# Patient Record
Sex: FEMALE | Race: WHITE | NOT HISPANIC OR LATINO | Employment: FULL TIME | URBAN - NONMETROPOLITAN AREA
[De-identification: names, ages, dates, MRNs, and addresses within clinical notes are randomized per-mention and may not be internally consistent; named-entity substitution may affect disease eponyms.]

---

## 2017-01-02 ENCOUNTER — HOSPITAL ENCOUNTER (EMERGENCY)
Facility: HOSPITAL | Age: 42
Discharge: HOME | End: 2017-01-02
Attending: PHYSICIAN ASSISTANT | Admitting: PHYSICIAN ASSISTANT
Payer: COMMERCIAL

## 2017-01-02 VITALS
OXYGEN SATURATION: 99 % | HEART RATE: 79 BPM | TEMPERATURE: 96.6 F | DIASTOLIC BLOOD PRESSURE: 93 MMHG | RESPIRATION RATE: 20 BRPM | SYSTOLIC BLOOD PRESSURE: 132 MMHG

## 2017-01-02 DIAGNOSIS — J02.9 ACUTE PHARYNGITIS, UNSPECIFIED ETIOLOGY: ICD-10-CM

## 2017-01-02 LAB
DEPRECATED S PYO AG THROAT QL EIA: NORMAL
MICRO REPORT STATUS: NORMAL
SPECIMEN SOURCE: NORMAL

## 2017-01-02 PROCEDURE — 87077 CULTURE AEROBIC IDENTIFY: CPT | Performed by: FAMILY MEDICINE

## 2017-01-02 PROCEDURE — 87081 CULTURE SCREEN ONLY: CPT | Performed by: FAMILY MEDICINE

## 2017-01-02 PROCEDURE — 87880 STREP A ASSAY W/OPTIC: CPT | Performed by: FAMILY MEDICINE

## 2017-01-02 PROCEDURE — 99213 OFFICE O/P EST LOW 20 MIN: CPT | Performed by: PHYSICIAN ASSISTANT

## 2017-01-02 PROCEDURE — 99213 OFFICE O/P EST LOW 20 MIN: CPT

## 2017-01-02 ASSESSMENT — ENCOUNTER SYMPTOMS
EYE DISCHARGE: 0
NECK STIFFNESS: 0
SORE THROAT: 1
NECK PAIN: 0
COUGH: 0
EYE REDNESS: 0
NAUSEA: 0
VOMITING: 0
DIZZINESS: 0
FATIGUE: 0
PSYCHIATRIC NEGATIVE: 1
FEVER: 0
SINUS PRESSURE: 0
TROUBLE SWALLOWING: 0
ABDOMINAL PAIN: 0
HEADACHES: 0
CARDIOVASCULAR NEGATIVE: 1
VOICE CHANGE: 0
APPETITE CHANGE: 0
DIARRHEA: 0
LIGHT-HEADEDNESS: 0

## 2017-01-02 NOTE — ED AVS SNAPSHOT
[facility]    [address] [address]    Phone:  [phone]                                       Ritesh Soliman   MRN: [id number]    Department:  [facility]   Date of Visit:  1/2/2017           After Visit Summary Signature Page     I have received my discharge instructions, and my questions have been answered. I have discussed any challenges I see with this plan with the nurse or doctor.    ..........................................................................................................................................  Patient/Patient Representative Signature      ..........................................................................................................................................  Patient Representative Print Name and Relationship to Patient    ..................................................               ................................................  Date                                            Time    ..........................................................................................................................................  Reviewed by Signature/Title    ...................................................              ..............................................  Date                                                            Time

## 2017-01-02 NOTE — ED AVS SNAPSHOT
[facility]    [address]    [place] [address]    Phone:  [phone]                                       Ritesh Soliman   MRN: [id number]    Department:  [facility]   Date of Visit:  1/2/2017           Patient Information     Date Of Birth          1975        Your diagnoses for this visit were:     Acute pharyngitis, unspecified etiology Rapid strep negative  Culture pending       You were seen by Mere Mathis PA.      Follow-up Information     Follow up with Olivia Weiss MD.    Specialty:  Family Practice    Why:  If symptoms worsen    Contact information:    [facility] [place]  [address]  [place] [address] [phone]          Follow up with [facility].    Specialty:  EMERGENCY MEDICINE    Why:  If further concerns develop    Contact information:    [address]  [place] [address] [phone]    Additional information:    From [place] Area: Take [address]. Turn left at [address]. Turn left at the first stoplight on [address]. At the first stop sign, take a right onto [address]. Take a left into the parking lot and continue through until you reach the North enterance of the building.       From [place]: Take [address]. Take the [address] ramp towards [place]. Turn left onto [address]. Take a slight right onto [address]. Turn left at the first stoplight on [address]. At the first stop sign, take a right onto [address]. Take a left into the parking lot and continue through until you reach the North enterance of the building.       From [place]: Take [address]. Take a right at [address]. At the first stop sign, take a right onto [address]. Take a left into the parking lot and continue through until you reach the North enterance of the building.       Discharge References/Attachments     PHARYNGITIS, REPORT PENDING  (ENGLISH)    SORE THROAT, WHEN YOU HAVE A (ENGLISH)         Review of your medicines      Our records show that you are taking the medicines listed below. If these are incorrect, please call your family doctor or clinic.        Dose / Directions Last dose taken    AZO TABS PO        Taking 2-4 tabs a day as needed   Refills:  0        FLUoxetine 20 MG capsule   Commonly known as:  PROzac   Quantity:  30 capsule        Take 1 capsule (20 mg) by mouth daily. DUE FOR OFFICE VISIT.   Refills:  0        ketorolac 10 MG tablet   Commonly known as:  TORADOL   Dose:  10 mg   Quantity:  60 tablet        Take 1 tablet (10 mg) by mouth every 6 hours as needed for moderate pain   Refills:  1        traZODone 50 MG tablet   Commonly known as:  DESYREL   Quantity:  30 tablet        Take 1 tablet (50 mg) by mouth nightly as needed for sleep   Refills:  5                Procedures and tests performed during your visit     Beta strep group A culture    Rapid strep screen      Orders Needing Specimen Collection     None      Pending Results     Date and Time Order Name Status Description    1/2/2017 1800 Beta strep group A culture In process             Thank you for choosing [facility]       Thank you for choosing [facility] for your care. Our goal is always to provide you with excellent care. Hearing back from our patients is one way we can continue to improve our services. Please take a few minutes to complete the written survey that you may receive in the mail after you visit with us. Thank you!        After Visit Summary       This is your record. Keep this with you and show to your community pharmacist(s) and doctor(s) at your next visit.

## 2017-01-03 ENCOUNTER — HOSPITAL ENCOUNTER (EMERGENCY)
Facility: HOSPITAL | Age: 42
Discharge: HOME | End: 2017-01-03
Attending: PHYSICIAN ASSISTANT | Admitting: PHYSICIAN ASSISTANT
Payer: COMMERCIAL

## 2017-01-03 VITALS
HEART RATE: 66 BPM | RESPIRATION RATE: 18 BRPM | DIASTOLIC BLOOD PRESSURE: 84 MMHG | TEMPERATURE: 98.2 F | SYSTOLIC BLOOD PRESSURE: 117 MMHG | OXYGEN SATURATION: 100 %

## 2017-01-03 DIAGNOSIS — T78.40XA ALLERGIC REACTION, INITIAL ENCOUNTER: Primary | ICD-10-CM

## 2017-01-03 PROCEDURE — 25000125 ZZHC RX 250: Performed by: PHYSICIAN ASSISTANT

## 2017-01-03 PROCEDURE — 99284 EMERGENCY DEPT VISIT MOD MDM: CPT | Mod: 25

## 2017-01-03 PROCEDURE — 99284 EMERGENCY DEPT VISIT MOD MDM: CPT | Performed by: PHYSICIAN ASSISTANT

## 2017-01-03 PROCEDURE — 25000132 ZZH RX MED GY IP 250 OP 250 PS 637: Performed by: PHYSICIAN ASSISTANT

## 2017-01-03 PROCEDURE — 96372 THER/PROPH/DIAG INJ SC/IM: CPT

## 2017-01-03 RX ORDER — DEXAMETHASONE SODIUM PHOSPHATE 10 MG/ML
10 INJECTION, SOLUTION INTRAMUSCULAR; INTRAVENOUS ONCE
Status: COMPLETED | OUTPATIENT
Start: 2017-01-03 | End: 2017-01-03

## 2017-01-03 RX ORDER — PREDNISONE 20 MG/1
TABLET ORAL
Qty: 10 TABLET | Refills: 0 | Status: SHIPPED | OUTPATIENT
Start: 2017-01-03 | End: 2017-05-04

## 2017-01-03 RX ADMIN — DEXAMETHASONE SODIUM PHOSPHATE 10 MG: 10 INJECTION, SOLUTION INTRAMUSCULAR; INTRAVENOUS at 11:37

## 2017-01-03 RX ADMIN — RANITIDINE HYDROCHLORIDE 150 MG: 150 TABLET, FILM COATED ORAL at 11:37

## 2017-01-03 ASSESSMENT — ENCOUNTER SYMPTOMS
WHEEZING: 0
PALPITATIONS: 0
NECK PAIN: 0
ABDOMINAL PAIN: 0
SHORTNESS OF BREATH: 0
NAUSEA: 0
NECK STIFFNESS: 0
FEVER: 0
CHILLS: 0
CHEST TIGHTNESS: 0
VOMITING: 0
SORE THROAT: 1
NEUROLOGICAL NEGATIVE: 1
COUGH: 0
BACK PAIN: 0
STRIDOR: 0

## 2017-01-03 NOTE — ED NOTES
Patient presents via [facility] for concern of allergic reaction to old antibiotic she took.  She is unsure of the what the medication was but 1-2 minutes after taking it patient felt her throat was closing and she felt itchy.  Patient did take 2 tabs of benadryl prior to EMS arriving.  Patient reports she feels like she is able to swallow much better.

## 2017-01-03 NOTE — ED AVS SNAPSHOT
[facility]    [address]    [place] [address]    Phone:  [phone]                                       Ritesh Soliman   MRN: [id number]    Department:  [facility]   Date of Visit:  1/3/2017           Patient Information     Date Of Birth          1975        Your diagnoses for this visit were:     Allergic reaction, initial encounter        You were seen by Dafne Maciel PA-C.      Follow-up Information     Follow up with Olivia Weiss MD In 1 week.    Specialty:  Family Practice    Why:  As needed    Contact information:    [facility] [place]  [address]  [place] [address] [phone]          Follow up with [facility].    Specialty:  EMERGENCY MEDICINE    Why:  If symptoms worsen    Contact information:    [address]  [place] [address] [phone]    Additional information:    From [place] Area: Take [address]. Turn left at [address]. Turn left at the first stoplight on [address]. At the first stop sign, take a right onto [address]. Take a left into the parking lot and continue through until you reach the North enterance of the building.       From [place]: Take [address]. Take the [address] ramp towards [place]. Turn left onto [address]. Take a slight right onto [address]. Turn left at the first stoplight on [address]. At the first stop sign, take a right onto [address]. Take a left into the parking lot and continue through until you reach the North enterance of the building.       From [place]: Take [address]. Take a right at [address]. At the first stop sign, take a right onto [address]. Take a left into the parking lot and continue through until you reach the North enterance of the building.         Discharge Instructions       Take the Prednisone as prescribed for your sore throat and allergic reaction today. You  "have an allergy to augmentin (amoxicillin-clavulanate) and should not take this in the future. Continue taking Benadryl 25-50mg every 4-6 hours, max of 300mg daily. Return here with any difficulty breathing or new/worsening symptoms.    Drug Reaction: Allergic  You are having an allergic reaction to a drug you have taken. This causes an itchy rash and sometimes swelling of various parts of the body. It may also cause trouble swallowing or breathing. The rash may take a few hours or up to two weeks to go away. In the future, remember to tell your doctor about your allergy to this drug so that drugs of this type won't be used again.  Home Care:  1) Throw the drug away and do not take it again. The next reaction may be much worse.  2) Avoid tight clothing and anything that heats up your skin (hot showers/baths, direct sunlight) since heat will make itching worse.  3) An ice pack (ice cubes in a plastic bag, wrapped in a towel) will relieve local areas of intense itching and redness. Lanacaine cream or Solarcaine spray (or other product containing \"benzocaine\") will reduce the itching.  4) Avoid scratching which may worsen the reaction, damage your skin and lead to an infection.  5) Oral Benadryl (diphenhydramine) is an antihistamine available at drug and grocery stores. Unless a prescription antihistamine was given, Benadryl may be used to reduce itching if large areas of the skin are involved. Use lower doses during the daytime and higher doses at bedtime since the drug may make you sleepy. [NOTE: Do not use Benadryl if you have glaucoma or if you are a man with trouble urinating due to an enlarged prostate.] Claritin (loratadine) is an antihistamine that causes less drowsiness and is a good alternative for daytime use.  Follow Up  with your doctor or this facility in the next two days if your symptoms do not continue to improve.  Get Prompt Medical Attention  if any of the following occur:  -- Wheezing, shortness of " breath or difficulty swallowing  -- Increased swelling in the face, eyelids, mouth, lips, tongue or throat  -- Dizziness, weakness or fainting    1199-5387 [employer]. [address] [place] [address], [place] [address]. All rights reserved. This information is not intended as a substitute for professional medical care. Always follow your healthcare professional's instructions.             Review of your medicines      START taking        Dose / Directions Last dose taken    predniSONE 20 MG tablet   Commonly known as:  DELTASONE   Quantity:  10 tablet        Take two tablets (= 40mg) each day for 5 (five) days   Refills:  0          Our records show that you are taking the medicines listed below. If these are incorrect, please call your family doctor or clinic.        Dose / Directions Last dose taken    AZO TABS PO        Taking 2-4 tabs a day as needed   Refills:  0        FLUoxetine 20 MG capsule   Commonly known as:  PROzac   Quantity:  30 capsule        Take 1 capsule (20 mg) by mouth daily. DUE FOR OFFICE VISIT.   Refills:  0        ketorolac 10 MG tablet   Commonly known as:  TORADOL   Dose:  10 mg   Quantity:  60 tablet        Take 1 tablet (10 mg) by mouth every 6 hours as needed for moderate pain   Refills:  1        lidocaine visc 2% 2.5mL/5mL & maalox/mylanta w/ simeth 2.5mL/5mL & diphenhydrAMINE 5mg/5mL Susp suspension   Commonly known as:  MAGIC Mouthwash   Dose:  10 mL   Quantity:  1 Bottle        Swish and swallow 10 mLs in mouth every 6 hours as needed for mouth sores   Refills:  0        traZODone 50 MG tablet   Commonly known as:  DESYREL   Quantity:  30 tablet        Take 1 tablet (50 mg) by mouth nightly as needed for sleep   Refills:  5                Prescriptions were sent or printed at these locations (1 Prescription)                   [facility] - [place], [place] - [address] [place] [address] [place] [address]    Telephone:  [phone]   Fax:  [phone]   Hours:                   E-Prescribed (1 of 1)         predniSONE (DELTASONE) 20 MG tablet                Procedures and tests performed during your visit     Pulse oximetry      Orders Needing Specimen Collection     None      Pending Results     Date and Time Order Name Status Description    1/2/2017 1800 Beta strep group A culture Preliminary             Pending Culture Results     Date and Time Order Name Status Description    1/2/2017 1800 Beta strep group A culture Preliminary             Thank you for choosing [facility]       Thank you for choosing [facility] for your care. Our goal is always to provide you with excellent care. Hearing back from our patients is one way we can continue to improve our services. Please take a few minutes to complete the written survey that you may receive in the mail after you visit with us. Thank you!        Care EveryWhere ID     This is your Care EveryWhere ID. This could be used by other organizations to access your [facility] medical records  [id number]        After Visit Summary       This is your record. Keep this with you and show to your community pharmacist(s) and doctor(s) at your next visit.

## 2017-01-03 NOTE — ED PROVIDER NOTES
History   No chief complaint on file.    The history is provided by the patient. No  was used.     Ritesh Soliman is a 41 year old female who has 3 days of sore throat. Denies n/v/d/f/c. No ear pain. No sinus pain/pressure    Allergies as of 01/02/2017     (No Known Allergies)     Discharge Medication List as of 1/2/2017  6:29 PM      CONTINUE these medications which have NOT CHANGED    Details   ketorolac (TORADOL) 10 MG tablet Take 1 tablet (10 mg) by mouth every 6 hours as needed for moderate pain, Disp-60 tablet, R-1, E-Prescribe      FLUoxetine (PROZAC) 20 MG capsule Take 1 capsule (20 mg) by mouth daily. DUE FOR OFFICE VISIT., Disp-30 capsule, R-0, E-Prescribe      traZODone (DESYREL) 50 MG tablet Take 1 tablet (50 mg) by mouth nightly as needed for sleep, Disp-30 tablet, R-5, E-Prescribe      Phenazopyridine HCl (AZO TABS PO) Taking 2-4 tabs a day as needed, Historical           Past Medical History   Diagnosis Date     Tobacco abuse      Depression with anxiety      Past Surgical History   Procedure Laterality Date     Oophorectomy       tubal pregnancy     Tubal ligation       Family History   Problem Relation Age of Onset     C.A.D. No family hx of      Breast Cancer No family hx of      Cancer - colorectal No family hx of      DIABETES No family hx of      Thyroid Disease No family hx of      Asthma No family hx of      Colon Cancer No family hx of      Hyperlipidemia No family hx of      Coronary Artery Disease Mother      Hypertension Mother      Coronary Artery Disease Father      Social History     Social History     Marital Status: Single     Spouse Name: N/A     Number of Children: N/A     Years of Education: N/A     Social History Main Topics     Smoking status: Current Some Day Smoker -- 0.50 packs/day     Types: Cigarettes     Smokeless tobacco: Never Used      Comment: rarely smokes     Alcohol Use: No     Drug Use: No     Sexual Activity:     Partners: Male     Other Topics  Concern     Parent/Sibling W/ Cabg, Mi Or Angioplasty Before 65f 55m? Yes     dad had heart attack      Service No     Blood Transfusions Yes     Permits if needed     Caffeine Concern Yes     3 soda     Occupational Exposure No     Hobby Hazards No     Sleep Concern Yes     Stress Concern No     Weight Concern Yes     Special Diet No     Back Care Yes     Exercise No     Seat Belt Yes     Self-Exams No     Education given     Social History Narrative       I have reviewed the Medications, Allergies, Past Medical and Surgical History, and Social History in the Epic system.    Review of Systems   Constitutional: Negative for fever, appetite change and fatigue.   HENT: Positive for sore throat. Negative for congestion, ear pain, sinus pressure, trouble swallowing and voice change.    Eyes: Negative for discharge and redness.   Respiratory: Negative for cough.    Cardiovascular: Negative.    Gastrointestinal: Negative for nausea, vomiting, abdominal pain and diarrhea.   Genitourinary: Negative.    Musculoskeletal: Negative for neck pain and neck stiffness.   Skin: Negative for rash.   Neurological: Negative for dizziness, light-headedness and headaches.   Psychiatric/Behavioral: Negative.        Physical Exam   BP: 132/93 mmHg  Pulse: 79  Temp: 96.6  F (35.9  C)  Resp: 20  SpO2: 99 %  Physical Exam   Constitutional: She is oriented to person, place, and time. She appears well-developed and well-nourished. No distress.   HENT:   Head: Normocephalic and atraumatic.   Right Ear: External ear normal.   Left Ear: External ear normal.   Mouth/Throat: Oropharynx is clear and moist.   Bilateral TMs/canals clear/wnl  No sinus TTP     Eyes: Conjunctivae and EOM are normal. Right eye exhibits no discharge. Left eye exhibits no discharge.   Neck: Normal range of motion. Neck supple.   Cardiovascular: Normal rate, regular rhythm and normal heart sounds.    Pulmonary/Chest: Effort normal and breath sounds normal. No  respiratory distress.   Abdominal: Soft. Bowel sounds are normal. She exhibits no distension. There is no tenderness.   Neurological: She is alert and oriented to person, place, and time.   Skin: Skin is warm and dry. No rash noted. She is not diaphoretic.   Psychiatric: She has a normal mood and affect.   Nursing note and vitals reviewed.      ED Course   Procedures      Labs Ordered and Resulted from Time of ED Arrival Up to the Time of Departure from the ED   RAPID STREP SCREEN       Assessments & Plan (with Medical Decision Making)     I have reviewed the nursing notes.    I have reviewed the findings, diagnosis, plan and need for follow up with the patient.    Final diagnoses:   Acute pharyngitis, unspecified etiology - Rapid strep negative  Culture pending           Patient verbally educated and given appropriate education sheets for the diagnoses and has no questions.  Take medications as directed.   Follow up with your Primary Care provider if symptoms increase or if concerns develop, return to the ER  Mere Mathis Certified  Physician Assistant  1/4/2017  10:33 AM  URGENT CARE CLINIC      1/2/2017   [facility]      Mere Mathis PA  01/04/17 1033

## 2017-01-03 NOTE — ED PROVIDER NOTES
History     Chief Complaint   Patient presents with     Medication Reaction     took an old abx unsure what it was,  felt itchy immediately after taking it.     HPI  Ritesh Soliman is a 41 year old female who presents via [facility] for a medication reaction. She took an old antibiotic (augmentin) she had at home for a sore throat this morning and immediately after, she felt flushed and itchy from her head to toes and felt her throat was swelling. She took Benadryl 50mg PO following. By the time EMS arrived, she states her symptoms were already improving. No epi was given by EMS because there were no signs of anaphylaxis. Pt has no history of medication allergies. Denies abd pain, n/v, wheezing, CP, or dyspnea.     I have reviewed the Medications, Allergies, Past Medical and Surgical History, and Social History in the Epic system.    Review of Systems   Constitutional: Negative for fever and chills.   HENT: Positive for sore throat. Negative for congestion.    Respiratory: Negative for cough, chest tightness, shortness of breath, wheezing and stridor.    Cardiovascular: Negative for chest pain, palpitations and leg swelling.   Gastrointestinal: Negative for nausea, vomiting and abdominal pain.   Genitourinary: Negative.    Musculoskeletal: Negative for back pain, neck pain and neck stiffness.   Skin: Negative.    Neurological: Negative.    All other systems reviewed and are negative.    Past Medical History:   Past Medical History   Diagnosis Date     Tobacco abuse      Depression with anxiety        Past Surgical History   Procedure Laterality Date     Oophorectomy       tubal pregnancy     Tubal ligation         Social History     Social History     Marital Status: Single     Spouse Name: N/A     Number of Children: N/A     Years of Education: N/A     Occupational History     Not on file.     Social History Main Topics     Smoking status: Current Some Day Smoker -- 0.50 packs/day     Types: Cigarettes      Smokeless tobacco: Never Used      Comment: rarely smokes     Alcohol Use: 0.0 oz/week     0 Standard drinks or equivalent per week      Comment: occassionally     Drug Use: No     Sexual Activity:     Partners: Male     Other Topics Concern     Parent/Sibling W/ Cabg, Mi Or Angioplasty Before 65f 55m? Yes     dad had heart attack      Service No     Blood Transfusions Yes     Permits if needed     Caffeine Concern Yes     3 soda     Occupational Exposure No     Hobby Hazards No     Sleep Concern Yes     Stress Concern No     Weight Concern Yes     Special Diet No     Back Care Yes     Exercise No     Seat Belt Yes     Self-Exams No     Education given     Social History Narrative       Discharge Medication List as of 1/3/2017 12:35 PM      START taking these medications    Details   predniSONE (DELTASONE) 20 MG tablet Take two tablets (= 40mg) each day for 5 (five) days, Disp-10 tablet, R-0, E-Prescribe         CONTINUE these medications which have NOT CHANGED    Details   magic mouthwash suspension (diphenhydrAMINE 5 mg/5 mL, lidocaine 50 mg/5 mL, Maalox 2.5 g/5 mL , simethicone 6.65 mg/5 mL) Swish and swallow 10 mLs in mouth every 6 hours as needed for mouth sores, Disp-1 Bottle, R-0, Fax      FLUoxetine (PROZAC) 20 MG capsule Take 1 capsule (20 mg) by mouth daily. DUE FOR OFFICE VISIT., Disp-30 capsule, R-0, E-Prescribe      Phenazopyridine HCl (AZO TABS PO) Taking 2-4 tabs a day as needed, Historical      traZODone (DESYREL) 50 MG tablet Take 1 tablet (50 mg) by mouth nightly as needed for sleep, Disp-30 tablet, R-5, E-Prescribe      ketorolac (TORADOL) 10 MG tablet Take 1 tablet (10 mg) by mouth every 6 hours as needed for moderate pain, Disp-60 tablet, R-1, E-Prescribe             Allergies: Augmentin    Physical Exam   BP: 104/82 mmHg  Pulse: 73  Temp: 98.2  F (36.8  C)  Resp: 16  SpO2: 94 %  Physical Exam   Constitutional: She is oriented to person, place, and time. She appears well-developed and  well-nourished. No distress.   HENT:   Head: Normocephalic and atraumatic.   Right Ear: External ear normal.   Left Ear: External ear normal.   Nose: Nose normal.   Mouth/Throat: Uvula is midline and mucous membranes are normal. Posterior oropharyngeal erythema present. No oropharyngeal exudate, posterior oropharyngeal edema or tonsillar abscesses.   Eyes: Conjunctivae and EOM are normal. Pupils are equal, round, and reactive to light. Right eye exhibits no discharge. Left eye exhibits no discharge. No scleral icterus.   Neck: Normal range of motion. Neck supple.   Cardiovascular: Normal rate, regular rhythm, normal heart sounds and intact distal pulses.  Exam reveals no gallop and no friction rub.    No murmur heard.  Pulmonary/Chest: Effort normal and breath sounds normal. No respiratory distress. She has no wheezes. She has no rales. She exhibits no tenderness.   Abdominal: Soft. Bowel sounds are normal. There is no tenderness.   Musculoskeletal: She exhibits no edema.   Lymphadenopathy:     She has no cervical adenopathy.   Neurological: She is alert and oriented to person, place, and time. No cranial nerve deficit. Coordination normal.   Skin: Skin is warm and dry. No rash noted. She is not diaphoretic. No erythema. No pallor.   Face is flushed.   Psychiatric: She has a normal mood and affect. Her behavior is normal. Judgment and thought content normal.   Nursing note and vitals reviewed.      ED Course   Procedures             Labs Ordered and Resulted from Time of ED Arrival Up to the Time of Departure from the ED - No data to display       Pt is in NAD and VS are WNL. No signs of anaphylaxis on exam. She was given Decadron 10mg IM and Zantac 150mg PO. She was monitored for 1 hour in the ED and her symptoms have resolved. Allergy was listed for augmentin in her chart. She was discharged home in good condition.    Plan: Take the Prednisone as prescribed for your sore throat and allergic reaction today. You  have an allergy to augmentin (amoxicillin-clavulanate) and should not take this in the future. Continue taking Benadryl 25-50mg every 4-6 hours, max of 300mg daily. Return here with any difficulty breathing or new/worsening symptoms.      I have reviewed the nursing notes.    I have reviewed the findings, diagnosis, plan and need for follow up with the patient.    Discharge Medication List as of 1/3/2017 12:35 PM      START taking these medications    Details   predniSONE (DELTASONE) 20 MG tablet Take two tablets (= 40mg) each day for 5 (five) days, Disp-10 tablet, R-0, E-Prescribe             Final diagnoses:   Allergic reaction, initial encounter       1/3/2017   [facility]      Dafne Maciel PA-C  01/03/17 [id number]

## 2017-01-03 NOTE — DISCHARGE INSTRUCTIONS
"Take the Prednisone as prescribed for your sore throat and allergic reaction today. You have an allergy to augmentin (amoxicillin-clavulanate) and should not take this in the future. Continue taking Benadryl 25-50mg every 4-6 hours, max of 300mg daily. Return here with any difficulty breathing or new/worsening symptoms.    Drug Reaction: Allergic  You are having an allergic reaction to a drug you have taken. This causes an itchy rash and sometimes swelling of various parts of the body. It may also cause trouble swallowing or breathing. The rash may take a few hours or up to two weeks to go away. In the future, remember to tell your doctor about your allergy to this drug so that drugs of this type won't be used again.  Home Care:  1) Throw the drug away and do not take it again. The next reaction may be much worse.  2) Avoid tight clothing and anything that heats up your skin (hot showers/baths, direct sunlight) since heat will make itching worse.  3) An ice pack (ice cubes in a plastic bag, wrapped in a towel) will relieve local areas of intense itching and redness. Lanacaine cream or Solarcaine spray (or other product containing \"benzocaine\") will reduce the itching.  4) Avoid scratching which may worsen the reaction, damage your skin and lead to an infection.  5) Oral Benadryl (diphenhydramine) is an antihistamine available at drug and grocery stores. Unless a prescription antihistamine was given, Benadryl may be used to reduce itching if large areas of the skin are involved. Use lower doses during the daytime and higher doses at bedtime since the drug may make you sleepy. [NOTE: Do not use Benadryl if you have glaucoma or if you are a man with trouble urinating due to an enlarged prostate.] Claritin (loratadine) is an antihistamine that causes less drowsiness and is a good alternative for daytime use.  Follow Up  with your doctor or this facility in the next two days if your symptoms do not continue to " improve.  Get Prompt Medical Attention  if any of the following occur:  -- Wheezing, shortness of breath or difficulty swallowing  -- Increased swelling in the face, eyelids, mouth, lips, tongue or throat  -- Dizziness, weakness or fainting    9622-7665 [employer]. [address] [place] [address]31. All rights reserved. This information is not intended as a substitute for professional medical care. Always follow your healthcare professional's instructions.

## 2017-01-03 NOTE — ED AVS SNAPSHOT
[facility]    [address] [address]    Phone:  [phone]                                       Ritesh Soliman   MRN: [id number]    Department:  [facility]   Date of Visit:  1/3/2017           After Visit Summary Signature Page     I have received my discharge instructions, and my questions have been answered. I have discussed any challenges I see with this plan with the nurse or doctor.    ..........................................................................................................................................  Patient/Patient Representative Signature      ..........................................................................................................................................  Patient Representative Print Name and Relationship to Patient    ..................................................               ................................................  Date                                            Time    ..........................................................................................................................................  Reviewed by Signature/Title    ...................................................              ..............................................  Date                                                            Time

## 2017-01-03 NOTE — ED NOTES
"Pt resting on cot, states \"swelling and swallowing getting better\".  No noted difficulty noted.  Pt able to swallow small amount of water and pill with no difficulty.    "

## 2017-01-04 LAB
BACTERIA SPEC CULT: ABNORMAL
MICRO REPORT STATUS: ABNORMAL
SPECIMEN SOURCE: ABNORMAL

## 2017-01-04 NOTE — PROGRESS NOTES
Quick Note:    Beta Strep Group A Culture -FINAL-  Culture Micro: Beta hemolytic Streptococcus group A    Discussed results with LIZBETH Simpson, prescription for a Z salena called into [facility] in [place]. TC to patient with results. Pt verbalized understanding of need to  antibiotic and had no further questions at this time. Pt to f/u with PCP PRN, results routed to Olivia Weiss MD.  ______

## 2017-01-06 ENCOUNTER — APPOINTMENT (OUTPATIENT)
Dept: CARE COORDINATION | Facility: OTHER | Age: 42
End: 2017-01-06
Attending: PSYCHIATRY & NEUROLOGY
Payer: COMMERCIAL

## 2017-02-23 DIAGNOSIS — G47.00 INSOMNIA: ICD-10-CM

## 2017-02-24 RX ORDER — TRAZODONE HYDROCHLORIDE 50 MG/1
TABLET, FILM COATED ORAL
Qty: 30 TABLET | Refills: 3 | Status: SHIPPED | OUTPATIENT
Start: 2017-02-24 | End: 2017-09-01

## 2017-05-04 ENCOUNTER — HOSPITAL ENCOUNTER (EMERGENCY)
Facility: HOSPITAL | Age: 42
Discharge: HOME | End: 2017-05-04
Attending: NURSE PRACTITIONER | Admitting: NURSE PRACTITIONER
Payer: COMMERCIAL

## 2017-05-04 VITALS
DIASTOLIC BLOOD PRESSURE: 84 MMHG | SYSTOLIC BLOOD PRESSURE: 130 MMHG | HEART RATE: 68 BPM | TEMPERATURE: 96.8 F | RESPIRATION RATE: 20 BRPM | OXYGEN SATURATION: 97 %

## 2017-05-04 DIAGNOSIS — R07.0 THROAT PAIN: ICD-10-CM

## 2017-05-04 DIAGNOSIS — J06.9 VIRAL UPPER RESPIRATORY TRACT INFECTION: ICD-10-CM

## 2017-05-04 LAB
DEPRECATED S PYO AG THROAT QL EIA: NORMAL
MICRO REPORT STATUS: NORMAL
SPECIMEN SOURCE: NORMAL

## 2017-05-04 PROCEDURE — 99213 OFFICE O/P EST LOW 20 MIN: CPT | Performed by: NURSE PRACTITIONER

## 2017-05-04 PROCEDURE — 87081 CULTURE SCREEN ONLY: CPT | Performed by: FAMILY MEDICINE

## 2017-05-04 PROCEDURE — 87880 STREP A ASSAY W/OPTIC: CPT | Performed by: FAMILY MEDICINE

## 2017-05-04 PROCEDURE — 99213 OFFICE O/P EST LOW 20 MIN: CPT

## 2017-05-04 RX ORDER — BENZONATATE 100 MG/1
100 CAPSULE ORAL 3 TIMES DAILY PRN
Qty: 30 CAPSULE | Refills: 0 | Status: SHIPPED | OUTPATIENT
Start: 2017-05-04 | End: 2017-10-03

## 2017-05-04 ASSESSMENT — ENCOUNTER SYMPTOMS
SHORTNESS OF BREATH: 1
ABDOMINAL PAIN: 0
DYSURIA: 0
VOMITING: 0
FEVER: 0
APPETITE CHANGE: 0
COUGH: 1
DIARRHEA: 1
SINUS PRESSURE: 0
TROUBLE SWALLOWING: 0
ACTIVITY CHANGE: 0
WHEEZING: 1
STRIDOR: 0
PSYCHIATRIC NEGATIVE: 1
CHILLS: 0
NAUSEA: 0
RHINORRHEA: 0
SORE THROAT: 1

## 2017-05-04 NOTE — ED AVS SNAPSHOT
[facility]    [address] [address]    Phone:  [phone]                                       Ritesh Soliman   MRN: [id number]    Department:  [facility]   Date of Visit:  5/4/2017           After Visit Summary Signature Page     I have received my discharge instructions, and my questions have been answered. I have discussed any challenges I see with this plan with the nurse or doctor.    ..........................................................................................................................................  Patient/Patient Representative Signature      ..........................................................................................................................................  Patient Representative Print Name and Relationship to Patient    ..................................................               ................................................  Date                                            Time    ..........................................................................................................................................  Reviewed by Signature/Title    ...................................................              ..............................................  Date                                                            Time

## 2017-05-04 NOTE — ED AVS SNAPSHOT
[facility]    [address]    [place] [address]    Phone:  [phone]                                       Ritesh Soliman   MRN: [id number]    Department:  [facility]   Date of Visit:  5/4/2017           Patient Information     Date Of Birth          1975        Your diagnoses for this visit were:     Throat pain     Viral upper respiratory tract infection        You were seen by Harleen Garcia NP.      Follow-up Information     Follow up with Olivia Weiss MD.    Specialty:  Family Practice    Why:  As needed, If symptoms worsen    Contact information:    [facility] [place]  [address]  [place] [address] [phone]          Follow up with [facility].    Specialty:  EMERGENCY MEDICINE    Why:  As needed, If symptoms worsen    Contact information:    [address]  [place] [address] [phone]    Additional information:    From [place] Area: Take [address]. Turn left at [address]. Turn left at the first stoplight on [address]. At the first stop sign, take a right onto [address]. Take a left into the parking lot and continue through until you reach the North enterance of the building.       From [place]: Take [address]. Take the [address] ramp towards [place]. Turn left onto [address]. Take a slight right onto [address]. Turn left at the first stoplight on [address]. At the first stop sign, take a right onto [address]. Take a left into the parking lot and continue through until you reach the North enterance of the building.       From [place]: Take [address]. Take a right at [address]. At the first stop sign, take a right onto [address]. Take a left into the parking lot and continue through until you reach the North enterance of the building.         Discharge Instructions       Increase fluid intake.   Take tylenol and or  ibuprofen for pain or fever. Follow dosing on package.   Take tessalon as needed as directed for cough.   Rapid strep is negative. Culture will grow in lab for 3 days and if positive we will call you.   Follow up with PCP with any increase in symptoms or concerns.   Return to urgent care or emergency department with any increase in symptoms or concerns.     Discharge References/Attachments     SORE THROAT, WHEN YOU HAVE A (ENGLISH)    URI, VIRAL, NO ABX (ADULT) (ENGLISH)    SORE THROATS, SELF-CARE FOR (ENGLISH)         Review of your medicines      START taking        Dose / Directions Last dose taken    benzonatate 100 MG capsule   Commonly known as:  TESSALON   Dose:  100 mg   Quantity:  30 capsule        Take 1 capsule (100 mg) by mouth 3 times daily as needed for cough   Refills:  0          Our records show that you are taking the medicines listed below. If these are incorrect, please call your family doctor or clinic.        Dose / Directions Last dose taken    AZO TABS PO        Taking 2-4 tabs a day as needed   Refills:  0        ketorolac 10 MG tablet   Commonly known as:  TORADOL   Dose:  10 mg   Quantity:  60 tablet        Take 1 tablet (10 mg) by mouth every 6 hours as needed for moderate pain   Refills:  1        traZODone 50 MG tablet   Commonly known as:  DESYREL   Quantity:  30 tablet        Take 1 tablet (50 mg) by mouth nightly as needed for sleep   Refills:  3        UNABLE TO FIND        MEDICATION NAME: ***   Refills:  0                Prescriptions were sent or printed at these locations (1 Prescription)                   [facility] [address] [place] [address] [facility] [place], [place] - [address] AT [facility] [address]   [address], [place] [address]    Telephone:  [phone]   Fax:  [phone]   Hours:                  E-Prescribed (1 of 1)         benzonatate (TESSALON) 100 MG capsule                Procedures and tests performed during your visit     Beta strep group A culture    Rapid strep  "screen      Orders Needing Specimen Collection     None      Pending Results     Date and Time Order Name Status Description    2017 1508 Beta strep group A culture In process             Pending Culture Results     Date and Time Order Name Status Description    2017 1508 Beta strep group A culture In process             Thank you for choosing [facility]       Thank you for choosing [facility] for your care. Our goal is always to provide you with excellent care. Hearing back from our patients is one way we can continue to improve our services. Please take a few minutes to complete the written survey that you may receive in the mail after you visit with us. Thank you!        Help Scout Information     Help Scout lets you send messages to your doctor, view your test results, renew your prescriptions, schedule appointments and more. To sign up, go to [email or web address] . Click on \"Log in\" on the left side of the screen, which will take you to the Welcome page. Then click on \"Sign up Now\" on the right side of the page.     You will be asked to enter the access code listed below, as well as some personal information. Please follow the directions to create your username and password.     Your access code is: [email or web address]  Expires: 2017  3:55 PM     Your access code will  in 90 days. If you need help or a new code, please call your [facility] clinic or [phone].        Care EveryWhere ID     This is your Care EveryWhere ID. This could be used by other organizations to access your [facility] medical records  [id number]        After Visit Summary       This is your record. Keep this with you and show to your community pharmacist(s) and doctor(s) at your next visit.                  "

## 2017-05-04 NOTE — ED PROVIDER NOTES
"  History     Chief Complaint   Patient presents with     Pharyngitis     Cough     The history is provided by the patient. No  was used.     Ritesh Soliman is a 41 year old female who presents with an cough and sore throat that started 4 days ago. She's taken ibuprofen and sucking on cough drops with mild effectiveness. Denies fever, chills, or night sweats. Eating and drinking well. Bowel and bladder are working well. No antibiotic use in the past 30 days. She smokes cigarettes once a week and \"not a lot.\"    I have reviewed the Medications, Allergies, Past Medical and Surgical History, and Social History in the Epic system.    Review of Systems   Constitutional: Negative for activity change, appetite change, chills and fever.   HENT: Positive for postnasal drip and sore throat. Negative for congestion, ear discharge, ear pain, rhinorrhea, sinus pressure and trouble swallowing.    Respiratory: Positive for cough, shortness of breath and wheezing. Negative for stridor.         SOB with cough.    Gastrointestinal: Positive for diarrhea. Negative for abdominal pain, nausea and vomiting.        Diarrhea once on Tuesday and Wednesday.    Genitourinary: Negative for dysuria.   Skin: Negative for rash.   Psychiatric/Behavioral: Negative.        Physical Exam   BP: 130/84  Pulse: 68  Temp: 96.8  F (36  C)  Resp: 20  SpO2: 97 %  Physical Exam   Constitutional: She is oriented to person, place, and time. She appears well-developed and well-nourished. No distress.   HENT:   Head: Normocephalic.   Right Ear: External ear normal.   Left Ear: External ear normal.   Oropharynx with erythema without exudate. NO TTP to sinuses.    Neck: Normal range of motion. Neck supple.   Cardiovascular: Normal rate, regular rhythm and normal heart sounds.    No murmur heard.  Pulmonary/Chest: Effort normal. No respiratory distress. She has no wheezes. She has no rales.   Abdominal: Soft. She exhibits no distension. "   Musculoskeletal: Normal range of motion.   Lymphadenopathy:     She has no cervical adenopathy.   Neurological: She is alert and oriented to person, place, and time.   Skin: Skin is warm and dry. No rash noted. She is not diaphoretic.   Psychiatric: She has a normal mood and affect. Her behavior is normal.   Nursing note and vitals reviewed.      ED Course     ED Course     Procedures    Labs Ordered and Resulted from Time of ED Arrival Up to the Time of Departure from the ED   RAPID STREP SCREEN   BETA STREP GROUP A CULTURE     Results for orders placed or performed during the hospital encounter of 05/04/17   Rapid strep screen   Result Value Ref Range    Specimen Description Throat     Rapid Strep A Screen       NEGATIVE: No Group A streptococcal antigen detected by immunoassay, await   culture report.      Micro Report Status FINAL 05/04/2017          Assessments & Plan (with Medical Decision Making)     Discussed plan of care. She verbalized understanding. All questions answered.     I have reviewed the nursing notes.    I have reviewed the findings, diagnosis, plan and need for follow up with the patient.  Discharged in stable condition.     New Prescriptions    BENZONATATE (TESSALON) 100 MG CAPSULE    Take 1 capsule (100 mg) by mouth 3 times daily as needed for cough       Final diagnoses:   Throat pain   Viral upper respiratory tract infection     Increase fluid intake.   Take tylenol and or ibuprofen for pain or fever. Follow dosing on package.   Take tessalon as needed as directed for cough.   Rapid strep is negative. Culture will grow in lab for 3 days and if positive we will call you.   Follow up with PCP with any increase in symptoms or concerns.   Return to urgent care or emergency department with any increase in symptoms or concerns.     SARBJIT Amezquita  5/4/2017  3:33 PM  [facility]       Harleen Garcia NP  05/04/17 9871

## 2017-05-04 NOTE — DISCHARGE INSTRUCTIONS
Increase fluid intake.   Take tylenol and or ibuprofen for pain or fever. Follow dosing on package.   Take tessalon as needed as directed for cough.   Rapid strep is negative. Culture will grow in lab for 3 days and if positive we will call you.   Follow up with PCP with any increase in symptoms or concerns.   Return to urgent care or emergency department with any increase in symptoms or concerns.

## 2017-05-04 NOTE — ED NOTES
Patient came in with strep throat like symptoms, throat pain, coughing, burning in chest with coughing. Pain 6/10. Ibuprofen & cough drops.

## 2017-05-06 LAB
BACTERIA SPEC CULT: NORMAL
MICRO REPORT STATUS: NORMAL
SPECIMEN SOURCE: NORMAL

## 2017-08-24 DIAGNOSIS — G89.4 CHRONIC PAIN SYNDROME: Primary | ICD-10-CM

## 2017-08-24 RX ORDER — TRAMADOL HYDROCHLORIDE 50 MG/1
TABLET ORAL
Qty: 20 TABLET | Refills: 0 | Status: SHIPPED | OUTPATIENT
Start: 2017-08-24 | End: 2017-10-03

## 2017-08-24 NOTE — TELEPHONE ENCOUNTER
Controlled Substance Refill Request for Ultram - Never on med list.  Problem List Complete:  No     PROVIDER TO CONSIDER COMPLETION OF PROBLEM LIST AND OVERVIEW/CONTROLLED SUBSTANCE AGREEMENT      Last Office Visit with [facility] primary care provider: 12.13.16    Future Office visit:     Controlled substance agreement on file: No.       checked in past 6 months?  No, route to RN

## 2017-08-24 NOTE — TELEPHONE ENCOUNTER
[place], PCP is Dr. Chelsi Weiss.  This has never been filled in EPIC and according to the [place] , has not been filled in last 12 months.  Should pt be seen?    Alanna Avalos RN-BSN  Chronic Pain Care Coordinator  [phone]

## 2017-09-01 DIAGNOSIS — G47.00 INSOMNIA: ICD-10-CM

## 2017-09-01 RX ORDER — TRAZODONE HYDROCHLORIDE 50 MG/1
TABLET, FILM COATED ORAL
Qty: 30 TABLET | Refills: 2 | Status: SHIPPED | OUTPATIENT
Start: 2017-09-01 | End: 2018-02-12

## 2017-10-03 ENCOUNTER — OFFICE VISIT (OUTPATIENT)
Dept: FAMILY MEDICINE | Facility: OTHER | Age: 42
End: 2017-10-03
Attending: FAMILY MEDICINE
Payer: COMMERCIAL

## 2017-10-03 ENCOUNTER — OFFICE VISIT (OUTPATIENT)
Dept: BEHAVIORAL HEALTH | Facility: OTHER | Age: 42
End: 2017-10-03
Attending: SOCIAL WORKER
Payer: COMMERCIAL

## 2017-10-03 VITALS
DIASTOLIC BLOOD PRESSURE: 80 MMHG | TEMPERATURE: 97.7 F | HEART RATE: 88 BPM | BODY MASS INDEX: 30.97 KG/M2 | OXYGEN SATURATION: 96 % | HEIGHT: 63 IN | SYSTOLIC BLOOD PRESSURE: 118 MMHG | WEIGHT: 174.8 LBS | RESPIRATION RATE: 20 BRPM

## 2017-10-03 DIAGNOSIS — R69 DIAGNOSIS DEFERRED: Primary | ICD-10-CM

## 2017-10-03 DIAGNOSIS — R30.0 DYSURIA: Primary | ICD-10-CM

## 2017-10-03 DIAGNOSIS — M77.50 TENDONITIS OF ANKLE OR FOOT: ICD-10-CM

## 2017-10-03 DIAGNOSIS — R82.90 ABNORMAL URINE FINDINGS: ICD-10-CM

## 2017-10-03 DIAGNOSIS — N60.81 SEBACEOUS CYST OF BREAST, RIGHT: ICD-10-CM

## 2017-10-03 DIAGNOSIS — N30.00 ACUTE CYSTITIS WITHOUT HEMATURIA: ICD-10-CM

## 2017-10-03 DIAGNOSIS — B37.31 YEAST INFECTION OF THE VAGINA: ICD-10-CM

## 2017-10-03 DIAGNOSIS — M72.2 PLANTAR FASCIITIS: ICD-10-CM

## 2017-10-03 LAB
ALBUMIN UR-MCNC: 10 MG/DL
APPEARANCE UR: CLEAR
BACTERIA #/AREA URNS HPF: ABNORMAL /HPF
BILIRUB UR QL STRIP: NEGATIVE
COLOR UR AUTO: YELLOW
GLUCOSE UR STRIP-MCNC: NEGATIVE MG/DL
HGB UR QL STRIP: ABNORMAL
KETONES UR STRIP-MCNC: NEGATIVE MG/DL
LEUKOCYTE ESTERASE UR QL STRIP: ABNORMAL
MUCOUS THREADS #/AREA URNS LPF: PRESENT /LPF
NITRATE UR QL: NEGATIVE
PH UR STRIP: 6 PH (ref 4.7–8)
RBC #/AREA URNS AUTO: 68 /HPF (ref 0–2)
SOURCE: ABNORMAL
SP GR UR STRIP: 1.02 (ref 1–1.03)
SPECIMEN SOURCE: NORMAL
SQUAMOUS #/AREA URNS AUTO: 5 /HPF (ref 0–1)
UROBILINOGEN UR STRIP-MCNC: NORMAL MG/DL (ref 0–2)
WBC #/AREA URNS AUTO: 63 /HPF (ref 0–2)
WET PREP SPEC: NORMAL

## 2017-10-03 PROCEDURE — 87210 SMEAR WET MOUNT SALINE/INK: CPT | Mod: ZL | Performed by: FAMILY MEDICINE

## 2017-10-03 PROCEDURE — 87086 URINE CULTURE/COLONY COUNT: CPT | Mod: ZL | Performed by: FAMILY MEDICINE

## 2017-10-03 PROCEDURE — 99214 OFFICE O/P EST MOD 30 MIN: CPT | Performed by: FAMILY MEDICINE

## 2017-10-03 PROCEDURE — 81003 URINALYSIS AUTO W/O SCOPE: CPT | Mod: ZL | Performed by: FAMILY MEDICINE

## 2017-10-03 PROCEDURE — 99212 OFFICE O/P EST SF 10 MIN: CPT

## 2017-10-03 RX ORDER — SULFAMETHOXAZOLE/TRIMETHOPRIM 800-160 MG
1 TABLET ORAL 2 TIMES DAILY
Qty: 14 TABLET | Refills: 0 | Status: SHIPPED | OUTPATIENT
Start: 2017-10-03 | End: 2018-05-08

## 2017-10-03 RX ORDER — NABUMETONE 500 MG/1
500-1000 TABLET, FILM COATED ORAL 2 TIMES DAILY PRN
Qty: 60 TABLET | Refills: 1 | Status: SHIPPED | OUTPATIENT
Start: 2017-10-03 | End: 2018-09-18

## 2017-10-03 RX ORDER — ESCITALOPRAM OXALATE 20 MG/1
20 TABLET ORAL DAILY
COMMUNITY
End: 2018-02-17

## 2017-10-03 ASSESSMENT — PAIN SCALES - GENERAL: PAINLEVEL: SEVERE PAIN (6)

## 2017-10-03 ASSESSMENT — ANXIETY QUESTIONNAIRES
3. WORRYING TOO MUCH ABOUT DIFFERENT THINGS: NEARLY EVERY DAY
4. TROUBLE RELAXING: MORE THAN HALF THE DAYS
GAD7 TOTAL SCORE: 17
5. BEING SO RESTLESS THAT IT IS HARD TO SIT STILL: MORE THAN HALF THE DAYS
1. FEELING NERVOUS, ANXIOUS, OR ON EDGE: MORE THAN HALF THE DAYS
7. FEELING AFRAID AS IF SOMETHING AWFUL MIGHT HAPPEN: MORE THAN HALF THE DAYS
2. NOT BEING ABLE TO STOP OR CONTROL WORRYING: NEARLY EVERY DAY
6. BECOMING EASILY ANNOYED OR IRRITABLE: NEARLY EVERY DAY
IF YOU CHECKED OFF ANY PROBLEMS ON THIS QUESTIONNAIRE, HOW DIFFICULT HAVE THESE PROBLEMS MADE IT FOR YOU TO DO YOUR WORK, TAKE CARE OF THINGS AT HOME, OR GET ALONG WITH OTHER PEOPLE: EXTREMELY DIFFICULT

## 2017-10-03 ASSESSMENT — PATIENT HEALTH QUESTIONNAIRE - PHQ9: SUM OF ALL RESPONSES TO PHQ QUESTIONS 1-9: 22

## 2017-10-03 NOTE — MR AVS SNAPSHOT
"              After Visit Summary   10/3/2017    Ritesh Soliman    MRN: [id number]           Patient Information     Date Of Birth          1975        Visit Information        Provider Department      10/3/2017 11:30 AM Lucrecia Basurto LSW [facility]        Today's Diagnoses     Diagnosis deferred    -  1       Follow-ups after your visit        Who to contact     If you have questions or need follow up information about today's clinic visit or your schedule please contact [facility] directly at [phone].  Normal or non-critical lab and imaging results will be communicated to you by MyChart, letter or phone within 4 business days after the clinic has received the results. If you do not hear from us within 7 days, please contact the clinic through [employer] or phone. If you have a critical or abnormal lab result, we will notify you by phone as soon as possible.  Submit refill requests through [employer] or call your pharmacy and they will forward the refill request to us. Please allow 3 business days for your refill to be completed.          Additional Information About Your Visit        MyChart Information     Exper[employer] lets you send messages to your doctor, view your test results, renew your prescriptions, schedule appointments and more. To sign up, go to [email or web address] . Click on \"Log in\" on the left side of the screen, which will take you to the Welcome page. Then click on \"Sign up Now\" on the right side of the page.     You will be asked to enter the access code listed below, as well as some personal information. Please follow the directions to create your username and password.     Your access code is: [email or web address]  Expires: 2018 11:49 AM     Your access code will  in 90 days. If you need help or a new code, please call your [facility] or [phone].        Care EveryWhere ID     This is your Care EveryWhere ID. This could be used by other organizations " to access your [place] medical records  [id number]         Blood Pressure from Last 3 Encounters:   10/03/17 118/80   05/04/17 130/84   01/03/17 117/84    Weight from Last 3 Encounters:   10/03/17 174 lb 12.8 oz (79.3 kg)   12/13/16 166 lb (75.3 kg)   04/29/16 148 lb (67.1 kg)              Today, you had the following     No orders found for display         Today's Medication Changes          These changes are accurate as of: 10/3/17 11:49 AM.  If you have any questions, ask your nurse or doctor.               Stop taking these medicines if you haven't already. Please contact your care team if you have questions.     benzonatate 100 MG capsule   Commonly known as:  TESSALON   Stopped by:  Olivia Weiss MD           ketorolac 10 MG tablet   Commonly known as:  TORADOL   Stopped by:  Olivia Weiss MD           UNABLE TO FIND   Stopped by:  Olivia Weiss MD                    Primary Care Provider Office Phone # Fax #    Olivia Weiss [phone]       [facility] [place] [address]  [place] [address]        Equal Access to Services     [facility] AH: Hadii aad ku hadasho Soomaali, waaxda luqadaha, qaybta kaalmada adeegyada, waxay idiin hayaan adedeisy palomoaramaría la'hannahn ah. So [facility] [phone].    ATENCIÓN: Si habla español, tiene a meléndez disposición servicios gratuitos de asistencia lingüística. Llame al [phone].    We comply with applicable federal civil rights laws and [place] laws. We do not discriminate on the basis of race, color, national origin, age, disability, sex, sexual orientation, or gender identity.            Thank you!     Thank you for choosing [facility]  [facility]. Our goal is always to provide you with excellent care. Hearing back from our patients is one way we can continue to improve our services. Please take a few minutes to complete the written survey that you may receive in the mail after your visit with us. Thank you!             Your  Updated Medication List - Protect others around you: Learn how to safely use, store and throw away your medicines at www.disposemymeds.org.          This list is accurate as of: 10/3/17 11:49 AM.  Always use your most recent med list.                   Brand Name Dispense Instructions for use Diagnosis    AZO TABS PO      Taking 2-4 tabs a day as needed        escitalopram 20 MG tablet    LEXAPRO     Take 20 mg by mouth daily        traZODone 50 MG tablet    DESYREL    30 tablet    Take 1 tablet (50 mg) by mouth nightly as needed for sleep    Insomnia

## 2017-10-03 NOTE — NURSING NOTE
"Chief Complaint   Patient presents with     Musculoskeletal Problem     foot pain bilateral onset 6 months     Breast Mass     right beast lesion       Initial /80  Pulse 88  Temp 97.7  F (36.5  C) (Tympanic)  Resp 20  Ht 5' 2.75\" (1.594 m)  Wt 174 lb 12.8 oz (79.3 kg)  SpO2 96%  BMI 31.21 kg/m2 Estimated body mass index is 31.21 kg/(m^2) as calculated from the following:    Height as of this encounter: 5' 2.75\" (1.594 m).    Weight as of this encounter: 174 lb 12.8 oz (79.3 kg).  Medication Reconciliation: complete   Ngoc Duong LPN      "

## 2017-10-03 NOTE — NURSING NOTE
"Chief Complaint   Patient presents with     Musculoskeletal Problem     foot pain bilateral onset 6 months     Breast Mass     right beast lesion     UTI     possible UTI burning and frequency onset 1 week using AZO prn and possible yeast infection       Initial /80  Pulse 88  Temp 97.7  F (36.5  C) (Tympanic)  Resp 20  Ht 5' 2.75\" (1.594 m)  Wt 174 lb 12.8 oz (79.3 kg)  SpO2 96%  BMI 31.21 kg/m2 Estimated body mass index is 31.21 kg/(m^2) as calculated from the following:    Height as of this encounter: 5' 2.75\" (1.594 m).    Weight as of this encounter: 174 lb 12.8 oz (79.3 kg).  Medication Reconciliation: complete   Ngoc Duong LPN      "

## 2017-10-03 NOTE — MR AVS SNAPSHOT
After Visit Summary   10/3/2017    Ritesh Soliman    MRN: [id number]           Patient Information     Date Of Birth          1975        Visit Information        Provider Department      10/3/2017 11:15 AM Olivia Weiss MD [facility]        Today's Diagnoses     Dysuria    -  1    Vaginal discharge        Abnormal urine findings        Acute cystitis without hematuria        Tendonitis of ankle or foot        Plantar fasciitis        Sebaceous cyst of breast, right           Follow-ups after your visit        Additional Services     GENERAL SURG ADULT REFERRAL       Your provider has referred you to: Dr Gallego for evaluation of possible removal of sebaceous cyst    Please be aware that coverage of these services is subject to the terms and limitations of your health insurance plan.  Call member services at your health plan with any benefit or coverage questions.      Please bring the following with you to your appointment:    (1) Any X-Rays, CTs or MRIs which have been performed.  Contact the facility where they were done to arrange for  prior to your scheduled appointment.   (2) List of current medications   (3) This referral request   (4) Any documents/labs given to you for this referral                  Who to contact     If you have questions or need follow up information about today's clinic visit or your schedule please contact [facility] directly at [phone].  Normal or non-critical lab and imaging results will be communicated to you by MyChart, letter or phone within 4 business days after the clinic has received the results. If you do not hear from us within 7 days, please contact the clinic through MyChart or phone. If you have a critical or abnormal lab result, we will notify you by phone as soon as possible.  Submit refill requests through [facility] or call your pharmacy and they will forward the refill request to us. Please allow 3 business days  "for your refill to be completed.          Additional Information About Your Visit        BoosterharStoryz Information     [employer] lets you send messages to your doctor, view your test results, renew your prescriptions, schedule appointments and more. To sign up, go to [email or web address] . Click on \"Log in\" on the left side of the screen, which will take you to the Welcome page. Then click on \"Sign up Now\" on the right side of the page.     You will be asked to enter the access code listed below, as well as some personal information. Please follow the directions to create your username and password.     Your access code is: [email or web address]  Expires: 2018 11:49 AM     Your access code will  in 90 days. If you need help or a new code, please call your [place] clinic or [phone].        Care EveryWhere ID     This is your [facility] EveryWhere ID. This could be used by other organizations to access your [place] medical records  [id number]        Your Vitals Were     Pulse Temperature Respirations Height Pulse Oximetry BMI (Body Mass Index)    88 97.7  F (36.5  C) (Tympanic) 20 5' 2.75\" (1.594 m) 96% 31.21 kg/m2       Blood Pressure from Last 3 Encounters:   10/03/17 118/80   17 130/84   17 117/84    Weight from Last 3 Encounters:   10/03/17 174 lb 12.8 oz (79.3 kg)   16 166 lb (75.3 kg)   16 148 lb (67.1 kg)              We Performed the Following     GENERAL SURG ADULT REFERRAL     UA reflex to Microscopic and Culture     Urine Culture Aerobic Bacterial     Wet prep          Today's Medication Changes          These changes are accurate as of: 10/3/17 12:15 PM.  If you have any questions, ask your nurse or doctor.               Start taking these medicines.        Dose/Directions    nabumetone 500 MG tablet   Commonly known as:  RELAFEN   Used for:  Tendonitis of ankle or foot, Plantar fasciitis   Started by:  Olivia Weiss MD        Dose:  500-1000 mg   Take 1-2 tablets (500-1,000 mg) by " mouth 2 times daily as needed for moderate pain   Quantity:  60 tablet   Refills:  1       sulfamethoxazole-trimethoprim 800-160 MG per tablet   Commonly known as:  BACTRIM DS/SEPTRA DS   Used for:  Acute cystitis without hematuria   Started by:  Olivia Weiss MD        Dose:  1 tablet   Take 1 tablet by mouth 2 times daily   Quantity:  14 tablet   Refills:  0         Stop taking these medicines if you haven't already. Please contact your care team if you have questions.     benzonatate 100 MG capsule   Commonly known as:  TESSALON   Stopped by:  Olivia Weiss MD           ketorolac 10 MG tablet   Commonly known as:  TORADOL   Stopped by:  Olivia Weiss MD           UNABLE TO FIND   Stopped by:  Olivia Weiss MD                Where to get your medicines      These medications were sent to [address] [address]     Phone:  [phone]     nabumetone 500 MG tablet    sulfamethoxazole-trimethoprim 800-160 MG per tablet                Primary Care Provider Office Phone # Fax #    Olivia Weiss [phone]       [facility] [address] [address]        Equal Access to Services     [facility] AH: Hadii aad ku hadasho Soomaali, waaxda luqadaha, qaybta kaalmada adeegyada, waxay leoniein hayhannahn micheal núñez. So [facility] [phone].    ATENCIÓN: Si habla español, tiene a meléndez disposición servicios gratuitos de asistencia lingüística. [facility] [phone].    We comply with applicable federal civil rights laws and [place] laws. We do not discriminate on the basis of race, color, national origin, age, disability, sex, sexual orientation, or gender identity.            Thank you!     Thank you for choosing [facility]  for your care. Our goal is always to provide you with excellent care. Hearing back from our patients is one way we can continue to improve our services. Please take a few minutes  to complete the written survey that you may receive in the mail after your visit with us. Thank you!             Your Updated Medication List - Protect others around you: Learn how to safely use, store and throw away your medicines at www.disposemymeds.org.          This list is accurate as of: 10/3/17 12:15 PM.  Always use your most recent med list.                   Brand Name Dispense Instructions for use Diagnosis    AZO TABS PO      Taking 2-4 tabs a day as needed        escitalopram 20 MG tablet    LEXAPRO     Take 20 mg by mouth daily        nabumetone 500 MG tablet    RELAFEN    60 tablet    Take 1-2 tablets (500-1,000 mg) by mouth 2 times daily as needed for moderate pain    Tendonitis of ankle or foot, Plantar fasciitis       sulfamethoxazole-trimethoprim 800-160 MG per tablet    BACTRIM DS/SEPTRA DS    14 tablet    Take 1 tablet by mouth 2 times daily    Acute cystitis without hematuria       traZODone 50 MG tablet    DESYREL    30 tablet    Take 1 tablet (50 mg) by mouth nightly as needed for sleep    Insomnia

## 2017-10-03 NOTE — PROGRESS NOTES
GIANNI CC introduced and explained integrated health model, brief therapy interventions and referral/ support services for ongoing therapy interventions.  Discussed [facility] services, patient not interested at this time, but did ask to keep the [facility] information to give to a friend.    October 3, 2017 BERTHA CaiW

## 2017-10-04 LAB
BACTERIA SPEC CULT: ABNORMAL
SPECIMEN SOURCE: ABNORMAL

## 2017-10-04 ASSESSMENT — ANXIETY QUESTIONNAIRES: GAD7 TOTAL SCORE: 17

## 2017-10-04 NOTE — PROGRESS NOTES
[facility]    Star JUANCARLOS Soliman, 42 year old, female presents with   Chief Complaint   Patient presents with     Musculoskeletal Problem     foot pain bilateral onset 6 months over the dorsum of both feet after wearing flip flops this summer at work while on her feet. Pain of the volar aspect of both feet, painful with first steps in the morning     Breast Mass     right beast lesion of the skin present for 6 months, no drainage, no pain     UTI     possible UTI burning and frequency onset 1 week using AZO prn and possible yeast infection which she is treating with OTC medication and feels is getting better       PAST MEDICAL HISTORY:  Past Medical History:   Diagnosis Date     Depression with anxiety      Tobacco abuse        PAST SURGICAL HISTORY:  Past Surgical History:   Procedure Laterality Date     OOPHORECTOMY      tubal pregnancy     TUBAL LIGATION         SOCIAL HISTORY  Social History     Social History     Marital status: Single     Spouse name: N/A     Number of children: N/A     Years of education: N/A     Occupational History     Not on file.     Social History Main Topics     Smoking status: Current Some Day Smoker     Packs/day: 0.50     Types: Cigarettes     Smokeless tobacco: Never Used      Comment: rarely smokes     Alcohol use 0.0 oz/week     0 Standard drinks or equivalent per week      Comment: occassionally     Drug use: No     Sexual activity: Yes     Partners: Male     Other Topics Concern     Parent/Sibling W/ Cabg, Mi Or Angioplasty Before 65f 55m? Yes     dad had heart attack      Service No     Blood Transfusions Yes     Permits if needed     Caffeine Concern Yes     3 soda     Occupational Exposure No     Hobby Hazards No     Sleep Concern Yes     Stress Concern No     Weight Concern Yes     Special Diet No     Back Care Yes     Exercise No     Seat Belt Yes     Self-Exams No     Education given     Social History Narrative       MEDICATIONS:  Prior to Admission  "medications    Medication Sig Start Date End Date Taking? Authorizing Provider   escitalopram (LEXAPRO) 20 MG tablet Take 20 mg by mouth daily   Yes Reported, Patient   sulfamethoxazole-trimethoprim (BACTRIM DS/SEPTRA DS) 800-160 MG per tablet Take 1 tablet by mouth 2 times daily 10/3/17  Yes Olivia Weiss MD   nabumetone (RELAFEN) 500 MG tablet Take 1-2 tablets (500-1,000 mg) by mouth 2 times daily as needed for moderate pain 10/3/17  Yes Olivia Weiss MD   traZODone (DESYREL) 50 MG tablet Take 1 tablet (50 mg) by mouth nightly as needed for sleep 9/1/17  Yes Olivia Weiss MD   Phenazopyridine HCl (AZO TABS PO) Taking 2-4 tabs a day as needed    Reported, Patient       ALLERGIES:     Allergies   Allergen Reactions     Augmentin [Amoxicillin-Pot Clavulanate] Swelling       ROS:  C: NEGATIVE for fever, chills, change in weight  R: NEGATIVE for significant cough or SOB  CV: NEGATIVE for chest pain, palpitations or peripheral edema  GI: NEGATIVE for nausea, abdominal pain, heartburn, or change in bowel habits  N: NEGATIVE for weakness, dizziness or paresthesias  H: NEGATIVE for bleeding problems  P: NEGATIVE for changes in mood or affect    EXAM:  /80  Pulse 88  Temp 97.7  F (36.5  C) (Tympanic)  Resp 20  Ht 5' 2.75\" (1.594 m)  Wt 174 lb 12.8 oz (79.3 kg)  SpO2 96%  BMI 31.21 kg/m2 Body mass index is 31.21 kg/(m^2).   GENERAL APPEARANCE: healthy, alert and no distress  BREAST: skin of the right lateral breast with sebaceous cyst, not draining with slight surrounding erythema  MS: extremities normal- no gross deformities noted and tenderness of the bilateral lateral dorsal area of the feet and plantar surfaces of both feet and heels.   SKIN: multiple tattoos  NEURO: Normal strength and tone, mentation intact and speech normal  PSYCH: mentation appears normal and affect normal/bright    LABS AND IMAGING:     Results for orders placed or performed in visit on 10/03/17   UA reflex to Microscopic and " Culture   Result Value Ref Range    Color Urine Yellow     Appearance Urine Clear     Glucose Urine Negative NEG^Negative mg/dL    Bilirubin Urine Negative NEG^Negative    Ketones Urine Negative NEG^Negative mg/dL    Specific Gravity Urine 1.020 1.003 - 1.035    Blood Urine Large (A) NEG^Negative    pH Urine 6.0 4.7 - 8.0 pH    Protein Albumin Urine 10 (A) NEG^Negative mg/dL    Urobilinogen mg/dL Normal 0.0 - 2.0 mg/dL    Nitrite Urine Negative NEG^Negative    Leukocyte Esterase Urine Small (A) NEG^Negative    Source Midstream Urine     RBC Urine 68 (H) 0 - 2 /HPF    WBC Urine 63 (H) 0 - 2 /HPF    Bacteria Urine Few (A) NEG^Negative /HPF    Squamous Epithelial /HPF Urine 5 (H) 0 - 1 /HPF    Mucous Urine Present (A) NEG^Negative /LPF   Wet prep   Result Value Ref Range    Specimen Description Vagina     Wet Prep No Trichomonas seen     Wet Prep No clue cells seen     Wet Prep No yeast seen     Wet Prep Rare  WBC'S seen            ASSESSMENT/PLAN:  (R30.0) Dysuria  (primary encounter diagnosis)  Comment:   Plan: UA reflex to Microscopic and Culture            (R82.90) Abnormal urine findings  Comment:   Plan: Urine Culture Aerobic Bacterial            (N30.00) Acute cystitis without hematuria  Comment:   Plan: sulfamethoxazole-trimethoprim (BACTRIM         DS/SEPTRA DS) 800-160 MG per tablet        Treat with Septra DS bid for 7 days, follow up if not imroving    (M77.50) Tendonitis of ankle or foot  Comment:   Plan: nabumetone (RELAFEN) 500 MG tablet        Advised taking this bid with food. If not improving after 4 weeks, follow up to consider PT, She declines PT at this time    (M72.2) Plantar fasciitis  Comment:   Plan: nabumetone (RELAFEN) 500 MG tablet        As above. Avoid flip flops, wear good supportive shoes in and out of the house. Demonstrated stretches to do bid     (N60.81) Sebaceous cyst of breast, right  Comment:   Plan: GENERAL SURG ADULT REFERRAL        Refer for removal    (B37.3) Yeast infection  of the vagina  Comment:   Plan: she will continue with OTC medication and declines exam today      Olivia Weiss MD  October 3, 2017

## 2017-10-10 ENCOUNTER — OFFICE VISIT (OUTPATIENT)
Dept: SURGERY | Facility: OTHER | Age: 42
End: 2017-10-10
Attending: FAMILY MEDICINE
Payer: COMMERCIAL

## 2017-10-10 VITALS
BODY MASS INDEX: 30.12 KG/M2 | WEIGHT: 170 LBS | DIASTOLIC BLOOD PRESSURE: 68 MMHG | HEART RATE: 72 BPM | OXYGEN SATURATION: 94 % | SYSTOLIC BLOOD PRESSURE: 114 MMHG | HEIGHT: 63 IN | TEMPERATURE: 96.3 F

## 2017-10-10 DIAGNOSIS — N63.0 BREAST MASS: Primary | ICD-10-CM

## 2017-10-10 PROCEDURE — 99213 OFFICE O/P EST LOW 20 MIN: CPT

## 2017-10-10 PROCEDURE — 99243 OFF/OP CNSLTJ NEW/EST LOW 30: CPT | Performed by: SURGERY

## 2017-10-10 ASSESSMENT — PAIN SCALES - GENERAL: PAINLEVEL: NO PAIN (0)

## 2017-10-10 NOTE — MR AVS SNAPSHOT
After Visit Summary   10/10/2017    Ritesh Soliman    MRN: [id number]           Patient Information     Date Of Birth          1975        Visit Information        Provider Department      10/10/2017 11:00 AM Kiko Newberry MD [facility] [place]        Care Instructions    Thank you for allowing Dr. Gallego and our surgical team to participate in your care.  If you have a scheduling or an appointment question please contact our Health Unit Coordinator, Ariela,  at her direct line [phone].   ALL nursing questions or concerns can be directed to your surgical nurse at: [phone] -Selene    You are scheduled for a: Excisional biopsy right breast  Your procedure date is: Thursday, October 19, 2017        HOW TO PREPARE-        You need a friend or family member available to drive you home AND stay with you for 24 hours after you leave the hospital. You will not be allowed to drive yourself. IF you need to take a taxi or the bus you MUST have a responsible person to ride with you. YOUR PROCEDURE WILL BE CANCELLED IF YOU DO NOT HAVE A RESPONSIBLE ADULT TO DRIVE YOU HOME.       You CANNOT have anything to eat or drink after midnight the night before your surgery, ncluding water and coffee. Your stomach needs to be completely empty. Do NOT chew gum, suck on hard candy, or smoke. You can brush your teeth the morning of surgery.       You need to call our Surgery Education Nurses 1-2 weeks prior to your surgery date at  [phone] or toll free [phone]. Please have you medication and allergy lists ready.      Stop your aspirin or other NSAIDs(Ibuprofen, Motrin, Aleve, Celebrex, Naproxen, etc...) 7 days before your surgery.      Hospital admitting will call you the day before your surgery with your arrival time. If you are scheduled on a Monday admitting will call you the Friday before.      Please call your primary care physician if you should become ill within 24 hours of scheduled  "surgery. (ex.vomiting, diarrhea, fever)          You will need to wash the night before AND the morning of you procedure with the supplied Hibiclens. Wash your Surgical area with your bare hands, apply friction and rinse. KEEP IT AWAY FROM YOUR EYES, EARS, NOSE AND MOUTH.             Follow-ups after your visit        Who to contact     If you have questions or need follow up information about today's clinic visit or your schedule please [facility] at [phone].  Normal or non-critical lab and imaging results will be communicated to you by MyChart, letter or phone within 4 business days after the clinic has received the results. If you do not hear from us within 7 days, please contact the clinic through [employer] or phone. If you have a critical or abnormal lab result, we will notify you by phone as soon as possible.  Submit refill requests through [employer] or call your pharmacy and they will forward the refill request to us. Please allow 3 business days for your refill to be completed.          Additional Information About Your Visit        [employer] [facility] Information     [employer] lets you send messages to your doctor, view your test results, renew your prescriptions, schedule appointments and more. To sign up, go to [email or web address] . Click on \"Log in\" on the left side of the screen, which will take you to the Welcome page. Then click on \"Sign up Now\" on the right side of the page.     You will be asked to enter the access code listed below, as well as some personal information. Please follow the directions to create your username and password.     Your access code is: [email or web address]  Expires: 2018 11:49 AM     Your access code will  in 90 days. If you need help or a new code, please call your [facility] or [phone].        Care EveryWhere ID     This is your Care EveryWhere ID. This could be used by other organizations to access your [facility] medical " "records  [id number]        Your Vitals Were     Pulse Temperature Height Pulse Oximetry BMI (Body Mass Index)       72 96.3  F (35.7  C) (Tympanic) 5' 2.75\" (1.594 m) 94% 30.35 kg/m2        Blood Pressure from Last 3 Encounters:   10/10/17 114/68   10/03/17 118/80   05/04/17 130/84    Weight from Last 3 Encounters:   10/10/17 170 lb (77.1 kg)   10/03/17 174 lb 12.8 oz (79.3 kg)   12/13/16 166 lb (75.3 kg)              Today, you had the following     No orders found for display       Primary Care Provider Office Phone # Fax #    Olivia Weiss [phone]       [facility] [place] [address]  [facility] [address]        Equal Access to Services     [facility] : Hadii aad ku hadasho Soomaali, waaxda luqadaha, qaybta kaalmada adeegyada, waxay leoniein hayaatang garcia . So [facility] [phone].    ATENCIÓN: Si habla español, tiene a meléndez disposición servicios gratuitos de asistencia lingüística. Llame al [phone].    We comply with applicable federal civil rights laws and [place] laws. We do not discriminate on the basis of race, color, national origin, age, disability, sex, sexual orientation, or gender identity.            Thank you!     Thank you for choosing [facility]  for your care. Our goal is always to provide you with excellent care. Hearing back from our patients is one way we can continue to improve our services. Please take a few minutes to complete the written survey that you may receive in the mail after your visit with us. Thank you!             Your Updated Medication List - Protect others around you: Learn how to safely use, store and throw away your medicines at www.disposemymeds.org.          This list is accurate as of: 10/10/17 11:14 AM.  Always use your most recent med list.                   Brand Name Dispense Instructions for use Diagnosis    AZO TABS PO      Taking 2-4 tabs a day as needed        escitalopram 20 MG tablet    LEXAPRO     Take " 20 mg by mouth daily        nabumetone 500 MG tablet    RELAFEN    60 tablet    Take 1-2 tablets (500-1,000 mg) by mouth 2 times daily as needed for moderate pain    Tendonitis of ankle or foot, Plantar fasciitis       sulfamethoxazole-trimethoprim 800-160 MG per tablet    BACTRIM DS/SEPTRA DS    14 tablet    Take 1 tablet by mouth 2 times daily    Acute cystitis without hematuria       traZODone 50 MG tablet    DESYREL    30 tablet    Take 1 tablet (50 mg) by mouth nightly as needed for sleep    Insomnia

## 2017-10-10 NOTE — PATIENT INSTRUCTIONS
Thank you for allowing Dr. Gallego and our surgical team to participate in your care.  If you have a scheduling or an appointment question please contact our Health Unit Coordinator, Ariela,  at her direct line [phone].   ALL nursing questions or concerns can be directed to your surgical nurse at: [phone] -Selene    You are scheduled for a: Excisional biopsy right breast  Your procedure date is: Thursday, October 19, 2017        HOW TO PREPARE-        You need a friend or family member available to drive you home AND stay with you for 24 hours after you leave the hospital. You will not be allowed to drive yourself. IF you need to take a taxi or the bus you MUST have a responsible person to ride with you. YOUR PROCEDURE WILL BE CANCELLED IF YOU DO NOT HAVE A RESPONSIBLE ADULT TO DRIVE YOU HOME.       You CANNOT have anything to eat or drink after midnight the night before your surgery, ncluding water and coffee. Your stomach needs to be completely empty. Do NOT chew gum, suck on hard candy, or smoke. You can brush your teeth the morning of surgery.       You need to call our Surgery Education Nurses 1-2 weeks prior to your surgery date at  [phone] or toll free [phone]. Please have you medication and allergy lists ready.      Stop your aspirin or other NSAIDs(Ibuprofen, Motrin, Aleve, Celebrex, Naproxen, etc...) 7 days before your surgery.      Hospital admitting will call you the day before your surgery with your arrival time. If you are scheduled on a Monday admitting will call you the Friday before.      Please call your primary care physician if you should become ill within 24 hours of scheduled surgery. (ex.vomiting, diarrhea, fever)          You will need to wash the night before AND the morning of you procedure with the supplied Hibiclens. Wash your Surgical area with your bare hands, apply friction and rinse. KEEP IT AWAY FROM YOUR EYES, EARS, NOSE AND MOUTH.

## 2017-10-10 NOTE — NURSING NOTE
"Chief Complaint   Patient presents with     Consult     Sebaceous cyst of right breast-Dr Olivia Weiss is referring.  Has been there for 6 months-Sometimes it gets big and red-On antibiotics right now.         Initial /68 (BP Location: Right arm, Patient Position: Chair, Cuff Size: Adult Regular)  Pulse 72  Temp 96.3  F (35.7  C) (Tympanic)  Ht 5' 2.75\" (1.594 m)  Wt 170 lb (77.1 kg)  SpO2 94%  BMI 30.35 kg/m2 Estimated body mass index is 30.35 kg/(m^2) as calculated from the following:    Height as of this encounter: 5' 2.75\" (1.594 m).    Weight as of this encounter: 170 lb (77.1 kg).  Medication Reconciliation: complete   SUNDAR VU      "

## 2017-10-11 NOTE — H&P (VIEW-ONLY)
[facility] Surgery Consultation    CC: Right breast skin lesion     HPI:  This 42 year old year old female is seen at the request of Dr. Olivia Weiss for evaluation and scheduling of right breast lesion. She reports that over the last 6 months she has had a lateral breast lesion that changes in size. It appears to become inflamed at times and will respond to antibiotics. She notes that it will scale at times. She had a normal mammogram one year ago. She denies any family history of breast cancer. Her age at menarche was 8. She has had 2 children. She denies hormone therapy. She is pre-menopausal.      Past Medical History:   Diagnosis Date     Depression with anxiety      Tobacco abuse        Past Surgical History:   Procedure Laterality Date     OOPHORECTOMY      tubal pregnancy     TUBAL LIGATION         Allergies   Allergen Reactions     Augmentin [Amoxicillin-Pot Clavulanate] Swelling       Current Outpatient Prescriptions   Medication     escitalopram (LEXAPRO) 20 MG tablet     sulfamethoxazole-trimethoprim (BACTRIM DS/SEPTRA DS) 800-160 MG per tablet     nabumetone (RELAFEN) 500 MG tablet     traZODone (DESYREL) 50 MG tablet     Phenazopyridine HCl (AZO TABS PO)     No current facility-administered medications for this visit.        HABITS:    Social History   Substance Use Topics     Smoking status: Current Some Day Smoker     Packs/day: 0.50     Types: Cigarettes     Smokeless tobacco: Never Used      Comment: rarely smokes     Alcohol use 0.0 oz/week     0 Standard drinks or equivalent per week      Comment: occassionally       Family History   Problem Relation Age of Onset     Coronary Artery Disease Mother      Hypertension Mother      Coronary Artery Disease Father      C.A.D. No family hx of      Breast Cancer No family hx of      Cancer - colorectal No family hx of      DIABETES No family hx of      Thyroid Disease No family hx of      Asthma No family hx of      Colon Cancer No family hx of       "Hyperlipidemia No family hx of        REVIEW OF SYSTEMS:  Ten point review of systems negative except those mentioned in the HPI.     OBJECTIVE:    /68 (BP Location: Right arm, Patient Position: Chair, Cuff Size: Adult Regular)  Pulse 72  Temp 96.3  F (35.7  C) (Tympanic)  Ht 5' 2.75\" (1.594 m)  Wt 170 lb (77.1 kg)  SpO2 94%  BMI 30.35 kg/m2    GENERAL: Generally appears well, in no distress with appropriate affect.  HEENT:   Sclerae anicteric - No cervical, supra/infraclavciular lymphadenopathy  Respiratory:  No acute distress   Breast: no skin dimpling, no nipple discharge, there is a 1.5 cm irregular pigmented skin lesion on the lateral aspect of the right breast. There is a sunflower seed subcutaneous lesion, possible cyst. No other masses or axillary adenopathy.   Cardiovascular:  Regular Rate and Rhythm   Abdomen:  :  deferred  Extremities:  Extremities normal. No deformities, edema, or skin discoloration.  Skin:  Significant amount of tattoos   Neurological: grossly intact    Psych:  Alert, oriented, affect appropriate with normal decision making ability.    IMPRESSION:    42 y.o. Female with no significant medical history presents to clinic with a right breast lesion. It appears to involve the skin, and by history sounds cystic in nature. Due to the changing nature of lesion as well as 6 month time course feel that this would be better evaluated in the OR with an excisional biopsy. No other suspicious lesions identified on exam.     PLAN:    Excisional biopsy in OR.     Kiko Newberry MD    10/10/2017  11:20 AM    cc:  Olivia Weiss     "

## 2017-10-13 NOTE — OR NURSING
Email sent to [facility] as follows;    We  have Sabine Soliman : 1975 coming 10/19 for right breast biopsy with Dr. Newberry.  Her PCP is Dr. RAYMOND Weiss.    Thank you,  Shayla Payne R.N.  Pre-Anesthesia Testing  [facility]

## 2017-10-19 ENCOUNTER — HOSPITAL ENCOUNTER (OUTPATIENT)
Facility: HOSPITAL | Age: 42
Discharge: HOME | End: 2017-10-19
Attending: SURGERY | Admitting: SURGERY
Payer: COMMERCIAL

## 2017-10-19 ENCOUNTER — ANESTHESIA EVENT (OUTPATIENT)
Dept: SURGERY | Facility: HOSPITAL | Age: 42
End: 2017-10-19
Payer: COMMERCIAL

## 2017-10-19 ENCOUNTER — ANESTHESIA (OUTPATIENT)
Dept: SURGERY | Facility: HOSPITAL | Age: 42
End: 2017-10-19
Payer: COMMERCIAL

## 2017-10-19 ENCOUNTER — SURGERY (OUTPATIENT)
Age: 42
End: 2017-10-19

## 2017-10-19 VITALS
DIASTOLIC BLOOD PRESSURE: 83 MMHG | OXYGEN SATURATION: 98 % | RESPIRATION RATE: 18 BRPM | TEMPERATURE: 98.3 F | SYSTOLIC BLOOD PRESSURE: 115 MMHG | WEIGHT: 170 LBS | BODY MASS INDEX: 30.12 KG/M2 | HEIGHT: 63 IN

## 2017-10-19 DIAGNOSIS — Z98.890 S/P BREAST BIOPSY, RIGHT: Primary | ICD-10-CM

## 2017-10-19 LAB — HCG UR QL: NEGATIVE

## 2017-10-19 PROCEDURE — 81025 URINE PREGNANCY TEST: CPT | Performed by: ANESTHESIOLOGY

## 2017-10-19 PROCEDURE — 37000008 ZZH ANESTHESIA TECHNICAL FEE, 1ST 30 MIN: Performed by: SURGERY

## 2017-10-19 PROCEDURE — 88305 TISSUE EXAM BY PATHOLOGIST: CPT | Mod: TC | Performed by: SURGERY

## 2017-10-19 PROCEDURE — 25000125 ZZHC RX 250: Performed by: SURGERY

## 2017-10-19 PROCEDURE — 36000050 ZZH SURGERY LEVEL 2 1ST 30 MIN: Performed by: SURGERY

## 2017-10-19 PROCEDURE — 19101 BIOPSY OF BREAST OPEN: CPT | Performed by: ANESTHESIOLOGY

## 2017-10-19 PROCEDURE — 27210794 ZZH OR GENERAL SUPPLY STERILE: Performed by: SURGERY

## 2017-10-19 PROCEDURE — 25000125 ZZHC RX 250: Performed by: NURSE ANESTHETIST, CERTIFIED REGISTERED

## 2017-10-19 PROCEDURE — 25000128 H RX IP 250 OP 636: Performed by: ANESTHESIOLOGY

## 2017-10-19 PROCEDURE — 71000027 ZZH RECOVERY PHASE 2 EACH 15 MINS: Performed by: SURGERY

## 2017-10-19 PROCEDURE — 27110028 ZZH OR GENERAL SUPPLY NON-STERILE: Performed by: SURGERY

## 2017-10-19 PROCEDURE — 25000128 H RX IP 250 OP 636: Performed by: NURSE ANESTHETIST, CERTIFIED REGISTERED

## 2017-10-19 PROCEDURE — 01999 UNLISTED ANES PROCEDURE: CPT | Performed by: NURSE ANESTHETIST, CERTIFIED REGISTERED

## 2017-10-19 PROCEDURE — 40000306 ZZH STATISTIC PRE PROC ASSESS II: Performed by: SURGERY

## 2017-10-19 PROCEDURE — S0077 INJECTION, CLINDAMYCIN PHOSP: HCPCS | Performed by: SURGERY

## 2017-10-19 PROCEDURE — 36000052 ZZH SURGERY LEVEL 2 EA 15 ADDTL MIN: Performed by: SURGERY

## 2017-10-19 PROCEDURE — 19120 REMOVAL OF BREAST LESION: CPT | Performed by: SURGERY

## 2017-10-19 PROCEDURE — 25000125 ZZHC RX 250: Performed by: ANESTHESIOLOGY

## 2017-10-19 PROCEDURE — 37000009 ZZH ANESTHESIA TECHNICAL FEE, EACH ADDTL 15 MIN: Performed by: SURGERY

## 2017-10-19 RX ORDER — SODIUM CHLORIDE, SODIUM LACTATE, POTASSIUM CHLORIDE, CALCIUM CHLORIDE 600; 310; 30; 20 MG/100ML; MG/100ML; MG/100ML; MG/100ML
INJECTION, SOLUTION INTRAVENOUS CONTINUOUS
Status: DISCONTINUED | OUTPATIENT
Start: 2017-10-19 | End: 2017-10-19 | Stop reason: HOSPADM

## 2017-10-19 RX ORDER — CLINDAMYCIN PHOSPHATE 900 MG/50ML
900 INJECTION, SOLUTION INTRAVENOUS SEE ADMIN INSTRUCTIONS
Status: DISCONTINUED | OUTPATIENT
Start: 2017-10-19 | End: 2017-10-19 | Stop reason: HOSPADM

## 2017-10-19 RX ORDER — LIDOCAINE HYDROCHLORIDE 20 MG/ML
INJECTION, SOLUTION INFILTRATION; PERINEURAL PRN
Status: DISCONTINUED | OUTPATIENT
Start: 2017-10-19 | End: 2017-10-19

## 2017-10-19 RX ORDER — NALOXONE HYDROCHLORIDE 0.4 MG/ML
.1-.4 INJECTION, SOLUTION INTRAMUSCULAR; INTRAVENOUS; SUBCUTANEOUS
Status: DISCONTINUED | OUTPATIENT
Start: 2017-10-19 | End: 2017-10-19 | Stop reason: HOSPADM

## 2017-10-19 RX ORDER — PROPOFOL 10 MG/ML
INJECTION, EMULSION INTRAVENOUS PRN
Status: DISCONTINUED | OUTPATIENT
Start: 2017-10-19 | End: 2017-10-19

## 2017-10-19 RX ORDER — HYDRALAZINE HYDROCHLORIDE 20 MG/ML
2.5-5 INJECTION INTRAMUSCULAR; INTRAVENOUS EVERY 10 MIN PRN
Status: DISCONTINUED | OUTPATIENT
Start: 2017-10-19 | End: 2017-10-19 | Stop reason: HOSPADM

## 2017-10-19 RX ORDER — FENTANYL CITRATE 50 UG/ML
25-50 INJECTION, SOLUTION INTRAMUSCULAR; INTRAVENOUS
Status: DISCONTINUED | OUTPATIENT
Start: 2017-10-19 | End: 2017-10-19 | Stop reason: HOSPADM

## 2017-10-19 RX ORDER — MEPERIDINE HYDROCHLORIDE 25 MG/ML
12.5 INJECTION INTRAMUSCULAR; INTRAVENOUS; SUBCUTANEOUS
Status: DISCONTINUED | OUTPATIENT
Start: 2017-10-19 | End: 2017-10-19 | Stop reason: HOSPADM

## 2017-10-19 RX ORDER — CLINDAMYCIN PHOSPHATE 900 MG/50ML
900 INJECTION, SOLUTION INTRAVENOUS
Status: DISCONTINUED | OUTPATIENT
Start: 2017-10-19 | End: 2017-10-19 | Stop reason: HOSPADM

## 2017-10-19 RX ORDER — ONDANSETRON 2 MG/ML
4 INJECTION INTRAMUSCULAR; INTRAVENOUS EVERY 30 MIN PRN
Status: DISCONTINUED | OUTPATIENT
Start: 2017-10-19 | End: 2017-10-19 | Stop reason: HOSPADM

## 2017-10-19 RX ORDER — ALBUTEROL SULFATE 0.83 MG/ML
2.5 SOLUTION RESPIRATORY (INHALATION) EVERY 4 HOURS PRN
Status: DISCONTINUED | OUTPATIENT
Start: 2017-10-19 | End: 2017-10-19 | Stop reason: HOSPADM

## 2017-10-19 RX ORDER — HYDROCODONE BITARTRATE AND ACETAMINOPHEN 5; 325 MG/1; MG/1
1-2 TABLET ORAL
Status: DISCONTINUED | OUTPATIENT
Start: 2017-10-19 | End: 2017-10-19 | Stop reason: HOSPADM

## 2017-10-19 RX ORDER — HYDROCODONE BITARTRATE AND ACETAMINOPHEN 5; 325 MG/1; MG/1
1-2 TABLET ORAL EVERY 4 HOURS PRN
Qty: 5 TABLET | Refills: 0 | Status: SHIPPED | OUTPATIENT
Start: 2017-10-19 | End: 2018-04-26

## 2017-10-19 RX ORDER — LABETALOL HYDROCHLORIDE 5 MG/ML
10 INJECTION, SOLUTION INTRAVENOUS
Status: DISCONTINUED | OUTPATIENT
Start: 2017-10-19 | End: 2017-10-19 | Stop reason: HOSPADM

## 2017-10-19 RX ORDER — ONDANSETRON 4 MG/1
4 TABLET, ORALLY DISINTEGRATING ORAL EVERY 30 MIN PRN
Status: DISCONTINUED | OUTPATIENT
Start: 2017-10-19 | End: 2017-10-19 | Stop reason: HOSPADM

## 2017-10-19 RX ORDER — SCOLOPAMINE TRANSDERMAL SYSTEM 1 MG/1
1 PATCH, EXTENDED RELEASE TRANSDERMAL ONCE
Status: COMPLETED | OUTPATIENT
Start: 2017-10-19 | End: 2017-10-19

## 2017-10-19 RX ORDER — KETOROLAC TROMETHAMINE 30 MG/ML
30 INJECTION, SOLUTION INTRAMUSCULAR; INTRAVENOUS EVERY 6 HOURS PRN
Status: DISCONTINUED | OUTPATIENT
Start: 2017-10-19 | End: 2017-10-19 | Stop reason: HOSPADM

## 2017-10-19 RX ORDER — DEXAMETHASONE SODIUM PHOSPHATE 4 MG/ML
4 INJECTION, SOLUTION INTRA-ARTICULAR; INTRALESIONAL; INTRAMUSCULAR; INTRAVENOUS; SOFT TISSUE EVERY 10 MIN PRN
Status: DISCONTINUED | OUTPATIENT
Start: 2017-10-19 | End: 2017-10-19 | Stop reason: HOSPADM

## 2017-10-19 RX ORDER — PROMETHAZINE HYDROCHLORIDE 25 MG/ML
12.5 INJECTION, SOLUTION INTRAMUSCULAR; INTRAVENOUS
Status: DISCONTINUED | OUTPATIENT
Start: 2017-10-19 | End: 2017-10-19 | Stop reason: HOSPADM

## 2017-10-19 RX ORDER — FENTANYL CITRATE 50 UG/ML
INJECTION, SOLUTION INTRAMUSCULAR; INTRAVENOUS PRN
Status: DISCONTINUED | OUTPATIENT
Start: 2017-10-19 | End: 2017-10-19

## 2017-10-19 RX ADMIN — LIDOCAINE HYDROCHLORIDE 40 MG: 20 INJECTION, SOLUTION INFILTRATION; PERINEURAL at 07:35

## 2017-10-19 RX ADMIN — SODIUM CHLORIDE, POTASSIUM CHLORIDE, SODIUM LACTATE AND CALCIUM CHLORIDE: 600; 310; 30; 20 INJECTION, SOLUTION INTRAVENOUS at 07:21

## 2017-10-19 RX ADMIN — PROPOFOL 40 MG: 10 INJECTION, EMULSION INTRAVENOUS at 07:35

## 2017-10-19 RX ADMIN — SCOPALAMINE 1 PATCH: 1 PATCH, EXTENDED RELEASE TRANSDERMAL at 07:17

## 2017-10-19 RX ADMIN — PROPOFOL 40 MG: 10 INJECTION, EMULSION INTRAVENOUS at 07:45

## 2017-10-19 RX ADMIN — PROPOFOL 40 MG: 10 INJECTION, EMULSION INTRAVENOUS at 07:40

## 2017-10-19 RX ADMIN — CLINDAMYCIN PHOSPHATE 900 MG: 18 INJECTION, SOLUTION INTRAVENOUS at 07:30

## 2017-10-19 RX ADMIN — FENTANYL CITRATE 50 MCG: 50 INJECTION, SOLUTION INTRAMUSCULAR; INTRAVENOUS at 07:42

## 2017-10-19 RX ADMIN — LIDOCAINE HYDROCHLORIDE 10 ML: 10 INJECTION, SOLUTION EPIDURAL; INFILTRATION; INTRACAUDAL; PERINEURAL at 08:08

## 2017-10-19 RX ADMIN — FENTANYL CITRATE 50 MCG: 50 INJECTION, SOLUTION INTRAMUSCULAR; INTRAVENOUS at 07:43

## 2017-10-19 RX ADMIN — PROPOFOL 40 MG: 10 INJECTION, EMULSION INTRAVENOUS at 07:55

## 2017-10-19 RX ADMIN — PROPOFOL 40 MG: 10 INJECTION, EMULSION INTRAVENOUS at 08:02

## 2017-10-19 RX ADMIN — MIDAZOLAM HYDROCHLORIDE 2 MG: 1 INJECTION, SOLUTION INTRAMUSCULAR; INTRAVENOUS at 07:33

## 2017-10-19 ASSESSMENT — LIFESTYLE VARIABLES: TOBACCO_USE: 1

## 2017-10-19 NOTE — IP AVS SNAPSHOT
MRN:[id number]                      After Visit Summary   10/19/2017    Ritesh Soliman    MRN: [id number]           Thank you!     Thank you for choosing [facility] for your care. Our goal is always to provide you with excellent care. Hearing back from our patients is one way we can continue to improve our services. Please take a few minutes to complete the written survey that you may receive in the mail after you visit with us. Thank you!        Patient Information     Date Of Birth          1975        About your hospital stay     You were admitted on:  October 19, 2017 You last received care in the:  [facility] Preop/Phase II    You were discharged on:  October 19, 2017       Who to Call     For medical emergencies, please call 911.  For non-urgent questions about your medical care, please call your primary care provider or clinic, [phone]  For questions related to your surgery, please call your surgery clinic        Attending Provider     Provider Kiko Baires MD General Surgery       Primary Care Provider Office Phone # Fax #    Olivia Weiss [phone]      After Care Instructions     Diet Instructions       Resume pre-procedure diet            Discharge Instructions       Patient to follow up with appointment in 2 weeks            Shower       No shower for 24 hours post procedure. May shower Postoperative Day (POD)  1            Weight bearing status - As tolerated                 Your next 10 appointments already scheduled     Nov 01, 2017  2:00 PM CDT   (Arrive by 1:45 PM)   Post Op with Kiko Newberry MD   [facility] ([facility] )    [address] [place] [address]   [phone]              Further instructions from your care team           Post-Anesthesia Patient Instructions    IMMEDIATELY FOLLOWING SURGERY:  Do not drive or operate machinery for the first twenty four hours after surgery.  Do not make any  "important decisions for twenty four hours after surgery or while taking narcotic pain medications or sedatives.  If you develop intractable nausea and vomiting or a severe headache please notify your doctor immediately.    FOLLOW-UP:  Please make an appointment with your surgeon as instructed. You do not need to follow up with anesthesia unless specifically instructed to do so.    WOUND CARE INSTRUCTIONS (if applicable):  Keep a dry clean dressing on the anesthesia/puncture wound site if there is drainage.  Once the wound has quit draining you may leave it open to air.  Generally you should leave the bandage intact for twenty four hours unless there is drainage.  If the epidural site drains for more than 36-48 hours please call the anesthesia department.    QUESTIONS?:  Please feel free to call your physician or the hospital  if you have any questions, and they will be happy to assist you.       Pending Results     No orders found from 10/17/2017 to 10/20/2017.            Admission Information     Date & Time Provider Department Dept. Phone    10/19/2017 Kiko Newberry MD [facility] Preop/Phase [phone]      Your Vitals Were     Blood Pressure Temperature Height Weight Pulse Oximetry BMI (Body Mass Index)    121/82 98.3  F (36.8  C) (Temporal) 1.594 m (5' 2.76\") 77.1 kg (170 lb) 96% 30.35 kg/m2      [employer] Information     [employer] lets you send messages to your doctor, view your test results, renew your prescriptions, schedule appointments and more. To sign up, go to [email or web address] . Click on \"Log in\" on the left side of the screen, which will take you to the Welcome page. Then click on \"Sign up Now\" on the right side of the page.     You will be asked to enter the access code listed below, as well as some personal information. Please follow the directions to create your username and password.     Your access code is: [email or web address]  Expires: 2018 11:49 AM     Your access code will  in 90 " days. If you need help or a new code, please call your [facility] clinic or [phone].        Care EveryWhere ID     This is your Care EveryWhere ID. This could be used by other organizations to access your [facility] medical records  [id number]        Equal Access to Services     CARLITOOBDULIA CHANCE : Hadii aad ku hadwilmerstefan Sorebelali, waaxda luqadaha, qaybta kaalmada adedeisyyada, sol burton karlimaría núñez. So [facility] [phone].    ATENCIÓN: Si habla español, tiene a meléndez disposición servicios gratuitos de asistencia lingüística. Llame al [phone].    We comply with applicable federal civil rights laws and [place] laws. We do not discriminate on the basis of race, color, national origin, age, disability, sex, sexual orientation, or gender identity.               Review of your medicines      START taking        Dose / Directions    HYDROcodone-acetaminophen 5-325 MG per tablet   Commonly known as:  NORCO   Used for:  S/P breast biopsy, right        Dose:  1-2 tablet   Take 1-2 tablets by mouth every 4 hours as needed for other (Moderate to Severe Pain)   Quantity:  5 tablet   Refills:  0         CONTINUE these medicines which have NOT CHANGED        Dose / Directions    AZO TABS PO        Taking 2-4 tabs a day as needed   Refills:  0       escitalopram 20 MG tablet   Commonly known as:  LEXAPRO        Dose:  20 mg   Take 20 mg by mouth daily   Refills:  0       nabumetone 500 MG tablet   Commonly known as:  RELAFEN   Used for:  Tendonitis of ankle or foot, Plantar fasciitis        Dose:  500-1000 mg   Take 1-2 tablets (500-1,000 mg) by mouth 2 times daily as needed for moderate pain   Quantity:  60 tablet   Refills:  1       sulfamethoxazole-trimethoprim 800-160 MG per tablet   Commonly known as:  BACTRIM DS/SEPTRA DS   Used for:  Acute cystitis without hematuria        Dose:  1 tablet   Take 1 tablet by mouth 2 times daily   Quantity:  14 tablet   Refills:  0       traZODone 50 MG tablet   Commonly known  as:  DESYREL   Used for:  Insomnia        Take 1 tablet (50 mg) by mouth nightly as needed for sleep   Quantity:  30 tablet   Refills:  2            Where to get your medicines      Some of these will need a paper prescription and others can be bought over the counter. Ask your nurse if you have questions.     Bring a paper prescription for each of these medications     HYDROcodone-acetaminophen 5-325 MG per tablet                Protect others around you: Learn how to safely use, store and throw away your medicines at www.disposemymeds.org.             Medication List: This is a list of all your medications and when to take them. Check marks below indicate your daily home schedule. Keep this list as a reference.      Medications           Morning Afternoon Evening Bedtime As Needed    AZO TABS PO   Taking 2-4 tabs a day as needed                                escitalopram 20 MG tablet   Commonly known as:  LEXAPRO   Take 20 mg by mouth daily                                HYDROcodone-acetaminophen 5-325 MG per tablet   Commonly known as:  NORCO   Take 1-2 tablets by mouth every 4 hours as needed for other (Moderate to Severe Pain)                                nabumetone 500 MG tablet   Commonly known as:  RELAFEN   Take 1-2 tablets (500-1,000 mg) by mouth 2 times daily as needed for moderate pain                                sulfamethoxazole-trimethoprim 800-160 MG per tablet   Commonly known as:  BACTRIM DS/SEPTRA DS   Take 1 tablet by mouth 2 times daily                                traZODone 50 MG tablet   Commonly known as:  DESYREL   Take 1 tablet (50 mg) by mouth nightly as needed for sleep

## 2017-10-19 NOTE — ANESTHESIA PREPROCEDURE EVALUATION
Anesthesia Evaluation     . Pt has had prior anesthetic.     No history of anesthetic complications          ROS/MED HX    ENT/Pulmonary:     (+)tobacco use, Current use 0.5 PPD packs/day  , . .    Neurologic:  - neg neurologic ROS     Cardiovascular:  - neg cardiovascular ROS       METS/Exercise Tolerance:     Hematologic:  - neg hematologic  ROS       Musculoskeletal:   (+) , , other musculoskeletal- BREAST MASS/RIGHT      GI/Hepatic:  - neg GI/hepatic ROS       Renal/Genitourinary:  - ROS Renal section negative       Endo:     (+) Obesity, .      Psychiatric:     (+) psychiatric history anxiety and depression      Infectious Disease:  - neg infectious disease ROS       Malignancy:      - no malignancy   Other:    (+) No chance of pregnancy   - neg other ROS                 Physical Exam  Normal systems: dental    Airway   Mallampati: III  TM distance: >3 FB  Neck ROM: full    Dental     Cardiovascular       Pulmonary                     Anesthesia Plan      History & Physical Review  History and physical reviewed and following examination; no interval change.    ASA Status:  2 .    NPO Status:  > 8 hours    Plan for MAC with Intravenous and Propofol induction. Maintenance will be TIVA.  Reason for MAC:  Deep or markedly invasive procedure (G8), Procedure to face, neck, head or breast, Extreme anxiety (QS) and Difficulty with conscious sedation (QS)  PONV prophylaxis:  Ondansetron (or other 5HT-3), Scopolamine patch and Dexamethasone or Solumedrol  HCG Negative      Postoperative Care  Postoperative pain management:  IV analgesics and Oral pain medications.      Consents  Anesthetic plan, risks, benefits and alternatives discussed with:  Patient..                          .

## 2017-10-19 NOTE — OR NURSING
Patient and responsible adult given discharge instructions with no questions regarding instructions. Vandana score 20. Pain level 0/10.  Discharged from unit via ambulatory. Patient discharged to home.

## 2017-10-19 NOTE — ANESTHESIA CARE TRANSFER NOTE
Patient: Ritesh Soliman    Procedure(s):  EXCISIONAL BIOPSY RIGHT BREAST - Wound Class: I-Clean    Diagnosis: BREAST MASS/RIGHT  Diagnosis Additional Information: No value filed.    Anesthesia Type:   MAC     Note:  Airway :Room Air  Patient transferred to:Phase II  Handoff Report: Identifed the Patient, Identified the Reponsible Provider, Reviewed the pertinent medical history, Discussed the surgical course, Reviewed Intra-OP anesthesia mangement and issues during anesthesia, Set expectations for post-procedure period and Allowed opportunity for questions and acknowledgement of understanding      Vitals: (Last set prior to Anesthesia Care Transfer)    CRNA VITALS  10/19/2017 0745 - 10/19/2017 0821      10/19/2017             Pulse: 74    SpO2: 97 %    Resp Rate (set): 8    EKG: Sinus rhythm                Electronically Signed By: MARINAN Raymond CRNA  October 19, 2017  8:21 AM

## 2017-10-19 NOTE — IP AVS SNAPSHOT
[facility]op[facility] II    [address] [address]    Phone:  [phone]                                       After Visit Summary   10/19/2017    Ritesh Soliman    MRN: [id number]           After Visit Summary Signature Page     I have received my discharge instructions, and my questions have been answered. I have discussed any challenges I see with this plan with the nurse or doctor.    ..........................................................................................................................................  Patient/Patient Representative Signature      ..........................................................................................................................................  Patient Representative Print Name and Relationship to Patient    ..................................................               ................................................  Date                                            Time    ..........................................................................................................................................  Reviewed by Signature/Title    ...................................................              ..............................................  Date                                                            Time

## 2017-10-19 NOTE — OP NOTE
[facility] Surgery   Procedure Note    Preoperative diagnosis:  Right breast lesion     Postoperative diagnosis:  Same     Procedure:  Excisional biopsy right breast     Anesthesia:  MAC     History: 42 year old year old female with no significant past medical history presents for outpatient excision of a right breast lesion. She was noted to have skin changes and pain at the site in question . Please see clinic note for more details.     Findings:  Right breast skin lesion     Tissue to pathology:    Right breast tissue, marked for orientation     Details:  Pre procedure anita was placed and the requisite time out pause observed during which the patient confirmed their identity, date of birth, side and site of the requested excision.  The region was prepped and draped sterilely; local anesthesia was obtained with infiltration of 10 cc of 0.25% marcaine w/ 1% lidocaine mix 1:1.  An elliptical incision was made to incorporate the entire lesion carried through full thickness skin.  Using  sharp dissection the skin lesion as well as some breast fat was removed. It was marked for orientation. Hemostasis was ensured with electrocautery.   The wound was closed in layers with interrupted 3-0 Vicryl in the subcutaneous tissue.  The skin was reapproximated with 4-0 monocryl in a subcuticular fashion.  Dermabond was placed and a steri-strip over the top.          Kiko Newberry

## 2017-10-20 LAB — COPATH REPORT: NORMAL

## 2017-11-01 ENCOUNTER — OFFICE VISIT (OUTPATIENT)
Dept: SURGERY | Facility: OTHER | Age: 42
End: 2017-11-01
Attending: SURGERY
Payer: COMMERCIAL

## 2017-11-01 VITALS
WEIGHT: 170 LBS | HEIGHT: 63 IN | HEART RATE: 76 BPM | OXYGEN SATURATION: 98 % | TEMPERATURE: 96.7 F | DIASTOLIC BLOOD PRESSURE: 78 MMHG | SYSTOLIC BLOOD PRESSURE: 112 MMHG | BODY MASS INDEX: 30.12 KG/M2

## 2017-11-01 DIAGNOSIS — Z98.890 POSTOPERATIVE STATE: Primary | ICD-10-CM

## 2017-11-01 PROCEDURE — 99024 POSTOP FOLLOW-UP VISIT: CPT | Performed by: SURGERY

## 2017-11-01 PROCEDURE — 99212 OFFICE O/P EST SF 10 MIN: CPT

## 2017-11-01 ASSESSMENT — PAIN SCALES - GENERAL: PAINLEVEL: NO PAIN (0)

## 2017-11-01 NOTE — MR AVS SNAPSHOT
"              After Visit Summary   11/1/2017    Ritesh Soliman    MRN: [id number]           Patient Information     Date Of Birth          1975        Visit Information        Provider Department      11/1/2017 2:00 PM Kiko Newberry MD [facility]        Care Instructions    Thank you for allowing Dr. Newberry and our surgical team to participate in your care.  If you have a scheduling or an appointment question please contact Ariela, our Health Unit Coordinator at her direct line [phone].   ALL nursing questions or concerns can be directed to your surgical nurse at: [phone]          Follow-ups after your visit        Who to contact     If you have questions or need follow up information about today's clinic visit or your schedule please contact [facility] directly at [phone].  Normal or non-critical lab and imaging results will be communicated to you by MyChart, letter or phone within 4 business days after the clinic has received the results. If you do not hear from us within 7 days, please contact the clinic through MyChart or phone. If you have a critical or abnormal lab result, we will notify you by phone as soon as possible.  Submit refill requests through Navmii or call your pharmacy and they will forward the refill request to us. Please allow 3 business days for your refill to be completed.          Additional Information About Your Visit        MyChart Information     Navmii lets you send messages to your doctor, view your test results, renew your prescriptions, schedule appointments and more. To sign up, go to [email or web address] . Click on \"Log in\" on the left side of the screen, which will take you to the Welcome page. Then click on \"Sign up Now\" on the right side of the page.     You will be asked to enter the access code listed below, as well as some personal information. Please follow the directions to create your username and password.   " "  Your access code is: [email or web address]  Expires: 2018 11:49 AM     Your access code will  in 90 days. If you need help or a new code, please call your [facility] clinic or [phone].        Care EveryWhere ID     This is your Care EveryWhere ID. This could be used by other organizations to access your [facility] medical records  [id number]        Your Vitals Were     Pulse Temperature Height Pulse Oximetry BMI (Body Mass Index)       76 96.7  F (35.9  C) (Tympanic) 5' 2.76\" (1.594 m) 98% 30.34 kg/m2        Blood Pressure from Last 3 Encounters:   17 112/78   10/19/17 115/83   10/10/17 114/68    Weight from Last 3 Encounters:   17 170 lb (77.1 kg)   10/19/17 170 lb (77.1 kg)   10/10/17 170 lb (77.1 kg)              Today, you had the following     No orders found for display       Primary Care Provider Office Phone # Fax #    Olivia Weiss [phone]       [facility] [place] [address]  [place] [address]        Equal Access to Services     ROBBI FLETCHER AH: Hadii carl ku hadasho Soomaali, waaxda luqadaha, qaybta kaalmada adeegyada, sol jeann micheal garcia . So [facility] [phone].    ATENCIÓN: Si habla español, tiene a melnédez disposición servicios gratuitos de asistencia lingüística. Llame al [phone].    We comply with applicable federal civil rights laws and [place] laws. We do not discriminate on the basis of race, color, national origin, age, disability, sex, sexual orientation, or gender identity.            Thank you!     Thank you for choosing [facility]  for your care. Our goal is always to provide you with excellent care. Hearing back from our patients is one way we can continue to improve our services. Please take a few minutes to complete the written survey that you may receive in the mail after your visit with us. Thank you!             Your Updated Medication List - Protect others around you: Learn how to safely use, store and " throw away your medicines at www.disposemymeds.org.          This list is accurate as of: 11/1/17  2:19 PM.  Always use your most recent med list.                   Brand Name Dispense Instructions for use Diagnosis    AZO TABS PO      Taking 2-4 tabs a day as needed        escitalopram 20 MG tablet    LEXAPRO     Take 20 mg by mouth daily        HYDROcodone-acetaminophen 5-325 MG per tablet    NORCO    5 tablet    Take 1-2 tablets by mouth every 4 hours as needed for other (Moderate to Severe Pain)    S/P breast biopsy, right       nabumetone 500 MG tablet    RELAFEN    60 tablet    Take 1-2 tablets (500-1,000 mg) by mouth 2 times daily as needed for moderate pain    Tendonitis of ankle or foot, Plantar fasciitis       sulfamethoxazole-trimethoprim 800-160 MG per tablet    BACTRIM DS/SEPTRA DS    14 tablet    Take 1 tablet by mouth 2 times daily    Acute cystitis without hematuria       traZODone 50 MG tablet    DESYREL    30 tablet    Take 1 tablet (50 mg) by mouth nightly as needed for sleep    Insomnia

## 2017-11-01 NOTE — NURSING NOTE
"Chief Complaint   Patient presents with     Surgical Followup     Right breast lesion bx-S/P excisional biopsy right breast 10/19/17       Initial /78 (BP Location: Left arm, Patient Position: Chair, Cuff Size: Adult Regular)  Pulse 76  Temp 96.7  F (35.9  C) (Tympanic)  Ht 5' 2.76\" (1.594 m)  Wt 170 lb (77.1 kg)  SpO2 98%  BMI 30.34 kg/m2 Estimated body mass index is 30.34 kg/(m^2) as calculated from the following:    Height as of this encounter: 5' 2.76\" (1.594 m).    Weight as of this encounter: 170 lb (77.1 kg).  Medication Reconciliation: bunny Martell          "

## 2017-11-01 NOTE — PATIENT INSTRUCTIONS
Thank you for allowing Dr. Newberry and our surgical team to participate in your care.  If you have a scheduling or an appointment question please contact Ariela, our Health Unit Coordinator at her direct line [phone].   ALL nursing questions or concerns can be directed to your surgical nurse at: [phone]

## 2017-11-01 NOTE — PROGRESS NOTES
[facility] Progress Note    S: Doing well, no complaints.     O:   Vitals:  @FLOWSTAT(5,6,8:24)@  [unfilled]    Physical Exam:  G: alert oriented, no acute distress   ENT: sclera non-icteric   Breast: Right breast incision with some scabbing, no erythema or drainage.   Pulm: no respiratory distress   CVS: RRR  ABD: soft non-tender non-distended   Ext: WWP     Assessment/Plan:  Discussed path, no concerns for infection.     Kiko Newberry

## 2017-12-14 ENCOUNTER — HOSPITAL ENCOUNTER (EMERGENCY)
Facility: HOSPITAL | Age: 42
Discharge: HOME | End: 2017-12-14
Attending: PHYSICIAN ASSISTANT | Admitting: PHYSICIAN ASSISTANT
Payer: COMMERCIAL

## 2017-12-14 VITALS
DIASTOLIC BLOOD PRESSURE: 74 MMHG | RESPIRATION RATE: 16 BRPM | OXYGEN SATURATION: 97 % | HEART RATE: 60 BPM | TEMPERATURE: 97.6 F | SYSTOLIC BLOOD PRESSURE: 123 MMHG

## 2017-12-14 DIAGNOSIS — J02.9 ACUTE PHARYNGITIS, UNSPECIFIED ETIOLOGY: ICD-10-CM

## 2017-12-14 LAB
DEPRECATED S PYO AG THROAT QL EIA: NORMAL
SPECIMEN SOURCE: NORMAL

## 2017-12-14 PROCEDURE — 87081 CULTURE SCREEN ONLY: CPT | Performed by: FAMILY MEDICINE

## 2017-12-14 PROCEDURE — 87880 STREP A ASSAY W/OPTIC: CPT | Performed by: FAMILY MEDICINE

## 2017-12-14 PROCEDURE — 99213 OFFICE O/P EST LOW 20 MIN: CPT

## 2017-12-14 PROCEDURE — 99213 OFFICE O/P EST LOW 20 MIN: CPT | Performed by: PHYSICIAN ASSISTANT

## 2017-12-14 ASSESSMENT — ENCOUNTER SYMPTOMS
FEVER: 0
SORE THROAT: 1
VOMITING: 0
DIARRHEA: 0
EYE DISCHARGE: 0
LIGHT-HEADEDNESS: 0
APPETITE CHANGE: 0
NAUSEA: 0
PSYCHIATRIC NEGATIVE: 1
NECK STIFFNESS: 0
ABDOMINAL PAIN: 0
VOICE CHANGE: 0
COUGH: 0
EYE REDNESS: 0
TROUBLE SWALLOWING: 0
HEADACHES: 0
FATIGUE: 1
CARDIOVASCULAR NEGATIVE: 1
SINUS PRESSURE: 0
NECK PAIN: 0
DIZZINESS: 0

## 2017-12-14 NOTE — ED PROVIDER NOTES
History     Chief Complaint   Patient presents with     Pharyngitis     The history is provided by the patient. No  was used.     Ritesh Soliman is a 42 year old female who has had a sore throat x 2 days. No n/v/d/f/c. No ear pain. No sinus pain/pressure. No change in b/b habits. States she has been exposed to strep pharyngitis    Problem List:    Patient Active Problem List    Diagnosis Date Noted     ACP (advance care planning) 04/29/2016     Priority: Medium     Advance Care Planning 4/29/2016: ACP Review of Chart / Resources Provided:  Reviewed chart for advance care plan.  Ritesh Soliman has been provided information and resources to begin or update their advance care plan.  Added by Ngoc Duong             Tobacco abuse      Priority: Medium     Depression with anxiety      Priority: Medium     Recurrent UTI 02/27/2014     Priority: Medium        Past Medical History:    Past Medical History:   Diagnosis Date     Depression with anxiety      Tobacco abuse        Past Surgical History:    Past Surgical History:   Procedure Laterality Date     BIOPSY BREAST Right 10/19/2017    Procedure: BIOPSY BREAST;  EXCISIONAL BIOPSY RIGHT BREAST;  Surgeon: Kiko Newberry MD;  Location: [facility]     OOPHORECTOMY      tubal pregnancy     TUBAL LIGATION         Family History:    Family History   Problem Relation Age of Onset     Coronary Artery Disease Mother      Hypertension Mother      Coronary Artery Disease Father      C.A.D. No family hx of      Breast Cancer No family hx of      Cancer - colorectal No family hx of      DIABETES No family hx of      Thyroid Disease No family hx of      Asthma No family hx of      Colon Cancer No family hx of      Hyperlipidemia No family hx of        Social History:  Marital Status:  Single [1]  Social History   Substance Use Topics     Smoking status: Current Some Day Smoker     Packs/day: 0.50     Types: Cigarettes     Smokeless tobacco: Never Used       Comment: rarely smokes     Alcohol use 0.0 oz/week     0 Standard drinks or equivalent per week      Comment: occassionally        Medications:      HYDROcodone-acetaminophen (NORCO) 5-325 MG per tablet   escitalopram (LEXAPRO) 20 MG tablet   sulfamethoxazole-trimethoprim (BACTRIM DS/SEPTRA DS) 800-160 MG per tablet   nabumetone (RELAFEN) 500 MG tablet   traZODone (DESYREL) 50 MG tablet   Phenazopyridine HCl (AZO TABS PO)         Review of Systems   Constitutional: Positive for fatigue. Negative for appetite change and fever.   HENT: Positive for sore throat. Negative for congestion, ear pain, sinus pressure, trouble swallowing and voice change.    Eyes: Negative for discharge and redness.   Respiratory: Negative for cough.    Cardiovascular: Negative.    Gastrointestinal: Negative for abdominal pain, diarrhea, nausea and vomiting.   Genitourinary: Negative.    Musculoskeletal: Negative for neck pain and neck stiffness.   Skin: Negative for rash.   Neurological: Negative for dizziness, light-headedness and headaches.   Psychiatric/Behavioral: Negative.        Physical Exam   BP: 123/74  Pulse: 60  Temp: 97.6  F (36.4  C)  Resp: 16  SpO2: 97 %      Physical Exam   Constitutional: She is oriented to person, place, and time. She appears well-developed and well-nourished. No distress.   HENT:   Head: Normocephalic and atraumatic.   Right Ear: External ear normal.   Left Ear: External ear normal.   Bilateral TMs/canals clear/wnl  No sinus TTP    Oropharynx has mild diffuse erythema. No edema/exudate     Eyes: Conjunctivae and EOM are normal. Right eye exhibits no discharge. Left eye exhibits no discharge.   Neck: Normal range of motion. Neck supple.   Cardiovascular: Normal rate, regular rhythm and normal heart sounds.    Pulmonary/Chest: Effort normal and breath sounds normal. No respiratory distress.   Abdominal: Soft. Bowel sounds are normal. She exhibits no distension. There is no tenderness.   Neurological: She is  alert and oriented to person, place, and time.   Skin: Skin is warm and dry. No rash noted. She is not diaphoretic.   Psychiatric: She has a normal mood and affect.   Nursing note and vitals reviewed.      ED Course     ED Course     Procedures            Labs Ordered and Resulted from Time of ED Arrival Up to the Time of Departure from the ED   RAPID STREP SCREEN   BETA STREP GROUP A CULTURE       Assessments & Plan (with Medical Decision Making)     I have reviewed the nursing notes.    I have reviewed the findings, diagnosis, plan and need for follow up with the patient.    Final diagnoses:   Acute pharyngitis, unspecified etiology - Rapid strep negative  Culture pending           Patient verbally educated and given appropriate education sheets for each of the diagnoses and has no questions.  Take OTC motrin or tylenol as directed on the bottle as needed.  Take prescription medications as directed.  Increase fluids, wash hands often.  Sleep in a recliner or with multiple pillows until this has resolved.  Follow up with your provider if symptoms increase or if concerns develop, return to the ER.  Mere Mathis Certified   Physician Assistant  12/14/2017  2:40 PM  URGENT CARE CLINIC    12/14/2017   [facility]     Mere Mathis PA  12/14/17 [id number]

## 2017-12-14 NOTE — ED AVS SNAPSHOT
[facility] Department    [address] [place] [address]    Phone:  [phone]                                       Ritesh Soliman   MRN: [id number]    Department:  [facility]   Date of Visit:  12/14/2017           After Visit Summary Signature Page     I have received my discharge instructions, and my questions have been answered. I have discussed any challenges I see with this plan with the nurse or doctor.    ..........................................................................................................................................  Patient/Patient Representative Signature      ..........................................................................................................................................  Patient Representative Print Name and Relationship to Patient    ..................................................               ................................................  Date                                            Time    ..........................................................................................................................................  Reviewed by Signature/Title    ...................................................              ..............................................  Date                                                            Time

## 2017-12-14 NOTE — ED AVS SNAPSHOT
[facility]    [address]    [place] [address]    Phone:  [phone]                                       Ritesh Soliman   MRN: [id number]    Department:  [facility]   Date of Visit:  12/14/2017           Patient Information     Date Of Birth          1975        Your diagnoses for this visit were:     Acute pharyngitis, unspecified etiology Rapid strep negative  Culture pending       You were seen by Mere Mathis PA.      Follow-up Information     Follow up with Olivia Weiss MD.    Specialty:  Family Practice    Why:  If symptoms worsen    Contact information:    [facility] [place]  [address]  [place] [address] [phone]          Follow up with [facility].    Specialty:  EMERGENCY MEDICINE    Why:  If further concerns develop    Contact information:    [address]  [place] [address] [phone]    Additional information:    From [place]: Take [address]. Turn left at [address]. Turn left at the first stoplight on [address]. At the first stop sign, take a right onto [address]. Take a left into the parking lot and continue through until you reach the North enterance of the building.       From [place]: Take [address]. Take the [address] ramp towards [place]. Turn left onto [address]. Take a slight right onto [address]. Turn left at the first stoplight on [address]. At the first stop sign, take a right onto [address]. Take a left into the parking lot and continue through until you reach the North enterance of the building.       From [place]: Take [address]. Take a right at [address]. At the first stop sign, take a right onto [address]. Take a left into the parking lot and continue through until you reach the North enterance of the building.       Discharge References/Attachments     PHARYNGITIS, REPORT PENDING  (ENGLISH)         Review of your medicines      Our records show that you are taking the medicines listed below. If these are incorrect, please call your family doctor or clinic.        Dose / Directions Last dose taken    AZO TABS PO        Taking 2-4 tabs a day as needed   Refills:  0        escitalopram 20 MG tablet   Commonly known as:  LEXAPRO   Dose:  20 mg        Take 20 mg by mouth daily   Refills:  0        HYDROcodone-acetaminophen 5-325 MG per tablet   Commonly known as:  NORCO   Dose:  1-2 tablet   Quantity:  5 tablet        Take 1-2 tablets by mouth every 4 hours as needed for other (Moderate to Severe Pain)   Refills:  0        nabumetone 500 MG tablet   Commonly known as:  RELAFEN   Dose:  500-1000 mg   Quantity:  60 tablet        Take 1-2 tablets (500-1,000 mg) by mouth 2 times daily as needed for moderate pain   Refills:  1        sulfamethoxazole-trimethoprim 800-160 MG per tablet   Commonly known as:  BACTRIM DS/SEPTRA DS   Dose:  1 tablet   Quantity:  14 tablet        Take 1 tablet by mouth 2 times daily   Refills:  0        traZODone 50 MG tablet   Commonly known as:  DESYREL   Quantity:  30 tablet        Take 1 tablet (50 mg) by mouth nightly as needed for sleep   Refills:  2                Procedures and tests performed during your visit     Beta strep group A culture    Rapid strep screen      Orders Needing Specimen Collection     None      Pending Results     Date and Time Order Name Status Description    12/14/2017 1412 Beta strep group A culture In process             Pending Culture Results     Date and Time Order Name Status Description    12/14/2017 1412 Beta strep group A culture In process             Thank you for choosing [facility]       Thank you for choosing [facility] for your care. Our goal is always to provide you with excellent care. Hearing back from our patients is one way we can continue to improve our services. Please take a few minutes to complete the written survey that  "you may receive in the mail after you visit with us. Thank you!        [employer] Information     [employer] lets you send messages to your doctor, view your test results, renew your prescriptions, schedule appointments and more. To sign up, go to [email or web address] . Click on \"Log in\" on the left side of the screen, which will take you to the Welcome page. Then click on \"Sign up Now\" on the right side of the page.     You will be asked to enter the access code listed below, as well as some personal information. Please follow the directions to create your username and password.     Your access code is: [email or web address]  Expires: 2018 10:49 AM     Your access code will  in 90 days. If you need help or a new code, please call your [facility] clinic or [phone].        Care EveryWhere ID     This is your Care EveryWhere ID. This could be used by other organizations to access your [facility] medical records  [id number]        Equal Access to Services     ROBBI FLETCHER : Hadii carl camarao Sojustin, waaxda luqadaha, qaybta kaalmada adehuber, sol garcia . So [facility] [phone].    ATENCIÓN: Si habla español, tiene a meléndez disposición servicios gratuitos de asistencia lingüística. Llame al [phone].    We comply with applicable federal civil rights laws and [place] laws. We do not discriminate on the basis of race, color, national origin, age, disability, sex, sexual orientation, or gender identity.            After Visit Summary       This is your record. Keep this with you and show to your community pharmacist(s) and doctor(s) at your next visit.                  "

## 2017-12-16 LAB
BACTERIA SPEC CULT: NORMAL
SPECIMEN SOURCE: NORMAL

## 2018-02-17 DIAGNOSIS — F41.8 DEPRESSION WITH ANXIETY: Primary | ICD-10-CM

## 2018-02-19 NOTE — TELEPHONE ENCOUNTER
Lexapro  Last Written Prescription Date: Patient Reported  Last Fill Quantity: NA # of Refills: NA  Last Office Visit: 10/3/17

## 2018-02-20 RX ORDER — ESCITALOPRAM OXALATE 20 MG/1
TABLET ORAL
Qty: 60 TABLET | Refills: 0 | Status: SHIPPED | OUTPATIENT
Start: 2018-02-20 | End: 2018-04-26 | Stop reason: ALTCHOICE

## 2018-02-20 NOTE — TELEPHONE ENCOUNTER
Lexapro is patient reported medication.  PCP Chelsi Weiss.  Please see below for last PHQ    PHQ-9 score:    PHQ-9 SCORE 10/3/2017   Total Score -   Total Score 22

## 2018-04-17 ENCOUNTER — TELEPHONE (OUTPATIENT)
Dept: FAMILY MEDICINE | Facility: OTHER | Age: 43
End: 2018-04-17

## 2018-04-17 DIAGNOSIS — R30.0 DYSURIA: Primary | ICD-10-CM

## 2018-04-17 NOTE — TELEPHONE ENCOUNTER
We need to get a sample so I can put an order in for her. Or she can be seen in Urgent Care. She could be an evisit if she were in [facility]

## 2018-04-17 NOTE — TELEPHONE ENCOUNTER
1:25 PM    Reason for Call: Phone Call    Description: Ritesh is wondering if Dr. Baker could send in a prescription for a UTI  To [facility]. She has an appointment on 04/26/2018, but has a UTI now. Please call [facility]    Preferred method for responding to this message: [phone]    If we cannot reach you directly, may we leave a detailed response at the number you provided?  Yes      Marichuy Jones

## 2018-04-20 DIAGNOSIS — N30.00 ACUTE CYSTITIS WITHOUT HEMATURIA: Primary | ICD-10-CM

## 2018-04-20 DIAGNOSIS — R30.0 DYSURIA: ICD-10-CM

## 2018-04-20 LAB
ALBUMIN UR-MCNC: 10 MG/DL
APPEARANCE UR: ABNORMAL
BACTERIA #/AREA URNS HPF: ABNORMAL /HPF
BILIRUB UR QL STRIP: NEGATIVE
COLOR UR AUTO: YELLOW
GLUCOSE UR STRIP-MCNC: NEGATIVE MG/DL
HGB UR QL STRIP: NEGATIVE
KETONES UR STRIP-MCNC: NEGATIVE MG/DL
LEUKOCYTE ESTERASE UR QL STRIP: ABNORMAL
NITRATE UR QL: NEGATIVE
PH UR STRIP: 5.5 PH (ref 4.7–8)
RBC #/AREA URNS AUTO: 3 /HPF (ref 0–2)
SOURCE: ABNORMAL
SP GR UR STRIP: 1.02 (ref 1–1.03)
SQUAMOUS #/AREA URNS AUTO: 9 /HPF (ref 0–1)
UROBILINOGEN UR STRIP-MCNC: NORMAL MG/DL (ref 0–2)
WBC #/AREA URNS AUTO: 53 /HPF (ref 0–5)

## 2018-04-20 PROCEDURE — 87186 SC STD MICRODIL/AGAR DIL: CPT | Mod: ZL | Performed by: FAMILY MEDICINE

## 2018-04-20 PROCEDURE — 81001 URINALYSIS AUTO W/SCOPE: CPT | Mod: ZL | Performed by: FAMILY MEDICINE

## 2018-04-20 PROCEDURE — 87086 URINE CULTURE/COLONY COUNT: CPT | Mod: ZL | Performed by: FAMILY MEDICINE

## 2018-04-20 PROCEDURE — 87088 URINE BACTERIA CULTURE: CPT | Mod: ZL | Performed by: FAMILY MEDICINE

## 2018-04-23 ENCOUNTER — TELEPHONE (OUTPATIENT)
Dept: FAMILY MEDICINE | Facility: OTHER | Age: 43
End: 2018-04-23

## 2018-04-23 LAB
BACTERIA SPEC CULT: ABNORMAL
SPECIMEN SOURCE: ABNORMAL

## 2018-04-23 RX ORDER — NITROFURANTOIN 25; 75 MG/1; MG/1
100 CAPSULE ORAL 2 TIMES DAILY
Qty: 14 CAPSULE | Refills: 0 | Status: SHIPPED | OUTPATIENT
Start: 2018-04-23 | End: 2018-05-08

## 2018-04-23 NOTE — TELEPHONE ENCOUNTER
Please schedule patient for date/time: unable to see today or tomorrow, may wait until Thursday to be seen or may see another provider    Have patient go to ER/Urgent Care Center. Urgent Care hours are 9:30 am to 8 pm, open 7 days a week. No.    Provider will call patient.No.    Other:

## 2018-04-23 NOTE — TELEPHONE ENCOUNTER
8:41 AM    Reason for Call: OVERBOOK    Patient is having the following symptoms: Med review (pt was very upset and stated her meds are not working)    The patient is requesting an appointment for ASAP with Dr JAIMIE Weiss .    Was an appointment offered for this call? No, pt has appt on Thurs and nothing before  If yes : Appointment type              Date    Preferred method for responding to this message: Telephone Call  What is your phone number ? [phone]    If we cannot reach you directly, may we leave a detailed response at the number you provided? Yes    Can this message wait until your PCP/provider returns, if unavailable today? Not applicable    Ana Shanks

## 2018-04-23 NOTE — TELEPHONE ENCOUNTER
Pt returned nurse call. Advised of note and offered another provider. Pt did not want to schedule with someone else. Put pt on the waitlist for her Thurs appt and advised her to call back if she changes her mind.

## 2018-04-26 ENCOUNTER — OFFICE VISIT (OUTPATIENT)
Dept: FAMILY MEDICINE | Facility: OTHER | Age: 43
End: 2018-04-26
Attending: FAMILY MEDICINE
Payer: COMMERCIAL

## 2018-04-26 VITALS
HEART RATE: 95 BPM | DIASTOLIC BLOOD PRESSURE: 72 MMHG | BODY MASS INDEX: 29.1 KG/M2 | TEMPERATURE: 99.3 F | WEIGHT: 163 LBS | OXYGEN SATURATION: 99 % | SYSTOLIC BLOOD PRESSURE: 128 MMHG

## 2018-04-26 DIAGNOSIS — F41.8 DEPRESSION WITH ANXIETY: ICD-10-CM

## 2018-04-26 PROCEDURE — 99213 OFFICE O/P EST LOW 20 MIN: CPT | Performed by: FAMILY MEDICINE

## 2018-04-26 PROCEDURE — G0463 HOSPITAL OUTPT CLINIC VISIT: HCPCS

## 2018-04-26 RX ORDER — PAROXETINE 40 MG/1
40 TABLET, FILM COATED ORAL AT BEDTIME
Qty: 30 TABLET | Refills: 1 | Status: SHIPPED | OUTPATIENT
Start: 2018-04-26 | End: 2018-05-08

## 2018-04-26 ASSESSMENT — ANXIETY QUESTIONNAIRES
4. TROUBLE RELAXING: NEARLY EVERY DAY
GAD7 TOTAL SCORE: 16
6. BECOMING EASILY ANNOYED OR IRRITABLE: SEVERAL DAYS
2. NOT BEING ABLE TO STOP OR CONTROL WORRYING: NEARLY EVERY DAY
IF YOU CHECKED OFF ANY PROBLEMS ON THIS QUESTIONNAIRE, HOW DIFFICULT HAVE THESE PROBLEMS MADE IT FOR YOU TO DO YOUR WORK, TAKE CARE OF THINGS AT HOME, OR GET ALONG WITH OTHER PEOPLE: NOT DIFFICULT AT ALL
3. WORRYING TOO MUCH ABOUT DIFFERENT THINGS: NEARLY EVERY DAY
1. FEELING NERVOUS, ANXIOUS, OR ON EDGE: NEARLY EVERY DAY
7. FEELING AFRAID AS IF SOMETHING AWFUL MIGHT HAPPEN: SEVERAL DAYS
5. BEING SO RESTLESS THAT IT IS HARD TO SIT STILL: MORE THAN HALF THE DAYS

## 2018-04-26 ASSESSMENT — PAIN SCALES - GENERAL: PAINLEVEL: NO PAIN (0)

## 2018-04-26 NOTE — LETTER
My Depression Action Plan  Name: Ritesh Soliman   Date of Birth 1975  Date: 4/26/2018    My doctor: Olivia Weiss   My clinic: [facility] [place]  [place] [address]  [phone]          GREEN    ZONE   Good Control    What it looks like:     Things are going generally well. You have normal up s and down s. You may even feel depressed from time to time, but bad moods usually last less than a day.   What you need to do:  1. Continue to care for yourself (see self care plan)  2. Check your depression survival kit and update it as needed  3. Follow your physician s recommendations including any medication.  4. Do not stop taking medication unless you consult with your physician first.           YELLOW         ZONE Getting Worse    What it looks like:     Depression is starting to interfere with your life.     It may be hard to get out of bed; you may be starting to isolate yourself from others.    Symptoms of depression are starting to last most all day and this has happened for several days.     You may have suicidal thoughts but they are not constant.   What you need to do:     1. Call your care team, your response to treatment will improve if you keep your care team informed of your progress. Yellow periods are signs an adjustment may need to be made.     2. Continue your self-care, even if you have to fake it!    3. Talk to someone in your support network    4. Open up your depression survival kit           RED    ZONE Medical Alert - Get Help    What it looks like:     Depression is seriously interfering with your life.     You may experience these or other symptoms: You can t get out of bed most days, can t work or engage in other necessary activities, you have trouble taking care of basic hygiene, or basic responsibilities, thoughts of suicide or death that will not go away, self-injurious behavior.     What you need to do:  1. Call your care team and request a same-day  appointment. If they are not available (weekends or after hours) call your local crisis line, emergency room or 911.            Depression Self Care Plan / Survival Kit    Self-Care for Depression  Here s the deal. Your body and mind are really not as separate as most people think.  What you do and think affects how you feel and how you feel influences what you do and think. This means if you do things that people who feel good do, it will help you feel better.  Sometimes this is all it takes.  There is also a place for medication and therapy depending on how severe your depression is, so be sure to consult with your medical provider and/ or Behavioral Health Consultant if your symptoms are worsening or not improving.     In order to better manage my stress, I will:    Exercise  Get some form of exercise, every day. This will help reduce pain and release endorphins, the  feel good  chemicals in your brain. This is almost as good as taking antidepressants!  This is not the same as joining a gym and then never going! (they count on that by the way ) It can be as simple as just going for a walk or doing some gardening, anything that will get you moving.      Hygiene   Maintain good hygiene (Get out of bed in the morning, Make your bed, Brush your teeth, Take a shower, and Get dressed like you were going to work, even if you are unemployed).  If your clothes don't fit try to get ones that do.    Diet  I will strive to eat foods that are good for me, drink plenty of water, and avoid excessive sugar, caffeine, alcohol, and other mood-altering substances.  Some foods that are helpful in depression are: complex carbohydrates, B vitamins, flaxseed, fish or fish oil, fresh fruits and vegetables.    Psychotherapy  I agree to participate in Individual Therapy (if recommended).    Medication  If prescribed medications, I agree to take them.  Missing doses can result in serious side effects.  I understand that drinking alcohol,  or other illicit drug use, may cause potential side effects.  I will not stop my medication abruptly without first discussing it with my provider.    Staying Connected With Others  I will stay in touch with my friends, family members, and my primary care provider/team.    Use your imagination  Be creative.  We all have a creative side; it doesn t matter if it s oil painting, sand castles, or mud pies! This will also kick up the endorphins.    Witness Beauty  (AKA stop and smell the roses) Take a look outside, even in mid-winter. Notice colors, textures. Watch the squirrels and birds.     Service to others  Be of service to others.  There is always someone else in need.  By helping others we can  get out of ourselves  and remember the really important things.  This also provides opportunities for practicing all the other parts of the program.    Humor  Laugh and be silly!  Adjust your TV habits for less news and crime-drama and more comedy.    Control your stress  Try breathing deep, massage therapy, biofeedback, and meditation. Find time to relax each day.     My support system    Clinic Contact:  Phone number:    Contact 1:  Phone number:    Contact 2:  Phone number:    Sabianism/:  Phone number:    Therapist:  Phone number:    Local crisis center:    Phone number:    Other community support:  Phone number:

## 2018-04-26 NOTE — LETTER
My Depression Action Plan  Name: Ritesh Soliman   Date of Birth 1975  Date: 4/26/2018    My doctor: Olivia Weiss   My clinic: [facility] [place]  [place] [address]  [phone]          GREEN    ZONE   Good Control    What it looks like:     Things are going generally well. You have normal up s and down s. You may even feel depressed from time to time, but bad moods usually last less than a day.   What you need to do:  1. Continue to care for yourself (see self care plan)  2. Check your depression survival kit and update it as needed  3. Follow your physician s recommendations including any medication.  4. Do not stop taking medication unless you consult with your physician first.           YELLOW         ZONE Getting Worse    What it looks like:     Depression is starting to interfere with your life.     It may be hard to get out of bed; you may be starting to isolate yourself from others.    Symptoms of depression are starting to last most all day and this has happened for several days.     You may have suicidal thoughts but they are not constant.   What you need to do:     1. Call your care team, your response to treatment will improve if you keep your care team informed of your progress. Yellow periods are signs an adjustment may need to be made.     2. Continue your self-care, even if you have to fake it!    3. Talk to someone in your support network    4. Open up your depression survival kit           RED    ZONE Medical Alert - Get Help    What it looks like:     Depression is seriously interfering with your life.     You may experience these or other symptoms: You can t get out of bed most days, can t work or engage in other necessary activities, you have trouble taking care of basic hygiene, or basic responsibilities, thoughts of suicide or death that will not go away, self-injurious behavior.     What you need to do:  1. Call your care team and request a same-day  appointment. If they are not available (weekends or after hours) call your local crisis line, emergency room or 911.            Depression Self Care Plan / Survival Kit    Self-Care for Depression  Here s the deal. Your body and mind are really not as separate as most people think.  What you do and think affects how you feel and how you feel influences what you do and think. This means if you do things that people who feel good do, it will help you feel better.  Sometimes this is all it takes.  There is also a place for medication and therapy depending on how severe your depression is, so be sure to consult with your medical provider and/ or Behavioral Health Consultant if your symptoms are worsening or not improving.     In order to better manage my stress, I will:    Exercise  Get some form of exercise, every day. This will help reduce pain and release endorphins, the  feel good  chemicals in your brain. This is almost as good as taking antidepressants!  This is not the same as joining a gym and then never going! (they count on that by the way ) It can be as simple as just going for a walk or doing some gardening, anything that will get you moving.      Hygiene   Maintain good hygiene (Get out of bed in the morning, Make your bed, Brush your teeth, Take a shower, and Get dressed like you were going to work, even if you are unemployed).  If your clothes don't fit try to get ones that do.    Diet  I will strive to eat foods that are good for me, drink plenty of water, and avoid excessive sugar, caffeine, alcohol, and other mood-altering substances.  Some foods that are helpful in depression are: complex carbohydrates, B vitamins, flaxseed, fish or fish oil, fresh fruits and vegetables.    Psychotherapy  I agree to participate in Individual Therapy (if recommended).    Medication  If prescribed medications, I agree to take them.  Missing doses can result in serious side effects.  I understand that drinking alcohol,  or other illicit drug use, may cause potential side effects.  I will not stop my medication abruptly without first discussing it with my provider.    Staying Connected With Others  I will stay in touch with my friends, family members, and my primary care provider/team.    Use your imagination  Be creative.  We all have a creative side; it doesn t matter if it s oil painting, sand castles, or mud pies! This will also kick up the endorphins.    Witness Beauty  (AKA stop and smell the roses) Take a look outside, even in mid-winter. Notice colors, textures. Watch the squirrels and birds.     Service to others  Be of service to others.  There is always someone else in need.  By helping others we can  get out of ourselves  and remember the really important things.  This also provides opportunities for practicing all the other parts of the program.    Humor  Laugh and be silly!  Adjust your TV habits for less news and crime-drama and more comedy.    Control your stress  Try breathing deep, massage therapy, biofeedback, and meditation. Find time to relax each day.     My support system    Clinic Contact:  Phone number:    Contact 1:  Phone number:    Contact 2:  Phone number:    Taoist/:  Phone number:    Therapist:  Phone number:    Local crisis center:    Phone number:    Other community support:  Phone number:

## 2018-04-26 NOTE — MR AVS SNAPSHOT
"              After Visit Summary   4/26/2018    Ritesh Soliman    MRN: [id number]           Patient Information     Date Of Birth          1975        Visit Information        Provider Department      4/26/2018 3:45 PM Olivia Weiss MD [facility]        Today's Diagnoses     Depression with anxiety           Follow-ups after your visit        Your next 10 appointments already scheduled     May 08, 2018  2:00 PM CDT   (Arrive by 1:45 PM)   SHORT with Olivia Weiss MD   [facility] ([facility] )    [address] [place] [address]   [phone]              Who to contact     If you have questions or need follow up information about today's clinic visit or your schedule please contact [facility] directly at [phone].  Normal or non-critical lab and imaging results will be communicated to you by MyChart, letter or phone within 4 business days after the clinic has received the results. If you do not hear from us within 7 days, please contact the clinic through MyChart or phone. If you have a critical or abnormal lab result, we will notify you by phone as soon as possible.  Submit refill requests through [facility] or call your pharmacy and they will forward the refill request to us. Please allow 3 business days for your refill to be completed.          Additional Information About Your Visit        MyChart Information     [facility] lets you send messages to your doctor, view your test results, renew your prescriptions, schedule appointments and more. To sign up, go to [email or web address] . Click on \"Log in\" on the left side of the screen, which will take you to the Welcome page. Then click on \"Sign up Now\" on the right side of the page.     You will be asked to enter the access code listed below, as well as some personal information. Please follow the directions to create your username and password.     Your access code is: " [email or web address]  Expires: 2018  4:27 PM     Your access code will  in 90 days. If you need help or a new code, please call your [facility] clinic or [phone].        Care EveryWhere ID     This is your Care EveryWhere ID. This could be used by other organizations to access your [facility] medical records  [id number]        Your Vitals Were     Pulse Temperature Pulse Oximetry BMI (Body Mass Index)          95 99.3  F (37.4  C) (Tympanic) 99% 29.1 kg/m2         Blood Pressure from Last 3 Encounters:   18 128/72   17 123/74   17 112/78    Weight from Last 3 Encounters:   18 163 lb (73.9 kg)   17 170 lb (77.1 kg)   10/19/17 170 lb (77.1 kg)              Today, you had the following     No orders found for display         Today's Medication Changes          These changes are accurate as of 18  4:27 PM.  If you have any questions, ask your nurse or doctor.               Start taking these medicines.        Dose/Directions    PARoxetine 40 MG tablet   Commonly known as:  PAXIL   Used for:  Depression with anxiety   Started by:  Olivia Weiss MD        Dose:  40 mg   Take 1 tablet (40 mg) by mouth At Bedtime   Quantity:  30 tablet   Refills:  1         Stop taking these medicines if you haven't already. Please contact your care team if you have questions.     escitalopram 20 MG tablet   Commonly known as:  LEXAPRO   Stopped by:  Olivia Weiss MD                Where to get your medicines      These medications were sent to [id number]  [place], [place] - [address] AT [facility] [address], [place] [address]     Phone:  [phone]     PARoxetine 40 MG tablet                Primary Care Provider Office Phone # Fax #    Olivia Weiss [phone] [address] [facility]  [place] [address]        Equal Access to Services     ROBBI FLETCHER AH: Hadii carl Allison, randida susy, qaybta sol kirkpatrick  parker palomojane la'aan ah. So [facility] [phone].    ATENCIÓN: Si jenniferla pat, tiene a meléndez disposición servicios gratuitos de asistencia lingüística. [place] [phone].    We comply with applicable federal civil rights laws and [place] laws. We do not discriminate on the basis of race, color, national origin, age, disability, sex, sexual orientation, or gender identity.            Thank you!     Thank you for choosing [facility]  for your care. Our goal is always to provide you with excellent care. Hearing back from our patients is one way we can continue to improve our services. Please take a few minutes to complete the written survey that you may receive in the mail after your visit with us. Thank you!             Your Updated Medication List - Protect others around you: Learn how to safely use, store and throw away your medicines at www.disposemymeds.org.          This list is accurate as of 4/26/18  4:27 PM.  Always use your most recent med list.                   Brand Name Dispense Instructions for use Diagnosis    AZO TABS PO      Taking 2-4 tabs a day as needed        nabumetone 500 MG tablet    RELAFEN    60 tablet    Take 1-2 tablets (500-1,000 mg) by mouth 2 times daily as needed for moderate pain    Tendonitis of ankle or foot, Plantar fasciitis       nitroFURantoin (macrocrystal-monohydrate) 100 MG capsule    MACROBID    14 capsule    Take 1 capsule (100 mg) by mouth 2 times daily    Acute cystitis without hematuria       PARoxetine 40 MG tablet    PAXIL    30 tablet    Take 1 tablet (40 mg) by mouth At Bedtime    Depression with anxiety       sulfamethoxazole-trimethoprim 800-160 MG per tablet    BACTRIM DS/SEPTRA DS    14 tablet    Take 1 tablet by mouth 2 times daily    Acute cystitis without hematuria       traZODone 50 MG tablet    DESYREL    30 tablet    Take 1 tablet (50 mg) by mouth nightly as needed for sleep    Insomnia

## 2018-04-26 NOTE — PROGRESS NOTES
SUBJECTIVE:   Ritesh Soliman is a 42 year old female who presents to clinic today for the following health issues:      Depression and Anxiety Follow-Up    Status since last visit: Worsened breakup with long term boyfriend    Other associated symptoms:does not feel medication is helping at this time    Complicating factors:     Significant life event: Yes-  See above     Current substance abuse: Alcohol- a little more than ususal    PHQ-9 4/29/2016 12/13/2016 10/3/2017   Total Score 14 15 22   Q9: Suicide Ideation Not at all Not at all Several days     OSCAR-7 SCORE 4/29/2016 12/13/2016 10/3/2017   Total Score 15 7 17     In the past two weeks have you had thoughts of suicide or self-harm?  Yes  In the past two weeks have you thought of a plan or intent to harm yourself? No.  Do you have concerns about your personal safety or the safety of others?   No  PHQ-9  English  PHQ-9   Any Language  OSCAR-7  Suicide Assessment Five-step Evaluation and Treatment (SAFE-T)    Amount of exercise or physical activity: not asked    Problems taking medications regularly: No    Medication side effects: none    Diet: regular (no restrictions)        She is not sleeping well which is not new for her. Edi isn't always working well. She has a good support group of friends and coworkers and her 9 year old son. She contracts to not harm herself as she would not want to leave her son. She has had depression in the past but his is worse now with her breakup    Problem list and histories reviewed & adjusted, as indicated.  Additional history: as documented    Patient Active Problem List   Diagnosis     Recurrent UTI     Tobacco abuse     Depression with anxiety     ACP (advance care planning)     Past Surgical History:   Procedure Laterality Date     BIOPSY BREAST Right 10/19/2017    Procedure: BIOPSY BREAST;  EXCISIONAL BIOPSY RIGHT BREAST;  Surgeon: Kiko Newberry MD;  Location: [facility]     OOPHORECTOMY      tubal pregnancy     TUBAL  LIGATION         Social History   Substance Use Topics     Smoking status: Current Some Day Smoker     Packs/day: 0.50     Types: Cigarettes     Smokeless tobacco: Never Used      Comment: rarely smokes     Alcohol use 0.0 oz/week     0 Standard drinks or equivalent per week      Comment: occassionally     Family History   Problem Relation Age of Onset     Coronary Artery Disease Mother      Hypertension Mother      Coronary Artery Disease Father      C.A.D. No family hx of      Breast Cancer No family hx of      Cancer - colorectal No family hx of      DIABETES No family hx of      Thyroid Disease No family hx of      Asthma No family hx of      Colon Cancer No family hx of      Hyperlipidemia No family hx of          Current Outpatient Prescriptions   Medication Sig Dispense Refill     nabumetone (RELAFEN) 500 MG tablet Take 1-2 tablets (500-1,000 mg) by mouth 2 times daily as needed for moderate pain 60 tablet 1     nitroFURantoin, macrocrystal-monohydrate, (MACROBID) 100 MG capsule Take 1 capsule (100 mg) by mouth 2 times daily 14 capsule 0     PARoxetine (PAXIL) 40 MG tablet Take 1 tablet (40 mg) by mouth At Bedtime 30 tablet 1     Phenazopyridine HCl (AZO TABS PO) Taking 2-4 tabs a day as needed       sulfamethoxazole-trimethoprim (BACTRIM DS/SEPTRA DS) 800-160 MG per tablet Take 1 tablet by mouth 2 times daily 14 tablet 0     traZODone (DESYREL) 50 MG tablet Take 1 tablet (50 mg) by mouth nightly as needed for sleep 30 tablet 5     Allergies   Allergen Reactions     Augmentin [Amoxicillin-Pot Clavulanate] Swelling     BP Readings from Last 3 Encounters:   04/26/18 128/72   12/14/17 123/74   11/01/17 112/78    Wt Readings from Last 3 Encounters:   04/26/18 163 lb (73.9 kg)   11/01/17 170 lb (77.1 kg)   10/19/17 170 lb (77.1 kg)                    Reviewed and updated as needed this visit by clinical staff  Tobacco  Allergies  Meds  Med Hx  Surg Hx  Fam Hx  Soc Hx      Reviewed and updated as needed this  visit by Provider         ROS:  Constitutional, HEENT, cardiovascular, pulmonary, gi and gu systems are negative, except as otherwise noted.    OBJECTIVE:                                                    /72  Pulse 95  Temp 99.3  F (37.4  C) (Tympanic)  Wt 163 lb (73.9 kg)  SpO2 99%  BMI 29.1 kg/m2  Body mass index is 29.1 kg/(m^2).  GENERAL APPEARANCE: alert, mild distress and crying  NEURO: Normal strength and tone, mentation intact and speech normal  PSYCH: mentation appears normal, anxious, concentration poor, crying and worried         ASSESSMENT/PLAN:                                                    1. Depression with anxiety  Will swtich to Paxil to take at night to help with sleep. She can switch over from lexaprol. Do not take trazedone with the paxil unless she awakens after falling asleep. Do not take together the first few days. She was seen by [facility] to enroll in Behavioral Health Program for counseling and follow up.  Will recheck in 2 weeks, sooner for concerns. Discussed that she had considered hospitalization but didn't want to do this at this time. She feels comfortable as an out patient.   - PARoxetine (PAXIL) 40 MG tablet; Take 1 tablet (40 mg) by mouth At Bedtime  Dispense: 30 tablet; Refill: 1          Olivia Weiss MD  [facility]

## 2018-04-26 NOTE — NURSING NOTE
"Chief Complaint   Patient presents with     Depression     Anxiety       Initial /72  Pulse 95  Temp 99.3  F (37.4  C) (Tympanic)  Wt 163 lb (73.9 kg)  SpO2 99%  BMI 29.1 kg/m2 Estimated body mass index is 29.1 kg/(m^2) as calculated from the following:    Height as of 11/1/17: 5' 2.76\" (1.594 m).    Weight as of this encounter: 163 lb (73.9 kg).  Medication Reconciliation: complete   Carie Palomares      "

## 2018-04-27 ASSESSMENT — PATIENT HEALTH QUESTIONNAIRE - PHQ9: SUM OF ALL RESPONSES TO PHQ QUESTIONS 1-9: 23

## 2018-04-27 ASSESSMENT — ANXIETY QUESTIONNAIRES: GAD7 TOTAL SCORE: 16

## 2018-05-08 ENCOUNTER — OFFICE VISIT (OUTPATIENT)
Dept: FAMILY MEDICINE | Facility: OTHER | Age: 43
End: 2018-05-08
Attending: FAMILY MEDICINE
Payer: COMMERCIAL

## 2018-05-08 VITALS
BODY MASS INDEX: 29.1 KG/M2 | HEART RATE: 81 BPM | DIASTOLIC BLOOD PRESSURE: 70 MMHG | WEIGHT: 163 LBS | SYSTOLIC BLOOD PRESSURE: 124 MMHG | OXYGEN SATURATION: 97 % | RESPIRATION RATE: 19 BRPM

## 2018-05-08 DIAGNOSIS — Z71.6 TOBACCO ABUSE COUNSELING: Primary | ICD-10-CM

## 2018-05-08 DIAGNOSIS — F41.8 DEPRESSION WITH ANXIETY: ICD-10-CM

## 2018-05-08 DIAGNOSIS — Z72.0 TOBACCO USE: ICD-10-CM

## 2018-05-08 PROCEDURE — G0463 HOSPITAL OUTPT CLINIC VISIT: HCPCS

## 2018-05-08 PROCEDURE — 99213 OFFICE O/P EST LOW 20 MIN: CPT | Performed by: FAMILY MEDICINE

## 2018-05-08 RX ORDER — PAROXETINE 40 MG/1
60 TABLET, FILM COATED ORAL AT BEDTIME
Qty: 45 TABLET | Refills: 1 | Status: SHIPPED | OUTPATIENT
Start: 2018-05-08 | End: 2018-09-04

## 2018-05-08 ASSESSMENT — ANXIETY QUESTIONNAIRES
GAD7 TOTAL SCORE: 20
3. WORRYING TOO MUCH ABOUT DIFFERENT THINGS: NEARLY EVERY DAY
IF YOU CHECKED OFF ANY PROBLEMS ON THIS QUESTIONNAIRE, HOW DIFFICULT HAVE THESE PROBLEMS MADE IT FOR YOU TO DO YOUR WORK, TAKE CARE OF THINGS AT HOME, OR GET ALONG WITH OTHER PEOPLE: VERY DIFFICULT
1. FEELING NERVOUS, ANXIOUS, OR ON EDGE: NEARLY EVERY DAY
6. BECOMING EASILY ANNOYED OR IRRITABLE: NEARLY EVERY DAY
4. TROUBLE RELAXING: NEARLY EVERY DAY
2. NOT BEING ABLE TO STOP OR CONTROL WORRYING: NEARLY EVERY DAY
5. BEING SO RESTLESS THAT IT IS HARD TO SIT STILL: NEARLY EVERY DAY
7. FEELING AFRAID AS IF SOMETHING AWFUL MIGHT HAPPEN: MORE THAN HALF THE DAYS

## 2018-05-08 ASSESSMENT — PAIN SCALES - GENERAL: PAINLEVEL: NO PAIN (0)

## 2018-05-08 NOTE — PROGRESS NOTES
SUBJECTIVE:   Ritesh Soliman is a 42 year old female who presents to clinic today for the following health issues:      Depression Followup    Status since last visit: Stable     See PHQ-9 for current symptoms.  Other associated symptoms: None    Complicating factors:   Significant life event:  No   Current substance abuse:  None  Anxiety or Panic symptoms:  No    PHQ-9 12/13/2016 10/3/2017 4/26/2018   Total Score 15 22 23   Q9: Suicide Ideation Not at all Several days Nearly every day   F/U: Thoughts of suicide or self-harm - - Yes   F/U: Self harm-plan - - No   F/U: Self-harm action - - No   F/U: Safety concerns - - No     In the past two weeks have you had thoughts of suicide or self-harm?  No.    Do you have concerns about your personal safety or the safety of others?   No  PHQ-9  English  PHQ-9   Any Language            Problem list and histories reviewed & adjusted, as indicated.  Additional history: as documented    Patient Active Problem List   Diagnosis     Recurrent UTI     Tobacco abuse     Depression with anxiety     ACP (advance care planning)     Past Surgical History:   Procedure Laterality Date     BIOPSY BREAST Right 10/19/2017    Procedure: BIOPSY BREAST;  EXCISIONAL BIOPSY RIGHT BREAST;  Surgeon: Kiko Newberry MD;  Location: [facility]     OOPHORECTOMY      tubal pregnancy     TUBAL LIGATION         Social History   Substance Use Topics     Smoking status: Current Some Day Smoker     Packs/day: 0.50     Types: Cigarettes     Smokeless tobacco: Never Used      Comment: rarely smokes     Alcohol use 0.0 oz/week     0 Standard drinks or equivalent per week      Comment: occassionally     Family History   Problem Relation Age of Onset     Coronary Artery Disease Mother      Hypertension Mother      Coronary Artery Disease Father      C.A.D. No family hx of      Breast Cancer No family hx of      Cancer - colorectal No family hx of      DIABETES No family hx of      Thyroid Disease No family hx of       Asthma No family hx of      Colon Cancer No family hx of      Hyperlipidemia No family hx of          Current Outpatient Prescriptions   Medication Sig Dispense Refill     nabumetone (RELAFEN) 500 MG tablet Take 1-2 tablets (500-1,000 mg) by mouth 2 times daily as needed for moderate pain 60 tablet 1     PARoxetine (PAXIL) 40 MG tablet Take 1.5 tablets (60 mg) by mouth At Bedtime 45 tablet 1     Phenazopyridine HCl (AZO TABS PO) Taking 2-4 tabs a day as needed       traZODone (DESYREL) 50 MG tablet Take 1 tablet (50 mg) by mouth nightly as needed for sleep 30 tablet 5     [DISCONTINUED] PARoxetine (PAXIL) 40 MG tablet Take 1 tablet (40 mg) by mouth At Bedtime 30 tablet 1     Allergies   Allergen Reactions     Augmentin [Amoxicillin-Pot Clavulanate] Swelling     BP Readings from Last 3 Encounters:   05/08/18 124/70   04/26/18 128/72   12/14/17 123/74    Wt Readings from Last 3 Encounters:   05/08/18 163 lb (73.9 kg)   04/26/18 163 lb (73.9 kg)   11/01/17 170 lb (77.1 kg)                    Reviewed and updated as needed this visit by clinical staff  Tobacco  Allergies  Meds  Med Hx  Surg Hx  Fam Hx  Soc Hx      Reviewed and updated as needed this visit by Provider         ROS:  CONSTITUTIONAL:NEGATIVE for fever, chills, change in weight  NEURO: NEGATIVE for weakness, dizziness or paresthesias  PSYCHIATRIC: as above    OBJECTIVE:                                                    /70  Pulse 81  Resp 19  Wt 163 lb (73.9 kg)  SpO2 97%  Breastfeeding? No  BMI 29.1 kg/m2  Body mass index is 29.1 kg/(m^2).  GENERAL APPEARANCE: alert and no distress  NEURO: Normal strength and tone, mentation intact and speech normal  PSYCH: mentation appears normal, affect flat and worried         ASSESSMENT/PLAN:                                                    1. Depression with anxiety  Improved but not where she needs to be. She has an appointment for counseling tomorrow. Will increase Paxil to 60 mg daily and  recheck in 3 weeks, sooner for concerns  - PARoxetine (PAXIL) 40 MG tablet; Take 1.5 tablets (60 mg) by mouth At Bedtime  Dispense: 45 tablet; Refill: 1    2. Tobacco abuse counseling  Not ready to quit    3. Tobacco use  Rare, previously on Tuesdays when she goes out          Olivia Weiss MD  [facility]

## 2018-05-08 NOTE — NURSING NOTE
"Chief Complaint   Patient presents with     Depression       Initial /70  Pulse 81  Resp 19  Wt 163 lb (73.9 kg)  SpO2 97%  Breastfeeding? No  BMI 29.1 kg/m2 Estimated body mass index is 29.1 kg/(m^2) as calculated from the following:    Height as of 11/1/17: 5' 2.76\" (1.594 m).    Weight as of this encounter: 163 lb (73.9 kg).  Medication Reconciliation: complete    Emily Álvarez LPN    "

## 2018-05-08 NOTE — MR AVS SNAPSHOT
After Visit Summary   5/8/2018    Ritesh Soliman    MRN: [id number]           Patient Information     Date Of Birth          1975        Visit Information        Provider Department      5/8/2018 2:00 PM Olivia Weiss MD [facility]3]bing        Today's Diagnoses     Tobacco abuse counseling    -  1    Depression with anxiety        Tobacco use           Follow-ups after your visit        Follow-up notes from your care team     Return in about 3 weeks (around 5/29/2018) for medication check.      Your next 10 appointments already scheduled     May 08, 2018  2:00 PM CDT   (Arrive by 1:45 PM)   SHORT with Olivia Weiss MD   [facility] [place] ([facility] [place] )    [address] [place] [address]  [place] [address]   [phone]            May 09, 2018  9:00 AM CDT   (Arrive by 8:45 AM)   New Visit with Dulce Maria Wise [facility] [place] ([facility] [place] )    [address] [place] [address]  [place] [id number] [phone]            May 31, 2018  3:45 PM CDT   (Arrive by 3:30 PM)   SHORT with Olivia Weiss MD   [facility] [place] ([facility] [place] )    [address] [place] [address]  [place] [address]   [phone]              Who to contact     If you have questions or need follow up information about today's clinic visit or your schedule please contact [facility] directly at [phone].  Normal or non-critical lab and imaging results will be communicated to you by MyChart, letter or phone within 4 business days after the clinic has received the results. If you do not hear from us within 7 days, please contact the clinic through MyChart or phone. If you have a critical or abnormal lab result, we will notify you by phone as soon as possible.  Submit refill requests through [employer] or call your pharmacy and they will forward the refill request to us. Please allow 3 business days for your refill to be completed.          Additional  "Information About Your Visit        MyChart Information     [employer] lets you send messages to your doctor, view your test results, renew your prescriptions, schedule appointments and more. To sign up, go to [email or web address] . Click on \"Log in\" on the left side of the screen, which will take you to the Welcome page. Then click on \"Sign up Now\" on the right side of the page.     You will be asked to enter the access code listed below, as well as some personal information. Please follow the directions to create your username and password.     Your access code is: [email or web address]  Expires: 2018  4:27 PM     Your access code will  in 90 days. If you need help or a new code, please call your Campus clinic or [phone].        Care EveryWhere ID     This is your Care EveryWhere ID. This could be used by other organizations to access your [facility] medical records  [id number]        Your Vitals Were     Pulse Respirations Pulse Oximetry Breastfeeding? BMI (Body Mass Index)       81 19 97% No 29.1 kg/m2        Blood Pressure from Last 3 Encounters:   18 124/70   18 128/72   17 123/74    Weight from Last 3 Encounters:   18 163 lb (73.9 kg)   18 163 lb (73.9 kg)   17 170 lb (77.1 kg)              Today, you had the following     No orders found for display         Today's Medication Changes          These changes are accurate as of 18  1:35 PM.  If you have any questions, ask your nurse or doctor.               These medicines have changed or have updated prescriptions.        Dose/Directions    PARoxetine 40 MG tablet   Commonly known as:  PAXIL   This may have changed:  how much to take   Used for:  Depression with anxiety   Changed by:  Olivia Weiss MD        Dose:  60 mg   Take 1.5 tablets (60 mg) by mouth At Bedtime   Quantity:  45 tablet   Refills:  1            Where to get your medicines      These medications were sent to [facility]- [place] - " [place] - [address]  [address] [place] [address]     Phone:  [phone]     PARoxetine 40 MG tablet                Primary Care Provider Office Phone # Fax #    Olivia Weiss [phone] [address] [facility] [address]        Equal Access to Services     [facility]: Hadii aad ku hadasho Soomaali, waaxda luqadaha, qaybta kaalmada adeegyada, waxay idiin hayaan adeeg khdarielsh lairman ah. So [facility] [phone].    ATENCIÓN: Si habla espsantiago, tiene a meléndez disposición servicios gratuitos de asistencia lingüística. Tieraame al [phone].    We comply with applicable federal civil rights laws and [place] laws. We do not discriminate on the basis of race, color, national origin, age, disability, sex, sexual orientation, or gender identity.            Thank you!     Thank you for choosing [facility]  for your care. Our goal is always to provide you with excellent care. Hearing back from our patients is one way we can continue to improve our services. Please take a few minutes to complete the written survey that you may receive in the mail after your visit with us. Thank you!             Your Updated Medication List - Protect others around you: Learn how to safely use, store and throw away your medicines at www.disposemymeds.org.          This list is accurate as of 5/8/18  1:35 PM.  Always use your most recent med list.                   Brand Name Dispense Instructions for use Diagnosis    AZO TABS PO      Taking 2-4 tabs a day as needed        nabumetone 500 MG tablet    RELAFEN    60 tablet    Take 1-2 tablets (500-1,000 mg) by mouth 2 times daily as needed for moderate pain    Tendonitis of ankle or foot, Plantar fasciitis       PARoxetine 40 MG tablet    PAXIL    45 tablet    Take 1.5 tablets (60 mg) by mouth At Bedtime    Depression with anxiety       traZODone 50 MG tablet    DESYREL    30 tablet    Take 1 tablet (50 mg) by mouth nightly as  needed for sleep    Insomnia

## 2018-05-09 ENCOUNTER — TELEPHONE (OUTPATIENT)
Dept: FAMILY MEDICINE | Facility: OTHER | Age: 43
End: 2018-05-09

## 2018-05-09 ENCOUNTER — OFFICE VISIT (OUTPATIENT)
Dept: BEHAVIORAL HEALTH | Facility: OTHER | Age: 43
End: 2018-05-09
Attending: SOCIAL WORKER
Payer: COMMERCIAL

## 2018-05-09 DIAGNOSIS — F41.0 PANIC ATTACK: ICD-10-CM

## 2018-05-09 DIAGNOSIS — F33.1 MAJOR DEPRESSIVE DISORDER, RECURRENT EPISODE, MODERATE (H): ICD-10-CM

## 2018-05-09 DIAGNOSIS — F41.1 GAD (GENERALIZED ANXIETY DISORDER): Primary | ICD-10-CM

## 2018-05-09 PROCEDURE — 90791 PSYCH DIAGNOSTIC EVALUATION: CPT | Performed by: SOCIAL WORKER

## 2018-05-09 ASSESSMENT — ANXIETY QUESTIONNAIRES
2. NOT BEING ABLE TO STOP OR CONTROL WORRYING: NEARLY EVERY DAY
7. FEELING AFRAID AS IF SOMETHING AWFUL MIGHT HAPPEN: MORE THAN HALF THE DAYS
3. WORRYING TOO MUCH ABOUT DIFFERENT THINGS: NEARLY EVERY DAY
GAD7 TOTAL SCORE: 20
6. BECOMING EASILY ANNOYED OR IRRITABLE: MORE THAN HALF THE DAYS
1. FEELING NERVOUS, ANXIOUS, OR ON EDGE: NEARLY EVERY DAY
5. BEING SO RESTLESS THAT IT IS HARD TO SIT STILL: MORE THAN HALF THE DAYS
IF YOU CHECKED OFF ANY PROBLEMS ON THIS QUESTIONNAIRE, HOW DIFFICULT HAVE THESE PROBLEMS MADE IT FOR YOU TO DO YOUR WORK, TAKE CARE OF THINGS AT HOME, OR GET ALONG WITH OTHER PEOPLE: VERY DIFFICULT
GAD7 TOTAL SCORE: 18

## 2018-05-09 ASSESSMENT — PATIENT HEALTH QUESTIONNAIRE - PHQ9
SUM OF ALL RESPONSES TO PHQ QUESTIONS 1-9: 26
5. POOR APPETITE OR OVEREATING: NEARLY EVERY DAY

## 2018-05-09 NOTE — TELEPHONE ENCOUNTER
APPROVAL - 05/09/2018 - received APPROVAL from [facility] for paroxetine.  Approval dates:  05/08/2018 thru 05/08/2019.  Pharmacy advised.  Forms scanned to Epic.  Belen Sesay, [facility] Specialist.

## 2018-05-09 NOTE — TELEPHONE ENCOUNTER
05/09/2018 - Received PA request from [facility] thru [employer].  Submitted thru [employer].  Waiting for response.  Belen Sesay, [facility] Specialist.
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
## 2018-05-09 NOTE — MR AVS SNAPSHOT
After Visit Summary   5/9/2018    Ritesh Soliman    MRN: [id number]           Patient Information     Date Of Birth          1975        Visit Information        Provider Department      5/9/2018 9:00 AM Dulce Maria Wise [facility]        Today's Diagnoses     OSCAR (generalized anxiety disorder)    -  1    Major depressive disorder, recurrent episode, moderate (H)        Panic attack           Follow-ups after your visit        Your next 10 appointments already scheduled     May 18, 2018  1:30 PM CDT   (Arrive by 1:15 PM)   Return Visit with EMELY Ramos   [facility] [place] ([facility] [place] )    [address] [place] [address] [phone]            May 25, 2018 11:00 AM CDT   (Arrive by 10:45 AM)   Return Visit with EMELY Ramos   [facility] ([facility] [place] )    [address] [place] [address]  [place] [address] [phone]            May 31, 2018  2:30 PM CDT   (Arrive by 2:15 PM)   Return Visit with EMELY Ramos   [facility] ([facility] [place] [facility]    [address] [place], [place] [address] [place] [id number] [phone]            May 31, 2018  3:45 PM CDT   (Arrive by 3:30 PM)   SHORT with Olivia Weiss MD   [facility] ([facility] [place] )    [address] [place] [address]  [place] [id number] [phone]              Who to contact     If you have questions or need follow up information about today's clinic visit or your schedule please contact [facility] directly at [phone].  Normal or non-critical lab and imaging results will be communicated to you by MyChart, letter or phone within 4 business days after the clinic has received the results. If you do not hear from us within 7 days, please contact the clinic through MyChart or phone. If you have a critical or abnormal lab result, we will notify you by phone as soon as  "possible.  Submit refill requests through [employer] or call your pharmacy and they will forward the refill request to us. Please allow 3 business days for your refill to be completed.          Additional Information About Your Visit        [facility] Information     [employer] lets you send messages to your doctor, view your test results, renew your prescriptions, schedule appointments and more. To sign up, go to [email or web address] . Click on \"Log in\" on the left side of the screen, which will take you to the Welcome page. Then click on \"Sign up Now\" on the right side of the page.     You will be asked to enter the access code listed below, as well as some personal information. Please follow the directions to create your username and password.     Your access code is: [email or web address]  Expires: 2018  4:27 PM     Your access code will  in 90 days. If you need help or a new code, please call your [place] clinic or [phone].        Care EveryWhere ID     This is your Care EveryWhere ID. This could be used by other organizations to access your [place] medical records  [id number]         Blood Pressure from Last 3 Encounters:   18 124/70   18 128/72   17 123/74    Weight from Last 3 Encounters:   18 163 lb (73.9 kg)   18 163 lb (73.9 kg)   17 170 lb (77.1 kg)              Today, you had the following     No orders found for display       Primary Care Provider Office Phone # Fax #    Olivia Weiss [phone]       [facility] [place] [id number]        Equal Access to Services     [facility] CHANCE : Hadroman Allison, joselito jain, sol olivia. So [facility] [phone].    ATENCIÓN: Si habla español, tiene a meléndez disposición servicios gratuitos de asistencia lingüística. Llame al [phone].    We comply with applicable federal civil rights laws and [place] laws. We do not " discriminate on the basis of race, color, national origin, age, disability, sex, sexual orientation, or gender identity.            Thank you!     Thank you for choosing [facility]  for your care. Our goal is always to provide you with excellent care. Hearing back from our patients is one way we can continue to improve our services. Please take a few minutes to complete the written survey that you may receive in the mail after your visit with us. Thank you!             Your Updated Medication List - Protect others around you: Learn how to safely use, store and throw away your medicines at www.disposemymeds.org.          This list is accurate as of 5/9/18 10:58 AM.  Always use your most recent med list.                   Brand Name Dispense Instructions for use Diagnosis    AZO TABS PO      Taking 2-4 tabs a day as needed        nabumetone 500 MG tablet    RELAFEN    60 tablet    Take 1-2 tablets (500-1,000 mg) by mouth 2 times daily as needed for moderate pain    Tendonitis of ankle or foot, Plantar fasciitis       PARoxetine 40 MG tablet    PAXIL    45 tablet    Take 1.5 tablets (60 mg) by mouth At Bedtime    Depression with anxiety       traZODone 50 MG tablet    DESYREL    30 tablet    Take 1 tablet (50 mg) by mouth nightly as needed for sleep    Insomnia

## 2018-05-10 ASSESSMENT — PATIENT HEALTH QUESTIONNAIRE - PHQ9: SUM OF ALL RESPONSES TO PHQ QUESTIONS 1-9: 23

## 2018-05-10 ASSESSMENT — ANXIETY QUESTIONNAIRES: GAD7 TOTAL SCORE: 18

## 2018-05-18 ENCOUNTER — OFFICE VISIT (OUTPATIENT)
Dept: BEHAVIORAL HEALTH | Facility: OTHER | Age: 43
End: 2018-05-18
Attending: SOCIAL WORKER
Payer: COMMERCIAL

## 2018-05-18 DIAGNOSIS — F41.0 PANIC ATTACK: ICD-10-CM

## 2018-05-18 DIAGNOSIS — F33.1 MAJOR DEPRESSIVE DISORDER, RECURRENT EPISODE, MODERATE (H): ICD-10-CM

## 2018-05-18 DIAGNOSIS — F41.1 GAD (GENERALIZED ANXIETY DISORDER): Primary | ICD-10-CM

## 2018-05-18 PROCEDURE — 90832 PSYTX W PT 30 MINUTES: CPT | Performed by: SOCIAL WORKER

## 2018-05-18 ASSESSMENT — PATIENT HEALTH QUESTIONNAIRE - PHQ9: 5. POOR APPETITE OR OVEREATING: MORE THAN HALF THE DAYS

## 2018-05-18 ASSESSMENT — ANXIETY QUESTIONNAIRES
2. NOT BEING ABLE TO STOP OR CONTROL WORRYING: NEARLY EVERY DAY
6. BECOMING EASILY ANNOYED OR IRRITABLE: NEARLY EVERY DAY
GAD7 TOTAL SCORE: 17
IF YOU CHECKED OFF ANY PROBLEMS ON THIS QUESTIONNAIRE, HOW DIFFICULT HAVE THESE PROBLEMS MADE IT FOR YOU TO DO YOUR WORK, TAKE CARE OF THINGS AT HOME, OR GET ALONG WITH OTHER PEOPLE: SOMEWHAT DIFFICULT
3. WORRYING TOO MUCH ABOUT DIFFERENT THINGS: NEARLY EVERY DAY
7. FEELING AFRAID AS IF SOMETHING AWFUL MIGHT HAPPEN: MORE THAN HALF THE DAYS
1. FEELING NERVOUS, ANXIOUS, OR ON EDGE: MORE THAN HALF THE DAYS
5. BEING SO RESTLESS THAT IT IS HARD TO SIT STILL: MORE THAN HALF THE DAYS

## 2018-05-18 NOTE — MR AVS SNAPSHOT
After Visit Summary   5/18/2018    Ritesh Soliman    MRN: [id number]           Patient Information     Date Of Birth          1975        Visit Information        Provider Department      5/18/2018 1:30 PM Dulce Maria Wise [facility]        Today's Diagnoses     OSCAR (generalized anxiety disorder)    -  1    Major depressive disorder, recurrent episode, moderate (H)        Panic attack           Follow-ups after your visit        Your next 10 appointments already scheduled     May 25, 2018 11:00 AM CDT   (Arrive by 10:45 AM)   [facility] EMELY Ramos   [facility] [place] )    [address] [place] [address] [phone]            May 31, 2018  2:30 PM CDT   (Arrive by 2:15 PM)   Return Visit with EMELY Ramos   [facility] ([facility] [place] )    [address] [facility]  [place] [address] [phone]            May 31, 2018  3:45 PM CDT   (Arrive by 3:30 PM)   SHORT with Olivia Weiss MD   [facility] ([facility] [place] )    [address] [facility]  [place] [address] [phone]              Who to contact     If you have questions or need follow up information about today's clinic visit or your schedule please contact [facility] directly at [phone].  Normal or non-critical lab and imaging results will be communicated to you by MyChart, letter or phone within 4 business days after the clinic has received the results. If you do not hear from us within 7 days, please contact the clinic through MyChart or phone. If you have a critical or abnormal lab result, we will notify you by phone as soon as possible.  Submit refill requests through [employer] or call your pharmacy and they will forward the refill request to us. Please allow 3 business days for your refill to be completed.          Additional Information About Your Visit    "     MyChart Information     [employer] lets you send messages to your doctor, view your test results, renew your prescriptions, schedule appointments and more. To sign up, go to [email or web address] . Click on \"Log in\" on the left side of the screen, which will take you to the Welcome page. Then click on \"Sign up Now\" on the right side of the page.     You will be asked to enter the access code listed below, as well as some personal information. Please follow the directions to create your username and password.     Your access code is: [email or web address]  Expires: 2018  4:27 PM     Your access code will  in 90 days. If you need help or a new code, please call your [place] clinic or [phone].        Care EveryWhere ID     This is your Care EveryWhere ID. This could be used by other organizations to access your [place] medical records  [id number]         Blood Pressure from Last 3 Encounters:   18 124/70   18 128/72   17 123/74    Weight from Last 3 Encounters:   18 163 lb (73.9 kg)   18 163 lb (73.9 kg)   17 170 lb (77.1 kg)              Today, you had the following     No orders found for display       Primary Care Provider Office Phone # Fax #    Olivia Weiss [phone]       [address] [place] [address]        Equal Access to Services     [facility] AH: Hadii carl bass hadasho Sojustin, waaxda luqadaha, qaybta kaalmada obdulia, sol garcia . So [facility] [phone].    ATENCIÓN: Si habla pat, tiene a meléndez disposición servicios gratuitos de asistencia lingüística. Llame al [phone].    We comply with applicable federal civil rights laws and [place] laws. We do not discriminate on the basis of race, color, national origin, age, disability, sex, sexual orientation, or gender identity.            Thank you!     Thank you for choosing [facility]  for your care. Our goal is always to provide " you with excellent care. Hearing back from our patients is one way we can continue to improve our services. Please take a few minutes to complete the written survey that you may receive in the mail after your visit with us. Thank you!             Your Updated Medication List - Protect others around you: Learn how to safely use, store and throw away your medicines at www.disposemymeds.org.          This list is accurate as of 5/18/18  2:47 PM.  Always use your most recent med list.                   Brand Name Dispense Instructions for use Diagnosis    AZO TABS PO      Taking 2-4 tabs a day as needed        nabumetone 500 MG tablet    RELAFEN    60 tablet    Take 1-2 tablets (500-1,000 mg) by mouth 2 times daily as needed for moderate pain    Tendonitis of ankle or foot, Plantar fasciitis       PARoxetine 40 MG tablet    PAXIL    45 tablet    Take 1.5 tablets (60 mg) by mouth At Bedtime    Depression with anxiety       traZODone 50 MG tablet    DESYREL    30 tablet    Take 1 tablet (50 mg) by mouth nightly as needed for sleep    Insomnia

## 2018-05-18 NOTE — PROGRESS NOTES
[facility]  May 18, 2018    Behavioral Health Clinician Progress Note    Patient Name: Ritesh Soliman         Service Type: Individual           Service Location:  Face to Face in Clinic      Session Start Time:  1:30  Session End Time: 2:00      Session Length: 16 - 37      Attendees: Client    Visit Activities (Refresh list every visit): [facility] and MSW Student Intern Moises Espinosa      Diagnostic Assessment Date: 05/09/2018  Treatment Plan Review Date: In the next two visits    Previous PHQ-9:   PHQ-9 5/8/2018 5/9/2018 5/18/2018   Total Score 26 23 22   Q9: Suicide Ideation Nearly every day More than half the days More than half the days   F/U: Thoughts of suicide or self-harm Yes Yes -   F/U: Self harm-plan No Yes -   F/U: Self-harm action No No -   F/U: Safety concerns No No -     Previous OSCAR-7:   OSCAR-7 SCORE 5/8/2018 5/9/2018 5/18/2018   Total Score 20 18 17     In the past two weeks have you had thoughts of suicide or self-harm?  No.    Do you have concerns about your personal safety or the safety of others?   No  Suicide Assessment Five-step Evaluation and Treatment (SAFE-T)  TOYA LEVEL:  TOYA Score (Last Two) 5/9/2018   TOYA Raw Score 23   Activation Score 39   OTYA Level 1       DATA  Extended Session (60+ minutes): No  Interactive Complexity: No  Crisis: No  [facility] Patient No    Treatment Objective(s) Addressed in This Session:  Target Behavior(s):  Depressed Mood: Increase interest, engagement, and pleasure in doing things  Decrease frequency and intensity of feeling down, depressed, hopeless  Improve quantity and quality of night time sleep / decrease daytime naps  Feel less tired and more energy during the day   Identify negative self-talk and behaviors: challenge core beliefs, myths, and actions  Improve concentration, focus, and mindfulness in daily activities   Decrease thoughts that you'd be better off dead or of suicide / self-harm  Anxiety: will experience a reduction in  anxiety, will develop more effective coping skills to manage anxiety symptoms and will develop healthy cognitive patterns and beliefs  Adjustment Difficulties: will develop coping/problem-solving skills to facilitate more adaptive adjustment    Current Stressors / Issues:    Pt reports that she is feeling ok. She states that she has not been crying as much in the past week. She feels that is because of the medications working. Pt describes her current stressors as feeling depressed, betrayed and conflicted over a recent break up with her boyfriend. She also feels guilt over her angry outbursts in the past and feels heartbroken over her son and her boyfriend's daughter being  from the breakup. She reports that she has felt a dip in her concentration and she also has been having body aches. She reports having no interest in doing activities that she used to enjoy.     Pt was amenable to trying AlphaStim as a treatment modality for improvement in anxiety, depression, and sleep. She completed a twenty minute treatment session at [facility] [employer].     Progress on Treatment Objective(s) / Homework:  Minimal progress - CONTEMPLATION (Considering change and yet undecided); Intervened by assessing the negative and positive thinking (ambivalence) about behavior change    Motivational Interviewing    MI Intervention: Expressed Empathy/Understanding, Supported Autonomy, Collaboration, Evocation, Reflections: simple and complex and Change talk (evoked)     Change Talk Expressed by the Patient: Desire to change    Provider Response to Change Talk: E - Evoked more info from patient about behavior change, A - Affirmed patient's thoughts, decisions, or attempts at behavior change and R - Reflected patient's change talk      Situation        Automatic Thoughts  Cognitive Distortions      Feelings        Behavior        Questioning Thoughts            Also provided psychoeducation about behavioral health condition, symptoms,  and treatment options    SKILLS TRAINING: Explored skills useful to client in current situation Skills include assertiveness, communication, conflict management, problem-solving, relaxation, etc.  SOLUTION FOCUSED: Explored patterns in patient's relationships and discussed options for new behaviors, Explored patterns in patient's actions and choices and discussed options for new behaviors  BEHAVIORAL ACTIVATION:  Discussed steps patient can take to become more involved in meaningful activity, Identified barriers to these activities and explored possible solutions  MINDFULLNESS-BASED-STRATEGIES:  Discussed skills based on development and application of mindfulness, Skills drawn from dialectical behavior therapy, mindfulness-based stress reduction, mindfulness-based cognitive therapy, etc.  ACCEPTANCE AND COMMITMENT THERAPY: Explored and identified important values in patient's life, Discussed ways to commit to behavioral activation around these values  RELAXATION INTERVENTIONS: Provided education and modeled, deep breathing, Progressive Muscle Relaxation, Guided Imagery, provided werbsite handout to practice at home  NARRATIVE THERAPY: Allowed patient to express themselves and their feelings in conversational mannor    Care Plan review completed: No    Medication Review:  Changes to psychiatric medications, see updated Medication List in EPIC.     Medication Compliance:  Yes    Changes in Health Issues:   None reported    Chemical Use Review:   Substance Use: Chemical use reviewed, no active concerns identified      Tobacco Use: No change in amount of tobacco use since last session.  Patient declined discussion at this time    Assessment: Current Emotional / Mental Status (status of significant symptoms):    Risk status (Self / Other harm or suicidal ideation)  Patient denies current fears or concerns for personal safety.  Patient reports the following current or recent suicidal ideation or behaviors: Wishes that she  was no longer here at times.  Patient denies current or recent homicidal ideation or behaviors.  Patient denies current or recent self injurious behavior or ideation.  Patient denies other safety concerns.  A safety and risk management plan has not been developed at this time, however patient was encouraged to call [facility] should there be a change in any of these risk factors.    Appearance:   Appropriate   Eye Contact:   Good   Psychomotor Behavior: Normal   Attitude:   Cooperative   Orientation:   All  Speech   Rate / Production: Normal    Volume:  Normal   Mood:    Depressed  Sad   Affect:    Appropriate   Thought Content:  Clear   Thought Form:  Coherent  Logical   Insight:    Good     Diagnoses:    1. OSCAR (generalized anxiety disorder)    2. Major depressive disorder, recurrent episode, moderate (H)    3. Panic attack        Collateral Reports Completed:  Not Applicable    Plan: (Homework, other):  Patient was given information about behavioral services and encouraged to schedule a follow up appointment with the clinic [facility] in 1 week.  She was also given information about mental health symptoms and treatment options  and sleep Hygeine recommendations, including a handout with tips and strategies.  CD Recommendations: No indications of CD issues.      GENEVA Foley Student Intern

## 2018-05-18 NOTE — PROGRESS NOTES
I saw and assessed the patient with SW student. I discussed with the student and agree with the assessment and plan as documented in the students note.    Dulce Maria iWse, MSW, LICSW, [facility]

## 2018-05-19 ASSESSMENT — ANXIETY QUESTIONNAIRES: GAD7 TOTAL SCORE: 17

## 2018-05-19 ASSESSMENT — PATIENT HEALTH QUESTIONNAIRE - PHQ9: SUM OF ALL RESPONSES TO PHQ QUESTIONS 1-9: 22

## 2018-05-25 ENCOUNTER — OFFICE VISIT (OUTPATIENT)
Dept: BEHAVIORAL HEALTH | Facility: OTHER | Age: 43
End: 2018-05-25
Attending: FAMILY MEDICINE
Payer: COMMERCIAL

## 2018-05-25 DIAGNOSIS — F33.1 MAJOR DEPRESSIVE DISORDER, RECURRENT EPISODE, MODERATE (H): Primary | ICD-10-CM

## 2018-05-25 PROCEDURE — 90832 PSYTX W PT 30 MINUTES: CPT | Performed by: SOCIAL WORKER

## 2018-05-25 ASSESSMENT — PATIENT HEALTH QUESTIONNAIRE - PHQ9: 5. POOR APPETITE OR OVEREATING: NOT AT ALL

## 2018-05-25 ASSESSMENT — ANXIETY QUESTIONNAIRES
5. BEING SO RESTLESS THAT IT IS HARD TO SIT STILL: MORE THAN HALF THE DAYS
2. NOT BEING ABLE TO STOP OR CONTROL WORRYING: MORE THAN HALF THE DAYS
7. FEELING AFRAID AS IF SOMETHING AWFUL MIGHT HAPPEN: SEVERAL DAYS
3. WORRYING TOO MUCH ABOUT DIFFERENT THINGS: NEARLY EVERY DAY
6. BECOMING EASILY ANNOYED OR IRRITABLE: SEVERAL DAYS
1. FEELING NERVOUS, ANXIOUS, OR ON EDGE: MORE THAN HALF THE DAYS
GAD7 TOTAL SCORE: 11
IF YOU CHECKED OFF ANY PROBLEMS ON THIS QUESTIONNAIRE, HOW DIFFICULT HAVE THESE PROBLEMS MADE IT FOR YOU TO DO YOUR WORK, TAKE CARE OF THINGS AT HOME, OR GET ALONG WITH OTHER PEOPLE: SOMEWHAT DIFFICULT

## 2018-05-25 NOTE — PROGRESS NOTES
[facility]  May 25, 2018    Behavioral Health Clinician Progress Note    Patient Name: Ritesh Soliman         Service Type: Individual           Service Location:  Face to Face in Clinic      Session Start Time:  10:40  Session End Time: 11:15      Session Length: 16 - 37      Attendees: Patient    Visit Activities (Refresh list every visit): [facility] Only      Diagnostic Assessment Date: 05/09/2018  Treatment Plan Review Date: In the next visit    Previous PHQ-9:   PHQ-9 5/9/2018 5/18/2018 5/25/2018   Total Score 23 22 22   Q9: Suicide Ideation More than half the days More than half the days More than half the days   F/U: Thoughts of suicide or self-harm Yes - -   F/U: Self harm-plan Yes - -   F/U: Self-harm action No - -   F/U: Safety concerns No - -     Previous OSCAR-7:   OSCAR-7 SCORE 5/9/2018 5/18/2018 5/25/2018   Total Score 18 17 11     In the past two weeks have you had thoughts of suicide or self-harm?  No.    Do you have concerns about your personal safety or the safety of others?   No  Suicide Assessment Five-step Evaluation and Treatment (SAFE-T)  TOYA LEVEL:  TOYA Score (Last Two) 5/9/2018   TOYA Raw Score 23   Activation Score 39   TOYA Level 1       DATA  Extended Session (60+ minutes): No  Interactive Complexity: No  Crisis: No  [facility] Patient No    Treatment Objective(s) Addressed in This Session:  Target Behavior(s):  Depressed Mood: Increase interest, engagement, and pleasure in doing things  Decrease frequency and intensity of feeling down, depressed, hopeless  Improve quantity and quality of night time sleep / decrease daytime naps  Feel less tired and more energy during the day   Identify negative self-talk and behaviors: challenge core beliefs, myths, and actions  Improve concentration, focus, and mindfulness in daily activities   Decrease thoughts that you'd be better off dead or of suicide / self-harm  Anxiety: will experience a reduction in anxiety, will develop more effective  coping skills to manage anxiety symptoms and will develop healthy cognitive patterns and beliefs  Adjustment Difficulties: will develop coping/problem-solving skills to facilitate more adaptive adjustment    Current Stressors / Issues:  Pt reports that she continues with depression.  States she went out to dinner with her ex-boyfriends daughter whom she's still close with.  States she had a good time, but then got sad that he wasn't with them.  Continues to presrevate on wanting  him back.  Patient continues to be tearful throughout appointment.  Allowed expression of her thoughts and emotions using supportive listening and narrative therapy techniques.    Coping skills: BE-THIS, 3C's, Mindfullness toolkit resources- Squeeze out stress, getting out of the monkey trap, thought re framing techniques.    Progress on Treatment Objective(s) / Homework:  Satisfactory progress - ACTION (Actively working towards change); Intervened by reinforcing change plan / affirming steps taken    Motivational Interviewing    MI Intervention: Expressed Empathy/Understanding, Supported Autonomy, Collaboration, Evocation, Reflections: simple and complex and Change talk (evoked)     Change Talk Expressed by the Patient: Desire to change Ability to change Reasons to change Need to change Committment to change Activation    Provider Response to Change Talk: E - Evoked more info from patient about behavior change, A - Affirmed patient's thoughts, decisions, or attempts at behavior change and R - Reflected patient's change talk    Also provided psychoeducation about behavioral health condition, symptoms, and treatment options    SKILLS TRAINING: Explored skills useful to client in current situation Skills include assertiveness, communication, conflict management, problem-solving, relaxation, etc.  SOLUTION FOCUSED: Explored patterns in patient's relationships and discussed options for new behaviors, Explored patterns in patient's actions and  choices and discussed options for new behaviors  BEHAVIORAL ACTIVATION:  Discussed steps patient can take to become more involved in meaningful activity, Identified barriers to these activities and explored possible solutions  MINDFULLNESS-BASED-STRATEGIES:  Discussed skills based on development and application of mindfulness, Skills drawn from dialectical behavior therapy, mindfulness-based stress reduction, mindfulness-based cognitive therapy, etc.  ACCEPTANCE AND COMMITMENT THERAPY: Explored and identified important values in patient's life, Discussed ways to commit to behavioral activation around these values  RELAXATION INTERVENTIONS: Provided education and modeled, deep breathing, Progressive Muscle Relaxation, Guided Imagery, provided werbsite handout to practice at home  NARRATIVE THERAPY: Allowed patient to express themselves and their feelings in conversational mannor    Care Plan review completed: No    Medication Review:  Changes to psychiatric medications, see updated Medication List in EPIC.     Medication Compliance:  Yes    Changes in Health Issues:   None reported    Chemical Use Review:   Substance Use: Chemical use reviewed, no active concerns identified      Tobacco Use: No change in amount of tobacco use since last session.  Patient declined discussion at this time    Assessment: Current Emotional / Mental Status (status of significant symptoms):    Risk status (Self / Other harm or suicidal ideation)  Patient denies current fears or concerns for personal safety.  Patient reports the following current or recent suicidal ideation or behaviors: Wishes that she was no longer here at times.  Denies plan of how she would hurt herself.  Denies intent to harm self..  Patient denies current or recent homicidal ideation or behaviors.  Patient denies current or recent self injurious behavior or ideation.  Patient denies other safety concerns.  A safety and risk management plan has not been developed at  this time, however patient was encouraged to call [facility] should there be a change in any of these risk factors.    Appearance:   Appropriate   Eye Contact:   Good   Psychomotor Behavior: Normal   Attitude:   Cooperative   Orientation:   All  Speech   Rate / Production: Normal    Volume:  Normal   Mood:    Depressed  Sad   Affect:    Appropriate   Thought Content:  Clear   Thought Form:  Coherent  Logical   Insight:    Good     Diagnoses:  1. Major depressive disorder, recurrent episode, moderate (H)        Collateral Reports Completed:  Not Applicable    Plan: (Homework, other):  Patient was given information about behavioral services and encouraged to schedule a follow up appointment with the clinic [facility] in 1 week.  She was also given information about mental health symptoms and treatment options  and sleep Hygeine recommendations, including a handout with tips and strategies.  CD Recommendations: No indications of CD issues.      GENEVA Queen, LICSW, [facility]

## 2018-05-25 NOTE — MR AVS SNAPSHOT
"              After Visit Summary   5/25/2018    Ritesh Soliman    MRN: [id number]           Patient Information     Date Of Birth          1975        Visit Information        Provider Department      5/25/2018 11:00 AM Dulce Maria Wise, [facility]        Today's Diagnoses     Major depressive disorder, recurrent episode, moderate (H)    -  1       Follow-ups after your visit        Your next 10 appointments already scheduled     May 31, 2018  3:45 PM CDT   (Arrive by 3:30 PM)   SHORT with Olivia Weiss MD   [facility] ([facility] [place] )    [address] [place] [address] [id number] [phone]            Jun 01, 2018  2:00 PM CDT   (Arrive by 1:45 PM)   Return Visit with EMELY Ramos   [facility] ([facility] [place] )    [address] [place] [address]  [place] [address] [phone]              Who to contact     If you have questions or need follow up information about today's clinic visit or your schedule please contact [facility] directly at [phone].  Normal or non-critical lab and imaging results will be communicated to you by MyChart, letter or phone within 4 business days after the clinic has received the results. If you do not hear from us within 7 days, please contact the clinic through Invaciohart or phone. If you have a critical or abnormal lab result, we will notify you by phone as soon as possible.  Submit refill requests through Broota or call your pharmacy and they will forward the refill request to us. Please allow 3 business days for your refill to be completed.          Additional Information About Your Visit        MyChart Information     Broota lets you send messages to your doctor, view your test results, renew your prescriptions, schedule appointments and more. To sign up, go to [email or web address] . Click on \"Log in\" on the left side of the screen, which will take you to the Welcome " "page. Then click on \"Sign up Now\" on the right side of the page.     You will be asked to enter the access code listed below, as well as some personal information. Please follow the directions to create your username and password.     Your access code is: [email or web address]  Expires: 2018  4:27 PM     Your access code will  in 90 days. If you need help or a new code, please call your [facility] clinic or [phone].        Care EveryWhere ID     This is your Care EveryWhere ID. This could be used by other organizations to access your [facility] medical records  [id number]         Blood Pressure from Last 3 Encounters:   18 124/70   18 128/72   17 123/74    Weight from Last 3 Encounters:   18 163 lb (73.9 kg)   18 163 lb (73.9 kg)   17 170 lb (77.1 kg)              Today, you had the following     No orders found for display       Primary Care Provider Office Phone # Fax #    Olivia Weiss [phone]       [facility] [address]        Equal Access to Services     ROBBI FLETCHER AH: Hadii carl camarao Sojustin, waaxda luqadaha, qaybta kaalmada adeegyada, sol núñez. So [facility] [phone].    ATENCIÓN: Si habla español, tiene a meléndez disposición servicios gratuitos de asistencia lingüística. [place] al [phone].    We comply with applicable federal civil rights laws and [place] laws. We do not discriminate on the basis of race, color, national origin, age, disability, sex, sexual orientation, or gender identity.            Thank you!     Thank you for choosing [facility]  for your care. Our goal is always to provide you with excellent care. Hearing back from our patients is one way we can continue to improve our services. Please take a few minutes to complete the written survey that you may receive in the mail after your visit with us. Thank you!             Your Updated Medication List - Protect " others around you: Learn how to safely use, store and throw away your medicines at www.disposemymeds.org.          This list is accurate as of 5/25/18 11:35 AM.  Always use your most recent med list.                   Brand Name Dispense Instructions for use Diagnosis    AZO TABS PO      Taking 2-4 tabs a day as needed        nabumetone 500 MG tablet    RELAFEN    60 tablet    Take 1-2 tablets (500-1,000 mg) by mouth 2 times daily as needed for moderate pain    Tendonitis of ankle or foot, Plantar fasciitis       PARoxetine 40 MG tablet    PAXIL    45 tablet    Take 1.5 tablets (60 mg) by mouth At Bedtime    Depression with anxiety       traZODone 50 MG tablet    DESYREL    30 tablet    Take 1 tablet (50 mg) by mouth nightly as needed for sleep    Insomnia

## 2018-05-26 ASSESSMENT — PATIENT HEALTH QUESTIONNAIRE - PHQ9: SUM OF ALL RESPONSES TO PHQ QUESTIONS 1-9: 22

## 2018-05-26 ASSESSMENT — ANXIETY QUESTIONNAIRES: GAD7 TOTAL SCORE: 11

## 2018-07-13 ENCOUNTER — HOSPITAL ENCOUNTER (EMERGENCY)
Facility: HOSPITAL | Age: 43
Discharge: HOME | End: 2018-07-13
Attending: PHYSICIAN ASSISTANT | Admitting: PHYSICIAN ASSISTANT
Payer: COMMERCIAL

## 2018-07-13 VITALS
SYSTOLIC BLOOD PRESSURE: 138 MMHG | OXYGEN SATURATION: 98 % | HEART RATE: 87 BPM | RESPIRATION RATE: 16 BRPM | TEMPERATURE: 98.6 F | DIASTOLIC BLOOD PRESSURE: 92 MMHG

## 2018-07-13 DIAGNOSIS — R55 PRE-SYNCOPE: ICD-10-CM

## 2018-07-13 PROCEDURE — 25000132 ZZH RX MED GY IP 250 OP 250 PS 637: Performed by: PHYSICIAN ASSISTANT

## 2018-07-13 PROCEDURE — 99213 OFFICE O/P EST LOW 20 MIN: CPT | Performed by: PHYSICIAN ASSISTANT

## 2018-07-13 PROCEDURE — G0463 HOSPITAL OUTPT CLINIC VISIT: HCPCS

## 2018-07-13 PROCEDURE — 25000125 ZZHC RX 250: Performed by: PHYSICIAN ASSISTANT

## 2018-07-13 RX ORDER — CETIRIZINE HYDROCHLORIDE 10 MG/1
10 TABLET ORAL DAILY
Qty: 10 TABLET | Refills: 0 | Status: SHIPPED | OUTPATIENT
Start: 2018-07-13 | End: 2018-07-23

## 2018-07-13 RX ORDER — ONDANSETRON 4 MG/1
4 TABLET, ORALLY DISINTEGRATING ORAL ONCE
Status: COMPLETED | OUTPATIENT
Start: 2018-07-13 | End: 2018-07-13

## 2018-07-13 RX ORDER — PREDNISONE 20 MG/1
TABLET ORAL
Qty: 10 TABLET | Refills: 0 | Status: SHIPPED | OUTPATIENT
Start: 2018-07-13 | End: 2018-08-08

## 2018-07-13 RX ORDER — CETIRIZINE HYDROCHLORIDE 10 MG/1
10 TABLET ORAL ONCE
Status: COMPLETED | OUTPATIENT
Start: 2018-07-13 | End: 2018-07-13

## 2018-07-13 RX ORDER — DEXAMETHASONE SODIUM PHOSPHATE 10 MG/ML
10 INJECTION, SOLUTION INTRAMUSCULAR; INTRAVENOUS ONCE
Status: COMPLETED | OUTPATIENT
Start: 2018-07-13 | End: 2018-07-13

## 2018-07-13 RX ADMIN — DEXAMETHASONE SODIUM PHOSPHATE 10 MG: 10 INJECTION, SOLUTION INTRAMUSCULAR; INTRAVENOUS at 13:37

## 2018-07-13 RX ADMIN — RANITIDINE 150 MG: 150 TABLET ORAL at 13:38

## 2018-07-13 RX ADMIN — ONDANSETRON 4 MG: 4 TABLET, ORALLY DISINTEGRATING ORAL at 13:37

## 2018-07-13 RX ADMIN — CETIRIZINE HYDROCHLORIDE 10 MG: 10 TABLET, FILM COATED ORAL at 13:38

## 2018-07-13 NOTE — ED TRIAGE NOTES
Pt presents with son with c/o rash across the chest and arms. Stated it has gotten worse, just itchy and no pain. Started about a month ago. Tried anti-ich and benadryl.

## 2018-07-13 NOTE — ED AVS SNAPSHOT
[facility]    [address] [place] [address]    Phone:  [phone]                                       Ritesh Soliman   MRN: [id number]    Department:  [facility]   Date of Visit:  7/13/2018           After Visit Summary Signature Page     I have received my discharge instructions, and my questions have been answered. I have discussed any challenges I see with this plan with the nurse or doctor.    ..........................................................................................................................................  Patient/Patient Representative Signature      ..........................................................................................................................................  Patient Representative Print Name and Relationship to Patient    ..................................................               ................................................  Date                                            Time    ..........................................................................................................................................  Reviewed by Signature/Title    ...................................................              ..............................................  Date                                                            Time

## 2018-07-13 NOTE — ED AVS SNAPSHOT
[facility]    [address] [address]    Phone:  [phone]                                       Ritesh Soliman   MRN: [id number]    Department:  [facility]   Date of Visit:  7/13/2018           Patient Information     Date Of Birth          1975        Your diagnoses for this visit were:     Pre-syncope     Allergy, urticaria        You were seen by Mere Mathis PA.      Follow-up Information     Follow up with Olivia Weiss MD.    Specialty:  Family Practice    Why:  If symptoms worsen    Contact information:    [address]  [place] [address] [phone]          Follow up with [facility].    Specialty:  EMERGENCY MEDICINE    Why:  If further concerns develop    Contact information:    [address] [place] [address] [phone]    Additional information:    From [facility]: Take [address]. Turn left at [address]. Turn left at the first stoplight on [address] [place] [address]. At the first stop sign, take a right onto [address]. Take a left into the parking lot and continue through until you reach the North enterance of the building.       From [place]: Take [address]. Take the [address] ramp towards [place]. Turn left onto [address]. Take a slight right onto [address]. Turn left at the first stoplight on [address]. At the first stop sign, take a right onto [address]. Take a left into the parking lot and continue through until you reach the North enterance of the building.       From [place]: Take [address]. Take a right at [address]. At the first stop sign, take a right onto [address]. Take a left into the parking lot and continue through until you reach the North enterance of the building.       Discharge References/Attachments     ALLERGIC REACTIONS, GENERAL (ENGLISH)    HIVES (ADULT) (ENGLISH)    HIVES (URTICARIA)  UNDERSTANDING (ENGLISH)    SYNCOPE, VASOVAGAL (ENGLISH)         Review of your medicines      START taking        Dose / Directions Last dose taken    cetirizine 10 MG tablet   Commonly known as:  zyrTEC   Dose:  10 mg   Quantity:  10 tablet        Take 1 tablet (10 mg) by mouth daily for 10 days   Refills:  0        predniSONE 20 MG tablet   Commonly known as:  DELTASONE   Quantity:  10 tablet        Take two tablets (= 40mg) each day for 5 (five) days   Refills:  0        ranitidine 150 MG tablet   Commonly known as:  ZANTAC   Dose:  150 mg   Quantity:  20 tablet        Take 1 tablet (150 mg) by mouth 2 times daily for 10 days   Refills:  0          Our records show that you are taking the medicines listed below. If these are incorrect, please call your family doctor or clinic.        Dose / Directions Last dose taken    AZO TABS PO        Taking 2-4 tabs a day as needed   Refills:  0        nabumetone 500 MG tablet   Commonly known as:  RELAFEN   Dose:  500-1000 mg   Quantity:  60 tablet        Take 1-2 tablets (500-1,000 mg) by mouth 2 times daily as needed for moderate pain   Refills:  1        PARoxetine 40 MG tablet   Commonly known as:  PAXIL   Dose:  60 mg   Quantity:  45 tablet        Take 1.5 tablets (60 mg) by mouth At Bedtime   Refills:  1        traZODone 50 MG tablet   Commonly known as:  DESYREL   Quantity:  30 tablet        Take 1 tablet (50 mg) by mouth nightly as needed for sleep   Refills:  5                Prescriptions were sent or printed at these locations (3 Prescriptions)                   [facility] [place] - [address] [place] [address]    Telephone:  [phone]   Fax:  [phone]   Hours:                  E-Prescribed (3 of 3)         cetirizine (ZYRTEC) 10 MG tablet               predniSONE (DELTASONE) 20 MG tablet               ranitidine (ZANTAC) 150 MG tablet                Orders Needing Specimen Collection     None     "  Pending Results     No orders found from 2018 to 2018.            Pending Culture Results     No orders found from 2018 to 2018.            Thank you for choosing [facility]       Thank you for choosing [facility] for your care. Our goal is always to provide you with excellent care. Hearing back from our patients is one way we can continue to improve our services. Please take a few minutes to complete the written survey that you may receive in the mail after you visit with us. Thank you!        HTPharCyberX Information     flux - neutrinity lets you send messages to your doctor, view your test results, renew your prescriptions, schedule appointments and more. To sign up, go to [email or web address] . Click on \"Log in\" on the left side of the screen, which will take you to the Welcome page. Then click on \"Sign up Now\" on the right side of the page.     You will be asked to enter the access code listed below, as well as some personal information. Please follow the directions to create your username and password.     Your access code is: [email or web address]  Expires: 2018  4:27 PM     Your access code will  in 90 days. If you need help or a new code, please call your [facility] clinic or [phone].        Care EveryWhere ID     This is your Care EveryWhere ID. This could be used by other organizations to access your [facility] medical records  [id number]        Equal Access to Services     ROBBI FLETCHER : Hadii carl restrepo Sojustin, waaxda lumartaadaha, qaybta kaalmada obdulia, sol garcia . So [facility] [phone].    ATENCIÓN: Si habla español, tiene a meléndez disposición servicios gratuitos de asistencia lingüística. Llame al [phone].    We comply with applicable federal civil rights laws and [place] laws. We do not discriminate on the basis of race, color, national origin, age, disability, sex, sexual orientation, or gender identity.            After Visit Summary       This is " your record. Keep this with you and show to your community pharmacist(s) and doctor(s) at your next visit.

## 2018-07-13 NOTE — ED TRIAGE NOTES
Pt presents with c/o a rash all over her body that started a month ago and got progressively worse.

## 2018-07-14 ASSESSMENT — ENCOUNTER SYMPTOMS
NEUROLOGICAL NEGATIVE: 1
FACIAL SWELLING: 0
CARDIOVASCULAR NEGATIVE: 1
PSYCHIATRIC NEGATIVE: 1
FEVER: 0

## 2018-07-14 NOTE — ED PROVIDER NOTES
History     Chief Complaint   Patient presents with     Rash     The history is provided by the patient. No  was used.     Ritesh Soliman is a 43 year old female who has an itchy rash over much of her body, and all of her tattoos area raised, yet these are not new tattoos and there are spreading hives. No throat/tongue swelling. No difficulty breathing. She is dorie puffy under her eyes. She does not recall any new food/toiletries/clothes/detergents.    Problem List:    Patient Active Problem List    Diagnosis Date Noted     OSCAR (generalized anxiety disorder) 05/09/2018     Priority: Medium     Major depressive disorder, recurrent episode, moderate (H) 05/09/2018     Priority: Medium     Panic attack 05/09/2018     Priority: Medium     ACP (advance care planning) 04/29/2016     Priority: Medium     Advance Care Planning 4/29/2016: ACP Review of Chart / Resources Provided:  Reviewed chart for advance care plan.  Ritesh Soliman has been provided information and resources to begin or update their advance care plan.  Added by Ngoc Duong             Tobacco abuse      Priority: Medium     Depression with anxiety      Priority: Medium     Recurrent UTI 02/27/2014     Priority: Medium        Past Medical History:    Past Medical History:   Diagnosis Date     Depression with anxiety      Tobacco abuse        Past Surgical History:    Past Surgical History:   Procedure Laterality Date     BIOPSY BREAST Right 10/19/2017    Procedure: BIOPSY BREAST;  EXCISIONAL BIOPSY RIGHT BREAST;  Surgeon: Kiko Newberry MD;  Location: [facility]     OOPHORECTOMY      tubal pregnancy     TUBAL LIGATION         Family History:    Family History   Problem Relation Age of Onset     Coronary Artery Disease Mother      Hypertension Mother      Coronary Artery Disease Father      C.A.D. No family hx of      Breast Cancer No family hx of      Cancer - colorectal No family hx of      Diabetes No family hx of      Thyroid  "Disease No family hx of      Asthma No family hx of      Colon Cancer No family hx of      Hyperlipidemia No family hx of        Social History:  Marital Status:  Single [1]  Social History   Substance Use Topics     Smoking status: Current Some Day Smoker     Packs/day: 0.50     Types: Cigarettes     Smokeless tobacco: Never Used      Comment: rarely smokes     Alcohol use 0.0 oz/week     0 Standard drinks or equivalent per week      Comment: occassionally        Medications:      cetirizine (ZYRTEC) 10 MG tablet   Phenazopyridine HCl (AZO TABS PO)   predniSONE (DELTASONE) 20 MG tablet   ranitidine (ZANTAC) 150 MG tablet   traZODone (DESYREL) 50 MG tablet   nabumetone (RELAFEN) 500 MG tablet   PARoxetine (PAXIL) 40 MG tablet         Review of Systems   Constitutional: Negative for fever.   HENT: Negative for facial swelling.    Cardiovascular: Negative.    Skin: Positive for rash.   Neurological: Negative.    Psychiatric/Behavioral: Negative.        Physical Exam   BP: 138/92  Pulse: 87  Temp: 98.6  F (37  C)  Resp: 16  SpO2: 98 %      Physical Exam   Constitutional: She is oriented to person, place, and time. She appears well-developed and well-nourished. No distress.   HENT:   No lip/tongue edema   Cardiovascular: Normal rate.    Pulmonary/Chest: Effort normal.   Neurological: She is alert and oriented to person, place, and time.   Skin: She is not diaphoretic.   Each of the pt's tattoos are raised, on her arms/legs, she has hives spreading out from the tattoos, she has mild edema under her eyes.   Psychiatric: She has a normal mood and affect.   Nursing note and vitals reviewed.      ED Course     ED Course     Procedures        Pt has had a vaso vagal response.  Cold wet wash cloth applied to neck and forehead, pt drank a cup of juice. After 20 minutes, she feels \"back to normal.\"      Medications   dexamethasone (DECADRON) oral solution (inj used orally) 10 mg (10 mg Oral Given 7/13/18 8467)   ranitidine " (ZANTAC) tablet 150 mg (150 mg Oral Given 7/13/18 1338)   cetirizine (zyrTEC) tablet 10 mg (10 mg Oral Given 7/13/18 1338)   ondansetron (ZOFRAN-ODT) ODT tab 4 mg (4 mg Oral Given 7/13/18 1337)     Pt observed x 20 minutes. Pt tolerated well      Assessments & Plan (with Medical Decision Making)     I have reviewed the nursing notes.    I have reviewed the findings, diagnosis, plan and need for follow up with the patient.      Discharge Medication List as of 7/13/2018  1:57 PM      START taking these medications    Details   cetirizine (ZYRTEC) 10 MG tablet Take 1 tablet (10 mg) by mouth daily for 10 days, Disp-10 tablet, R-0, E-Prescribe      predniSONE (DELTASONE) 20 MG tablet Take two tablets (= 40mg) each day for 5 (five) days, Disp-10 tablet, R-0, E-Prescribe      ranitidine (ZANTAC) 150 MG tablet Take 1 tablet (150 mg) by mouth 2 times daily for 10 days, Disp-20 tablet, R-0, E-Prescribe             Final diagnoses:   Pre-syncope   Allergy, urticaria           Patient verbally educated and given appropriate education sheets for the diagnoses and has no questions.  Take medications as directed.    if symptoms increase or if further concerns develop, return to the ER  Mere Mathis Certified  Physician Assistant  7/14/2018  12:14 PM  URGENT CARE CLINIC      7/13/2018   [facility]     Mere Mathis PA  07/14/18 2035

## 2018-07-15 ENCOUNTER — HOSPITAL ENCOUNTER (EMERGENCY)
Facility: HOSPITAL | Age: 43
Discharge: HOME | End: 2018-07-15
Attending: PHYSICIAN ASSISTANT | Admitting: PHYSICIAN ASSISTANT
Payer: COMMERCIAL

## 2018-07-15 VITALS
DIASTOLIC BLOOD PRESSURE: 98 MMHG | RESPIRATION RATE: 16 BRPM | SYSTOLIC BLOOD PRESSURE: 142 MMHG | TEMPERATURE: 98.9 F | HEART RATE: 68 BPM | OXYGEN SATURATION: 99 %

## 2018-07-15 DIAGNOSIS — R13.10 DYSPHAGIA, UNSPECIFIED TYPE: ICD-10-CM

## 2018-07-15 DIAGNOSIS — Z87.2 H/O URTICARIA: ICD-10-CM

## 2018-07-15 PROCEDURE — 99284 EMERGENCY DEPT VISIT MOD MDM: CPT | Mod: 25

## 2018-07-15 PROCEDURE — 99283 EMERGENCY DEPT VISIT LOW MDM: CPT | Performed by: PHYSICIAN ASSISTANT

## 2018-07-15 PROCEDURE — 25000128 H RX IP 250 OP 636: Performed by: PHYSICIAN ASSISTANT

## 2018-07-15 PROCEDURE — 96372 THER/PROPH/DIAG INJ SC/IM: CPT

## 2018-07-15 RX ORDER — HYDROXYZINE HYDROCHLORIDE 25 MG/1
25-50 TABLET, FILM COATED ORAL EVERY 6 HOURS PRN
Qty: 18 TABLET | Refills: 0 | Status: SHIPPED | OUTPATIENT
Start: 2018-07-15 | End: 2018-09-18

## 2018-07-15 RX ORDER — METHYLPREDNISOLONE SODIUM SUCCINATE 125 MG/2ML
125 INJECTION, POWDER, LYOPHILIZED, FOR SOLUTION INTRAMUSCULAR; INTRAVENOUS ONCE
Status: COMPLETED | OUTPATIENT
Start: 2018-07-15 | End: 2018-07-15

## 2018-07-15 RX ADMIN — METHYLPREDNISOLONE SODIUM SUCCINATE 125 MG: 125 INJECTION, POWDER, FOR SOLUTION INTRAMUSCULAR; INTRAVENOUS at 15:35

## 2018-07-15 ASSESSMENT — ENCOUNTER SYMPTOMS
TROUBLE SWALLOWING: 1
CHILLS: 0
COUGH: 0
FACIAL SWELLING: 0
SHORTNESS OF BREATH: 0
NAUSEA: 0
HEADACHES: 0
EYES NEGATIVE: 1
MUSCULOSKELETAL NEGATIVE: 1
FEVER: 0
STRIDOR: 0
VOMITING: 0
RESPIRATORY NEGATIVE: 1
CONSTITUTIONAL NEGATIVE: 1
CARDIOVASCULAR NEGATIVE: 1
WHEEZING: 0
SORE THROAT: 0
CHOKING: 0
LIGHT-HEADEDNESS: 0

## 2018-07-15 NOTE — DISCHARGE INSTRUCTIONS
"- resume the prednisone in the morning  - may add the hydroxyzine for itching.   - referral placed to ENT for discomfort swallowing. NO findings to indicate severe allergic reaction or complete blockage after the drinking test.   * Back here if unable to pass food/drink (especially the \"bolus\" like we did in the office) OR with facial or tongue swelling.   "

## 2018-07-15 NOTE — ED AVS SNAPSHOT
[facility]    [address] [place] [address]    Phone:  [phone]                                       Ritesh Soliman   MRN: [id number]    Department:  [facility]   Date of Visit:  7/15/2018           After Visit Summary Signature Page     I have received my discharge instructions, and my questions have been answered. I have discussed any challenges I see with this plan with the nurse or doctor.    ..........................................................................................................................................  Patient/Patient Representative Signature      ..........................................................................................................................................  Patient Representative Print Name and Relationship to Patient    ..................................................               ................................................  Date                                            Time    ..........................................................................................................................................  Reviewed by Signature/Title    ...................................................              ..............................................  Date                                                            Time

## 2018-07-15 NOTE — ED NOTES
Ambulated to room 1 independently, gown placed, call light in reach.  Reports ate ham this morning and feels like it is stuck in her throat.  Woke up with a rash on Friday, was seen in UC.  Nausea, no vomiting.  Pain in suprasternal notch is 4, pressure.  Pain goal zero.

## 2018-07-15 NOTE — ED NOTES
DC instructions reviewed with patient.  Patient verbalized understanding and has no further questions.  Patient is aware that she has an ENT consult and they should be contacting her this week.  Patient to return to the emergency room if symptoms worsen or any new concerns.  Home to rest.

## 2018-07-15 NOTE — ED PROVIDER NOTES
History     Chief Complaint   Patient presents with     Airway Obstruction     HPI  Ritesh Soliman is a 43 year old female who presents with dysphagia. She is concerned about allergic reaction vs. Ham stuck in her throat. She is on day 3 zantac, zyrtec, and prednisone. She did not take the prednisone today due to agitation and is concerned her throat could be swelling. She is able to sip on water. NO wheezing. Pt can swallow water, but has not tried to eat solids since breakfast.     Problem List:    Patient Active Problem List    Diagnosis Date Noted     OSCAR (generalized anxiety disorder) 05/09/2018     Priority: Medium     Major depressive disorder, recurrent episode, moderate (H) 05/09/2018     Priority: Medium     Panic attack 05/09/2018     Priority: Medium     ACP (advance care planning) 04/29/2016     Priority: Medium     Advance Care Planning 4/29/2016: ACP Review of Chart / Resources Provided:  Reviewed chart for advance care plan.  Ritesh Soliman has been provided information and resources to begin or update their advance care plan.  Added by Ngoc Duong             Tobacco abuse      Priority: Medium     Depression with anxiety      Priority: Medium     Recurrent UTI 02/27/2014     Priority: Medium        Past Medical History:    Past Medical History:   Diagnosis Date     Depression with anxiety      Tobacco abuse        Past Surgical History:    Past Surgical History:   Procedure Laterality Date     BIOPSY BREAST Right 10/19/2017    Procedure: BIOPSY BREAST;  EXCISIONAL BIOPSY RIGHT BREAST;  Surgeon: Kiko Newberry MD;  Location: [facility]     OOPHORECTOMY      tubal pregnancy     TUBAL LIGATION         Family History:    Family History   Problem Relation Age of Onset     Coronary Artery Disease Mother      Hypertension Mother      Coronary Artery Disease Father      C.A.D. No family hx of      Breast Cancer No family hx of      Cancer - colorectal No family hx of      Diabetes No family hx of       Thyroid Disease No family hx of      Asthma No family hx of      Colon Cancer No family hx of      Hyperlipidemia No family hx of        Social History:  Marital Status:  Single [1]  Social History   Substance Use Topics     Smoking status: Current Some Day Smoker     Packs/day: 0.50     Types: Cigarettes     Smokeless tobacco: Never Used      Comment: rarely smokes     Alcohol use 0.0 oz/week     0 Standard drinks or equivalent per week      Comment: occassionally        Medications:      cetirizine (ZYRTEC) 10 MG tablet   hydrOXYzine (ATARAX) 25 MG tablet   nabumetone (RELAFEN) 500 MG tablet   PARoxetine (PAXIL) 40 MG tablet   Phenazopyridine HCl (AZO TABS PO)   predniSONE (DELTASONE) 20 MG tablet   ranitidine (ZANTAC) 150 MG tablet   traZODone (DESYREL) 50 MG tablet         Review of Systems   Constitutional: Negative.  Negative for chills and fever.   HENT: Positive for trouble swallowing. Negative for facial swelling and sore throat.    Eyes: Negative.    Respiratory: Negative.  Negative for cough, choking, shortness of breath, wheezing and stridor.    Cardiovascular: Negative.    Gastrointestinal: Negative for nausea and vomiting.   Musculoskeletal: Negative.    Skin: Positive for rash.   Neurological: Negative for syncope, light-headedness and headaches.       Physical Exam   BP: 142/95  Pulse: 68  Heart Rate: 69  Temp: 98  F (36.7  C)  Resp: 16  SpO2: 99 %      Physical Exam   Constitutional: She is oriented to person, place, and time. She appears well-developed and well-nourished. She appears distressed (anxious).   HENT:   Head: Normocephalic and atraumatic.   Right Ear: External ear normal.   Left Ear: External ear normal.   Mouth/Throat: Oropharynx is clear and moist.   NO angioedema   Eyes: Conjunctivae are normal.   Cardiovascular: Normal rate and normal heart sounds.    Pulmonary/Chest: Effort normal and breath sounds normal.   Abdominal: Soft. Bowel sounds are normal.   Neurological: She is alert and  oriented to person, place, and time.   Skin: Skin is warm.   Hives resolved. Excoriations arms and neck.    Psychiatric: She has a normal mood and affect.   Nursing note and vitals reviewed.      ED Course     ED Course     Procedures      No results found for this or any previous visit (from the past 24 hour(s)).    Medications   methylPREDNISolone sodium succinate (solu-MEDROL) injection 125 mg (125 mg Intramuscular Given 7/15/18 1535)       Assessments & Plan (with Medical Decision Making)     I have reviewed the nursing notes.  I have reviewed the findings, diagnosis, plan and need for follow up with the patient.    Discharge Medication List as of 7/15/2018  3:55 PM      START taking these medications    Details   hydrOXYzine (ATARAX) 25 MG tablet Take 1-2 tablets (25-50 mg) by mouth every 6 hours as needed for itching, Disp-18 tablet, R-0, E-Prescribe             Final diagnoses:   Dysphagia, unspecified type   H/O urticaria   I witnessed Star drink approx 12 oz water withOUT complications. She in fact reports symptoms have improved since arrival. I recommended resuming the prednisone for hives, IM solumedrol given here. May use atarax for itching. I did place referral to ENT for f/u for sensation of FB in her throat/dysphagia. She will return here with any worsening, facial swelling or hives despite resuming  Steroid.     7/15/2018   [facility]     Lamont Gibbs PA  07/15/18 6899

## 2018-07-15 NOTE — ED AVS SNAPSHOT
[facility]    [address] [address]    Phone:  [phone]                                       Ritesh Soliman   MRN: [id number]    Department:  [facility]   Date of Visit:  7/15/2018           Patient Information     Date Of Birth          1975        Your diagnoses for this visit were:     Dysphagia, unspecified type     H/O urticaria        You were seen by Lamont Gibbs PA.      Follow-up Information     Please follow up.    Why:  ENT vs primary care after the weekend        Follow up with [facility].    Specialty:  EMERGENCY MEDICINE    Why:  If symptoms worsen    Contact information:    [address] [address] [phone]    Additional information:    From [facility]: Take [address]. Turn left at [address]. Turn left at the first stoplight on [address]. At the first stop sign, take a right onto [address]. Take a left into the parking lot and continue through until you reach the North enterance of the building.       From [place]: Take [address]. Take the [address] ramp towards [place]. Turn left onto [address]. Take a slight right onto [address]. Turn left at the first stoplight on [address]. At the first stop sign, take a right onto [address]. Take a left into the parking lot and continue through until you reach the North enterance of the building.       From [place]: Take [address]. Take a right at [address]. At the first stop sign, take a right onto [address]. Take a left into the parking lot and continue through until you reach the North enterance of the building.         Discharge Instructions       - resume the prednisone in the morning  - may add the hydroxyzine for itching.   - referral placed to ENT for discomfort swallowing. NO findings to indicate severe allergic reaction or complete blockage after the  "drinking test.   * Back here if unable to pass food/drink (especially the \"bolus\" like we did in the office) OR with facial or tongue swelling.     Discharge References/Attachments     DYSPHAGIA, TREATING (ENGLISH)    HIVES (URTICARIA) UNDERSTANDING (ENGLISH)      ED Discharge Orders     OTOLARYNGOLOGY REFERRAL       Your provider has referred you to: [facility] [phone]   [email or web address]    Please be aware that coverage of these services is subject to the terms and limitations of your health insurance plan.  Call member services at your health plan with any benefit or coverage questions.      Please bring the following with you to your appointment:    (1) Any X-Rays, CTs or MRIs which have been performed.  Contact the facility where they were done to arrange for  prior to your scheduled appointment.   (2) List of current medications  (3) This referral request   (4) Any documents/labs given to you for this referral                     Review of your medicines      START taking        Dose / Directions Last dose taken    hydrOXYzine 25 MG tablet   Commonly known as:  ATARAX   Dose:  25-50 mg   Quantity:  18 tablet        Take 1-2 tablets (25-50 mg) by mouth every 6 hours as needed for itching   Refills:  0          Our records show that you are taking the medicines listed below. If these are incorrect, please call your family doctor or clinic.        Dose / Directions Last dose taken    AZO TABS PO        Taking 2-4 tabs a day as needed   Refills:  0        cetirizine 10 MG tablet   Commonly known as:  zyrTEC   Dose:  10 mg   Quantity:  10 tablet        Take 1 tablet (10 mg) by mouth daily for 10 days   Refills:  0        nabumetone 500 MG tablet   Commonly known as:  RELAFEN   Dose:  500-1000 mg   Quantity:  60 tablet        Take 1-2 tablets (500-1,000 mg) by mouth 2 times daily as needed for moderate pain   Refills:  1        " "PARoxetine 40 MG tablet   Commonly known as:  PAXIL   Dose:  60 mg   Quantity:  45 tablet        Take 1.5 tablets (60 mg) by mouth At Bedtime   Refills:  1        predniSONE 20 MG tablet   Commonly known as:  DELTASONE   Quantity:  10 tablet        Take two tablets (= 40mg) each day for 5 (five) days   Refills:  0        ranitidine 150 MG tablet   Commonly known as:  ZANTAC   Dose:  150 mg   Quantity:  20 tablet        Take 1 tablet (150 mg) by mouth 2 times daily for 10 days   Refills:  0        traZODone 50 MG tablet   Commonly known as:  DESYREL   Quantity:  30 tablet        Take 1 tablet (50 mg) by mouth nightly as needed for sleep   Refills:  5                Prescriptions were sent or printed at these locations (1 Prescription)                   [facility] [address] [place] [address], [place] - [address] AT [facility] [address]   [address], [place] [address]    Telephone:  [phone]   Fax:  [phone]   Hours:                  E-Prescribed (1 of 1)         hydrOXYzine (ATARAX) 25 MG tablet                Orders Needing Specimen Collection     None      Pending Results     No orders found from 7/13/2018 to 7/16/2018.            Pending Culture Results     No orders found from 7/13/2018 to 7/16/2018.            Thank you for choosing [facility]       Thank you for choosing [facility] for your care. Our goal is always to provide you with excellent care. Hearing back from our patients is one way we can continue to improve our services. Please take a few minutes to complete the written survey that you may receive in the mail after you visit with us. Thank you!        [email or web address] Information     [email or web address] lets you send messages to your doctor, view your test results, renew your prescriptions, schedule appointments and more. To sign up, go to [email or web address] . Click on \"Log in\" on the left side of the screen, which will take you to the Welcome page. Then click on \"Sign up Now\" on the right side of the " page.     You will be asked to enter the access code listed below, as well as some personal information. Please follow the directions to create your username and password.     Your access code is: [email or web address]  Expires: 2018  4:27 PM     Your access code will  in 90 days. If you need help or a new code, please call your [facility] clinic or [phone].        Care EveryWhere ID     This is your Care EveryWhere ID. This could be used by other organizations to access your [facility] medical records  [id number]        Equal Access to Services     [facility]: Jamil Allison, joselito jain, stefania steiner, sol garcia . So [facility] [phone].    ATENCIÓN: Si habla español, tiene a meléndez disposición servicios gratuitos de asistencia lingüística. Llame al [phone].    We comply with applicable federal civil rights laws and [place] laws. We do not discriminate on the basis of race, color, national origin, age, disability, sex, sexual orientation, or gender identity.            After Visit Summary       This is your record. Keep this with you and show to your community pharmacist(s) and doctor(s) at your next visit.

## 2018-07-19 ENCOUNTER — HOSPITAL ENCOUNTER (EMERGENCY)
Facility: HOSPITAL | Age: 43
Discharge: HOME | End: 2018-07-19
Attending: EMERGENCY MEDICINE | Admitting: EMERGENCY MEDICINE
Payer: COMMERCIAL

## 2018-07-19 VITALS
HEART RATE: 58 BPM | SYSTOLIC BLOOD PRESSURE: 142 MMHG | TEMPERATURE: 98 F | DIASTOLIC BLOOD PRESSURE: 99 MMHG | RESPIRATION RATE: 16 BRPM | OXYGEN SATURATION: 96 %

## 2018-07-19 DIAGNOSIS — K21.9 GASTROESOPHAGEAL REFLUX DISEASE, ESOPHAGITIS PRESENCE NOT SPECIFIED: Primary | ICD-10-CM

## 2018-07-19 PROCEDURE — 99284 EMERGENCY DEPT VISIT MOD MDM: CPT | Mod: Z6 | Performed by: EMERGENCY MEDICINE

## 2018-07-19 PROCEDURE — 93005 ELECTROCARDIOGRAM TRACING: CPT

## 2018-07-19 PROCEDURE — 25000125 ZZHC RX 250: Performed by: EMERGENCY MEDICINE

## 2018-07-19 PROCEDURE — 25000132 ZZH RX MED GY IP 250 OP 250 PS 637: Performed by: EMERGENCY MEDICINE

## 2018-07-19 PROCEDURE — 99283 EMERGENCY DEPT VISIT LOW MDM: CPT

## 2018-07-19 RX ORDER — SUCRALFATE 1 G/1
1 TABLET ORAL 4 TIMES DAILY
Qty: 40 TABLET | Refills: 1 | Status: SHIPPED | OUTPATIENT
Start: 2018-07-19 | End: 2018-08-08

## 2018-07-19 RX ADMIN — LIDOCAINE HYDROCHLORIDE: 20 SOLUTION ORAL; TOPICAL at 03:24

## 2018-07-19 NOTE — ED AVS SNAPSHOT
[facility]    [address] [place] [address]    Phone:  [phone]                                       Ritesh Soliman   MRN: [id number]    Department[facility]  [facility]   Date of Visit:  7/19/2018           After Visit Summary Signature Page     I have received my discharge instructions, and my questions have been answered. I have discussed any challenges I see with this plan with the nurse or doctor.    ..........................................................................................................................................  Patient/Patient Representative Signature      ..........................................................................................................................................  Patient Representative Print Name and Relationship to Patient    ..................................................               ................................................  Date                                            Time    ..........................................................................................................................................  Reviewed by Signature/Title    ...................................................              ..............................................  Date                                                            Time

## 2018-07-19 NOTE — ED NOTES
Patient discharged to home with spouse. Patient verbalize/demonstrate understanding of all written and verbal instructions. Prescriptions e-scribed to local pharmacy. All questions answered.

## 2018-07-19 NOTE — ED NOTES
Patient being evaluated tonight for mid-sternal chest pain that radiates into her throat. Patient states she has been experiencing chest pain and a rash X 1 week. Tonight, the chest pain started at approx 1900 and has been progressively become more severe. She also reports associated SOB, dizziness, nausea, abdominal pain, and headache. Patient alert and oriented. She is resting on ED cart. Call light in reach.

## 2018-07-19 NOTE — ED PROVIDER NOTES
History     Chief Complaint   Patient presents with     Gastrophageal Reflux     reports she has some midline anterior chest pain from neck to upper abdomen. intermittent sx worse with eating or drinking     HPI  Ritesh Soliman is a 43 year old female who presented to the emergency department with a one-week history of epigastric pain, pain with swallowing of food.  Patient was seen in the emergency department 4 days ago with similar symptoms.  She was evaluated and discharged home and advised to continue using prednisone.  She now reports that the pain is worsened especially when she is swallowing food.  Pain is also located in the epigastric region.  No unknown relieving factors.  EKG done upon arrival showed normal sinus rhythm.  Patient denies any fever, cough, shortness of breath, sputum production, painful breathing.  She is currently on paroxetine for depression, ranitidine and prednisone.     Problem List:    Patient Active Problem List    Diagnosis Date Noted     OSCAR (generalized anxiety disorder) 05/09/2018     Priority: Medium     Major depressive disorder, recurrent episode, moderate (H) 05/09/2018     Priority: Medium     Panic attack 05/09/2018     Priority: Medium     ACP (advance care planning) 04/29/2016     Priority: Medium     Advance Care Planning 4/29/2016: ACP Review of Chart / Resources Provided:  Reviewed chart for advance care plan.  Ritesh Soliman has been provided information and resources to begin or update their advance care plan.  Added by Ngoc Duong             Tobacco abuse      Priority: Medium     Depression with anxiety      Priority: Medium     Recurrent UTI 02/27/2014     Priority: Medium        Past Medical History:    Past Medical History:   Diagnosis Date     Depression with anxiety      Tobacco abuse        Past Surgical History:    Past Surgical History:   Procedure Laterality Date     BIOPSY BREAST Right 10/19/2017    Procedure: BIOPSY BREAST;  EXCISIONAL BIOPSY  RIGHT BREAST;  Surgeon: Kiko Newberry MD;  Location: [facility]     OOPHORECTOMY      tubal pregnancy     TUBAL LIGATION         Family History:    Family History   Problem Relation Age of Onset     Coronary Artery Disease Mother      Hypertension Mother      Coronary Artery Disease Father      C.A.D. No family hx of      Breast Cancer No family hx of      Cancer - colorectal No family hx of      Diabetes No family hx of      Thyroid Disease No family hx of      Asthma No family hx of      Colon Cancer No family hx of      Hyperlipidemia No family hx of        Social History:  Marital Status:  Single [1]  Social History   Substance Use Topics     Smoking status: Current Some Day Smoker     Packs/day: 0.50     Types: Cigarettes     Smokeless tobacco: Never Used      Comment: rarely smokes     Alcohol use 0.0 oz/week     0 Standard drinks or equivalent per week      Comment: occassionally        Medications:      cetirizine (ZYRTEC) 10 MG tablet   hydrOXYzine (ATARAX) 25 MG tablet   lidocaine, viscous, (XYLOCAINE) 2 % solution   nabumetone (RELAFEN) 500 MG tablet   PARoxetine (PAXIL) 40 MG tablet   predniSONE (DELTASONE) 20 MG tablet   ranitidine (ZANTAC) 150 MG tablet   sucralfate (CARAFATE) 1 GM tablet   traZODone (DESYREL) 50 MG tablet         Review of Systems   All other systems reviewed and are negative.      Physical Exam   BP: 148/100  Pulse: 62  Heart Rate: 64  Temp: 98  F (36.7  C)  Resp: 16  SpO2: 94 %      Physical Exam   Constitutional: She is oriented to person, place, and time. She appears well-developed and well-nourished. No distress.   HENT:   Head: Normocephalic and atraumatic.   Eyes: EOM are normal. Pupils are equal, round, and reactive to light.   Cardiovascular: Normal rate, regular rhythm and normal heart sounds.    Pulmonary/Chest: Effort normal and breath sounds normal. No respiratory distress. She has no wheezes.   Abdominal: Soft. Bowel sounds are normal. She exhibits no distension. There is  no tenderness.   Neurological: She is alert and oriented to person, place, and time.   Skin: She is not diaphoretic.   Nursing note and vitals reviewed.      ED Course     ED Course     Procedures         EKG Interpretation:      Interpreted by Kevin Reveles  Time reviewed: 3:10 PM  Symptoms at time of EKG: Painful swallowing  Rhythm: normal sinus   Rate: normal  Axis: normal  Ectopy: none  Conduction: normal  ST Segments/ T Waves: No ST-T wave changes  Q Waves: none  Comparison to prior: No old EKG available    Clinical Impression: normal EKG            No results found for this or any previous visit (from the past 24 hour(s)).    Medications   lidocaine (viscous) (XYLOCAINE) 2 % 15 mL, alum & mag hydroxide-simethicone (MYLANTA ES/MAALOX  ES) 15 mL GI Cocktail ( Oral Given 7/19/18 0324)       Assessments & Plan (with Medical Decision Making)   As esophageal reflux: Patient presented to the emergency department with complaints of painful swallowing for more than a week.  Her pain was relieved after GI cocktail was given.  EKG done showed normal sinus rhythm without any acute changes.  Normal examination of the chest and abdomen.  Patient is currently on prednisone for some allergic reaction.  She is also taking ranitidine and antidepressants.  Patient is going to call later today  to schedule for EGD.  Now she is being discharged home on lidocaine viscous gel and Carafate as needed for pain for swallowing.  Advised follow-up with primary care later today or tomorrow.  May return to ED if condition deteriorates.    I have reviewed the nursing notes.    I have reviewed the findings, diagnosis, plan and need for follow up with the patient.    Discharge Medication List as of 7/19/2018  4:18 AM      START taking these medications    Details   lidocaine, viscous, (XYLOCAINE) 2 % solution Take 15 mLs by mouth every 2 hours as needed for moderate pain swish and spit every 3-8 hours as needed; max 8 doses/24 hour period,  Disp-100 mL, R-1, E-Prescribe      sucralfate (CARAFATE) 1 GM tablet Take 1 tablet (1 g) by mouth 4 times daily, Disp-40 tablet, R-1, E-Prescribe             Final diagnoses:   Gastroesophageal reflux disease, esophagitis presence not specified       7/19/2018   [facility]     Kevin Reveles MD  07/19/18 6610

## 2018-07-19 NOTE — ED AVS SNAPSHOT
[facility]    [address]    [place] [address]    Phone:  [phone]                                       Ritesh Soliman   MRN: [id number]    Department:  [facility]   Date of Visit:  7/19/2018           Patient Information     Date Of Birth          1975        Your diagnoses for this visit were:     Gastroesophageal reflux disease, esophagitis presence not specified        You were seen by Kevin Reveles MD.         Review of your medicines      START taking        Dose / Directions Last dose taken    lidocaine (viscous) 2 % solution   Commonly known as:  XYLOCAINE   Dose:  15 mL   Quantity:  100 mL        Take 15 mLs by mouth every 2 hours as needed for moderate pain swish and spit every 3-8 hours as needed; max 8 doses/24 hour period   Refills:  1        sucralfate 1 GM tablet   Commonly known as:  CARAFATE   Dose:  1 g   Quantity:  40 tablet        Take 1 tablet (1 g) by mouth 4 times daily   Refills:  1          Our records show that you are taking the medicines listed below. If these are incorrect, please call your family doctor or clinic.        Dose / Directions Last dose taken    cetirizine 10 MG tablet   Commonly known as:  zyrTEC   Dose:  10 mg   Quantity:  10 tablet        Take 1 tablet (10 mg) by mouth daily for 10 days   Refills:  0        hydrOXYzine 25 MG tablet   Commonly known as:  ATARAX   Dose:  25-50 mg   Quantity:  18 tablet        Take 1-2 tablets (25-50 mg) by mouth every 6 hours as needed for itching   Refills:  0        nabumetone 500 MG tablet   Commonly known as:  RELAFEN   Dose:  500-1000 mg   Quantity:  60 tablet        Take 1-2 tablets (500-1,000 mg) by mouth 2 times daily as needed for moderate pain   Refills:  1        PARoxetine 40 MG tablet   Commonly known as:  PAXIL   Dose:  60 mg   Quantity:  45 tablet        Take 1.5 tablets (60 mg) by mouth At Bedtime   Refills:  1        predniSONE 20 MG tablet   Commonly known as:  DELTASONE  "  Quantity:  10 tablet        Take two tablets (= 40mg) each day for 5 (five) days   Refills:  0        ranitidine 150 MG tablet   Commonly known as:  ZANTAC   Dose:  150 mg   Quantity:  20 tablet        Take 1 tablet (150 mg) by mouth 2 times daily for 10 days   Refills:  0        traZODone 50 MG tablet   Commonly known as:  DESYREL   Quantity:  30 tablet        Take 1 tablet (50 mg) by mouth nightly as needed for sleep   Refills:  5                Prescriptions were sent or printed at these locations (2 Prescriptions)                   [id number] [place] [facility] [address] AT [facility] [address]   [address], [place] [address]    Telephone:  [phone]   Fax:  [phone]   Hours:                  E-Prescribed (2 of 2)         lidocaine, viscous, (XYLOCAINE) 2 % solution               sucralfate (CARAFATE) 1 GM tablet                Orders Needing Specimen Collection     None      Pending Results     No orders found from 7/17/2018 to 7/20/2018.            Pending Culture Results     No orders found from 7/17/2018 to 7/20/2018.            Thank you for choosing [facility]       Thank you for choosing [facility] for your care. Our goal is always to provide you with excellent care. Hearing back from our patients is one way we can continue to improve our services. Please take a few minutes to complete the written survey that you may receive in the mail after you visit with us. Thank you!        [employer] Information     [employer] lets you send messages to your doctor, view your test results, renew your prescriptions, schedule appointments and more. To sign up, go to [email or web address] . Click on \"Log in\" on the left side of the screen, which will take you to the Welcome page. Then click on \"Sign up Now\" on the right side of the page.     You will be asked to enter the access code listed below, as well as some personal information. Please follow the directions to create your username and " password.     Your access code is: [email or web address]  Expires: 2018  4:27 PM     Your access code will  in 90 days. If you need help or a new code, please call your [facility] clinic or [phone].        Care EveryWhere ID     This is your Care EveryWhere ID. This could be used by other organizations to access your [facility] medical records  [id number]        Equal Access to Services     ROBBI FLETCHER : Hadii carl camarao Sojustin, waaxda luqadaha, qaybta kaalmada adeegyada, sol garcia . So [facility] [phone].    ATENCIÓN: Si habla español, tiene a meléndez disposición servicios gratuitos de asistencia lingüística. Llame al [phone].    We comply with applicable federal civil rights laws and [place] laws. We do not discriminate on the basis of race, color, national origin, age, disability, sex, sexual orientation, or gender identity.            After Visit Summary       This is your record. Keep this with you and show to your community pharmacist(s) and doctor(s) at your next visit.

## 2018-08-08 ENCOUNTER — OFFICE VISIT (OUTPATIENT)
Dept: OTOLARYNGOLOGY | Facility: OTHER | Age: 43
End: 2018-08-08
Attending: PHYSICIAN ASSISTANT
Payer: COMMERCIAL

## 2018-08-08 VITALS
SYSTOLIC BLOOD PRESSURE: 124 MMHG | OXYGEN SATURATION: 98 % | WEIGHT: 160 LBS | TEMPERATURE: 97.8 F | BODY MASS INDEX: 28.35 KG/M2 | HEIGHT: 63 IN | DIASTOLIC BLOOD PRESSURE: 72 MMHG | HEART RATE: 67 BPM

## 2018-08-08 DIAGNOSIS — Z72.0 TOBACCO ABUSE: ICD-10-CM

## 2018-08-08 DIAGNOSIS — L29.9 PRURITUS: ICD-10-CM

## 2018-08-08 DIAGNOSIS — R30.0 DYSURIA: ICD-10-CM

## 2018-08-08 DIAGNOSIS — R13.10 DYSPHAGIA, UNSPECIFIED TYPE: Primary | ICD-10-CM

## 2018-08-08 DIAGNOSIS — K21.9 LPRD (LARYNGOPHARYNGEAL REFLUX DISEASE): ICD-10-CM

## 2018-08-08 DIAGNOSIS — R82.90 ABNORMAL URINE FINDINGS: ICD-10-CM

## 2018-08-08 DIAGNOSIS — N39.0 URINARY TRACT INFECTION WITHOUT HEMATURIA, SITE UNSPECIFIED: ICD-10-CM

## 2018-08-08 DIAGNOSIS — L50.9 URTICARIA: ICD-10-CM

## 2018-08-08 LAB
ALBUMIN UR-MCNC: NEGATIVE MG/DL
ANION GAP SERPL CALCULATED.3IONS-SCNC: 5 MMOL/L (ref 3–14)
APPEARANCE UR: ABNORMAL
BACTERIA #/AREA URNS HPF: ABNORMAL /HPF
BILIRUB UR QL STRIP: NEGATIVE
BUN SERPL-MCNC: 17 MG/DL (ref 7–30)
CALCIUM SERPL-MCNC: 8 MG/DL (ref 8.5–10.1)
CHLORIDE SERPL-SCNC: 110 MMOL/L (ref 94–109)
CO2 SERPL-SCNC: 25 MMOL/L (ref 20–32)
COLOR UR AUTO: YELLOW
CREAT SERPL-MCNC: 0.89 MG/DL (ref 0.52–1.04)
GFR SERPL CREATININE-BSD FRML MDRD: 69 ML/MIN/1.7M2
GLUCOSE SERPL-MCNC: 89 MG/DL (ref 70–99)
GLUCOSE UR STRIP-MCNC: NEGATIVE MG/DL
HGB BLD-MCNC: 14 G/DL (ref 11.7–15.7)
HGB UR QL STRIP: NEGATIVE
KETONES UR STRIP-MCNC: NEGATIVE MG/DL
LEUKOCYTE ESTERASE UR QL STRIP: ABNORMAL
MUCOUS THREADS #/AREA URNS LPF: PRESENT /LPF
NITRATE UR QL: POSITIVE
PH UR STRIP: 5.5 PH (ref 4.7–8)
POTASSIUM SERPL-SCNC: 4.3 MMOL/L (ref 3.4–5.3)
RBC #/AREA URNS AUTO: 0 /HPF (ref 0–2)
SODIUM SERPL-SCNC: 140 MMOL/L (ref 133–144)
SOURCE: ABNORMAL
SP GR UR STRIP: 1.02 (ref 1–1.03)
SQUAMOUS #/AREA URNS AUTO: 7 /HPF (ref 0–1)
TSH SERPL DL<=0.005 MIU/L-ACNC: 0.76 MU/L (ref 0.4–4)
UROBILINOGEN UR STRIP-MCNC: NORMAL MG/DL (ref 0–2)
WBC #/AREA URNS AUTO: >182 /HPF (ref 0–5)

## 2018-08-08 PROCEDURE — 31575 DIAGNOSTIC LARYNGOSCOPY: CPT | Performed by: PHYSICIAN ASSISTANT

## 2018-08-08 PROCEDURE — 87088 URINE BACTERIA CULTURE: CPT | Mod: ZL | Performed by: PHYSICIAN ASSISTANT

## 2018-08-08 PROCEDURE — 80048 BASIC METABOLIC PNL TOTAL CA: CPT | Mod: ZL | Performed by: PHYSICIAN ASSISTANT

## 2018-08-08 PROCEDURE — 36415 COLL VENOUS BLD VENIPUNCTURE: CPT | Mod: ZL | Performed by: PHYSICIAN ASSISTANT

## 2018-08-08 PROCEDURE — G0463 HOSPITAL OUTPT CLINIC VISIT: HCPCS

## 2018-08-08 PROCEDURE — 84443 ASSAY THYROID STIM HORMONE: CPT | Mod: ZL | Performed by: PHYSICIAN ASSISTANT

## 2018-08-08 PROCEDURE — 87186 SC STD MICRODIL/AGAR DIL: CPT | Mod: ZL | Performed by: PHYSICIAN ASSISTANT

## 2018-08-08 PROCEDURE — 99214 OFFICE O/P EST MOD 30 MIN: CPT | Mod: 25 | Performed by: PHYSICIAN ASSISTANT

## 2018-08-08 PROCEDURE — 87086 URINE CULTURE/COLONY COUNT: CPT | Mod: ZL | Performed by: PHYSICIAN ASSISTANT

## 2018-08-08 PROCEDURE — 81001 URINALYSIS AUTO W/SCOPE: CPT | Mod: ZL | Performed by: PHYSICIAN ASSISTANT

## 2018-08-08 PROCEDURE — 85018 HEMOGLOBIN: CPT | Mod: ZL | Performed by: PHYSICIAN ASSISTANT

## 2018-08-08 RX ORDER — OMEPRAZOLE 40 MG/1
40 CAPSULE, DELAYED RELEASE ORAL DAILY
Qty: 30 CAPSULE | Refills: 1 | Status: SHIPPED | OUTPATIENT
Start: 2018-08-08 | End: 2018-08-25

## 2018-08-08 RX ORDER — SULFAMETHOXAZOLE/TRIMETHOPRIM 800-160 MG
1 TABLET ORAL 2 TIMES DAILY
Qty: 14 TABLET | Refills: 0 | Status: SHIPPED | OUTPATIENT
Start: 2018-08-08 | End: 2018-08-28

## 2018-08-08 ASSESSMENT — PAIN SCALES - GENERAL: PAINLEVEL: NO PAIN (0)

## 2018-08-08 NOTE — MR AVS SNAPSHOT
After Visit Summary   8/8/2018    Ritesh Soliman    MRN: [id number]           Patient Information     Date Of Birth          1975        Visit Information        Provider Department      8/8/2018 9:30 AM Ioana Mendenhall PA-C [facility]        Today's Diagnoses     Pruritus    -  1    Dysphagia, unspecified type        Urticaria        Dysuria          Care Instructions    Complete labs- will call with results.   If rash continues- consider allergy testing  Start Zyrtec one tablet at night for 1-2 weeks     Start Prilosec 40 mg daily for 1-2 months  Increase water intake. Limit caffeine  Follow LPR precautions.   Return in 2 months  Quit tobacco.   It takes 20 years of quitting tobacco to be at same risk of developing head and neck cancer as a never smoker.  Every year of cessation offers health benefits. This was discussed with the patient today and they voiced understanding.  Tobacco cessation was strongly encouraged.    Adult lifestyle changes to prevent LPR reviewed      Avoid eating and drinking within two to three hours prior to bedtime    Do not drink alcohol    Eat small meals and slowly    Limit problem foods:    o Caffeine  o Carbonated drinks  o Chocolate  o Peppermint  o Tomato  o Citrus fruits  o Fatty and fried foods      Lose weight    Quit smoking    Wear loose clothing      Thank you for allowing [facility] and our ENT team to participate in your care.  If your medications are too expensive, please give the nurse a call.  We can possibly change this medication.  If you have a scheduling or an appointment question please contact [facility] Coordinator at their direct line [phone].   ALL nursing questions or concerns can be directed to your ENT nurse at: [phone] [facility]               Follow-ups after your visit        Follow-up notes from your care team     Return in about 2 months (around 10/8/2018).      Who to contact     If you have questions or  "need follow up information about today's clinic visit or your schedule please contact [facility] [place] directly at [phone].  Normal or non-critical lab and imaging results will be communicated to you by MyChart, letter or phone within 4 business days after the clinic has received the results. If you do not hear from us within 7 days, please contact the clinic through MyChart or phone. If you have a critical or abnormal lab result, we will notify you by phone as soon as possible.  Submit refill requests through [employer] or call your pharmacy and they will forward the refill request to us. Please allow 3 business days for your refill to be completed.          Additional Information About Your Visit        Care EveryWhere ID     This is your Care EveryWhere ID. This could be used by other organizations to access your [facility] medical records  [id number]        Your Vitals Were     Pulse Temperature Height Pulse Oximetry BMI (Body Mass Index)       67 97.8  F (36.6  C) (Tympanic) 5' 2.75\" (1.594 m) 98% 28.57 kg/m2        Blood Pressure from Last 3 Encounters:   08/08/18 124/72   07/19/18 142/99   07/15/18 142/98    Weight from Last 3 Encounters:   08/08/18 160 lb (72.6 kg)   05/08/18 163 lb (73.9 kg)   04/26/18 163 lb (73.9 kg)              We Performed the Following     Basic metabolic panel     Hemoglobin     TSH     UA reflex to Microscopic and Culture - [place]          Today's Medication Changes          These changes are accurate as of 8/8/18  9:44 AM.  If you have any questions, ask your nurse or doctor.               Start taking these medicines.        Dose/Directions    omeprazole 40 MG capsule   Commonly known as:  priLOSEC   Used for:  Dysphagia, unspecified type   Started by:  Ioana Mendenhall PA-C        Dose:  40 mg   Take 1 capsule (40 mg) by mouth daily Take 30-60 minutes before a meal.   Quantity:  30 capsule   Refills:  1            Where to get your medicines      These medications were sent " to [facility] [place] [place] [facility] [address]  [address], [place] [address]     Phone:  [phone]     omeprazole 40 MG capsule                Primary Care Provider Office Phone # Fax #    Olivia Weiss [phone] [address] [facility] [address] [facility] [address]        Equal Access to Services     ROBBI FLETCHER : Hadii aad ku hadasho Soomaali, waaxda luqadaha, qaybta kaalmada adeegyada, waxay idiin hayaan adeeg khjane lamarvin . So [facility] [phone].    ATENCIÓN: Si habla espsantiago, tiene a meléndez disposición servicios gratuitos de asistencia lingüística. Llame al [phone].    We comply with applicable federal civil rights laws and [place] laws. We do not discriminate on the basis of race, color, national origin, age, disability, sex, sexual orientation, or gender identity.            Thank you!     Thank you for choosing [facility]  for your care. Our goal is always to provide you with excellent care. Hearing back from our patients is one way we can continue to improve our services. Please take a few minutes to complete the written survey that you may receive in the mail after your visit with us. Thank you!             Your Updated Medication List - Protect others around you: Learn how to safely use, store and throw away your medicines at www.disposemymeds.org.          This list is accurate as of 8/8/18  9:44 AM.  Always use your most recent med list.                   Brand Name Dispense Instructions for use Diagnosis    hydrOXYzine 25 MG tablet    ATARAX    18 tablet    Take 1-2 tablets (25-50 mg) by mouth every 6 hours as needed for itching        nabumetone 500 MG tablet    RELAFEN    60 tablet    Take 1-2 tablets (500-1,000 mg) by mouth 2 times daily as needed for moderate pain    Tendonitis of ankle or foot, Plantar fasciitis       omeprazole 40 MG capsule    priLOSEC    30 capsule    Take 1 capsule (40 mg) by mouth daily Take 30-60 minutes  before a meal.    Dysphagia, unspecified type       PARoxetine 40 MG tablet    PAXIL    45 tablet    Take 1.5 tablets (60 mg) by mouth At Bedtime    Depression with anxiety       traZODone 50 MG tablet    DESYREL    30 tablet    Take 1 tablet (50 mg) by mouth nightly as needed for sleep    Insomnia

## 2018-08-08 NOTE — PATIENT INSTRUCTIONS
Complete labs- will call with results.   If rash continues- consider allergy testing  Start Zyrtec one tablet at night for 1-2 weeks     Start Prilosec 40 mg daily for 1-2 months  Increase water intake. Limit caffeine  Follow LPR precautions.   Return in 2 months  Quit tobacco.   It takes 20 years of quitting tobacco to be at same risk of developing head and neck cancer as a never smoker.  Every year of cessation offers health benefits. This was discussed with the patient today and they voiced understanding.  Tobacco cessation was strongly encouraged.    Adult lifestyle changes to prevent LPR reviewed      Avoid eating and drinking within two to three hours prior to bedtime    Do not drink alcohol    Eat small meals and slowly    Limit problem foods:    o Caffeine  o Carbonated drinks  o Chocolate  o Peppermint  o Tomato  o Citrus fruits  o Fatty and fried foods      Lose weight    Quit smoking    Wear loose clothing      Thank you for allowing LIZBETH Roberts and our ENT team to participate in your care.  If your medications are too expensive, please give the nurse a call.  We can possibly change this medication.  If you have a scheduling or an appointment question please contact Nicole our Health Unit Coordinator at their direct line [phone].   ALL nursing questions or concerns can be directed to your ENT nurse at: [phone] [place]

## 2018-08-08 NOTE — LETTER
2018         RE: Ritesh Soliman  [address] [facility] [address]        Dear Colleague,    Thank you for referring your patient, Ritesh Soliman, to the [facility]. Please see a copy of my visit note below.    Otolaryngology Consultation    Patient: Ritesh Soliman  : 1975    Patient presents with:  Consult: Dysphagia; Referred by Lamont Gibbs      HPI:  Ritesh Soliman is a 43 year old female seen today for rash developing into throat irritation. She develops rash a few weeks     Patient presents for concerns of of swallowing. She had difficulty swallowing water at he r visit.   She has noted resolution of her dysphagia. No concerns with liquids or solids. Symptoms lasted about 4-6 days and after ED visits, symptoms resolved.   Mild raspy voice at this time. Mild globus sensation, but significantly improved.   Ritesh has no fevers. She had night sweats recently, and remaining present.   Weight loss about 10 pounds, patient related to nerve/ anxiety/ depression.     Denies allergies, seasonal complaints. No concerns with post nasal drip or congestions.   She does have a rash present, which is intermittent. Marcus correlation among food/ products.   No current use of Carafate. No concerns with reflux.     Water- 2 glasses. Caffeine- 4 pops daily.   Tobacco use- she does smoke- Social use of tobacco.   ETOH- Social. 1-2 weeks.       Current Outpatient Rx   Medication Sig Dispense Refill     hydrOXYzine (ATARAX) 25 MG tablet Take 1-2 tablets (25-50 mg) by mouth every 6 hours as needed for itching 18 tablet 0     nabumetone (RELAFEN) 500 MG tablet Take 1-2 tablets (500-1,000 mg) by mouth 2 times daily as needed for moderate pain 60 tablet 1     PARoxetine (PAXIL) 40 MG tablet Take 1.5 tablets (60 mg) by mouth At Bedtime 45 tablet 1     traZODone (DESYREL) 50 MG tablet Take 1 tablet (50 mg) by mouth nightly as needed for sleep 30 tablet 5       Allergies: Augmentin [amoxicillin-pot clavulanate]  "    Past Medical History:   Diagnosis Date     Depression with anxiety      Tobacco abuse        Past Surgical History:   Procedure Laterality Date     BIOPSY BREAST Right 10/19/2017    Procedure: BIOPSY BREAST;  EXCISIONAL BIOPSY RIGHT BREAST;  Surgeon: Kiko Newberry MD;  Location: [facility]     OOPHORECTOMY      tubal pregnancy     TUBAL LIGATION         ENT family history reviewed    Social History   Substance Use Topics     Smoking status: Current Some Day Smoker     Packs/day: 0.50     Types: Cigarettes     Smokeless tobacco: Never Used      Comment: rarely smokes     Alcohol use 0.0 oz/week     0 Standard drinks or equivalent per week      Comment: occassionally       Review of Systems  ROS: 10 point ROS neg other than the symptoms noted above in the HPI     Physical Exam  /72 (Cuff Size: Adult Regular)  Pulse 67  Temp 97.8  F (36.6  C) (Tympanic)  Ht 5' 2.75\" (1.594 m)  Wt 160 lb (72.6 kg)  SpO2 98%  BMI 28.57 kg/m2  General - The patient is well nourished and well developed, and appears to have good nutritional status.  Alert and oriented to person and place, answers questions and cooperates with examination appropriately.   Head and Face - Normocephalic and atraumatic, with no gross asymmetry noted.  The facial nerve is intact, with strong symmetric movements.  Voice and Breathing - The patient was breathing comfortably without the use of accessory muscles. There was no wheezing, stridor, or stertor.  The patients voice was clear and strong, and had appropriate pitch and quality.  Ears -The external auditory canals are patent, the tympanic membranes are intact without effusion, retraction or mass.  Bony landmarks are intact.  Eyes - Extraocular movements intact, and the pupils were reactive to light.  Sclera were not icteric or injected, conjunctiva were pink and moist.  Mouth - Examination of the oral cavity showed pink, healthy oral mucosa. No lesions or ulcerations noted.  The tongue was " mobile and midline, and the dentition were in good condition.    Throat - The walls of the oropharynx were smooth, pink, moist, symmetric, and had no lesions or ulcerations.  The tonsillar pillars and soft palate were symmetric.  The uvula was midline on elevation.    Neck - Normal midline excursion of the laryngotracheal complex during swallowing.  Full range of motion on passive movement.  Palpation of the occipital, submental, submandibular, internal jugular chain, and supraclavicular nodes did not demonstrate any abnormal lymph nodes or masses.  Palpation of the thyroid was soft and smooth, with no nodules or goiter appreciated.  The trachea was mobile and midline.  Nose - External contour is symmetric, no gross deflection or scars.  Nasal mucosa is pink and moist with no abnormal mucus.  The septum and turbinates were evaluated:  No polyps, masses, or purulence noted on examination.    Flexible Endoscopy -    Attempts at mirror laryngoscopy were not possible due to gag reflex.  Therefore I proceeded with a fiberoptic examination.  First I sprayed both sides of the nose with a mixture of lidocaine and neosynephrine.  I then passed the scope through the nasal cavity.   The nasal cavity was unremarkable.  The nasopharynx was mucosally covered and symmetric.  The Eustachian tube openings were unobstructed.  Going further down I had a clear view of the base of tongue, which had normal appearing lingual tonsillar tissue.  The base of tongue was free of lesions, and the vallecula was open.  The epiglottis was smooth and mucosally covered.  The supraglottic larynx was then clearly visualized.  The vocal cords moved smoothly and symmetrically, they were slightly edematous but pearly white and no lesions were seen.  I did note a significant amount of edema and redundant mucosa in the interarytenoid soft tissues and posterior surface of the larynx.  The pyriform sinuses were open, and the limited view of the postcricoid  region did not show any erosive or mass lesions.      ASSESSMENT:    ICD-10-CM    1. Dysphagia, unspecified type R13.10 omeprazole (PRILOSEC) 40 MG capsule   2. LPRD (laryngopharyngeal reflux disease) K21.9    3. Pruritus L29.9 TSH     Basic metabolic panel     Hemoglobin   4. Urticaria L50.9 TSH     Basic metabolic panel     Hemoglobin   5. Dysuria R30.0 UA reflex to Microscopic and Culture - [place]     sulfamethoxazole-trimethoprim (BACTRIM DS/SEPTRA DS) 800-160 MG per tablet   6. Abnormal urine findings R82.90 Urine Culture Aerobic Bacterial   7. Urinary tract infection without hematuria, site unspecified N39.0 sulfamethoxazole-trimethoprim (BACTRIM DS/SEPTRA DS) 800-160 MG per tablet   8. Tobacco abuse Z72.0        Complete labs- will call with results.   If rash continues- consider allergy testing  Start Zyrtec one tablet at night for 1-2 weeks     Start Prilosec 40 mg daily for 1-2 months  Increase water intake. Limit caffeine  Follow LPR precautions.   Return in 2 months for recheck.     Quit tobacco.   It takes 20 years of quitting tobacco to be at same risk of developing head and neck cancer as a never smoker.  Every year of cessation offers health benefits. This was discussed with the patient today and they voiced understanding.  Tobacco cessation was strongly encouraged.      Patient has c/o dysuria and hx of UTI.   UA was ordered. Bactrim DS BID for 7 days sent to pharmacy. Follow up with PCP for continued concerns regarding UTI.     Adult lifestyle changes to prevent LPR reviewed      Avoid eating and drinking within two to three hours prior to bedtime    Do not drink alcohol    Eat small meals and slowly    Limit problem foods:    o Caffeine  o Carbonated drinks  o Chocolate  o Peppermint  o Tomato  o Citrus fruits  o Fatty and fried foods      Lose weight    Quit smoking    Wear loose clothing    Ioana Mendenhall PA-C  [facility], [place]  [phone]        Again, thank you for allowing me to  participate in the care of your patient.        Sincerely,        Ioana Mendenhall PA-C

## 2018-08-08 NOTE — NURSING NOTE
"Chief Complaint   Patient presents with     Consult     Dysphagia; Referred by Lamont Gibbs       Initial /72 (Cuff Size: Adult Regular)  Pulse 67  Temp 97.8  F (36.6  C) (Tympanic)  Ht 5' 2.75\" (1.594 m)  Wt 160 lb (72.6 kg)  SpO2 98%  BMI 28.57 kg/m2 Estimated body mass index is 28.57 kg/(m^2) as calculated from the following:    Height as of this encounter: 5' 2.75\" (1.594 m).    Weight as of this encounter: 160 lb (72.6 kg).  Medication Reconciliation: complete    Dulce Maria Street LPN    "

## 2018-08-08 NOTE — PROGRESS NOTES
Otolaryngology Consultation    Patient: Ritesh Soliman  : 1975    Patient presents with:  Consult: Dysphagia; Referred by Lamont Gibbs      HPI:  Ritesh Soliman is a 43 year old female seen today for rash developing into throat irritation. She develops rash a few weeks     Patient presents for concerns of of swallowing. She had difficulty swallowing water at he r visit.   She has noted resolution of her dysphagia. No concerns with liquids or solids. Symptoms lasted about 4-6 days and after ED visits, symptoms resolved.   Mild raspy voice at this time. Mild globus sensation, but significantly improved.   Ritesh has no fevers. She had night sweats recently, and remaining present.   Weight loss about 10 pounds, patient related to nerve/ anxiety/ depression.     Denies allergies, seasonal complaints. No concerns with post nasal drip or congestions.   She does have a rash present, which is intermittent. Marcus correlation among food/ products.   No current use of Carafate. No concerns with reflux.     Water- 2 glasses. Caffeine- 4 pops daily.   Tobacco use- she does smoke- Social use of tobacco.   ETOH- Social. 1-2 weeks.       Current Outpatient Rx   Medication Sig Dispense Refill     hydrOXYzine (ATARAX) 25 MG tablet Take 1-2 tablets (25-50 mg) by mouth every 6 hours as needed for itching 18 tablet 0     nabumetone (RELAFEN) 500 MG tablet Take 1-2 tablets (500-1,000 mg) by mouth 2 times daily as needed for moderate pain 60 tablet 1     PARoxetine (PAXIL) 40 MG tablet Take 1.5 tablets (60 mg) by mouth At Bedtime 45 tablet 1     traZODone (DESYREL) 50 MG tablet Take 1 tablet (50 mg) by mouth nightly as needed for sleep 30 tablet 5       Allergies: Augmentin [amoxicillin-pot clavulanate]     Past Medical History:   Diagnosis Date     Depression with anxiety      Tobacco abuse        Past Surgical History:   Procedure Laterality Date     BIOPSY BREAST Right 10/19/2017    Procedure: BIOPSY BREAST;  EXCISIONAL BIOPSY  "RIGHT BREAST;  Surgeon: Kiko Newberry MD;  Location: [facility]     OOPHORECTOMY      tubal pregnancy     TUBAL LIGATION         ENT family history reviewed    Social History   Substance Use Topics     Smoking status: Current Some Day Smoker     Packs/day: 0.50     Types: Cigarettes     Smokeless tobacco: Never Used      Comment: rarely smokes     Alcohol use 0.0 oz/week     0 Standard drinks or equivalent per week      Comment: occassionally       Review of Systems  ROS: 10 point ROS neg other than the symptoms noted above in the HPI     Physical Exam  /72 (Cuff Size: Adult Regular)  Pulse 67  Temp 97.8  F (36.6  C) (Tympanic)  Ht 5' 2.75\" (1.594 m)  Wt 160 lb (72.6 kg)  SpO2 98%  BMI 28.57 kg/m2  General - The patient is well nourished and well developed, and appears to have good nutritional status.  Alert and oriented to person and place, answers questions and cooperates with examination appropriately.   Head and Face - Normocephalic and atraumatic, with no gross asymmetry noted.  The facial nerve is intact, with strong symmetric movements.  Voice and Breathing - The patient was breathing comfortably without the use of accessory muscles. There was no wheezing, stridor, or stertor.  The patients voice was clear and strong, and had appropriate pitch and quality.  Ears -The external auditory canals are patent, the tympanic membranes are intact without effusion, retraction or mass.  Bony landmarks are intact.  Eyes - Extraocular movements intact, and the pupils were reactive to light.  Sclera were not icteric or injected, conjunctiva were pink and moist.  Mouth - Examination of the oral cavity showed pink, healthy oral mucosa. No lesions or ulcerations noted.  The tongue was mobile and midline, and the dentition were in good condition.    Throat - The walls of the oropharynx were smooth, pink, moist, symmetric, and had no lesions or ulcerations.  The tonsillar pillars and soft palate were symmetric.  The " uvula was midline on elevation.    Neck - Normal midline excursion of the laryngotracheal complex during swallowing.  Full range of motion on passive movement.  Palpation of the occipital, submental, submandibular, internal jugular chain, and supraclavicular nodes did not demonstrate any abnormal lymph nodes or masses.  Palpation of the thyroid was soft and smooth, with no nodules or goiter appreciated.  The trachea was mobile and midline.  Nose - External contour is symmetric, no gross deflection or scars.  Nasal mucosa is pink and moist with no abnormal mucus.  The septum and turbinates were evaluated:  No polyps, masses, or purulence noted on examination.    Flexible Endoscopy -    Attempts at mirror laryngoscopy were not possible due to gag reflex.  Therefore I proceeded with a fiberoptic examination.  First I sprayed both sides of the nose with a mixture of lidocaine and neosynephrine.  I then passed the scope through the nasal cavity.   The nasal cavity was unremarkable.  The nasopharynx was mucosally covered and symmetric.  The Eustachian tube openings were unobstructed.  Going further down I had a clear view of the base of tongue, which had normal appearing lingual tonsillar tissue.  The base of tongue was free of lesions, and the vallecula was open.  The epiglottis was smooth and mucosally covered.  The supraglottic larynx was then clearly visualized.  The vocal cords moved smoothly and symmetrically, they were slightly edematous but pearly white and no lesions were seen.  I did note a significant amount of edema and redundant mucosa in the interarytenoid soft tissues and posterior surface of the larynx.  The pyriform sinuses were open, and the limited view of the postcricoid region did not show any erosive or mass lesions.      ASSESSMENT:    ICD-10-CM    1. Dysphagia, unspecified type R13.10 omeprazole (PRILOSEC) 40 MG capsule   2. LPRD (laryngopharyngeal reflux disease) K21.9    3. Pruritus L29.9 TSH      Basic metabolic panel     Hemoglobin   4. Urticaria L50.9 TSH     Basic metabolic panel     Hemoglobin   5. Dysuria R30.0 UA reflex to Microscopic and Culture - [place]     sulfamethoxazole-trimethoprim (BACTRIM DS/SEPTRA DS) 800-160 MG per tablet   6. Abnormal urine findings R82.90 Urine Culture Aerobic Bacterial   7. Urinary tract infection without hematuria, site unspecified N39.0 sulfamethoxazole-trimethoprim (BACTRIM DS/SEPTRA DS) 800-160 MG per tablet   8. Tobacco abuse Z72.0        Complete labs- will call with results.   If rash continues- consider allergy testing  Start Zyrtec one tablet at night for 1-2 weeks     Start Prilosec 40 mg daily for 1-2 months  Increase water intake. Limit caffeine  Follow LPR precautions.   Return in 2 months for recheck.     Quit tobacco.   It takes 20 years of quitting tobacco to be at same risk of developing head and neck cancer as a never smoker.  Every year of cessation offers health benefits. This was discussed with the patient today and they voiced understanding.  Tobacco cessation was strongly encouraged.      Patient has c/o dysuria and hx of UTI.   UA was ordered. Bactrim DS BID for 7 days sent to pharmacy. Follow up with PCP for continued concerns regarding UTI.     Adult lifestyle changes to prevent LPR reviewed      Avoid eating and drinking within two to three hours prior to bedtime    Do not drink alcohol    Eat small meals and slowly    Limit problem foods:    o Caffeine  o Carbonated drinks  o Chocolate  o Peppermint  o Tomato  o Citrus fruits  o Fatty and fried foods      Lose weight    Quit smoking    Wear loose clothing    Ioana Mendenhall PA-C  [facility], [place]  [phone]

## 2018-08-10 LAB
BACTERIA SPEC CULT: ABNORMAL
SPECIMEN SOURCE: ABNORMAL

## 2018-08-25 DIAGNOSIS — G47.00 INSOMNIA: ICD-10-CM

## 2018-08-25 DIAGNOSIS — R13.10 DYSPHAGIA, UNSPECIFIED TYPE: ICD-10-CM

## 2018-08-27 ENCOUNTER — TELEPHONE (OUTPATIENT)
Dept: FAMILY MEDICINE | Facility: OTHER | Age: 43
End: 2018-08-27

## 2018-08-27 RX ORDER — TRAZODONE HYDROCHLORIDE 50 MG/1
TABLET, FILM COATED ORAL
Qty: 30 TABLET | Refills: 1 | Status: SHIPPED | OUTPATIENT
Start: 2018-08-27 | End: 2018-11-08

## 2018-08-27 RX ORDER — OMEPRAZOLE 40 MG/1
CAPSULE, DELAYED RELEASE ORAL
Qty: 30 CAPSULE | Refills: 3 | Status: SHIPPED | OUTPATIENT
Start: 2018-08-27 | End: 2018-09-18

## 2018-08-27 NOTE — TELEPHONE ENCOUNTER
GENERIC ADULT  Ritesh Soliman is a 43 year old female who calls with multiple complains.  She states she has nausea, rash/hives occasionally, and a mole that came up on her pelvic area.    Patient states she had a mole removed years ago and that it has now came back over the last month.  She is wondering if it has to be removed again.   The nausea has been over the last month but worse the last couple days.    She states she has had hives off and on and wondering if it from her meds.  She states he was put on a bunch of meds but taken off of them so she is unsure what she is taking.    RECOMMENDED DISPOSITION:  Advised patient I can offer an appointment sooner with another provider but that they will not be able to address all her problems in the short appointment and she may need to make another appointment.  She verbalized understanding.      Next 5 appointments (look out 90 days)     Aug 28, 2018  2:00 PM CDT   (Arrive by 1:45 PM)   SHORT with LIZBETH Huerta   [facility] ([facility] )    [address] [place] [address]   [phone]            Oct 09, 2018  1:15 PM CDT   (Arrive by 1:00 PM)   Return Visit with Ioana Mendenhall PA-C   [facility] ([facility] )    [address] [place] [address]   [phone]                  Will comply with recommendation: Yes    If further questions/concerns or if symptoms do not improve, worsen or new symptoms develop, call your PCP or [facility] as soon as possible.      Guideline used: Adult Telephone Protocols Office Version 3rd Edition - Elliot Coronado MD, FACEP      Janet Catherine, RN

## 2018-08-28 ENCOUNTER — OFFICE VISIT (OUTPATIENT)
Dept: FAMILY MEDICINE | Facility: OTHER | Age: 43
End: 2018-08-28
Attending: PHYSICIAN ASSISTANT
Payer: COMMERCIAL

## 2018-08-28 VITALS
HEIGHT: 62 IN | SYSTOLIC BLOOD PRESSURE: 108 MMHG | WEIGHT: 158 LBS | BODY MASS INDEX: 29.08 KG/M2 | OXYGEN SATURATION: 98 % | HEART RATE: 88 BPM | DIASTOLIC BLOOD PRESSURE: 70 MMHG | TEMPERATURE: 98 F

## 2018-08-28 DIAGNOSIS — R11.2 NAUSEA AND VOMITING, INTRACTABILITY OF VOMITING NOT SPECIFIED, UNSPECIFIED VOMITING TYPE: Primary | ICD-10-CM

## 2018-08-28 DIAGNOSIS — L98.9 SKIN LESION: ICD-10-CM

## 2018-08-28 LAB — HCG UR QL: NEGATIVE

## 2018-08-28 PROCEDURE — 81025 URINE PREGNANCY TEST: CPT | Mod: ZL | Performed by: PHYSICIAN ASSISTANT

## 2018-08-28 PROCEDURE — 36415 COLL VENOUS BLD VENIPUNCTURE: CPT | Mod: ZL | Performed by: PHYSICIAN ASSISTANT

## 2018-08-28 PROCEDURE — 99214 OFFICE O/P EST MOD 30 MIN: CPT | Performed by: PHYSICIAN ASSISTANT

## 2018-08-28 PROCEDURE — 80053 COMPREHEN METABOLIC PANEL: CPT | Mod: ZL | Performed by: PHYSICIAN ASSISTANT

## 2018-08-28 PROCEDURE — G0463 HOSPITAL OUTPT CLINIC VISIT: HCPCS

## 2018-08-28 ASSESSMENT — ANXIETY QUESTIONNAIRES
6. BECOMING EASILY ANNOYED OR IRRITABLE: NEARLY EVERY DAY
3. WORRYING TOO MUCH ABOUT DIFFERENT THINGS: NEARLY EVERY DAY
7. FEELING AFRAID AS IF SOMETHING AWFUL MIGHT HAPPEN: SEVERAL DAYS
1. FEELING NERVOUS, ANXIOUS, OR ON EDGE: MORE THAN HALF THE DAYS
GAD7 TOTAL SCORE: 14
5. BEING SO RESTLESS THAT IT IS HARD TO SIT STILL: SEVERAL DAYS
2. NOT BEING ABLE TO STOP OR CONTROL WORRYING: NEARLY EVERY DAY

## 2018-08-28 ASSESSMENT — PATIENT HEALTH QUESTIONNAIRE - PHQ9: 5. POOR APPETITE OR OVEREATING: SEVERAL DAYS

## 2018-08-28 ASSESSMENT — PAIN SCALES - GENERAL: PAINLEVEL: NO PAIN (0)

## 2018-08-28 NOTE — NURSING NOTE
"Chief Complaint   Patient presents with     Nausea     Derm Problem       Initial /70  Pulse 88  Temp 98  F (36.7  C) (Tympanic)  Ht 5' 2\" (1.575 m)  Wt 158 lb (71.7 kg)  SpO2 98%  BMI 28.9 kg/m2 Estimated body mass index is 28.9 kg/(m^2) as calculated from the following:    Height as of this encounter: 5' 2\" (1.575 m).    Weight as of this encounter: 158 lb (71.7 kg).  Medication Reconciliation: complete    Akanksha Mitchell LPN  "

## 2018-08-28 NOTE — PROGRESS NOTES
SUBJECTIVE:   Ritesh Soliman is a 43 year old female who presents to clinic today for the following health issues: 1 month history of nausea and vomiting. Symptoms worsening. She has eliminated some medications which has not made a difference. Next she has a mole in her pubic are. She had this removed years ago but has now regrown. Would like removed        Nausea      Duration: 1 month    Description (location/character/radiation): Nauseous everyday, vomiting almost everyday     Intensity:  moderate    Accompanying signs and symptoms: Vomiting, nauseous, vomits after eating, dizzy    History (similar episodes/previous evaluation): None    Precipitating or alleviating factors: Turning head to quick    Therapies tried and outcome: None       Rash      Duration: 3 months    Description  Location: All over  Itching: severe    Intensity:  severe    Accompanying signs and symptoms: Break out in hives all over, severe itching    History (similar episodes/previous evaluation): None    Precipitating or alleviating factors:  New exposures:  None  Recent travel: no      Therapies tried and outcome: hydrocortisone cream -  , calamine lotion, prednisone, prednisone was effective Benadryl/diphenhydramine -  not effective       Problem list and histories reviewed & adjusted, as indicated.  Additional history: as documented    Patient Active Problem List   Diagnosis     Recurrent UTI     Tobacco abuse     Depression with anxiety     ACP (advance care planning)     OSCAR (generalized anxiety disorder)     Major depressive disorder, recurrent episode, moderate (H)     Panic attack     Past Surgical History:   Procedure Laterality Date     BIOPSY BREAST Right 10/19/2017    Procedure: BIOPSY BREAST;  EXCISIONAL BIOPSY RIGHT BREAST;  Surgeon: Kiko Newberry MD;  Location: [facility]     OOPHORECTOMY      tubal pregnancy     TUBAL LIGATION         Social History   Substance Use Topics     Smoking status: Current Some Day Smoker      "Packs/day: 0.50     Types: Cigarettes     Smokeless tobacco: Never Used      Comment: rarely smokes     Alcohol use 0.0 oz/week     0 Standard drinks or equivalent per week      Comment: occassionally     Family History   Problem Relation Age of Onset     Coronary Artery Disease Mother      Hypertension Mother      Coronary Artery Disease Father      C.A.D. No family hx of      Breast Cancer No family hx of      Cancer - colorectal No family hx of      Diabetes No family hx of      Thyroid Disease No family hx of      Asthma No family hx of      Colon Cancer No family hx of      Hyperlipidemia No family hx of          Current Outpatient Prescriptions   Medication Sig Dispense Refill     hydrOXYzine (ATARAX) 25 MG tablet Take 1-2 tablets (25-50 mg) by mouth every 6 hours as needed for itching 18 tablet 0     nabumetone (RELAFEN) 500 MG tablet Take 1-2 tablets (500-1,000 mg) by mouth 2 times daily as needed for moderate pain 60 tablet 1     omeprazole (PRILOSEC) 40 MG capsule Take 1 capsule (40 mg) by mouth daily Take 30-60 minutes before a meal. 30 capsule 3     PARoxetine (PAXIL) 40 MG tablet Take 1.5 tablets (60 mg) by mouth At Bedtime 45 tablet 1     traZODone (DESYREL) 50 MG tablet TAKE 1 TABLET (50 MG) BY MOUTH NIGHTLY AS NEEDED FOR SLEEP 30 tablet 1     Allergies   Allergen Reactions     Augmentin [Amoxicillin-Pot Clavulanate] Swelling       Reviewed and updated as needed this visit by clinical staff       Reviewed and updated as needed this visit by Provider         ROS:  Constitutional, HEENT, cardiovascular, pulmonary, gi and gu systems are negative, except as otherwise noted.    OBJECTIVE:                                                    /70  Pulse 88  Temp 98  F (36.7  C) (Tympanic)  Ht 5' 2\" (1.575 m)  Wt 158 lb (71.7 kg)  SpO2 98%  BMI 28.9 kg/m2  Body mass index is 28.9 kg/(m^2).          Physical Exam:  Constitutional: healthy, alert and no acute distress  Skin: Raised skin lesion left " mons pubis  ENT: Posterior pharynx moist and pink without edema or exudate.  EAC's clear. TM's intact bilateral.  CV: RRR. No murmur  Pulm: Lungs clear to auscultation without wheeze, rales or rhonchi  GI: Abdomen soft, non-tender. BS normal. No masses, organomegaly  Psych: mentation and affect appear normal                   ASSESSMENT/PLAN:                                                    1. Nausea and vomiting, intractability of vomiting not specified, unspecified vomiting type  Labs. Refer for EGD consult  - Comprehensive metabolic panel  - HCG qualitative urine  - H Pylori antigen, stool; Future  - GENERAL SURG ADULT REFERRAL    2. Skin lesion  Refer for excision consult  - GENERAL SURG ADULT REFERRAL      Follow up with all results.      LIZBETH Casanova  [facility]

## 2018-08-28 NOTE — MR AVS SNAPSHOT
After Visit Summary   8/28/2018    Ritesh Soliman    MRN: [id number]           Patient Information     Date Of Birth          1975        Visit Information        Provider Department      8/28/2018 2:00 PM Dafne Mccall PA [facility]        Today's Diagnoses     Nausea and vomiting, intractability of vomiting not specified, unspecified vomiting type    -  1    Skin lesion           Follow-ups after your visit        Additional Services     GENERAL SURG ADULT REFERRAL       Your provider has referred you to: surgery for EGD consult and skin lesion consult    Please be aware that coverage of these services is subject to the terms and limitations of your health insurance plan.  Call member services at your health plan with any benefit or coverage questions.      Please bring the following with you to your appointment:    (1) Any X-Rays, CTs or MRIs which have been performed.  Contact the facility where they were done to arrange for  prior to your scheduled appointment.   (2) List of current medications   (3) This referral request   (4) Any documents/labs given to you for this referral                  Your next 10 appointments already scheduled     Sep 18, 2018  1:30 PM CDT   (Arrive by 1:15 PM)   New Visit with Kiko Newberry MD   [facility] ([facility] [place] )    [address]  [place] [address]   [phone]            Oct 09, 2018  1:15 PM CDT   (Arrive by 1:00 PM)   Return Visit with Ioana Mendenhall PA-C   [facility] ([facility] [place] )    [address]  [place] [address]   [phone]              Future tests that were ordered for you today     Open Future Orders        Priority Expected Expires Ordered    H Pylori antigen, stool Routine  11/27/2018 8/28/2018            Who to contact     If you have questions or need follow up information about today's clinic visit or your schedule please contact  "[facility] directly at [phone].  Normal or non-critical lab and imaging results will be communicated to you by MyChart, letter or phone within 4 business days after the clinic has received the results. If you do not hear from us within 7 days, please contact the clinic through MyChart or phone. If you have a critical or abnormal lab result, we will notify you by phone as soon as possible.  Submit refill requests through [employer] or call your pharmacy and they will forward the refill request to us. Please allow 3 business days for your refill to be completed.          Additional Information About Your Visit        Care EveryWhere ID     This is your Care EveryWhere ID. This could be used by other organizations to access your [facility] medical records  [id number]        Your Vitals Were     Pulse Temperature Height Pulse Oximetry BMI (Body Mass Index)       88 98  F (36.7  C) (Tympanic) 5' 2\" (1.575 m) 98% 28.9 kg/m2        Blood Pressure from Last 3 Encounters:   08/28/18 108/70   08/08/18 124/72   07/19/18 142/99    Weight from Last 3 Encounters:   08/28/18 158 lb (71.7 kg)   08/08/18 160 lb (72.6 kg)   05/08/18 163 lb (73.9 kg)              We Performed the Following     Comprehensive metabolic panel     GENERAL SURG ADULT REFERRAL     HCG qualitative urine          Today's Medication Changes          These changes are accurate as of 8/28/18  4:08 PM.  If you have any questions, ask your nurse or doctor.               Stop taking these medicines if you haven't already. Please contact your care team if you have questions.     sulfamethoxazole-trimethoprim 800-160 MG per tablet   Commonly known as:  BACTRIM DS/SEPTRA DS   Stopped by:  Dafne Mccall PA                    Primary Care Provider Office Phone # Fax #    Olivia Weiss [phone] [address] [facility] [address]        Equal Access to Services     ROBBI FLETCHER AH: joselito Murray " susy sol olivia. So [facility] [phone].    ATENCIÓN: Si kinjal stewart, tiene a meléndez disposición servicios gratuitos de asistencia lingüística. [place] [phone].    We comply with applicable federal civil rights laws and [place] laws. We do not discriminate on the basis of race, color, national origin, age, disability, sex, sexual orientation, or gender identity.            Thank you!     Thank you for choosing [facility]  for your care. Our goal is always to provide you with excellent care. Hearing back from our patients is one way we can continue to improve our services. Please take a few minutes to complete the written survey that you may receive in the mail after your visit with us. Thank you!             Your Updated Medication List - Protect others around you: Learn how to safely use, store and throw away your medicines at www.disposemymeds.org.          This list is accurate as of 8/28/18  4:08 PM.  Always use your most recent med list.                   Brand Name Dispense Instructions for use Diagnosis    hydrOXYzine 25 MG tablet    ATARAX    18 tablet    Take 1-2 tablets (25-50 mg) by mouth every 6 hours as needed for itching        nabumetone 500 MG tablet    RELAFEN    60 tablet    Take 1-2 tablets (500-1,000 mg) by mouth 2 times daily as needed for moderate pain    Tendonitis of ankle or foot, Plantar fasciitis       omeprazole 40 MG capsule    priLOSEC    30 capsule    Take 1 capsule (40 mg) by mouth daily Take 30-60 minutes before a meal.    Dysphagia, unspecified type       PARoxetine 40 MG tablet    PAXIL    45 tablet    Take 1.5 tablets (60 mg) by mouth At Bedtime    Depression with anxiety       traZODone 50 MG tablet    DESYREL    30 tablet    TAKE 1 TABLET (50 MG) BY MOUTH NIGHTLY AS NEEDED FOR SLEEP    Insomnia

## 2018-08-29 LAB
ALBUMIN SERPL-MCNC: 4.2 G/DL (ref 3.4–5)
ALP SERPL-CCNC: 68 U/L (ref 40–150)
ALT SERPL W P-5'-P-CCNC: 23 U/L (ref 0–50)
ANION GAP SERPL CALCULATED.3IONS-SCNC: 10 MMOL/L (ref 3–14)
AST SERPL W P-5'-P-CCNC: 18 U/L (ref 0–45)
BILIRUB SERPL-MCNC: 0.4 MG/DL (ref 0.2–1.3)
BUN SERPL-MCNC: 12 MG/DL (ref 7–30)
CALCIUM SERPL-MCNC: 8.8 MG/DL (ref 8.5–10.1)
CHLORIDE SERPL-SCNC: 105 MMOL/L (ref 94–109)
CO2 SERPL-SCNC: 24 MMOL/L (ref 20–32)
CREAT SERPL-MCNC: 0.87 MG/DL (ref 0.52–1.04)
GFR SERPL CREATININE-BSD FRML MDRD: 70 ML/MIN/1.7M2
GLUCOSE SERPL-MCNC: 88 MG/DL (ref 70–99)
POTASSIUM SERPL-SCNC: 4 MMOL/L (ref 3.4–5.3)
PROT SERPL-MCNC: 7.8 G/DL (ref 6.8–8.8)
SODIUM SERPL-SCNC: 139 MMOL/L (ref 133–144)

## 2018-08-29 ASSESSMENT — PATIENT HEALTH QUESTIONNAIRE - PHQ9: SUM OF ALL RESPONSES TO PHQ QUESTIONS 1-9: 22

## 2018-08-29 ASSESSMENT — ANXIETY QUESTIONNAIRES: GAD7 TOTAL SCORE: 14

## 2018-09-12 DIAGNOSIS — R11.2 NAUSEA AND VOMITING, INTRACTABILITY OF VOMITING NOT SPECIFIED, UNSPECIFIED VOMITING TYPE: ICD-10-CM

## 2018-09-13 ENCOUNTER — APPOINTMENT (OUTPATIENT)
Dept: LAB | Facility: OTHER | Age: 43
End: 2018-09-13
Attending: FAMILY MEDICINE
Payer: COMMERCIAL

## 2018-09-13 PROCEDURE — 87338 HPYLORI STOOL AG IA: CPT | Mod: ZL | Performed by: PHYSICIAN ASSISTANT

## 2018-09-13 PROCEDURE — 99000 SPECIMEN HANDLING OFFICE-LAB: CPT | Performed by: PHYSICIAN ASSISTANT

## 2018-09-14 LAB
H PYLORI AG STL QL IA: NORMAL
SPECIMEN SOURCE: NORMAL

## 2018-09-18 ENCOUNTER — OFFICE VISIT (OUTPATIENT)
Dept: SURGERY | Facility: OTHER | Age: 43
End: 2018-09-18
Attending: SURGERY
Payer: COMMERCIAL

## 2018-09-18 VITALS
TEMPERATURE: 96.8 F | BODY MASS INDEX: 28.3 KG/M2 | OXYGEN SATURATION: 99 % | DIASTOLIC BLOOD PRESSURE: 70 MMHG | HEART RATE: 83 BPM | SYSTOLIC BLOOD PRESSURE: 110 MMHG | HEIGHT: 62 IN | WEIGHT: 153.8 LBS

## 2018-09-18 DIAGNOSIS — R11.2 NAUSEA AND VOMITING, INTRACTABILITY OF VOMITING NOT SPECIFIED, UNSPECIFIED VOMITING TYPE: ICD-10-CM

## 2018-09-18 DIAGNOSIS — L98.9 SKIN LESION: ICD-10-CM

## 2018-09-18 PROCEDURE — 12031 INTMD RPR S/A/T/EXT 2.5 CM/<: CPT

## 2018-09-18 PROCEDURE — 11401 EXC TR-EXT B9+MARG 0.6-1 CM: CPT

## 2018-09-18 PROCEDURE — 11401 EXC TR-EXT B9+MARG 0.6-1 CM: CPT | Performed by: SURGERY

## 2018-09-18 PROCEDURE — G0463 HOSPITAL OUTPT CLINIC VISIT: HCPCS

## 2018-09-18 PROCEDURE — 88305 TISSUE EXAM BY PATHOLOGIST: CPT | Mod: TC | Performed by: SURGERY

## 2018-09-18 PROCEDURE — 99213 OFFICE O/P EST LOW 20 MIN: CPT | Mod: 25 | Performed by: SURGERY

## 2018-09-18 PROCEDURE — G0463 HOSPITAL OUTPT CLINIC VISIT: HCPCS | Mod: 25

## 2018-09-18 PROCEDURE — 12031 INTMD RPR S/A/T/EXT 2.5 CM/<: CPT | Performed by: SURGERY

## 2018-09-18 ASSESSMENT — PAIN SCALES - GENERAL: PAINLEVEL: NO PAIN (0)

## 2018-09-18 NOTE — MR AVS SNAPSHOT
After Visit Summary   9/18/2018    Ritesh Soliman    MRN: [id number]           Patient Information     Date Of Birth          1975        Visit Information        Provider Department      9/18/2018 1:30 PM Kiko Newberry MD [facility]        Today's Diagnoses     Nausea and vomiting, intractability of vomiting not specified, unspecified vomiting type        Skin lesion          Care Instructions    Thank you for allowing Dr Newberry and our surgical team to participate in your care.  If you have a scheduling or an appointment question please contact Ariela, our Health Unit Coordinator, at her direct line [phone].   ALL nursing questions or concerns can be directed to your surgical nurse at: [phone]-Selene    You are scheduled for a:Esophagogastroduodenoscopy with possible biopsy.  Your procedure date is: Thursday October 18, 2018    Nothing to eat after midnight the night prior to your procedure.  Nothing to drink 4 hours before your arrival time.        HOW TO PREPARE-        You need a friend or family member available to drive you home AND stay with you for 4 hours after you leave the hospital. You will not be allowed to drive yourself. IF you need to take a taxi or the bus you MUST have a responsible person to ride with you. YOUR PROCEDURE WILL BE CANCELLED IF YOU DO NOT HAVE A RESPONSIBLE ADULT TO DRIVE YOU HOME.       You need to call our Surgery Education Nurses 1-2 weeks prior to your surgery date at [phone] or toll free [phone]. Please have you medication and allergy lists ready.       Stop your aspirin or other NSAIDs(Ibuprofen, Motrin, Aleve, Celebrex, Naproxen, etc...) 7 days before your surgery.      Hospital admitting will call you the day before your surgery with your arrival time. If you are scheduled on a Monday admitting will call you the Friday before.      Please call your primary care physician if you should become ill within 24 hours  "of scheduled surgery. (ex.vomiting, diarrhea, fever)              Follow-ups after your visit        Your next 10 appointments already scheduled     Oct 09, 2018  1:15 PM CDT   (Arrive by 1:00 PM)   Return Visit with Ioana Mendenhall PA-C   [facility] ([facility] )    [address]  [place] [address] [phone]              Who to contact     If you have questions or need follow up information about today's clinic visit or your schedule please contact [facility] directly at [phone].  Normal or non-critical lab and imaging results will be communicated to you by MyChart, letter or phone within 4 business days after the clinic has received the results. If you do not hear from us within 7 days, please contact the clinic through MyChart or phone. If you have a critical or abnormal lab result, we will notify you by phone as soon as possible.  Submit refill requests through [employer] or call your pharmacy and they will forward the refill request to us. Please allow 3 business days for your refill to be completed.          Additional Information About Your Visit        Care EveryWhere ID     This is your Care EveryWhere ID. This could be used by other organizations to access your [facility] medical records  [id number]        Your Vitals Were     Pulse Temperature Height Pulse Oximetry BMI (Body Mass Index)       83 96.8  F (36  C) 5' 2\" (1.575 m) 99% 28.13 kg/m2        Blood Pressure from Last 3 Encounters:   09/18/18 110/70   08/28/18 108/70   08/08/18 124/72    Weight from Last 3 Encounters:   09/18/18 153 lb 12.8 oz (69.8 kg)   08/28/18 158 lb (71.7 kg)   08/08/18 160 lb (72.6 kg)              Today, you had the following     No orders found for display       Primary Care Provider Office Phone # Fax #    Olivia Weiss [phone] [address]  [facility] [address]        Equal Access to Services     ROBBI FLETCHER AH: Hadii " carl Allison, joselito jain, qaandraeta kadawson senahuber, sol palomodarielmaría núñez. So [facility] [phone].    ATENCIÓN: Si habla español, tiene a meléndez disposición servicios gratuitos de asistencia lingüística. [place] al [phone].    We comply with applicable federal civil rights laws and [place] laws. We do not discriminate on the basis of race, color, national origin, age, disability, sex, sexual orientation, or gender identity.            Thank you!     Thank you for choosing [facility]  for your care. Our goal is always to provide you with excellent care. Hearing back from our patients is one way we can continue to improve our services. Please take a few minutes to complete the written survey that you may receive in the mail after your visit with us. Thank you!             Your Updated Medication List - Protect others around you: Learn how to safely use, store and throw away your medicines at www.disposemymeds.org.          This list is accurate as of 9/18/18  1:54 PM.  Always use your most recent med list.                   Brand Name Dispense Instructions for use Diagnosis    PARoxetine 40 MG tablet    PAXIL    45 tablet    Take 1.5 tablets (60 mg) by mouth At Bedtime    Depression with anxiety       traZODone 50 MG tablet    DESYREL    30 tablet    TAKE 1 TABLET (50 MG) BY MOUTH NIGHTLY AS NEEDED FOR SLEEP    Insomnia

## 2018-09-18 NOTE — PATIENT INSTRUCTIONS
Thank you for allowing Dr Newberry and our surgical team to participate in your care.  If you have a scheduling or an appointment question please contact Ariela, our Health Unit Coordinator, at her direct line [phone].   ALL nursing questions or concerns can be directed to your surgical nurse at: [phone]-Selene    You are scheduled for a:Esophagogastroduodenoscopy with possible biopsy.  Your procedure date is: Thursday October 18, 2018    Nothing to eat after midnight the night prior to your procedure.  Nothing to drink 4 hours before your arrival time.        HOW TO PREPARE-        You need a friend or family member available to drive you home AND stay with you for 4 hours after you leave the hospital. You will not be allowed to drive yourself. IF you need to take a taxi or the bus you MUST have a responsible person to ride with you. YOUR PROCEDURE WILL BE CANCELLED IF YOU DO NOT HAVE A RESPONSIBLE ADULT TO DRIVE YOU HOME.       You need to call our Surgery Education Nurses 1-2 weeks prior to your surgery date at [phone] or toll free [phone]. Please have you medication and allergy lists ready.       Stop your aspirin or other NSAIDs(Ibuprofen, Motrin, Aleve, Celebrex, Naproxen, etc...) 7 days before your surgery.      Hospital admitting will call you the day before your surgery with your arrival time. If you are scheduled on a Monday admitting will call you the Friday before.      Please call your primary care physician if you should become ill within 24 hours of scheduled surgery. (ex.vomiting, diarrhea, fever)

## 2018-09-18 NOTE — NURSING NOTE
"Chief Complaint   Patient presents with     Consult For     nausea and vomiting; mole in pelvic area. Referred by Dafne Mccall.       Initial /70  Pulse 83  Temp 96.8  F (36  C)  Ht 5' 2\" (1.575 m)  Wt 153 lb 12.8 oz (69.8 kg)  SpO2 99%  BMI 28.13 kg/m2 Estimated body mass index is 28.13 kg/(m^2) as calculated from the following:    Height as of this encounter: 5' 2\" (1.575 m).    Weight as of this encounter: 153 lb 12.8 oz (69.8 kg).  Medication Reconciliation: complete    ARGENIS LEUNG LPN    "

## 2018-09-19 NOTE — PROGRESS NOTES
[facility] Surgery Consultation    CC:  Skin lesion, dysphagia.     HPI:  This 43 year old year old female is seen at the request of Dafne Villa for evaluation of left groin lesion as well as dysphagia, globus sensation. She reports that she has had a constant feeling of a lump in the back of her throat. She does admit to avoidance of certain foods due to concerns for it getting stuck. She denies pain. She does admit to nausea and vomiting. She was seen by ENT who did nasopharyngoscopy which was normal and placed on PPI. She has gone off it now as she was having issues with hives and facial swelling and was concerned that medication was exacerbating. She admits that the feeling has improved. She smoke occassionally. Works at a bar.     Other issue is a left groin lesion. She had it removed 4 years ago sounds like shave biopsy it has come back and continues to get bigger. She denies any STIs. It does not bleed. She is concerned about it.     Past Medical History:   Diagnosis Date     Depression with anxiety      Tobacco abuse        Past Surgical History:   Procedure Laterality Date     BIOPSY BREAST Right 10/19/2017    Procedure: BIOPSY BREAST;  EXCISIONAL BIOPSY RIGHT BREAST;  Surgeon: Kiko Newberry MD;  Location: [facility]     OOPHORECTOMY      tubal pregnancy     TUBAL LIGATION         Allergies   Allergen Reactions     Augmentin [Amoxicillin-Pot Clavulanate] Swelling       Current Outpatient Prescriptions   Medication     PARoxetine (PAXIL) 40 MG tablet     traZODone (DESYREL) 50 MG tablet     No current facility-administered medications for this visit.        HABITS:    Social History   Substance Use Topics     Smoking status: Current Some Day Smoker     Packs/day: 0.50     Types: Cigarettes     Smokeless tobacco: Never Used      Comment: rarely smokes. declines quitplan referral 9/18/18     Alcohol use 0.0 oz/week     0 Standard drinks or equivalent per week      Comment: occassionally       Family  "History   Problem Relation Age of Onset     Coronary Artery Disease Mother      Hypertension Mother      Coronary Artery Disease Father      C.A.D. No family hx of      Breast Cancer No family hx of      Cancer - colorectal No family hx of      Diabetes No family hx of      Thyroid Disease No family hx of      Asthma No family hx of      Colon Cancer No family hx of      Hyperlipidemia No family hx of        REVIEW OF SYSTEMS:  Ten point review of systems negative except those mentioned in the HPI.     OBJECTIVE:    /70  Pulse 83  Temp 96.8  F (36  C)  Ht 5' 2\" (1.575 m)  Wt 153 lb 12.8 oz (69.8 kg)  SpO2 99%  BMI 28.13 kg/m2    GENERAL: Generally appears well, in no distress with appropriate affect.  HEENT:   Sclerae anicteric - normocephalic atraumatic   Respiratory:  No acute distress, no splinting, CTAB   Cardiovascular:  Regular Rate and Rhythm, no murmur   Abdomen: soft non-tender   :  deferred  Extremities:  Extremities normal. No deformities, edema, or skin discoloration.  Skin:  verroucus lesion left mons pubis, no bleeding or erythema. aprox 1 cm in size.   Neurological: grossly intact  Psych:  Alert, oriented, affect appropriate with normal decision making ability.    IMPRESSION:    Two issues today     First is globus sensation with dysphagia, she does not that this has been improving over the last month and does admit to significant stress in her life during the time it was the worse. The interesting thing is that she has been dealing with unexplained outbreaks of hives and facial swelling to the point of going to the ED without clear cause. I would like to preform an EGD to get biopsies of the esophagus to rule out eosinophilic esophagitis. She signed consent today in clinic. Though discussed that likely reflux associated.     Second she admits to having a lesion removed from her left mons pubis aprox 4 years ago that has since grown back and has been getting bigger. I do not have the " path report from this but it sounds like it was a shave biopsy. The lesion looks like a wart to me, but will removed full thickness to prove this as I do not have pathology. She agreed to proceed with this.     PLAN:    Excisional biopsy left groin     EGD with biopsies    Kiko Newberry MD,     9/19/2018  9:08 AM    [facility] Surgery  Minor Procedure Note    Preoperative diagnosis:  Left groin lesion     Postoperative diagnosis:  Same     Procedure:  Excisional biopsy left groin     Anesthesia:  Local    History: see above     Findings:  1 cm verrucous lesion to the left groin     Tissue to pathology:    Skin lesion     Details:  Pre procedure anita was placed and the requisite time out pause observed during which the patient confirmed their identity, date of birth, side and site of the requested excision.  The region was prepped and draped sterilely; local anesthesia was obtained with infiltration of 7 cc of 1% lidocaine.  A elliptical incision was made over the presenting portion of the mass and carried through full thickness skin.   The lesion was then elevated off normal appearing fat. The wound was closed in layers with interrupted 3-0 Vicryl in the subcutaneous tissue.  The skin was reapproximated with 3-0 vicryl interrupted x 2.  dressings were applied.   The patient was observed in the treatment room for 15 minutes following the procedure without sequelae.  The procedure was well tolerated and the patient was discharged with wound care instructions and followup appointment.       Kiko Newberry

## 2018-09-20 LAB — COPATH REPORT: NORMAL

## 2018-09-25 NOTE — H&P (VIEW-ONLY)
[facility] Surgery Consultation    CC:  Skin lesion, dysphagia.     HPI:  This 43 year old year old female is seen at the request of [facility] for evaluation of left groin lesion as well as dysphagia, globus sensation. She reports that she has had a constant feeling of a lump in the back of her throat. She does admit to avoidance of certain foods due to concerns for it getting stuck. She denies pain. She does admit to nausea and vomiting. She was seen by ENT who did nasopharyngoscopy which was normal and placed on PPI. She has gone off it now as she was having issues with hives and facial swelling and was concerned that medication was exacerbating. She admits that the feeling has improved. She smoke occassionally. Works at a bar.     Other issue is a left groin lesion. She had it removed 4 years ago sounds like shave biopsy it has come back and continues to get bigger. She denies any STIs. It does not bleed. She is concerned about it.     Past Medical History:   Diagnosis Date     Depression with anxiety      Tobacco abuse        Past Surgical History:   Procedure Laterality Date     BIOPSY BREAST Right 10/19/2017    Procedure: BIOPSY BREAST;  EXCISIONAL BIOPSY RIGHT BREAST;  Surgeon: Kiko Newberry MD;  Location: [facility] OR     OOPHORECTOMY      tubal pregnancy     TUBAL LIGATION         Allergies   Allergen Reactions     Augmentin [Amoxicillin-Pot Clavulanate] Swelling       Current Outpatient Prescriptions   Medication     PARoxetine (PAXIL) 40 MG tablet     traZODone (DESYREL) 50 MG tablet     No current facility-administered medications for this visit.        HABITS:    Social History   Substance Use Topics     Smoking status: Current Some Day Smoker     Packs/day: 0.50     Types: Cigarettes     Smokeless tobacco: Never Used      Comment: rarely smokes. declines quitplan referral 9/18/18     Alcohol use 0.0 oz/week     0 Standard drinks or equivalent per week      Comment: occassionally       Family  "History   Problem Relation Age of Onset     Coronary Artery Disease Mother      Hypertension Mother      Coronary Artery Disease Father      C.A.D. No family hx of      Breast Cancer No family hx of      Cancer - colorectal No family hx of      Diabetes No family hx of      Thyroid Disease No family hx of      Asthma No family hx of      Colon Cancer No family hx of      Hyperlipidemia No family hx of        REVIEW OF SYSTEMS:  Ten point review of systems negative except those mentioned in the HPI.     OBJECTIVE:    /70  Pulse 83  Temp 96.8  F (36  C)  Ht 5' 2\" (1.575 m)  Wt 153 lb 12.8 oz (69.8 kg)  SpO2 99%  BMI 28.13 kg/m2    GENERAL: Generally appears well, in no distress with appropriate affect.  HEENT:   Sclerae anicteric - normocephalic atraumatic   Respiratory:  No acute distress, no splinting, CTAB   Cardiovascular:  Regular Rate and Rhythm, no murmur   Abdomen: soft non-tender   :  deferred  Extremities:  Extremities normal. No deformities, edema, or skin discoloration.  Skin:  verroucus lesion left mons pubis, no bleeding or erythema. aprox 1 cm in size.   Neurological: grossly intact  Psych:  Alert, oriented, affect appropriate with normal decision making ability.    IMPRESSION:    Two issues today     First is globus sensation with dysphagia, she does not that this has been improving over the last month and does admit to significant stress in her life during the time it was the worse. The interesting thing is that she has been dealing with unexplained outbreaks of hives and facial swelling to the point of going to the ED without clear cause. I would like to preform an EGD to get biopsies of the esophagus to rule out eosinophilic esophagitis. She signed consent today in clinic. Though discussed that likely reflux associated.     Second she admits to having a lesion removed from her left mons pubis aprox 4 years ago that has since grown back and has been getting bigger. I do not have the " path report from this but it sounds like it was a shave biopsy. The lesion looks like a wart to me, but will removed full thickness to prove this as I do not have pathology. She agreed to proceed with this.     PLAN:    Excisional biopsy left groin     EGD with biopsies    Kiko Newberry MD,     9/19/2018  9:08 AM    [facility] Surgery  Minor Procedure Note    Preoperative diagnosis:  Left groin lesion     Postoperative diagnosis:  Same     Procedure:  Excisional biopsy left groin     Anesthesia:  Local    History: see above     Findings:  1 cm verrucous lesion to the left groin     Tissue to pathology:    Skin lesion     Details:  Pre procedure anita was placed and the requisite time out pause observed during which the patient confirmed their identity, date of birth, side and site of the requested excision.  The region was prepped and draped sterilely; local anesthesia was obtained with infiltration of 7 cc of 1% lidocaine.  A elliptical incision was made over the presenting portion of the mass and carried through full thickness skin.   The lesion was then elevated off normal appearing fat. The wound was closed in layers with interrupted 3-0 Vicryl in the subcutaneous tissue.  The skin was reapproximated with 3-0 vicryl interrupted x 2.  dressings were applied.   The patient was observed in the treatment room for 15 minutes following the procedure without sequelae.  The procedure was well tolerated and the patient was discharged with wound care instructions and followup appointment.       Kiko Newberry

## 2018-09-28 ENCOUNTER — ANESTHESIA EVENT (OUTPATIENT)
Dept: SURGERY | Facility: HOSPITAL | Age: 43
End: 2018-09-28
Payer: COMMERCIAL

## 2018-09-28 ASSESSMENT — LIFESTYLE VARIABLES: TOBACCO_USE: 1

## 2018-09-28 NOTE — ANESTHESIA PREPROCEDURE EVALUATION
Anesthesia Evaluation     . Pt has had prior anesthetic. Type: General    No history of anesthetic complications          ROS/MED HX    ENT/Pulmonary:     (+)tobacco use, Current use 0.5 PPD packs/day  , . .    Neurologic:  - neg neurologic ROS     Cardiovascular:  - neg cardiovascular ROS   (+) ----. : . . . :. . No previous cardiac testing       METS/Exercise Tolerance:  >4 METS   Hematologic:  - neg hematologic  ROS       Musculoskeletal:   (+) arthritis, , , other musculoskeletal- LBP      GI/Hepatic:     (+) GERD (questionable heart burn, none now, not on anything)       Renal/Genitourinary:  - ROS Renal section negative       Endo:  - neg endo ROS       Psychiatric:     (+) psychiatric history anxiety, depression and other (comment) (Panic attack)      Infectious Disease:  - neg infectious disease ROS       Malignancy:      - no malignancy   Other:    - neg other ROS                 Physical Exam  Normal systems: pulmonary and dental    Airway   Mallampati: III  TM distance: >3 FB  Neck ROM: full    Dental     Cardiovascular   Rhythm and rate: regular and normal      Pulmonary    breath sounds clear to auscultation                    Anesthesia Plan      History & Physical Review  History and physical reviewed and following examination; no interval change.    ASA Status:  2 .    NPO Status:  > 8 hours    Plan for MAC with Intravenous and Propofol induction. Maintenance will be TIVA.  Reason for MAC:  Extreme anxiety (QS), Procedure to face, neck, head or breast and Other - see comments  PONV prophylaxis:  Ondansetron (or other 5HT-3)  Surgeon requests deep sedation. Patient has an anxiety d/o treated with anxiolytics. Will provide MAC.  HCG Pending- negative  H&P 9/18/18 Coni  Risks and benefits of MAC anesthetic discussed including dental damage, aspiration, loss of airway, conversion to general anesthetic, CV complications, MI, stroke, death. Pt wishes to proceed.       Postoperative Care  Postoperative  pain management:  IV analgesics.      Consents  Anesthetic plan, risks, benefits and alternatives discussed with:  Patient.  Use of blood products discussed: Yes.   Use of blood products discussed with Patient.  Consented to blood products.  .                          .

## 2018-10-01 ENCOUNTER — ANESTHESIA (OUTPATIENT)
Dept: SURGERY | Facility: HOSPITAL | Age: 43
End: 2018-10-01
Payer: COMMERCIAL

## 2018-10-01 ENCOUNTER — HOSPITAL ENCOUNTER (OUTPATIENT)
Facility: HOSPITAL | Age: 43
Discharge: HOME | End: 2018-10-01
Attending: SURGERY | Admitting: SURGERY
Payer: COMMERCIAL

## 2018-10-01 ENCOUNTER — APPOINTMENT (OUTPATIENT)
Dept: LAB | Facility: HOSPITAL | Age: 43
End: 2018-10-01
Attending: SURGERY
Payer: COMMERCIAL

## 2018-10-01 ENCOUNTER — SURGERY (OUTPATIENT)
Age: 43
End: 2018-10-01

## 2018-10-01 VITALS
HEIGHT: 62 IN | HEART RATE: 69 BPM | TEMPERATURE: 97.3 F | BODY MASS INDEX: 28.71 KG/M2 | OXYGEN SATURATION: 96 % | WEIGHT: 156 LBS | DIASTOLIC BLOOD PRESSURE: 85 MMHG | SYSTOLIC BLOOD PRESSURE: 119 MMHG | RESPIRATION RATE: 18 BRPM

## 2018-10-01 LAB — HCG UR QL: NEGATIVE

## 2018-10-01 PROCEDURE — 88305 TISSUE EXAM BY PATHOLOGIST: CPT | Mod: TC | Performed by: SURGERY

## 2018-10-01 PROCEDURE — 25000125 ZZHC RX 250: Performed by: SURGERY

## 2018-10-01 PROCEDURE — 25000128 H RX IP 250 OP 636: Performed by: NURSE ANESTHETIST, CERTIFIED REGISTERED

## 2018-10-01 PROCEDURE — 40000306 ZZH STATISTIC PRE PROC ASSESS II: Performed by: SURGERY

## 2018-10-01 PROCEDURE — 37000008 ZZH ANESTHESIA TECHNICAL FEE, 1ST 30 MIN: Performed by: SURGERY

## 2018-10-01 PROCEDURE — 71000027 ZZH RECOVERY PHASE 2 EACH 15 MINS: Performed by: SURGERY

## 2018-10-01 PROCEDURE — 25000125 ZZHC RX 250: Performed by: NURSE ANESTHETIST, CERTIFIED REGISTERED

## 2018-10-01 PROCEDURE — 36000050 ZZH SURGERY LEVEL 2 1ST 30 MIN: Performed by: SURGERY

## 2018-10-01 PROCEDURE — 01999 UNLISTED ANES PROCEDURE: CPT | Performed by: NURSE ANESTHETIST, CERTIFIED REGISTERED

## 2018-10-01 PROCEDURE — 25000128 H RX IP 250 OP 636: Performed by: ANESTHESIOLOGY

## 2018-10-01 PROCEDURE — 43239 EGD BIOPSY SINGLE/MULTIPLE: CPT | Performed by: SURGERY

## 2018-10-01 PROCEDURE — 81025 URINE PREGNANCY TEST: CPT | Performed by: ANESTHESIOLOGY

## 2018-10-01 RX ORDER — FENTANYL CITRATE 50 UG/ML
INJECTION, SOLUTION INTRAMUSCULAR; INTRAVENOUS PRN
Status: DISCONTINUED | OUTPATIENT
Start: 2018-10-01 | End: 2018-10-01

## 2018-10-01 RX ORDER — DEXAMETHASONE SODIUM PHOSPHATE 4 MG/ML
4 INJECTION, SOLUTION INTRA-ARTICULAR; INTRALESIONAL; INTRAMUSCULAR; INTRAVENOUS; SOFT TISSUE EVERY 10 MIN PRN
Status: DISCONTINUED | OUTPATIENT
Start: 2018-10-01 | End: 2018-10-01 | Stop reason: HOSPADM

## 2018-10-01 RX ORDER — SODIUM CHLORIDE, SODIUM LACTATE, POTASSIUM CHLORIDE, CALCIUM CHLORIDE 600; 310; 30; 20 MG/100ML; MG/100ML; MG/100ML; MG/100ML
INJECTION, SOLUTION INTRAVENOUS CONTINUOUS
Status: DISCONTINUED | OUTPATIENT
Start: 2018-10-01 | End: 2018-10-01 | Stop reason: HOSPADM

## 2018-10-01 RX ORDER — ALBUTEROL SULFATE 0.83 MG/ML
2.5 SOLUTION RESPIRATORY (INHALATION) EVERY 4 HOURS PRN
Status: DISCONTINUED | OUTPATIENT
Start: 2018-10-01 | End: 2018-10-01 | Stop reason: HOSPADM

## 2018-10-01 RX ORDER — LIDOCAINE HYDROCHLORIDE 20 MG/ML
INJECTION, SOLUTION INFILTRATION; PERINEURAL PRN
Status: DISCONTINUED | OUTPATIENT
Start: 2018-10-01 | End: 2018-10-01

## 2018-10-01 RX ORDER — NALOXONE HYDROCHLORIDE 0.4 MG/ML
.1-.4 INJECTION, SOLUTION INTRAMUSCULAR; INTRAVENOUS; SUBCUTANEOUS
Status: CANCELLED | OUTPATIENT
Start: 2018-10-01 | End: 2018-10-02

## 2018-10-01 RX ORDER — GLYCOPYRROLATE 0.2 MG/ML
INJECTION, SOLUTION INTRAMUSCULAR; INTRAVENOUS PRN
Status: DISCONTINUED | OUTPATIENT
Start: 2018-10-01 | End: 2018-10-01

## 2018-10-01 RX ORDER — HYDRALAZINE HYDROCHLORIDE 20 MG/ML
2.5-5 INJECTION INTRAMUSCULAR; INTRAVENOUS EVERY 10 MIN PRN
Status: DISCONTINUED | OUTPATIENT
Start: 2018-10-01 | End: 2018-10-01 | Stop reason: HOSPADM

## 2018-10-01 RX ORDER — FENTANYL CITRATE 50 UG/ML
25-50 INJECTION, SOLUTION INTRAMUSCULAR; INTRAVENOUS
Status: DISCONTINUED | OUTPATIENT
Start: 2018-10-01 | End: 2018-10-01 | Stop reason: HOSPADM

## 2018-10-01 RX ORDER — OXYCODONE HYDROCHLORIDE 5 MG/1
5 TABLET ORAL EVERY 4 HOURS PRN
Status: DISCONTINUED | OUTPATIENT
Start: 2018-10-01 | End: 2018-10-01 | Stop reason: HOSPADM

## 2018-10-01 RX ORDER — LABETALOL HYDROCHLORIDE 5 MG/ML
10 INJECTION, SOLUTION INTRAVENOUS
Status: DISCONTINUED | OUTPATIENT
Start: 2018-10-01 | End: 2018-10-01 | Stop reason: HOSPADM

## 2018-10-01 RX ORDER — ONDANSETRON 2 MG/ML
4 INJECTION INTRAMUSCULAR; INTRAVENOUS EVERY 30 MIN PRN
Status: DISCONTINUED | OUTPATIENT
Start: 2018-10-01 | End: 2018-10-01 | Stop reason: HOSPADM

## 2018-10-01 RX ORDER — LIDOCAINE 40 MG/G
CREAM TOPICAL
Status: DISCONTINUED | OUTPATIENT
Start: 2018-10-01 | End: 2018-10-01 | Stop reason: HOSPADM

## 2018-10-01 RX ORDER — KETOROLAC TROMETHAMINE 30 MG/ML
30 INJECTION, SOLUTION INTRAMUSCULAR; INTRAVENOUS EVERY 6 HOURS PRN
Status: DISCONTINUED | OUTPATIENT
Start: 2018-10-01 | End: 2018-10-01 | Stop reason: HOSPADM

## 2018-10-01 RX ORDER — ONDANSETRON 4 MG/1
4 TABLET, ORALLY DISINTEGRATING ORAL EVERY 30 MIN PRN
Status: DISCONTINUED | OUTPATIENT
Start: 2018-10-01 | End: 2018-10-01 | Stop reason: HOSPADM

## 2018-10-01 RX ORDER — NALOXONE HYDROCHLORIDE 0.4 MG/ML
.1-.4 INJECTION, SOLUTION INTRAMUSCULAR; INTRAVENOUS; SUBCUTANEOUS
Status: DISCONTINUED | OUTPATIENT
Start: 2018-10-01 | End: 2018-10-01 | Stop reason: HOSPADM

## 2018-10-01 RX ORDER — MEPERIDINE HYDROCHLORIDE 50 MG/ML
12.5 INJECTION INTRAMUSCULAR; INTRAVENOUS; SUBCUTANEOUS
Status: DISCONTINUED | OUTPATIENT
Start: 2018-10-01 | End: 2018-10-01 | Stop reason: HOSPADM

## 2018-10-01 RX ORDER — PROPOFOL 10 MG/ML
INJECTION, EMULSION INTRAVENOUS PRN
Status: DISCONTINUED | OUTPATIENT
Start: 2018-10-01 | End: 2018-10-01

## 2018-10-01 RX ORDER — FLUMAZENIL 0.1 MG/ML
0.2 INJECTION, SOLUTION INTRAVENOUS
Status: CANCELLED | OUTPATIENT
Start: 2018-10-01 | End: 2018-10-02

## 2018-10-01 RX ADMIN — BENZOCAINE 3 SPRAY: 200 SPRAY DENTAL; ORAL; PERIODONTAL at 12:30

## 2018-10-01 RX ADMIN — FENTANYL CITRATE 100 MCG: 50 INJECTION, SOLUTION INTRAMUSCULAR; INTRAVENOUS at 12:35

## 2018-10-01 RX ADMIN — LIDOCAINE HYDROCHLORIDE 40 MG: 20 INJECTION, SOLUTION INFILTRATION; PERINEURAL at 12:35

## 2018-10-01 RX ADMIN — SODIUM CHLORIDE, POTASSIUM CHLORIDE, SODIUM LACTATE AND CALCIUM CHLORIDE: 600; 310; 30; 20 INJECTION, SOLUTION INTRAVENOUS at 11:45

## 2018-10-01 RX ADMIN — PROPOFOL 20 MG: 10 INJECTION, EMULSION INTRAVENOUS at 12:38

## 2018-10-01 RX ADMIN — PROPOFOL 100 MG: 10 INJECTION, EMULSION INTRAVENOUS at 12:35

## 2018-10-01 RX ADMIN — GLYCOPYRROLATE 0.2 MG: 0.2 INJECTION, SOLUTION INTRAMUSCULAR; INTRAVENOUS at 12:32

## 2018-10-01 NOTE — DISCHARGE INSTRUCTIONS
UPPER ENDOSCOPY    PURPOSE:  An Upper Endoscopy is a procedure in which the doctor passes a flexible, lighted tube called a gastroscope through your mouth into your stomach in order to:    See the lining of the esophagus, stomach, and duodenum (first part of the small intestine).    Look for bleeding, inflammation (swelling), abnormal tumors or tissue, or ulcers.    Take biopsy specimens.  (Biopsies do not hurt.)    TELL YOUR DOCTOR IF:     You have a heart condition, heart murmur, or have had heart valve surgery.    You have had angioplasty with stents put in within the last year.    You are taking blood thinners - Aspirin, Anti-inflammatory pain pills (like Motrin, ibuprofen, Naproxen, Feldene, Advil, etc.), Coumadin, Plavix.    You have diabetes - contact your regular doctor for management of your insulin.    YOU NEED TO:    Have someone drive you home.  You are not allowed to drive until the next day.  (If you take a taxi, the person staying with you after surgery must ride with you in the taxi.  The  is not responsible for you).    Have someone stay with you for four (4) hours after you leave the hospital.    Know the time to be at admitting:  A nurse will call you with the time the afternoon before your procedure.  If your procedure is on Monday, you will be called on Friday.  If you have not been contacted with the time, call the Admitting Department at [phone] or [phone], ext. [phone] after 5 pm (Admitting is open 24 hours daily).    Talk with the Surgery Patient Education Nurses.  Please call them @ [phone] or toll free [phone] after 8:00 a.m. Monday through Friday.    TO PREPARE FOR THE PROCEDURE:      ONE WEEK BEFORE:    Stop Aspirin, anti-inflammatory pain pills (Motrin, ibuprofen, Naproxen, Feldene, Advil, etc.).  It is OK to take Tylenol (acetaminophen).    Your doctor will tell you what to do if you are on Coumadin or Plavix.     NIGHT BEFORE:    Do not eat or drink  anything after midnight.  Your stomach needs to be empty.     DAY OF YOUR PROCEDURE:    Take your pills you were told to take.  Do not take diabetic pills.  If you are on Insulin, take the dose your doctor told you to take.    Wear loose, comfortable clothing and shoes.    Remove ALL jewelry including wedding rings.  Leave valuables at home.    Come to the hospital Admitting Department located at the [facility] on the lower level.    In Same Day surgery, you will be asked to change into an exam gown.    You will be asked your name, birth date, and what you are having done by every person who is involved with your care.    Your health history, medications, and allergies will be reviewed and verified.    An IV (intravenous line) will be started in your hand.  DURING THE UPPER ENDOSCOPY:    Your doctor and a registered nurse will be with you throughout the procedure which takes about 30 minutes.    You will either lie on your left side or on your back.    Medications will be given to you to help you relax and reduce the gag reflex when swallowing the scope.    Your doctor may need to insert air into your stomach to see better.  This may cause fullness or a cramping sensation.  The air will be removed at the end of the exam.    AFTER THE PROCEDURE    You will return to Same Day Surgery to rest for about an hour before you go home.    The doctor will talk with you and your family.    A family member/friend may visit with you.    You may burp up any air remaining in your stomach.    You may feel dizzy or light-headed from the medicine.    Your nurse will go over the discharge instructions with you and your caregiver and answer any of your questions.      You will be contacted the next day to check on how you are doing.    If biopsies were taken, you will be contacted with the results usually within 3 days.  BACK AT HOME    Rest for an hour or two after you get home.    When your throat is no longer numb and you have a  gag reflex, take a few sips of cool water.  If you can swallow comfortably, you may start eating again.    You may have a mild sore throat for the rest of the day.  WHAT TO WATCH FOR:  Problems rarely occur after the exam, but it is important to be aware of the early signs of a complication.  Call your doctor immediately if you have:    Difficulty swallowing or breathing    Unusual pain in your stomach or chest    Vomiting blood or dark material that looks like coffee grounds    Black or tarry stools    Temperature over 100.6 degrees    MORE QUESTIONS?  Please ask your doctor or nurse before the exam begins  or call your doctor at the clinic.    IF YOU MUST CANCEL YOUR PROCEDURE THE EVENING/NIGHT BEFORE, PLEASE CALL HOSPITAL ADMITTING -[phone] OR TOLL FREE [phone], EXT. [phone].    Phone Numbers:  Hospital - [phone] [phone]  [facility] - [phone]  Surgery Patient Education - [phone] or toll free [phone]    Post-Anesthesia Patient Instructions    IMMEDIATELY FOLLOWING SURGERY:  Do not drive or operate machinery for the first twenty four hours after surgery.  Do not make any important decisions for twenty four hours after surgery or while taking narcotic pain medications or sedatives.  If you develop intractable nausea and vomiting or a severe headache please notify your doctor immediately.    FOLLOW-UP:  Please make an appointment with your surgeon as instructed. You do not need to follow up with anesthesia unless specifically instructed to do so.    WOUND CARE INSTRUCTIONS (if applicable):  Keep a dry clean dressing on the anesthesia/puncture wound site if there is drainage.  Once the wound has quit draining you may leave it open to air.  Generally you should leave the bandage intact for twenty four hours unless there is drainage.  If the epidural site drains for more than 36-48 hours please call the anesthesia department.    QUESTIONS?:  Please feel free to call your  physician or the hospital  if you have any questions, and they will be happy to assist you.

## 2018-10-01 NOTE — IP AVS SNAPSHOT
[facility] Preop/Phase II    [address] [address]    Phone:  [phone]                                       After Visit Summary   10/1/2018    Ritesh Soliman    MRN: [id number]           After Visit Summary Signature Page     I have received my discharge instructions, and my questions have been answered. I have discussed any challenges I see with this plan with the nurse or doctor.    ..........................................................................................................................................  Patient/Patient Representative Signature      ..........................................................................................................................................  Patient Representative Print Name and Relationship to Patient    ..................................................               ................................................  Date                                   Time    ..........................................................................................................................................  Reviewed by Signature/Title    ...................................................              ..............................................  Date                                               Time          22EPIC Rev 08/18

## 2018-10-01 NOTE — ANESTHESIA CARE TRANSFER NOTE
Patient: Ritesh Soliman    Procedure(s):  UPPER ENDOSCOPY WITH BIOPSIES - Wound Class: II-Clean Contaminated    Diagnosis: NAUSEA/VOMITTING  Diagnosis Additional Information: No value filed.    Anesthesia Type:   MAC     Note:    Patient transferred to:Phase II  Handoff Report: Identifed the Patient, Identified the Reponsible Provider, Reviewed the pertinent medical history, Discussed the surgical course, Reviewed Intra-OP anesthesia mangement and issues during anesthesia, Set expectations for post-procedure period and Allowed opportunity for questions and acknowledgement of understanding      Vitals: (Last set prior to Anesthesia Care Transfer)    CRNA VITALS  10/1/2018 1214 - 10/1/2018 1249      10/1/2018             Resp Rate (set): 8                Electronically Signed By: MARIANN Camarena CRNA  October 1, 2018  12:49 PM

## 2018-10-01 NOTE — ANESTHESIA POSTPROCEDURE EVALUATION
Patient: Ritesh Soliman    Procedure(s):  UPPER ENDOSCOPY WITH BIOPSIES - Wound Class: II-Clean Contaminated    Diagnosis:NAUSEA/VOMITTING  Diagnosis Additional Information: No value filed.    Anesthesia Type:  MAC    Note:  Anesthesia Post Evaluation    Patient location during evaluation: Phase 2  Patient participation: Able to fully participate in evaluation  Level of consciousness: awake and alert  Pain management: adequate  Airway patency: patent  Cardiovascular status: acceptable  Respiratory status: acceptable  Hydration status: acceptable  PONV: none             Last vitals:  Vitals:    10/01/18 1300 10/01/18 1305 10/01/18 1310   BP: 120/72 111/76 112/77   Pulse:      Resp: 18 18 18   Temp:      SpO2: 94% 95% 99%         Electronically Signed By: MARIANN Camarena CRNA  October 1, 2018  1:16 PM

## 2018-10-01 NOTE — IP AVS SNAPSHOT
MRN:[id number]                      After Visit Summary   10/1/2018    Ritesh Soliman    MRN: [id number]           Thank you!     Thank you for choosing [facility] for your care. Our goal is always to provide you with excellent care. Hearing back from our patients is one way we can continue to improve our services. Please take a few minutes to complete the written survey that you may receive in the mail after you visit with us. Thank you!        Patient Information     Date Of Birth          1975        About your hospital stay     You were admitted on:  October 1, 2018 You last received care in the:  [facility] Preop/Phase II    You were discharged on:  October 1, 2018       Who to Call     For medical emergencies, please call 911.  For non-urgent questions about your medical care, please call your primary care provider or clinic, [phone]  For questions related to your surgery, please call your surgery clinic        Attending Provider     Provider Kiko Baires MD General Surgery       Primary Care Provider Office Phone # Fax #    Olivia Weiss [phone]      After Care Instructions     Discharge Instructions       Resume pre procedure diet            Discharge Instructions       Restart home medications.                  Your next 10 appointments already scheduled     Oct 09, 2018  1:15 PM CDT   (Arrive by 1:00 PM)   Return Visit with Ioana Mendenhall PA-C   [facility] ([facility] )    [address] [place] [address]   [phone]              Further instructions from your care team           UPPER ENDOSCOPY    PURPOSE:  An Upper Endoscopy is a procedure in which the doctor passes a flexible, lighted tube called a gastroscope through your mouth into your stomach in order to:    See the lining of the esophagus, stomach, and duodenum (first part of the small intestine).    Look for bleeding, inflammation (swelling),  abnormal tumors or tissue, or ulcers.    Take biopsy specimens.  (Biopsies do not hurt.)    TELL YOUR DOCTOR IF:     You have a heart condition, heart murmur, or have had heart valve surgery.    You have had angioplasty with stents put in within the last year.    You are taking blood thinners - Aspirin, Anti-inflammatory pain pills (like Motrin, ibuprofen, Naproxen, Feldene, Advil, etc.), Coumadin, Plavix.    You have diabetes - contact your regular doctor for management of your insulin.    YOU NEED TO:    Have someone drive you home.  You are not allowed to drive until the next day.  (If you take a taxi, the person staying with you after surgery must ride with you in the taxi.  The  is not responsible for you).    Have someone stay with you for four (4) hours after you leave the hospital.    Know the time to be at admitting:  A nurse will call you with the time the afternoon before your procedure.  If your procedure is on Monday, you will be called on Friday.  If you have not been contacted with the time, call the Admitting Department at [phone] or [phone], ext. [phone] after 5 pm (Admitting is open 24 hours daily).    Talk with the Surgery Patient Education Nurses.  Please call them @ [phone] or toll free [phone] after 8:00 a.m. Monday through Friday.    TO PREPARE FOR THE PROCEDURE:      ONE WEEK BEFORE:    Stop Aspirin, anti-inflammatory pain pills (Motrin, ibuprofen, Naproxen, Feldene, Advil, etc.).  It is OK to take Tylenol (acetaminophen).    Your doctor will tell you what to do if you are on Coumadin or Plavix.     NIGHT BEFORE:    Do not eat or drink anything after midnight.  Your stomach needs to be empty.     DAY OF YOUR PROCEDURE:    Take your pills you were told to take.  Do not take diabetic pills.  If you are on Insulin, take the dose your doctor told you to take.    Wear loose, comfortable clothing and shoes.    Remove ALL jewelry including wedding rings.  Leave  valuables at home.    Come to the hospital Admitting Department located at the [place] entrance on the lower level.    In Same Day surgery, you will be asked to change into an exam gown.    You will be asked your name, birth date, and what you are having done by every person who is involved with your care.    Your health history, medications, and allergies will be reviewed and verified.    An IV (intravenous line) will be started in your hand.  DURING THE UPPER ENDOSCOPY:    Your doctor and a registered nurse will be with you throughout the procedure which takes about 30 minutes.    You will either lie on your left side or on your back.    Medications will be given to you to help you relax and reduce the gag reflex when swallowing the scope.    Your doctor may need to insert air into your stomach to see better.  This may cause fullness or a cramping sensation.  The air will be removed at the end of the exam.    AFTER THE PROCEDURE    You will return to Same Day Surgery to rest for about an hour before you go home.    The doctor will talk with you and your family.    A family member/friend may visit with you.    You may burp up any air remaining in your stomach.    You may feel dizzy or light-headed from the medicine.    Your nurse will go over the discharge instructions with you and your caregiver and answer any of your questions.      You will be contacted the next day to check on how you are doing.    If biopsies were taken, you will be contacted with the results usually within 3 days.  BACK AT HOME    Rest for an hour or two after you get home.    When your throat is no longer numb and you have a gag reflex, take a few sips of cool water.  If you can swallow comfortably, you may start eating again.    You may have a mild sore throat for the rest of the day.  WHAT TO WATCH FOR:  Problems rarely occur after the exam, but it is important to be aware of the early signs of a complication.  Call your doctor immediately if  you have:    Difficulty swallowing or breathing    Unusual pain in your stomach or chest    Vomiting blood or dark material that looks like coffee grounds    Black or tarry stools    Temperature over 100.6 degrees    MORE QUESTIONS?  Please ask your doctor or nurse before the exam begins  or call your doctor at the clinic.    IF YOU MUST CANCEL YOUR PROCEDURE THE EVENING/NIGHT BEFORE, PLEASE CALL HOSPITAL ADMITTING -[phone] OR TOLL FREE [phone], [phone]. [phone].    Phone Numbers:  Hospital - [phone] [phone]  [facility] - [phone]  Surgery Patient Education - [phone] or toll free [phone]    Post-Anesthesia Patient Instructions    IMMEDIATELY FOLLOWING SURGERY:  Do not drive or operate machinery for the first twenty four hours after surgery.  Do not make any important decisions for twenty four hours after surgery or while taking narcotic pain medications or sedatives.  If you develop intractable nausea and vomiting or a severe headache please notify your doctor immediately.    FOLLOW-UP:  Please make an appointment with your surgeon as instructed. You do not need to follow up with anesthesia unless specifically instructed to do so.    WOUND CARE INSTRUCTIONS (if applicable):  Keep a dry clean dressing on the anesthesia/puncture wound site if there is drainage.  Once the wound has quit draining you may leave it open to air.  Generally you should leave the bandage intact for twenty four hours unless there is drainage.  If the epidural site drains for more than 36-48 hours please call the anesthesia department.    QUESTIONS?:  Please feel free to call your physician or the hospital  if you have any questions, and they will be happy to assist you.       Pending Results     No orders found from 9/29/2018 to 10/2/2018.            Admission Information     Date & Time Provider Department Dept. Phone    10/1/2018 Kiko Newberry MD [facility] Preop/Phase -[phone]      Your Vitals  "Were     Blood Pressure Pulse Temperature Respirations Height Weight    112/77 69 97.3  F (36.3  C) (Oral) 18 1.575 m (5' 2\") 70.8 kg (156 lb)    Pulse Oximetry BMI (Body Mass Index)                99% 28.53 kg/m2          Care EveryWhere ID     This is your Care EveryWhere ID. This could be used by other organizations to access your [facility] medical records  [id number]        Equal Access to Services     ROBBI FLETCHER AH: Hadii carl ku hadasho Soomaali, waaxda luqadaha, qaybta kaalmada adeegyada, sol hadleyin hayjing palomodarielmaría núñez. So [facility] [phone].    ATENCIÓN: Si kinjal stewart, tiene a meléndez disposición servicios gratuitos de asistencia lingüística. [place] [phone].    We comply with applicable federal civil rights laws and [place] laws. We do not discriminate on the basis of race, color, national origin, age, disability, sex, sexual orientation, or gender identity.               Review of your medicines      CONTINUE these medicines which have NOT CHANGED        Dose / Directions    PARoxetine 40 MG tablet   Commonly known as:  PAXIL   Used for:  Depression with anxiety        Take 1.5 tablets (60 mg) by mouth At Bedtime   Quantity:  45 tablet   Refills:  1       traZODone 50 MG tablet   Commonly known as:  DESYREL   Used for:  Insomnia        TAKE 1 TABLET (50 MG) BY MOUTH NIGHTLY AS NEEDED FOR SLEEP   Quantity:  30 tablet   Refills:  1                Protect others around you: Learn how to safely use, store and throw away your medicines at www.disposemymeds.org.             Medication List: This is a list of all your medications and when to take them. Check marks below indicate your daily home schedule. Keep this list as a reference.      Medications           Morning Afternoon Evening Bedtime As Needed    PARoxetine 40 MG tablet   Commonly known as:  PAXIL   Take 1.5 tablets (60 mg) by mouth At Bedtime                                traZODone 50 MG tablet   Commonly known as:  DESYREL   TAKE 1 " TABLET (50 MG) BY MOUTH NIGHTLY AS NEEDED FOR SLEEP

## 2018-10-01 NOTE — BRIEF OP NOTE
[facility] - Brief Operative Note                Pre-operative diagnosis:                     NAUSEA/VOMITTING   Post-operative diagnosis * No post-op diagnosis entered *   Procedure: Procedure(s):  UPPER ENDOSCOPY WITH BIOPSIES - Wound Class: II-Clean Contaminated   Surgeon: Kiko Newberry MD,    Anesthesia: Monitor Anesthesia Care    Estimated blood loss: * No values recorded between 10/1/2018 12:36 PM and 10/1/2018 12:43 PM *   Blood transfusion: No transfusion was given during surgery   Drains: None    Specimens:   ID Type Source Tests Collected by Time Destination   A :  Biopsy Stomach, Antrum SURGICAL PATHOLOGY EXAM Kiko Newberry MD 10/1/2018 12:37 PM    B : random esophageal biopsies Biopsy Esophagus SURGICAL PATHOLOGY EXAM Kiko Newberry MD 10/1/2018 12:40 PM       Findings: Normal exam biopsies taken   Complications: None   Condition: Stable   Comments: Details included in dictated operative note.

## 2018-10-01 NOTE — OR NURSING
Gag reflex intact.Took water w/o nausea. spoke with pt and sister.Patient and responsible adult given discharge instructions with no questions regarding instructions. Vandana score 20. Pain level 0/10.  Discharged from unit via walking. Patient discharged to home

## 2018-10-02 NOTE — OP NOTE
Ritesh Soliman MRN# [id number]   YOB: 1975 Age: 43 year old      Date of Admission:  10/1/2018    Primary care provider: Olivia Weiss    PREOPERATIVE DIAGNOSIS:  Dysphagia, nausea, vomiting       POSTOPERATIVE DIAGNOSES:    Normal exam       PROCEDURE:  Esophagogastroduodenoscopy with cold forceps biopsies.       HISTORY OF PRESENT ILLNESS:  See clinic note      OPERATIVE FINDINGS:  The gastroesophageal junction was noted 39 cm from the incisors.  The Z line was regular without changes suggesting reflux.  There was no evidence of ulceration or stricture.  The were no concerns  in the  stomach and/or duodenum.    Specimen(s) submitted to pathology:  Antral biopsies, Random esophageal biopsies.     PROCEDURE:  With the patient in the supine position on the transport cart, IV sedation was administered by the nurse anesthetist.  Her correct identity and planned procedure were confirmed during a requisite timeout pause.  A bite block was placed and the fiberoptic endoscope was introduced and negotiated through the cricopharyngeus without difficulty.  The length of the esophagus was examined without findings.  The gastroesophageal junction was noted 39 cm from the incisors; the Z line was regular without ulceration, bleeding source or stricture.  There was no  evidence of Poe's change.  The stomach was entered and the pylorus was traversed easily.  The gastric mucosa was normal in appearence; there was no evidence of gastric polyp, ulcer or bleeding source.  The duodenum was similarly without finding.  The endoscope was returned to the body of the stomach and retroflex confirmed the absence of abnormality in the cardia.      Random cold forceps biopsies were obtained of the antrum for H. pylori.  Hemostasis was judged adequate on visualization.   The endoscope was returned to the GE junction. Random esophageal biopsies were taken to rule out eosinophilic esophagitis.  A second circumferential  examination of the esophagus was performed on slow withdrawal of the endoscope without additional finding.  The procedure was satisfactorially tolerated; there was no appreciable blood loss and the patient was returned to Day Surgery in good condition.      Kiko Newberry

## 2018-10-03 LAB — COPATH REPORT: NORMAL

## 2018-10-04 ENCOUNTER — TELEPHONE (OUTPATIENT)
Dept: FAMILY MEDICINE | Facility: OTHER | Age: 43
End: 2018-10-04

## 2018-10-04 ENCOUNTER — HOSPITAL ENCOUNTER (EMERGENCY)
Facility: HOSPITAL | Age: 43
Discharge: HOME | End: 2018-10-04
Attending: PHYSICIAN ASSISTANT | Admitting: PHYSICIAN ASSISTANT
Payer: COMMERCIAL

## 2018-10-04 VITALS
TEMPERATURE: 97.6 F | DIASTOLIC BLOOD PRESSURE: 89 MMHG | SYSTOLIC BLOOD PRESSURE: 131 MMHG | OXYGEN SATURATION: 97 % | RESPIRATION RATE: 20 BRPM | HEART RATE: 81 BPM

## 2018-10-04 DIAGNOSIS — N92.4 EXCESSIVE BLEEDING IN PREMENOPAUSAL PERIOD: ICD-10-CM

## 2018-10-04 LAB
ALBUMIN SERPL-MCNC: 4.1 G/DL (ref 3.4–5)
ALBUMIN UR-MCNC: NEGATIVE MG/DL
ALP SERPL-CCNC: 85 U/L (ref 40–150)
ALT SERPL W P-5'-P-CCNC: 21 U/L (ref 0–50)
ANION GAP SERPL CALCULATED.3IONS-SCNC: 8 MMOL/L (ref 3–14)
APPEARANCE UR: CLEAR
AST SERPL W P-5'-P-CCNC: 19 U/L (ref 0–45)
BACTERIA #/AREA URNS HPF: ABNORMAL /HPF
BASOPHILS # BLD AUTO: 0 10E9/L (ref 0–0.2)
BASOPHILS NFR BLD AUTO: 0.2 %
BILIRUB SERPL-MCNC: 0.2 MG/DL (ref 0.2–1.3)
BILIRUB UR QL STRIP: NEGATIVE
BUN SERPL-MCNC: 17 MG/DL (ref 7–30)
CALCIUM SERPL-MCNC: 9.1 MG/DL (ref 8.5–10.1)
CHLORIDE SERPL-SCNC: 108 MMOL/L (ref 94–109)
CO2 SERPL-SCNC: 23 MMOL/L (ref 20–32)
COLOR UR AUTO: ABNORMAL
CREAT SERPL-MCNC: 0.9 MG/DL (ref 0.52–1.04)
DIFFERENTIAL METHOD BLD: ABNORMAL
EOSINOPHIL # BLD AUTO: 0.2 10E9/L (ref 0–0.7)
EOSINOPHIL NFR BLD AUTO: 1.9 %
ERYTHROCYTE [DISTWIDTH] IN BLOOD BY AUTOMATED COUNT: 12.5 % (ref 10–15)
GFR SERPL CREATININE-BSD FRML MDRD: 67 ML/MIN/1.7M2
GLUCOSE SERPL-MCNC: 83 MG/DL (ref 70–99)
GLUCOSE UR STRIP-MCNC: NEGATIVE MG/DL
HCG UR QL: NEGATIVE
HCT VFR BLD AUTO: 40.8 % (ref 35–47)
HGB BLD-MCNC: 14.2 G/DL (ref 11.7–15.7)
HGB UR QL STRIP: ABNORMAL
IMM GRANULOCYTES # BLD: 0 10E9/L (ref 0–0.4)
IMM GRANULOCYTES NFR BLD: 0.2 %
KETONES UR STRIP-MCNC: NEGATIVE MG/DL
LEUKOCYTE ESTERASE UR QL STRIP: NEGATIVE
LYMPHOCYTES # BLD AUTO: 1.8 10E9/L (ref 0.8–5.3)
LYMPHOCYTES NFR BLD AUTO: 19.2 %
MCH RBC QN AUTO: 33.3 PG (ref 26.5–33)
MCHC RBC AUTO-ENTMCNC: 34.8 G/DL (ref 31.5–36.5)
MCV RBC AUTO: 96 FL (ref 78–100)
MONOCYTES # BLD AUTO: 0.7 10E9/L (ref 0–1.3)
MONOCYTES NFR BLD AUTO: 7.6 %
MUCOUS THREADS #/AREA URNS LPF: PRESENT /LPF
NEUTROPHILS # BLD AUTO: 6.7 10E9/L (ref 1.6–8.3)
NEUTROPHILS NFR BLD AUTO: 70.9 %
NITRATE UR QL: NEGATIVE
NRBC # BLD AUTO: 0 10*3/UL
NRBC BLD AUTO-RTO: 0 /100
PH UR STRIP: 5 PH (ref 4.7–8)
PLATELET # BLD AUTO: 308 10E9/L (ref 150–450)
POTASSIUM SERPL-SCNC: 4.1 MMOL/L (ref 3.4–5.3)
PROT SERPL-MCNC: 8 G/DL (ref 6.8–8.8)
RBC # BLD AUTO: 4.27 10E12/L (ref 3.8–5.2)
RBC #/AREA URNS AUTO: 2 /HPF (ref 0–2)
SODIUM SERPL-SCNC: 139 MMOL/L (ref 133–144)
SOURCE: ABNORMAL
SP GR UR STRIP: 1.01 (ref 1–1.03)
UROBILINOGEN UR STRIP-MCNC: NORMAL MG/DL (ref 0–2)
WBC # BLD AUTO: 9.4 10E9/L (ref 4–11)
WBC #/AREA URNS AUTO: <1 /HPF (ref 0–5)

## 2018-10-04 PROCEDURE — 81001 URINALYSIS AUTO W/SCOPE: CPT | Performed by: PHYSICIAN ASSISTANT

## 2018-10-04 PROCEDURE — 85025 COMPLETE CBC W/AUTO DIFF WBC: CPT | Performed by: PHYSICIAN ASSISTANT

## 2018-10-04 PROCEDURE — 99283 EMERGENCY DEPT VISIT LOW MDM: CPT

## 2018-10-04 PROCEDURE — 81025 URINE PREGNANCY TEST: CPT | Performed by: PHYSICIAN ASSISTANT

## 2018-10-04 PROCEDURE — 99284 EMERGENCY DEPT VISIT MOD MDM: CPT | Performed by: PHYSICIAN ASSISTANT

## 2018-10-04 PROCEDURE — 36415 COLL VENOUS BLD VENIPUNCTURE: CPT | Performed by: PHYSICIAN ASSISTANT

## 2018-10-04 PROCEDURE — 80053 COMPREHEN METABOLIC PANEL: CPT | Performed by: PHYSICIAN ASSISTANT

## 2018-10-04 RX ORDER — NORETHINDRONE ACETATE 5 MG
5 TABLET ORAL 2 TIMES DAILY
Qty: 20 TABLET | Refills: 0 | Status: SHIPPED | OUTPATIENT
Start: 2018-10-04 | End: 2018-10-09

## 2018-10-04 ASSESSMENT — ENCOUNTER SYMPTOMS
ANAL BLEEDING: 0
DYSURIA: 0
MUSCULOSKELETAL NEGATIVE: 1
FREQUENCY: 0
ABDOMINAL PAIN: 0
CHILLS: 0
ABDOMINAL DISTENTION: 0
FLANK PAIN: 0
FEVER: 0
SORE THROAT: 0
HEMATURIA: 0
DIARRHEA: 0
UNEXPECTED WEIGHT CHANGE: 0
DIFFICULTY URINATING: 0
NEUROLOGICAL NEGATIVE: 1
CONSTIPATION: 0
SHORTNESS OF BREATH: 0
NAUSEA: 0
VOMITING: 0
BLOOD IN STOOL: 0
APPETITE CHANGE: 0

## 2018-10-04 NOTE — ED AVS SNAPSHOT
[facility]    [address] [address]    Phone:  [phone]                                       Ritesh Soliman   MRN: [id number]    Department:  [facility]   Date of Visit:  10/4/2018           Patient Information     Date Of Birth          1975        Your diagnoses for this visit were:     Excessive bleeding in premenopausal period        You were seen by Dafne Maciel PA-C.      Follow-up Information     Follow up with Juan Roger MD In 1 week.    Specialty:  OB/Gyn    Contact information:    [address] [facility] [address]  [phone]          Follow up with [facility].    Specialty:  EMERGENCY MEDICINE    Why:  If symptoms worsen    Contact information:    [address] [address] [phone]    Additional information:    From [facility] ([place], [place]): Take [address]. Turn left at [address]. Turn left at the first stoplight on [address]. At the first stop sign, take a right onto [address]. Take a left into the parking lot and continue through until you reach the North enterance of the building.       From [place]: Take [address]. Take the [address] ramp towards [place]. Turn left onto [address]. Take a slight right onto [address]. Turn left at the first stoplight on [address]. At the first stop sign, take a right onto [address]. Take a left into the parking lot and continue through until you reach the North enterance of the building.       From [place]: Take [address]. Take a right at [address]. At the first stop sign, take a right onto [address]. Take a left into the parking lot and continue through until you reach the North enterance of the building.         Discharge Instructions       Take the Norethindrone as prescribed to help stop the bleeding. You should be getting a call from the ultrasound department to set  up your ultrasound. Call OB to make an appointment for after the ultrasound to follow up. Return here with any new or worsening symptoms.         Heavy Menstrual Bleeding    Heavy menstrual bleeding means that your periods are heavier or longer than usual. You may soak through a pad or tampon every 1 to 3 hours on the heaviest days of your period. You may also pass large, dark clots. And your periods may last longer than 7 days.  If you have heavy periods often, this can cause a problem called anemia. With anemia, your red blood cell count is too low. Red blood cells are needed because they help carry oxygen throughout your body. Severe anemia may cause you to look pale and feel weak or tired. You might also become short of breath easily.  There are many possible causes of heavy menstrual bleeding. Hormonal imbalance is the most common cause. Having benign growths in your uterus, such as fibroids or polyps, is another cause. Taking certain medicines or having certain health problems or bleeding disorders are also causes.  To treat heavy menstrual bleeding, your healthcare provider may prescribe medicines first. If these don t help, you may need further testing and treatments.  Home care  Medicines  If you re prescribed medicines, be sure to take them as directed.    To help control heavy bleeding, any of the following may be used:  ? Hormone therapy (this includes all methods of hormonal birth control such as pills, shots, cream, ring, patch, or hormone-releasing IUD)  ? Nonsteroidal anti-inflammatory drugs (NSAIDs), such as ibuprofen  ? Antifibrinolytic medicines, such as transexamic acid    To help treat anemia, iron supplements may be prescribed.            General care    Get plenty of rest if you tire easily. Avoid heavy exertion.    To help relieve pain or cramping, try using a heating pad on the lower belly or back. A warm bath may also help.  Follow-up care  Follow up with your healthcare provider, or as  advised.  When to seek medical advice  Call your healthcare provider right away if any of these occur:    Heavier bleeding (soaking 1 pad or tampon every hour for 3 hours)    Heavy bleeding that lasts longer than 1 week    Fever of 100.4 F (38 C) or higher, or as directed by your provider    Pain or cramping that gets worse instead of better    Signs of anemia such as pale skin, extreme fatigue or weakness, or shortness of breath    Dizziness or fainting  Date Last Reviewed: 10/1/2017    1566-5562 [employer]. [address] [place] [address]. All rights reserved. This information is not intended as a substitute for professional medical care. Always follow your healthcare professional's instructions.          Your next 10 appointments already scheduled     Oct 09, 2018  1:15 PM CDT   (Arrive by 1:00 PM)   [facility] with Io[facility]enhall [facility]-C   [facility]    [address] [place] [address]  [place] [address]   [phone]              ED Discharge Orders     OB/GYN REFERRAL       Your provider has referred you to:  [facility] [phone]  [email or web address]    Please be aware that coverage of these services is subject to the terms and limitations of your health insurance plan.  Call member services at your health plan with any benefit or coverage questions.      Please bring the following with you to your appointment:    (1) Any X-Rays, CTs or MRIs which have been performed.  Contact the facility where they were done to arrange for  prior to your scheduled appointment.   (2) List of current medications   (3) This referral request   (4) Any documents/labs given to you for this referral            US Pelvic with transvaginal*                    Review of your medicines      START taking        Dose / Directions Last dose taken    norethindrone 5 MG tablet   Commonly known as:  AYGESTIN   Dose:   5 mg   Quantity:  20 tablet        Take 1 tablet (5 mg) by mouth 2 times daily for 10 days   Refills:  0          Our records show that you are taking the medicines listed below. If these are incorrect, please call your family doctor or clinic.        Dose / Directions Last dose taken    PARoxetine 40 MG tablet   Commonly known as:  PAXIL   Quantity:  45 tablet        Take 1.5 tablets (60 mg) by mouth At Bedtime   Refills:  1        traZODone 50 MG tablet   Commonly known as:  DESYREL   Quantity:  30 tablet        TAKE 1 TABLET (50 MG) BY MOUTH NIGHTLY AS NEEDED FOR SLEEP   Refills:  1                Prescriptions were sent or printed at these locations (1 Prescription)                   [facility] - [place], [place] - [address] AT [facility] [address]   [address], [place] [address]    Telephone:  [phone]   Fax:  [phone]   Hours:                  E-Prescribed (1 of 1)         norethindrone (AYGESTIN) 5 MG tablet                Procedures and tests performed during your visit     CBC with platelets differential    Comprehensive metabolic panel    HCG qualitative urine    UA reflex to Microscopic and Culture      Orders Needing Specimen Collection     Ordered          10/04/18 1843  UA reflex to Microscopic and Culture - STAT, Prio: STAT, Edited Result - FINAL     Scheduled Task Status   10/04/18 1844 [facility] Reminder: Open   Order Class:  PCU Collect                10/04/18 1843  HCG qualitative urine - STAT, Prio: STAT, Edited Result - FINAL     Scheduled Task Status   10/04/18 1844 [facility] Reminder: Open   Order Class:  PCU Collect                  Pending Results     No orders found from 10/2/2018 to 10/5/2018.            Pending Culture Results     No orders found from 10/2/2018 to 10/5/2018.            Thank you for choosing [facility]       Thank you for choosing [facility] for your care. Our goal is always to provide you with excellent care. Hearing back from our  patients is one way we can continue to improve our services. Please take a few minutes to complete the written survey that you may receive in the mail after you visit with us. Thank you!        Care EveryWhere ID     This is your Care EveryWhere ID. This could be used by other organizations to access your [facility] medical records  [id number]        Equal Access to Services     ROBBI FLETCHER : Jamil Allison, joselito jain, stefania steiner, sol garcia . So [facility] [phone].    ATENCIÓN: Si habla español, tiene a meléndez disposición servicios gratuitos de asistencia lingüística. [place] [phone].    We comply with applicable federal civil rights laws and [place] laws. We do not discriminate on the basis of race, color, national origin, age, disability, sex, sexual orientation, or gender identity.            After Visit Summary       This is your record. Keep this with you and show to your community pharmacist(s) and doctor(s) at your next visit.

## 2018-10-04 NOTE — TELEPHONE ENCOUNTER
GENERIC ADULT  Ritesh Soliman is a 43 year old female who calls with heavy period for the past 2 1/2 weeks, reports using 10 pads a day that are saturated with bright red blood, with occasional clots the size of walnuts. Patient c/o some dizziness at times, fatigue, and reports being anemic in the past.      RECOMMENDED DISPOSITION:  Go to Emergency Department or Urgent Care now Per Dr. Chelsi Weiss pt advised to go to urgent care and then follow-up with Dr. Weiss after. Pt verbalizes understanding.     Will comply with recommendation: Yes    If further questions/concerns or if symptoms do not improve, worsen or new symptoms develop, call your PCP or [facility] as soon as possible.      Guideline used: Adult Telephone Protocols Office Version 3rd Edition - Elliot Coronado MD, FACEP      Latrice Miller, DEANNE

## 2018-10-04 NOTE — ED NOTES
Pt presents with hx of 3 weeks worth of vaginal bleeding, reports very heavy bleeding for almost the entire time and that today is the lightest it has been. Pt states she was using both pads and tampons 10 to 12 x's/day for nearly the entire time. Pt was unable to get in to see her primary physician today. Pt without any pain. Pt denies dizziness and lightheadedness. No abdominal pain with palpation. AIDE NIEVES at bedside.

## 2018-10-04 NOTE — ED AVS SNAPSHOT
[facility]    [address] [place] [address]    Phone:  [phone]                                       Ritesh Soliman   MRN: [id number]    Department:  [facility]   Date of Visit:  10/4/2018           After Visit Summary Signature Page     I have received my discharge instructions, and my questions have been answered. I have discussed any challenges I see with this plan with the nurse or doctor.    ..........................................................................................................................................  Patient/Patient Representative Signature      ..........................................................................................................................................  Patient Representative Print Name and Relationship to Patient    ..................................................               ................................................  Date                                   Time    ..........................................................................................................................................  Reviewed by Signature/Title    ...................................................              ..............................................  Date                                               Time          22EPIC Rev 08/18

## 2018-10-05 NOTE — DISCHARGE INSTRUCTIONS
Take the Norethindrone as prescribed to help stop the bleeding. You should be getting a call from the ultrasound department to set up your ultrasound. Call OB to make an appointment for after the ultrasound to follow up. Return here with any new or worsening symptoms.         Heavy Menstrual Bleeding    Heavy menstrual bleeding means that your periods are heavier or longer than usual. You may soak through a pad or tampon every 1 to 3 hours on the heaviest days of your period. You may also pass large, dark clots. And your periods may last longer than 7 days.  If you have heavy periods often, this can cause a problem called anemia. With anemia, your red blood cell count is too low. Red blood cells are needed because they help carry oxygen throughout your body. Severe anemia may cause you to look pale and feel weak or tired. You might also become short of breath easily.  There are many possible causes of heavy menstrual bleeding. Hormonal imbalance is the most common cause. Having benign growths in your uterus, such as fibroids or polyps, is another cause. Taking certain medicines or having certain health problems or bleeding disorders are also causes.  To treat heavy menstrual bleeding, your healthcare provider may prescribe medicines first. If these don t help, you may need further testing and treatments.  Home care  Medicines  If you re prescribed medicines, be sure to take them as directed.    To help control heavy bleeding, any of the following may be used:  ? Hormone therapy (this includes all methods of hormonal birth control such as pills, shots, cream, ring, patch, or hormone-releasing IUD)  ? Nonsteroidal anti-inflammatory drugs (NSAIDs), such as ibuprofen  ? Antifibrinolytic medicines, such as transexamic acid    To help treat anemia, iron supplements may be prescribed.            General care    Get plenty of rest if you tire easily. Avoid heavy exertion.    To help relieve pain or cramping, try using a  heating pad on the lower belly or back. A warm bath may also help.  Follow-up care  Follow up with your healthcare provider, or as advised.  When to seek medical advice  Call your healthcare provider right away if any of these occur:    Heavier bleeding (soaking 1 pad or tampon every hour for 3 hours)    Heavy bleeding that lasts longer than 1 week    Fever of 100.4 F (38 C) or higher, or as directed by your provider    Pain or cramping that gets worse instead of better    Signs of anemia such as pale skin, extreme fatigue or weakness, or shortness of breath    Dizziness or fainting  Date Last Reviewed: 10/1/2017    6433-7826 [employer]. [address] [place] [address], [place] [address]. All rights reserved. This information is not intended as a substitute for professional medical care. Always follow your healthcare professional's instructions.

## 2018-10-05 NOTE — ED PROVIDER NOTES
History     Chief Complaint   Patient presents with     Vaginal Bleeding     for 3 weeks     HPI  Ritesh Soliman is a 43 year old female who presents with 3 week h/o heavy vaginal bleeding. Her menstrual cycles have been regular and usually lasting 3 days up until 3 weeks ago. She does not believe she is going through menopause. No abdominal pain. No dizziness or lightheadedness. States she is going through a tampon every 2 hours.     Problem List:    Patient Active Problem List    Diagnosis Date Noted     OSCAR (generalized anxiety disorder) 05/09/2018     Priority: Medium     Major depressive disorder, recurrent episode, moderate (H) 05/09/2018     Priority: Medium     Panic attack 05/09/2018     Priority: Medium     ACP (advance care planning) 04/29/2016     Priority: Medium     Advance Care Planning 4/29/2016: ACP Review of Chart / Resources Provided:  Reviewed chart for advance care plan.  Ritesh Soliman has been provided information and resources to begin or update their advance care plan.  Added by Ngoc Duong             Tobacco abuse      Priority: Medium     Depression with anxiety      Priority: Medium     Recurrent UTI 02/27/2014     Priority: Medium        Past Medical History:    Past Medical History:   Diagnosis Date     Depression with anxiety      Tobacco abuse        Past Surgical History:    Past Surgical History:   Procedure Laterality Date     BIOPSY BREAST Right 10/19/2017    Procedure: BIOPSY BREAST;  EXCISIONAL BIOPSY RIGHT BREAST;  Surgeon: Kiko Newberry MD;  Location: [facility]     ESOPHAGOSCOPY, GASTROSCOPY, DUODENOSCOPY (EGD), COMBINED N/A 10/1/2018    Procedure: COMBINED ESOPHAGOSCOPY, GASTROSCOPY, DUODENOSCOPY (EGD);  UPPER ENDOSCOPY WITH BIOPSIES;  Surgeon: Kiko Newberry MD;  Location: [facility]     OOPHORECTOMY      tubal pregnancy     TUBAL LIGATION         Family History:    Family History   Problem Relation Age of Onset     Coronary Artery Disease Mother      Hypertension Mother       Coronary Artery Disease Father      C.A.D. No family hx of      Breast Cancer No family hx of      Cancer - colorectal No family hx of      Diabetes No family hx of      Thyroid Disease No family hx of      Asthma No family hx of      Colon Cancer No family hx of      Hyperlipidemia No family hx of        Social History:  Marital Status:  Single [1]  Social History   Substance Use Topics     Smoking status: Current Some Day Smoker     Packs/day: 0.50     Types: Cigarettes     Smokeless tobacco: Never Used      Comment: rarely smokes. declines quitplan referral 9/18/18     Alcohol use 0.0 oz/week     0 Standard drinks or equivalent per week      Comment: occassionally        Medications:      norethindrone (AYGESTIN) 5 MG tablet   PARoxetine (PAXIL) 40 MG tablet   traZODone (DESYREL) 50 MG tablet         Review of Systems   Constitutional: Negative for appetite change, chills, fever and unexpected weight change.   HENT: Negative for sore throat.    Respiratory: Negative for shortness of breath.    Cardiovascular: Negative for chest pain.   Gastrointestinal: Negative for abdominal distention, abdominal pain, anal bleeding, blood in stool, constipation, diarrhea, nausea and vomiting.   Genitourinary: Positive for vaginal bleeding. Negative for difficulty urinating, dyspareunia, dysuria, flank pain, frequency, genital sores, hematuria, pelvic pain, urgency, vaginal discharge and vaginal pain.   Musculoskeletal: Negative.    Skin: Negative.    Neurological: Negative.    All other systems reviewed and are negative.      Physical Exam   BP: 144/85  Pulse: 86  Heart Rate: 87  Temp: 98.8  F (37.1  C)  Resp: 16  SpO2: 97 %      Physical Exam   Constitutional: She is oriented to person, place, and time. Vital signs are normal. She appears well-developed and well-nourished.  Non-toxic appearance. She does not have a sickly appearance. She does not appear ill. No distress.   HENT:   Head: Normocephalic and atraumatic.    Right Ear: External ear normal.   Left Ear: External ear normal.   Nose: Nose normal.   Mouth/Throat: Oropharynx is clear and moist. No oropharyngeal exudate.   Eyes: Conjunctivae are normal. Pupils are equal, round, and reactive to light. Right eye exhibits no discharge. Left eye exhibits no discharge. No scleral icterus.   Neck: Normal range of motion. Neck supple.   Cardiovascular: Normal rate, regular rhythm, normal heart sounds and intact distal pulses.  Exam reveals no gallop and no friction rub.    No murmur heard.  Pulmonary/Chest: Effort normal and breath sounds normal. No respiratory distress. She has no wheezes. She has no rales. She exhibits no tenderness.   Abdominal: Soft. Bowel sounds are normal. She exhibits no distension and no mass. There is no tenderness. There is no rebound and no guarding.   Genitourinary: Uterus normal. There is no rash, tenderness, lesion or injury on the right labia. There is no rash, tenderness, lesion or injury on the left labia. Uterus is not deviated, not enlarged, not fixed and not tender. Cervix exhibits no motion tenderness, no discharge and no friability. Right adnexum displays no mass, no tenderness and no fullness. Left adnexum displays no mass, no tenderness and no fullness. There is bleeding (Moderate bleeding from cervix. ) in the vagina. No tenderness in the vagina. No foreign body in the vagina. No signs of injury around the vagina. No vaginal discharge found.   Musculoskeletal: Normal range of motion. She exhibits no edema.   Lymphadenopathy:     She has no cervical adenopathy.   Neurological: She is alert and oriented to person, place, and time. No cranial nerve deficit.   Skin: Skin is warm and dry. No rash noted. She is not diaphoretic. No erythema. No pallor.   Psychiatric: She has a normal mood and affect. Her behavior is normal. Judgment and thought content normal.   Nursing note and vitals reviewed.      ED Course     ED Course     Procedures               Results for orders placed or performed during the hospital encounter of 10/04/18 (from the past 24 hour(s))   UA reflex to Microscopic and Culture   Result Value Ref Range    Color Urine Light Yellow     Appearance Urine Clear     Glucose Urine Negative NEG^Negative mg/dL    Bilirubin Urine Negative NEG^Negative    Ketones Urine Negative NEG^Negative mg/dL    Specific Gravity Urine 1.008 1.003 - 1.035    Blood Urine Small (A) NEG^Negative    pH Urine 5.0 4.7 - 8.0 pH    Protein Albumin Urine Negative NEG^Negative mg/dL    Urobilinogen mg/dL Normal 0.0 - 2.0 mg/dL    Nitrite Urine Negative NEG^Negative    Leukocyte Esterase Urine Negative NEG^Negative    Source Midstream Urine     RBC Urine 2 0 - 2 /HPF    WBC Urine <1 0 - 5 /HPF    Bacteria Urine None (A) NEG^Negative /HPF    Mucous Urine Present (A) NEG^Negative /LPF   HCG qualitative urine   Result Value Ref Range    HCG Qual Urine Negative NEG^Negative   CBC with platelets differential   Result Value Ref Range    WBC 9.4 4.0 - 11.0 10e9/L    RBC Count 4.27 3.8 - 5.2 10e12/L    Hemoglobin 14.2 11.7 - 15.7 g/dL    Hematocrit 40.8 35.0 - 47.0 %    MCV 96 78 - 100 fl    MCH 33.3 (H) 26.5 - 33.0 pg    MCHC 34.8 31.5 - 36.5 g/dL    RDW 12.5 10.0 - 15.0 %    Platelet Count 308 150 - 450 10e9/L    Diff Method Automated Method     % Neutrophils 70.9 %    % Lymphocytes 19.2 %    % Monocytes 7.6 %    % Eosinophils 1.9 %    % Basophils 0.2 %    % Immature Granulocytes 0.2 %    Nucleated RBCs 0 0 /100    Absolute Neutrophil 6.7 1.6 - 8.3 10e9/L    Absolute Lymphocytes 1.8 0.8 - 5.3 10e9/L    Absolute Monocytes 0.7 0.0 - 1.3 10e9/L    Absolute Eosinophils 0.2 0.0 - 0.7 10e9/L    Absolute Basophils 0.0 0.0 - 0.2 10e9/L    Abs Immature Granulocytes 0.0 0 - 0.4 10e9/L    Absolute Nucleated RBC 0.0    Comprehensive metabolic panel   Result Value Ref Range    Sodium 139 133 - 144 mmol/L    Potassium 4.1 3.4 - 5.3 mmol/L    Chloride 108 94 - 109 mmol/L    Carbon Dioxide 23 20  - 32 mmol/L    Anion Gap 8 3 - 14 mmol/L    Glucose 83 70 - 99 mg/dL    Urea Nitrogen 17 7 - 30 mg/dL    Creatinine 0.90 0.52 - 1.04 mg/dL    GFR Estimate 67 >60 mL/min/1.7m2    GFR Estimate If Black 81 >60 mL/min/1.7m2    Calcium 9.1 8.5 - 10.1 mg/dL    Bilirubin Total 0.2 0.2 - 1.3 mg/dL    Albumin 4.1 3.4 - 5.0 g/dL    Protein Total 8.0 6.8 - 8.8 g/dL    Alkaline Phosphatase 85 40 - 150 U/L    ALT 21 0 - 50 U/L    AST 19 0 - 45 U/L       Medications - No data to display    Assessments & Plan (with Medical Decision Making)   Star is hemodynamically stable. Hemoglobin WNL at 14.2. Pelvic exam reveal moderate bleeding from cervix, otherwise normal. Consulted with Dr. Roger who recommends Norethindrone and f/u with OB/Gyn following outpatient pelvic US. Pt happy with this plan and was discharged home following. Outpatient US ordered.     Plan: Take the Norethindrone as prescribed to help stop the bleeding. You should be getting a call from the ultrasound department to set up your ultrasound. Call OB to make an appointment for after the ultrasound to follow up. Return here with any new or worsening symptoms.     I have reviewed the nursing notes.    I have reviewed the findings, diagnosis, plan and need for follow up with the patient.    Discharge Medication List as of 10/4/2018  8:05 PM      START taking these medications    Details   norethindrone (AYGESTIN) 5 MG tablet Take 1 tablet (5 mg) by mouth 2 times daily for 10 days, Disp-20 tablet, R-0, E-Prescribe             Final diagnoses:   Excessive bleeding in premenopausal period       10/4/2018   [facility]     Dafne Maciel PA-C  10/04/18 1306

## 2018-10-05 NOTE — ED NOTES
Discharge teaching done, AVS reviewed with pt, questions answered. Pt verbalized understanding and is agreeable with discharge plan. Pt discharged to home.

## 2018-10-09 ENCOUNTER — OFFICE VISIT (OUTPATIENT)
Dept: OTOLARYNGOLOGY | Facility: OTHER | Age: 43
End: 2018-10-09
Attending: PHYSICIAN ASSISTANT
Payer: COMMERCIAL

## 2018-10-09 VITALS
HEART RATE: 75 BPM | TEMPERATURE: 98.5 F | DIASTOLIC BLOOD PRESSURE: 76 MMHG | WEIGHT: 156 LBS | BODY MASS INDEX: 28.71 KG/M2 | HEIGHT: 62 IN | OXYGEN SATURATION: 98 % | SYSTOLIC BLOOD PRESSURE: 124 MMHG

## 2018-10-09 DIAGNOSIS — L50.9 URTICARIA: Primary | ICD-10-CM

## 2018-10-09 DIAGNOSIS — R13.10 DYSPHAGIA, UNSPECIFIED TYPE: ICD-10-CM

## 2018-10-09 DIAGNOSIS — K21.9 LPRD (LARYNGOPHARYNGEAL REFLUX DISEASE): ICD-10-CM

## 2018-10-09 DIAGNOSIS — Z72.0 TOBACCO ABUSE: ICD-10-CM

## 2018-10-09 PROCEDURE — 99213 OFFICE O/P EST LOW 20 MIN: CPT | Performed by: PHYSICIAN ASSISTANT

## 2018-10-09 PROCEDURE — G0463 HOSPITAL OUTPT CLINIC VISIT: HCPCS

## 2018-10-09 ASSESSMENT — PAIN SCALES - GENERAL: PAINLEVEL: NO PAIN (0)

## 2018-10-09 NOTE — PATIENT INSTRUCTIONS
Continue with good water intake.   Limit caffeine-   Symptoms should improve, if continuing- contact nurse. May try Prilosec again.  Quit tobacco.   Return to ENT as needed.     Thank you for allowing LIZBETH Roberts and our ENT team to participate in your care.  If your medications are too expensive, please give the nurse a call.  We can possibly change this medication.  If you have a scheduling or an appointment question please contact our Health Unit Coordinator at their direct line [phone].   ALL nursing questions or concerns can be directed to your ENT nurse at: [phone] [place]

## 2018-10-09 NOTE — NURSING NOTE
"Chief Complaint   Patient presents with     RECHECK     Follow Up Dysphagia, LPRD, Pruritis, Urticaria       Initial /76 (Cuff Size: Adult Regular)  Pulse 75  Temp 98.5  F (36.9  C) (Tympanic)  Ht 5' 2\" (1.575 m)  Wt 156 lb (70.8 kg)  SpO2 98%  BMI 28.53 kg/m2 Estimated body mass index is 28.53 kg/(m^2) as calculated from the following:    Height as of this encounter: 5' 2\" (1.575 m).    Weight as of this encounter: 156 lb (70.8 kg).  Medication Reconciliation: complete    Dulce Maria Street LPN    "

## 2018-10-09 NOTE — LETTER
10/9/2018         RE: Ritesh Soliman  [address] [address]        Dear Colleague,    Thank you for referring your patient, Ritesh Soliman, [facility]. Please see a copy of my visit note below.    Chief Complaint   Patient presents with     RECHECK     Follow Up Dysphagia, LPRD, Pruritis, Urticaria     Patient returns for repeat exam. She was last seen on 8/8/18 for concerns of dysphagia. She described dysphagia which was present at the ED but upon ENT visit overall had resolved. Start did c/o intermittent raspy vocal quality, mild globus sensation.   She was started on prilosec for short term trial to see if symptoms would be relieved. However, she did have some side effects from and had since quit use.   Patient completed EGD with biopsies and were WNL. No acute or obstructive findings.   She returns today for follow up since her last exam.     She has discontinued Prilosec at that time. She had since being able to tolerate food.   She is able to swallow liquids well. Overall her throat feels better since before.   No recent rashes. These have subsided. No concerns.   She has been drinking water. She has cut down on dt pop.   Weight has been stable, she did have weight loss at her initial visit but has improved.     At her last visit, Hgb, TSH, CMP were within normal range.   Recent labs have been within normal range.       Past Medical History:   Diagnosis Date     Depression with anxiety      Tobacco abuse         Allergies   Allergen Reactions     Augmentin [Amoxicillin-Pot Clavulanate] Swelling     Current Outpatient Prescriptions   Medication     norethindrone (AYGESTIN) 5 MG tablet     PARoxetine (PAXIL) 40 MG tablet     traZODone (DESYREL) 50 MG tablet     No current facility-administered medications for this visit.       ROS: 10 point ROS neg other than the symptoms noted above in the HPI.  /76 (Cuff Size: Adult Regular)  Pulse 75  Temp 98.5  F (36.9  " C) (Tympanic)  Ht 5' 2\" (1.575 m)  Wt 156 lb (70.8 kg)  SpO2 98%  BMI 28.53 kg/m2    General - The patient is well nourished and well developed, and appears to have good nutritional status.  Alert and oriented to person and place, answers questions and cooperates with examination appropriately.   Head and Face - Normocephalic and atraumatic, with no gross asymmetry noted.  The facial nerve is intact, with strong symmetric movements.  Voice and Breathing - The patient was breathing comfortably without the use of accessory muscles. There was no wheezing, stridor, or stertor.  The patients voice was clear and strong, and had appropriate pitch and quality.  Ears -The external auditory canals are patent, the tympanic membranes are intact without effusion, retraction or mass.  Bony landmarks are intact.  Eyes - Extraocular movements intact, and the pupils were reactive to light.  Sclera were not icteric or injected, conjunctiva were pink and moist.  Mouth - Examination of the oral cavity showed pink, healthy oral mucosa. No lesions or ulcerations noted.  The tongue was mobile and midline, and the dentition were in good condition.    Throat - The walls of the oropharynx were smooth, pink, moist, symmetric, and had no lesions or ulcerations.  The tonsillar pillars and soft palate were symmetric.  The uvula was midline on elevation.    Neck - Normal midline excursion of the laryngotracheal complex during swallowing.  Full range of motion on passive movement.  Palpation of the occipital, submental, submandibular, internal jugular chain, and supraclavicular nodes did not demonstrate any abnormal lymph nodes or masses.  Palpation of the thyroid was soft and smooth, with no nodules or goiter appreciated.  The trachea was mobile and midline.  Nose - External contour is symmetric, no gross deflection or scars.  Nasal mucosa is pink and moist with no abnormal mucus.  The septum and turbinates were evaluated:  No polyps, " masses, or purulence noted on examination.  Skin- Negative for rash.     ASSESSMENT:    ICD-10-CM    1. Urticaria, Resolved L50.9    2. LPRD (laryngopharyngeal reflux disease), Improved K21.9    3. Dysphagia, unspecified type. Resolved R13.10    4. Tobacco abuse Z72.0        Continue with good water intake.   Limit caffeine-   Symptoms should improve, if continuing- contact nurse. May try Prilosec again.  Quit tobacco.   Return to ENT as needed. If symptoms return. She has no active symptoms and has no further concerns.         Ioana Mendenhall PA-C  ENT  [facility], [place]  [phone]      Again, thank you for allowing me to participate in the care of your patient.        Sincerely,        Ioana Mendenhall PA-C

## 2018-10-09 NOTE — MR AVS SNAPSHOT
After Visit Summary   10/9/2018    Ritesh Soliman    MRN: [id number]           Patient Information     Date Of Birth          1975        Visit Information        Provider Department      10/9/2018 1:15 PM Ioana Mendenhall PA-C [facility]        Today's Diagnoses     Urticaria, Resolved    -  1    LPRD (laryngopharyngeal reflux disease), Improved        Dysphagia, unspecified type. Resolved        Tobacco abuse          Care Instructions    Continue with good water intake.   Limit caffeine-   Symptoms should improve, if continuing- contact nurse. May try Prilosec again.  Quit tobacco.   Return to ENT as needed.     Thank you for allowing LIZBETH Roberts and our ENT team to participate in your care.  If your medications are too expensive, please give the nurse a call.  We can possibly change this medication.  If you have a scheduling or an appointment question please contact our Health Unit Coordinator at their direct line [phone].   ALL nursing questions or concerns can be directed to your ENT nurse at: [phone] [place]              Follow-ups after your visit        Who to contact     If you have questions or need follow up information about today's clinic visit or your schedule please contact [facility] directly at [phone].  Normal or non-critical lab and imaging results will be communicated to you by MyChart, letter or phone within 4 business days after the clinic has received the results. If you do not hear from us within 7 days, please contact the clinic through MyChart or phone. If you have a critical or abnormal lab result, we will notify you by phone as soon as possible.  Submit refill requests through [employer] or call your pharmacy and they will forward the refill request to us. Please allow 3 business days for your refill to be completed.          Additional Information About Your Visit        Care EveryWhere ID     This is your Care  "EveryWhere ID. This could be used by other organizations to access your [facility] medical records  [id number]        Your Vitals Were     Pulse Temperature Height Pulse Oximetry BMI (Body Mass Index)       75 98.5  F (36.9  C) (Tympanic) 5' 2\" (1.575 m) 98% 28.53 kg/m2        Blood Pressure from Last 3 Encounters:   10/09/18 124/76   10/04/18 131/89   10/01/18 119/85    Weight from Last 3 Encounters:   10/09/18 156 lb (70.8 kg)   10/01/18 156 lb (70.8 kg)   09/18/18 153 lb 12.8 oz (69.8 kg)              Today, you had the following     No orders found for display       Primary Care Provider Office Phone # Fax #    Olivia Weiss [phone] [address] [address]        Equal Access to Services     OBDULIA FLETCHER : Hadii carl bass hadasho Sojustin, waaxda luqadaha, qaybta kaalmada adeegyada, sol garcia . So [facility] [phone].    ATENCIÓN: Si habla español, tiene a meléndez disposición servicios gratuitos de asistencia lingüística. [place] [phone].    We comply with applicable federal civil rights laws and [place] laws. We do not discriminate on the basis of race, color, national origin, age, disability, sex, sexual orientation, or gender identity.            Thank you!     Thank you for choosing [facility]  for your care. Our goal is always to provide you with excellent care. Hearing back from our patients is one way we can continue to improve our services. Please take a few minutes to complete the written survey that you may receive in the mail after your visit with us. Thank you!             Your Updated Medication List - Protect others around you: Learn how to safely use, store and throw away your medicines at www.disposemymeds.org.          This list is accurate as of 10/9/18  1:23 PM.  Always use your most recent med list.                   Brand Name Dispense Instructions for use Diagnosis    PARoxetine 40 MG tablet    " PAXIL    45 tablet    Take 1.5 tablets (60 mg) by mouth At Bedtime    Depression with anxiety       traZODone 50 MG tablet    DESYREL    30 tablet    TAKE 1 TABLET (50 MG) BY MOUTH NIGHTLY AS NEEDED FOR SLEEP    Insomnia

## 2018-10-09 NOTE — PROGRESS NOTES
"Chief Complaint   Patient presents with     RECHECK     Follow Up Dysphagia, LPRD, Pruritis, Urticaria     Patient returns for repeat exam. She was last seen on 8/8/18 for concerns of dysphagia. She described dysphagia which was present at the ED but upon ENT visit overall had resolved. Start did c/o intermittent raspy vocal quality, mild globus sensation.   She was started on prilosec for short term trial to see if symptoms would be relieved. However, she did have some side effects from and had since quit use.   Patient completed EGD with biopsies and were WNL. No acute or obstructive findings.   She returns today for follow up since her last exam.     She has discontinued Prilosec at that time. She had since being able to tolerate food.   She is able to swallow liquids well. Overall her throat feels better since before.   No recent rashes. These have subsided. No concerns.   She has been drinking water. She has cut down on dt pop.   Weight has been stable, she did have weight loss at her initial visit but has improved.     At her last visit, Hgb, TSH, CMP were within normal range.   Recent labs have been within normal range.       Past Medical History:   Diagnosis Date     Depression with anxiety      Tobacco abuse         Allergies   Allergen Reactions     Augmentin [Amoxicillin-Pot Clavulanate] Swelling     Current Outpatient Prescriptions   Medication     norethindrone (AYGESTIN) 5 MG tablet     PARoxetine (PAXIL) 40 MG tablet     traZODone (DESYREL) 50 MG tablet     No current facility-administered medications for this visit.       ROS: 10 point ROS neg other than the symptoms noted above in the HPI.  /76 (Cuff Size: Adult Regular)  Pulse 75  Temp 98.5  F (36.9  C) (Tympanic)  Ht 5' 2\" (1.575 m)  Wt 156 lb (70.8 kg)  SpO2 98%  BMI 28.53 kg/m2    General - The patient is well nourished and well developed, and appears to have good nutritional status.  Alert and oriented to person and place, answers " questions and cooperates with examination appropriately.   Head and Face - Normocephalic and atraumatic, with no gross asymmetry noted.  The facial nerve is intact, with strong symmetric movements.  Voice and Breathing - The patient was breathing comfortably without the use of accessory muscles. There was no wheezing, stridor, or stertor.  The patients voice was clear and strong, and had appropriate pitch and quality.  Ears -The external auditory canals are patent, the tympanic membranes are intact without effusion, retraction or mass.  Bony landmarks are intact.  Eyes - Extraocular movements intact, and the pupils were reactive to light.  Sclera were not icteric or injected, conjunctiva were pink and moist.  Mouth - Examination of the oral cavity showed pink, healthy oral mucosa. No lesions or ulcerations noted.  The tongue was mobile and midline, and the dentition were in good condition.    Throat - The walls of the oropharynx were smooth, pink, moist, symmetric, and had no lesions or ulcerations.  The tonsillar pillars and soft palate were symmetric.  The uvula was midline on elevation.    Neck - Normal midline excursion of the laryngotracheal complex during swallowing.  Full range of motion on passive movement.  Palpation of the occipital, submental, submandibular, internal jugular chain, and supraclavicular nodes did not demonstrate any abnormal lymph nodes or masses.  Palpation of the thyroid was soft and smooth, with no nodules or goiter appreciated.  The trachea was mobile and midline.  Nose - External contour is symmetric, no gross deflection or scars.  Nasal mucosa is pink and moist with no abnormal mucus.  The septum and turbinates were evaluated:  No polyps, masses, or purulence noted on examination.  Skin- Negative for rash.     ASSESSMENT:    ICD-10-CM    1. Urticaria, Resolved L50.9    2. LPRD (laryngopharyngeal reflux disease), Improved K21.9    3. Dysphagia, unspecified type. Resolved R13.10    4.  Tobacco abuse Z72.0        Continue with good water intake.   Limit caffeine-   Symptoms should improve, if continuing- contact nurse. May try Prilosec again.  Quit tobacco.   Return to ENT as needed. If symptoms return. She has no active symptoms and has no further concerns.         Ioana Mendenhall PA-C  [facility], [place]  [phone]

## 2018-10-11 ENCOUNTER — TELEPHONE (OUTPATIENT)
Dept: ULTRASOUND IMAGING | Facility: HOSPITAL | Age: 43
End: 2018-10-11

## 2018-11-08 DIAGNOSIS — F51.01 PRIMARY INSOMNIA: Primary | ICD-10-CM

## 2018-11-08 DIAGNOSIS — G47.00 INSOMNIA: ICD-10-CM

## 2018-11-08 NOTE — TELEPHONE ENCOUNTER
trazodone      Last Written Prescription Date:  8/27/18  Last Fill Quantity: 30,   # refills: 1  Last Office Visit: 5/8/18  Future Office visit:       Routing refill request to provider for review/approval because:  Drug not on the [facility], [facility] or [facility] refill protocol or controlled substance

## 2018-11-12 RX ORDER — TRAZODONE HYDROCHLORIDE 50 MG/1
TABLET, FILM COATED ORAL
Qty: 30 TABLET | Refills: 0 | Status: SHIPPED | OUTPATIENT
Start: 2018-11-12 | End: 2019-10-03

## 2018-12-06 ENCOUNTER — TELEPHONE (OUTPATIENT)
Dept: FAMILY MEDICINE | Facility: OTHER | Age: 43
End: 2018-12-06

## 2018-12-06 NOTE — TELEPHONE ENCOUNTER
Called patient for depression follow up and asked if she would like to schedule appointment for follow up she stated she was feeling much better in that aspect but was unable to do a PHQ over the phone  (she was at work) but she was willing to have a nurse call her Friday on her day off and she would also schedule appt,

## 2018-12-07 NOTE — TELEPHONE ENCOUNTER
Contacted patient who cannot do the worksheet over the phone at this time but willing to be contacted Monday befor 9 ooam

## 2018-12-10 NOTE — TELEPHONE ENCOUNTER
No response from patient after 3 attempts to fill out PHQ over the phone and make a follow up appt  Task complete  Patient to call clinic at her convenence

## 2019-07-09 ENCOUNTER — HOSPITAL ENCOUNTER (EMERGENCY)
Facility: HOSPITAL | Age: 44
Discharge: HOME | End: 2019-07-09
Attending: PHYSICIAN ASSISTANT | Admitting: PHYSICIAN ASSISTANT
Payer: COMMERCIAL

## 2019-07-09 VITALS
TEMPERATURE: 97.8 F | DIASTOLIC BLOOD PRESSURE: 82 MMHG | RESPIRATION RATE: 18 BRPM | OXYGEN SATURATION: 95 % | SYSTOLIC BLOOD PRESSURE: 114 MMHG

## 2019-07-09 DIAGNOSIS — N39.0 URINARY TRACT INFECTION: ICD-10-CM

## 2019-07-09 DIAGNOSIS — N39.0 URINARY TRACT INFECTION WITHOUT HEMATURIA, SITE UNSPECIFIED: ICD-10-CM

## 2019-07-09 LAB
ALBUMIN UR-MCNC: 30 MG/DL
APPEARANCE UR: ABNORMAL
BACTERIA #/AREA URNS HPF: ABNORMAL /HPF
BILIRUB UR QL STRIP: NEGATIVE
COLOR UR AUTO: YELLOW
GLUCOSE UR STRIP-MCNC: NEGATIVE MG/DL
HGB UR QL STRIP: ABNORMAL
KETONES UR STRIP-MCNC: NEGATIVE MG/DL
LEUKOCYTE ESTERASE UR QL STRIP: ABNORMAL
MUCOUS THREADS #/AREA URNS LPF: PRESENT /LPF
NITRATE UR QL: NEGATIVE
PH UR STRIP: 5.5 PH (ref 4.7–8)
RBC #/AREA URNS AUTO: 8 /HPF (ref 0–2)
SOURCE: ABNORMAL
SP GR UR STRIP: 1.02 (ref 1–1.03)
SQUAMOUS #/AREA URNS AUTO: 4 /HPF (ref 0–1)
TRANS CELLS #/AREA URNS HPF: 2 /HPF (ref 0–1)
UROBILINOGEN UR STRIP-MCNC: NORMAL MG/DL (ref 0–2)
WBC #/AREA URNS AUTO: >182 /HPF (ref 0–5)
WBC CLUMPS #/AREA URNS HPF: PRESENT /HPF

## 2019-07-09 PROCEDURE — 81001 URINALYSIS AUTO W/SCOPE: CPT | Performed by: FAMILY MEDICINE

## 2019-07-09 PROCEDURE — 99213 OFFICE O/P EST LOW 20 MIN: CPT | Mod: Z6 | Performed by: PHYSICIAN ASSISTANT

## 2019-07-09 PROCEDURE — G0463 HOSPITAL OUTPT CLINIC VISIT: HCPCS

## 2019-07-09 RX ORDER — SULFAMETHOXAZOLE/TRIMETHOPRIM 800-160 MG
1 TABLET ORAL 2 TIMES DAILY
Qty: 6 TABLET | Refills: 0 | Status: SHIPPED | OUTPATIENT
Start: 2019-07-09 | End: 2019-10-03

## 2019-07-09 NOTE — ED TRIAGE NOTES
Patient arrives by self for evaluation of dysuria, onset yesterday. Hx of multiple UTI's. Denies fevers.

## 2019-07-09 NOTE — ED AVS SNAPSHOT
[facility]  [address] [address]  Phone:  [phone]                                    Ritesh Soliman   MRN: [id number]    Department:  [facility]   Date of Visit:  7/9/2019           After Visit Summary Signature Page    I have received my discharge instructions, and my questions have been answered. I have discussed any challenges I see with this plan with the nurse or doctor.    ..........................................................................................................................................  Patient/Patient Representative Signature      ..........................................................................................................................................  Patient Representative Print Name and Relationship to Patient    ..................................................               ................................................  Date                                   Time    ..........................................................................................................................................  Reviewed by Signature/Title    ...................................................              ..............................................  Date                                               Time          22EPIC Rev 08/18

## 2019-07-09 NOTE — ED PROVIDER NOTES
History     Chief Complaint   Patient presents with     Rule out Urinary Tract Infection     HPI  Ritesh Soliman is a 44 year old female who presents with complaints of dysuria, frequency, urgency for 2 day(s).  Denies fever, nausea, vomiting, flank pain, abdominal pain, unusual vaginal discharge.  LMP within the past month.      Allergies:  Allergies   Allergen Reactions     Augmentin [Amoxicillin-Pot Clavulanate] Swelling       Problem List:    Patient Active Problem List    Diagnosis Date Noted     OSCAR (generalized anxiety disorder) 05/09/2018     Priority: Medium     Major depressive disorder, recurrent episode, moderate (H) 05/09/2018     Priority: Medium     Panic attack 05/09/2018     Priority: Medium     ACP (advance care planning) 04/29/2016     Priority: Medium     Advance Care Planning 4/29/2016: ACP Review of Chart / Resources Provided:  Reviewed chart for advance care plan.  Ritesh Soliman has been provided information and resources to begin or update their advance care plan.  Added by Ngoc Duong             Tobacco abuse      Priority: Medium     Depression with anxiety      Priority: Medium     Recurrent UTI 02/27/2014     Priority: Medium        Past Medical History:    Past Medical History:   Diagnosis Date     Depression with anxiety      Tobacco abuse        Social History:  Marital Status:  Single [1]  Social History     Tobacco Use     Smoking status: Current Some Day Smoker     Packs/day: 0.50     Types: Cigarettes     Smokeless tobacco: Never Used     Tobacco comment: rarely smokes. declines quitplan referral 9/18/18   Substance Use Topics     Alcohol use: Yes     Alcohol/week: 0.0 oz     Comment: occassionally     Drug use: No        Medications:      sulfamethoxazole-trimethoprim (BACTRIM DS) 800-160 MG tablet   traZODone (DESYREL) 50 MG tablet         Review of Systems :  Constitutional: Negative for fever.   Abdomen: Negative for pain  : Positive for dysuria, frequency, urgency.   Negative for unusual vaginal discharge.    Physical Exam   BP: 114/82  Heart Rate: 97  Temp: 97.8  F (36.6  C)  Resp: 18  SpO2: 95 %    Physical Exam   Constitutional: Well-developed and well-nourished.   Head: Normocephalic and atraumatic.   Eyes: Conjunctivae and EOM are normal. Pupils are equal, round, and reactive to light.   Neck: Normal range of motion.   Pulmonary/Chest: Effort normal  Abdomen: No CVA tenderness to percussion.  : Vaginal exam deferred.  Skin: Skin is warm and dry. No rash noted.   Neuro: Gait normal.      ED Course               Results for orders placed or performed during the hospital encounter of 07/09/19 (from the past 24 hour(s))   UA reflex to Microscopic   Result Value Ref Range    Color Urine Yellow     Appearance Urine Cloudy     Glucose Urine Negative NEG^Negative mg/dL    Bilirubin Urine Negative NEG^Negative    Ketones Urine Negative NEG^Negative mg/dL    Specific Gravity Urine 1.021 1.003 - 1.035    Blood Urine Small (A) NEG^Negative    pH Urine 5.5 4.7 - 8.0 pH    Protein Albumin Urine 30 (A) NEG^Negative mg/dL    Urobilinogen mg/dL Normal 0.0 - 2.0 mg/dL    Nitrite Urine Negative NEG^Negative    Leukocyte Esterase Urine Large (A) NEG^Negative    Source Midstream Urine     RBC Urine 8 (H) 0 - 2 /HPF    WBC Urine >182 (H) 0 - 5 /HPF    WBC Clumps Present (A) NEG^Negative /HPF    Bacteria Urine Moderate (A) NEG^Negative /HPF    Squamous Epithelial /HPF Urine 4 (H) 0 - 1 /HPF    Transitional Epi 2 (H) 0 - 1 /HPF    Mucous Urine Present (A) NEG^Negative /LPF       Medications - No data to display    Assessments & Plan (with Medical Decision Making)     I have reviewed the nursing notes.    I have reviewed the findings, diagnosis, plan and need for follow up with the patient.  Recommended return to clinic or ED if new fever, vomiting, flank pain that would be more concerning for kidney infection or if any new or concerning symptoms develop.   Follow-up with primary provider if no  improvement in 2-3 days.        Medication List      Started    sulfamethoxazole-trimethoprim 800-160 MG tablet  Commonly known as:  BACTRIM DS  1 tablet, Oral, 2 TIMES DAILY            Final diagnoses:   Urinary tract infection       LIZBETH Jaquez on 7/9/2019 at 11:04 AM   7/9/2019   [facility]         Dwaine Domingo PA  07/09/19 1109

## 2019-10-03 ENCOUNTER — NURSE TRIAGE (OUTPATIENT)
Dept: FAMILY MEDICINE | Facility: OTHER | Age: 44
End: 2019-10-03

## 2019-10-03 ENCOUNTER — OFFICE VISIT (OUTPATIENT)
Dept: FAMILY MEDICINE | Facility: OTHER | Age: 44
End: 2019-10-03
Attending: FAMILY MEDICINE
Payer: COMMERCIAL

## 2019-10-03 VITALS
RESPIRATION RATE: 19 BRPM | OXYGEN SATURATION: 95 % | SYSTOLIC BLOOD PRESSURE: 120 MMHG | WEIGHT: 160 LBS | HEART RATE: 70 BPM | HEIGHT: 62 IN | BODY MASS INDEX: 29.44 KG/M2 | DIASTOLIC BLOOD PRESSURE: 60 MMHG

## 2019-10-03 DIAGNOSIS — Z72.0 TOBACCO ABUSE: ICD-10-CM

## 2019-10-03 DIAGNOSIS — F41.1 GAD (GENERALIZED ANXIETY DISORDER): Primary | ICD-10-CM

## 2019-10-03 DIAGNOSIS — Z71.6 TOBACCO ABUSE COUNSELING: ICD-10-CM

## 2019-10-03 DIAGNOSIS — F41.0 PANIC ATTACK: ICD-10-CM

## 2019-10-03 DIAGNOSIS — F51.01 PRIMARY INSOMNIA: ICD-10-CM

## 2019-10-03 PROCEDURE — 99213 OFFICE O/P EST LOW 20 MIN: CPT | Performed by: FAMILY MEDICINE

## 2019-10-03 PROCEDURE — G0463 HOSPITAL OUTPT CLINIC VISIT: HCPCS

## 2019-10-03 RX ORDER — HYDROXYZINE PAMOATE 50 MG/1
50 CAPSULE ORAL 3 TIMES DAILY PRN
Qty: 40 CAPSULE | Refills: 1 | Status: SHIPPED | OUTPATIENT
Start: 2019-10-03 | End: 2019-11-05

## 2019-10-03 RX ORDER — TRAZODONE HYDROCHLORIDE 50 MG/1
TABLET, FILM COATED ORAL
Qty: 30 TABLET | Refills: 1 | Status: SHIPPED | OUTPATIENT
Start: 2019-10-03 | End: 2019-12-30

## 2019-10-03 RX ORDER — VENLAFAXINE 75 MG/1
75 TABLET ORAL 3 TIMES DAILY
Qty: 30 TABLET | Refills: 1 | Status: SHIPPED | OUTPATIENT
Start: 2019-10-03 | End: 2019-10-21

## 2019-10-03 ASSESSMENT — PAIN SCALES - GENERAL: PAINLEVEL: NO PAIN (0)

## 2019-10-03 ASSESSMENT — MIFFLIN-ST. JEOR: SCORE: 1319.01

## 2019-10-03 ASSESSMENT — ANXIETY QUESTIONNAIRES
5. BEING SO RESTLESS THAT IT IS HARD TO SIT STILL: MORE THAN HALF THE DAYS
7. FEELING AFRAID AS IF SOMETHING AWFUL MIGHT HAPPEN: NEARLY EVERY DAY
GAD7 TOTAL SCORE: 17
IF YOU CHECKED OFF ANY PROBLEMS ON THIS QUESTIONNAIRE, HOW DIFFICULT HAVE THESE PROBLEMS MADE IT FOR YOU TO DO YOUR WORK, TAKE CARE OF THINGS AT HOME, OR GET ALONG WITH OTHER PEOPLE: SOMEWHAT DIFFICULT
1. FEELING NERVOUS, ANXIOUS, OR ON EDGE: MORE THAN HALF THE DAYS
3. WORRYING TOO MUCH ABOUT DIFFERENT THINGS: NEARLY EVERY DAY
2. NOT BEING ABLE TO STOP OR CONTROL WORRYING: NEARLY EVERY DAY
6. BECOMING EASILY ANNOYED OR IRRITABLE: SEVERAL DAYS
4. TROUBLE RELAXING: NEARLY EVERY DAY

## 2019-10-03 ASSESSMENT — PATIENT HEALTH QUESTIONNAIRE - PHQ9: SUM OF ALL RESPONSES TO PHQ QUESTIONS 1-9: 23

## 2019-10-03 NOTE — NURSING NOTE
"Chief Complaint   Patient presents with     Anxiety       Initial /60   Pulse 70   Resp 19   Ht 1.575 m (5' 2\")   Wt 72.6 kg (160 lb)   SpO2 95%   BMI 29.26 kg/m   Estimated body mass index is 29.26 kg/m  as calculated from the following:    Height as of this encounter: 1.575 m (5' 2\").    Weight as of this encounter: 72.6 kg (160 lb).  Medication Reconciliation: complete  Emily Álvarez LPN    "

## 2019-10-03 NOTE — PROGRESS NOTES
Subjective     Ritesh Soliman is a 44 year old female who presents to clinic today for the following health issues:    HPI   Abnormal Mood Symptoms      Duration: for quite some time    Description:  Depression: YES  Anxiety: YES  Panic attacks: YES     Accompanying signs and symptoms: see PHQ-9 and OSCAR scores    History (similar episodes/previous evaluation): None    Precipitating or alleviating factors: None    Therapies tried and outcome: Desyrel (Tazadone), Lexapro (Escitalopram) and Prozac (Fluoxetine)  She is having some issues with her boyfriend and has underlying anxiety and panic attacks. He is in tears to day, upset. Symptoms are causing nausea and emesis at times and not sleeping the last couple of nights. Insomnia has been an ongoing issue over the last 10 years.   No thoughts of hurting herself.         Patient Active Problem List   Diagnosis     Recurrent UTI     Tobacco abuse     Depression with anxiety     ACP (advance care planning)     OSCAR (generalized anxiety disorder)     Major depressive disorder, recurrent episode, moderate (H)     Panic attack     Past Surgical History:   Procedure Laterality Date     BIOPSY BREAST Right 10/19/2017    Procedure: BIOPSY BREAST;  EXCISIONAL BIOPSY RIGHT BREAST;  Surgeon: Kiko Newberry MD;  Location: [facility]     ESOPHAGOSCOPY, GASTROSCOPY, DUODENOSCOPY (EGD), COMBINED N/A 10/1/2018    Procedure: COMBINED ESOPHAGOSCOPY, GASTROSCOPY, DUODENOSCOPY (EGD);  UPPER ENDOSCOPY WITH BIOPSIES;  Surgeon: Kiko Newberry MD;  Location: [facility]     OOPHORECTOMY      tubal pregnancy     TUBAL LIGATION         Social History     Tobacco Use     Smoking status: Current Some Day Smoker     Packs/day: 0.50     Types: Cigarettes     Smokeless tobacco: Never Used     Tobacco comment: rarely smokes. declines quitplan referral 9/18/18   Substance Use Topics     Alcohol use: Yes     Alcohol/week: 0.0 standard drinks     Comment: occassionally     Family History   Problem Relation Age of  "Onset     Coronary Artery Disease Mother      Hypertension Mother      Coronary Artery Disease Father      C.A.D. No family hx of      Breast Cancer No family hx of      Cancer - colorectal No family hx of      Diabetes No family hx of      Thyroid Disease No family hx of      Asthma No family hx of      Colon Cancer No family hx of      Hyperlipidemia No family hx of          Current Outpatient Medications   Medication Sig Dispense Refill     hydrOXYzine (VISTARIL) 50 MG capsule Take 1 capsule (50 mg) by mouth 3 times daily as needed for anxiety 40 capsule 1     traZODone (DESYREL) 50 MG tablet TAKE 1 TABLET (50 MG) BY MOUTH NIGHTLY AS NEEDED FOR SLEEP 30 tablet 1     venlafaxine (EFFEXOR) 75 MG tablet Take 1 tablet (75 mg) by mouth 3 times daily 30 tablet 1     Allergies   Allergen Reactions     Augmentin [Amoxicillin-Pot Clavulanate] Swelling     BP Readings from Last 3 Encounters:   10/03/19 120/60   07/09/19 114/82   10/09/18 124/76    Wt Readings from Last 3 Encounters:   10/03/19 72.6 kg (160 lb)   10/09/18 70.8 kg (156 lb)   10/01/18 70.8 kg (156 lb)                      Reviewed and updated as needed this visit by Provider  Allergies  Meds  Problems  Med Hx  Surg Hx         Review of Systems   ROS COMP: Constitutional, HEENT, cardiovascular, pulmonary, gi and gu systems are negative, except as otherwise noted.      Objective    /60   Pulse 70   Resp 19   Ht 1.575 m (5' 2\")   Wt 72.6 kg (160 lb)   SpO2 95%   BMI 29.26 kg/m    Body mass index is 29.26 kg/m .  Physical Exam   GENERAL APPEARANCE: alert, no distress, crying and fatigued  NECK: no adenopathy, no asymmetry, masses, or scars and thyroid normal to palpation  RESP: lungs clear to auscultation - no rales, rhonchi or wheezes  CV: regular rates and rhythm, normal S1 S2, no S3 or S4 and no murmur, click or rub  LYMPHATICS: no cervical adenopathy  ABDOMEN: soft, nontender, without hepatosplenomegaly or masses and bowel sounds " "normal  SKIN: no suspicious lesions or rashes and tattoo - back and abdomen  NEURO: Normal strength and tone, mentation intact and speech normal  PSYCH: mentation appears normal, anxious and crying            Assessment & Plan     1. OSCAR (generalized anxiety disorder)  Will try venlafaxine for her to start daily and recheck in 3 weeks, sooner for concerns  - venlafaxine (EFFEXOR) 75 MG tablet; Take 1 tablet (75 mg) by mouth 3 times daily  Dispense: 30 tablet; Refill: 1    2. Panic attack  willl start this to use every 8 hours as needed. Advised this can cause drowsiness  - hydrOXYzine (VISTARIL) 50 MG capsule; Take 1 capsule (50 mg) by mouth 3 times daily as needed for anxiety  Dispense: 40 capsule; Refill: 1    3. Tobacco abuse      4. Tobacco abuse counseling  Drinks rarely only with drinking alcohol which she isn't using currently    5. Primary insomnia  Restart this, can take up to 150 mg if needed.   - traZODone (DESYREL) 50 MG tablet; TAKE 1 TABLET (50 MG) BY MOUTH NIGHTLY AS NEEDED FOR SLEEP  Dispense: 30 tablet; Refill: 1     Tobacco Cessation:   reports that she has been smoking cigarettes. She has been smoking about 0.50 packs per day. She has never used smokeless tobacco.  Tobacco Cessation Action Plan: Information offered: Patient not interested at this time      BMI:   Estimated body mass index is 29.26 kg/m  as calculated from the following:    Height as of this encounter: 1.575 m (5' 2\").    Weight as of this encounter: 72.6 kg (160 lb).   Weight management plan: Discussed healthy diet and exercise guidelines            Return in about 3 weeks (around 10/24/2019) for anxiety.    Olivia Weiss MD  [facility] - [place]      "

## 2019-10-03 NOTE — TELEPHONE ENCOUNTER
Patient called stating she is experiencing severe anxiety. Patient was crying stating she has dealt with anxiety and depression for most of her life and the anxiety has gotten worse the last few days due to relationship issues and job.  Patient states she has not thoughts of harming herself or other and feels safe. Patient scheduled to see PCP today at 2:45. Nurse advised patient call back if symptoms worsened or call 911 with any thoughts of hurting self or others. Patient agreed and understood recommendation.    Reason for Disposition    Problems with anxiety or stress    Additional Information    Negative: Severe difficulty breathing (e.g., struggling for each breath, speaks in single words)    Negative: Bluish (or gray) lips or face now    Negative: Difficult to awaken or acting confused (e.g., disoriented, slurred speech)    Negative: Hysterical or combative behavior    Negative: Sounds like a life-threatening emergency to the triager    Negative: Chest pain    Negative: Cough is main symptom    Palpitations, skipped heart beat, or rapid heart beat    Negative: Passed out (i.e., lost consciousness, collapsed and was not responding)    Negative: Shock suspected (e.g., cold/pale/clammy skin, too weak to stand, low BP, rapid pulse)    Negative: Difficult to awaken or acting confused (e.g., disoriented, slurred speech)    Negative: Visible sweat on face or sweat dripping down face    Negative: Unable to walk, or can only walk with assistance (e.g., requires support)    Negative: [1] Received SHOCK from implantable cardiac defibrillator AND [2] persisting symptoms (i.e., palpitations, lightheadedness)    Negative: Sounds like a life-threatening emergency to the triager    Negative: Chest pain    Negative: Difficulty breathing    Negative: Dizziness, lightheadedness, or weakness    Negative: [1] Heart beating very rapidly (e.g., > 140 / minute) AND [2] present now  (Exception: during exercise)    Negative: Heart  "beating very slowly (e.g., < 50 / minute)  (Exception: athlete)    Negative: New or worsened shortness of breath with activity (dyspnea on exertion)    Negative: Patient sounds very sick or weak to the triager    Negative: [1] Heart beating very rapidly (e.g., > 140 / minute) AND [2] not present now  (Exception: during exercise)    Negative: [1] Skipped or extra beat(s) AND [2] increases with exercise or exertion    Negative: [1] Skipped or extra beat(s) AND [2] occurs 4 or more times per minute    Negative: New or worsened ankle swelling    Negative: History of heart disease  (i.e., heart attack, bypass surgery, angina, angioplasty, CHF) (Exception: brief heart beat symptoms that went away and now feels well)    Negative: Age > 60 years (Exception: brief heart beat symptoms that went away and now feels well)    Negative: Taking water pill (i.e., diuretic) or heart medication (e.g., digoxin)    Negative: Wearing a \"holter monitor\" or \"cardiac event monitor\"    Negative: [1] Received SHOCK from implantable cardiac defibrillator AND [2] now feels well    Negative: History of hyperthyroidism or taking thyroid medication    Negative: Known or suspected substance abuse (e.g., cocaine, alcohol abuse)    Negative: [1] Palpitations AND [2] no improvement after using CARE ADVICE    Answer Assessment - Initial Assessment Questions  1. CONCERN: \"What happened that made you call today?\"      Depression in the past. Patient can sleep feeling sick  2. ANXIETY SYMPTOM SCREENING: \"Can you describe how you have been feeling?\"  (e.g., tense, restless, panicky, anxious, keyed up, trouble sleeping, trouble concentrating)     Trouble sleeping  3. ONSET: \"How long have you been feeling this way?\"      Really bad the last couple days  4. RECURRENT: \"Have you felt this way before?\"  If yes: \"What happened that time?\" \"What helped these feelings go away in the past?\"       Yes, on a off most of her life  5. RISK OF HARM - SUICIDAL IDEATION: " " \"Do you ever have thoughts of hurting or killing yourself?\"  (e.g., yes, no, no but preoccupation with thoughts about death)    - INTENT:  \"Do you have thoughts of hurting or killing yourself right NOW?\" (e.g., yes, no, N/A)    - PLAN: \"Do you have a specific plan for how you would do this?\" (e.g., gun, knife, overdose, no plan, N/A)      no  6. RISK OF HARM - HOMICIDAL IDEATION:  \"Do you ever have thoughts of hurting or killing someone else?\"  (e.g., yes, no, no but preoccupation with thoughts about death)    - INTENT:  \"Do you have thoughts of hurting or killing someone right NOW?\" (e.g., yes, no, N/A)    - PLAN: \"Do you have a specific plan for how you would do this?\" (e.g., gun, knife, no plan, N/A)       no  7. FUNCTIONAL IMPAIRMENT: \"How have things been going for you overall in your life? Have you had any more difficulties than usual doing your normal daily activities?\"  (e.g., better, same, worse; self-care, school, work, interactions)      Okay, able to preform normal daily activities  8. SUPPORT: \"Who is with you now?\" \"Who do you live with?\" \"Do you have family or friends nearby who you can talk to?\"       Single mother of a 10 year old boy  9. THERAPIST: \"Do you have a counselor or therapist? Name?\"      No, patient has in the past but doesn't right now  10. STRESSORS: \"Has there been any new stress or recent changes in your life?\"        Relationship issues and work  11. CAFFEINE ABUSE: \"Do you drink caffeinated beverages, and how much each day?\" (e.g., coffee, tea, eloisa)        5-6 cups daily  12. SUBSTANCE ABUSE: \"Do you use any illegal drugs or alcohol?\"        No  Drinks occasionally  13. OTHER SYMPTOMS: \"Do you have any other physical symptoms right now?\" (e.g., chest pain, palpitations, difficulty breathing, fever)        Feels like her heart is beating out of her chest  14. PREGNANCY: \"Is there any chance you are pregnant?\" \"When was your last menstrual period?\"        no    Answer Assessment - " "Initial Assessment Questions  1. DESCRIPTION: \"Please describe your heart rate or heart beat that you are having\" (e.g., fast/slow, regular/irregular, skipped or extra beats, \"palpitations\")      Patient states when laying in bed last night could feel it in her chest, sometimes seem irregular  2. ONSET: \"When did it start?\" (Minutes, hours or days)       yesterday  3. DURATION: \"How long does it last\" (e.g., seconds, minutes, hours)      All night  4. PATTERN \"Does it come and go, or has it been constant since it started?\"  \"Does it get worse with exertion?\"   \"Are you feeling it now?\"      constant  5. TAP: \"Using your hand, can you tap out what you are feeling on a chair or table in front of you, so that I can hear?\" (Note: not all patients can do this)        unable  6. HEART RATE: \"Can you tell me your heart rate?\" \"How many beats in 15 seconds?\"  (Note: not all patients can do this)        no  7. RECURRENT SYMPTOM: \"Have you ever had this before?\" If so, ask: \"When was the last time?\" and \"What happened that time?\"       no  8. CAUSE: \"What do you think is causing the palpitations?\"      anxiety  9. CARDIAC HISTORY: \"Do you have any history of heart disease?\" (e.g., heart attack, angina, bypass surgery, angioplasty, arrhythmia)       no  10. OTHER SYMPTOMS: \"Do you have any other symptoms?\" (e.g., dizziness, chest pain, sweating, difficulty breathing)        anxiety  11. PREGNANCY: \"Is there any chance you are pregnant?\" \"When was your last menstrual period?\"        no    Protocols used: HEART RATE AND HEARTBEAT [id number], ANXIETY AND PANIC ATTACK-A-AH      "

## 2019-10-04 ASSESSMENT — ANXIETY QUESTIONNAIRES: GAD7 TOTAL SCORE: 17

## 2019-10-17 ENCOUNTER — APPOINTMENT (OUTPATIENT)
Dept: GENERAL RADIOLOGY | Facility: HOSPITAL | Age: 44
End: 2019-10-17
Attending: NURSE PRACTITIONER
Payer: COMMERCIAL

## 2019-10-17 ENCOUNTER — HOSPITAL ENCOUNTER (EMERGENCY)
Facility: HOSPITAL | Age: 44
Discharge: HOME | End: 2019-10-17
Attending: NURSE PRACTITIONER | Admitting: NURSE PRACTITIONER
Payer: COMMERCIAL

## 2019-10-17 VITALS
HEART RATE: 96 BPM | RESPIRATION RATE: 18 BRPM | DIASTOLIC BLOOD PRESSURE: 80 MMHG | OXYGEN SATURATION: 96 % | SYSTOLIC BLOOD PRESSURE: 120 MMHG | TEMPERATURE: 98.3 F

## 2019-10-17 DIAGNOSIS — S63.501A WRIST SPRAIN, RIGHT, INITIAL ENCOUNTER: ICD-10-CM

## 2019-10-17 PROCEDURE — 73110 X-RAY EXAM OF WRIST: CPT | Mod: TC,RT

## 2019-10-17 PROCEDURE — 99213 OFFICE O/P EST LOW 20 MIN: CPT | Mod: Z6 | Performed by: NURSE PRACTITIONER

## 2019-10-17 PROCEDURE — 73130 X-RAY EXAM OF HAND: CPT | Mod: TC,RT

## 2019-10-17 PROCEDURE — 25000132 ZZH RX MED GY IP 250 OP 250 PS 637: Performed by: NURSE PRACTITIONER

## 2019-10-17 PROCEDURE — G0463 HOSPITAL OUTPT CLINIC VISIT: HCPCS

## 2019-10-17 RX ORDER — IBUPROFEN 600 MG/1
600 TABLET, FILM COATED ORAL ONCE
Status: COMPLETED | OUTPATIENT
Start: 2019-10-17 | End: 2019-10-17

## 2019-10-17 RX ADMIN — IBUPROFEN 600 MG: 600 TABLET ORAL at 13:54

## 2019-10-17 ASSESSMENT — ENCOUNTER SYMPTOMS
COLOR CHANGE: 1
NUMBNESS: 1
FEVER: 0
CHILLS: 0
GASTROINTESTINAL NEGATIVE: 1
ACTIVITY CHANGE: 1

## 2019-10-17 NOTE — ED TRIAGE NOTES
"Pt states she was wrestling with her nephew last night and injured her R wrist. Pt states \"this bone was sticking out of place and I pushed it back in\". Pt pointing to her distal Ulna.  "

## 2019-10-17 NOTE — ED PROVIDER NOTES
"  History     Chief Complaint   Patient presents with     Wrist Pain     \"injured right wrist while wrestling with nephew\"      HPI  Ritesh Soliman is a 44 year old female who was wrestling with her nephew and injured her right hand and wrist, last evening. She is not sure exactly what happened. She states she did push her ulnar bone back into place after it happened. She has iced it. She has not taken any home medications for it. She did wrap it this am. Her fingers are numb and tingling from the injury.  Denies fevers and chills.    Allergies:  Allergies   Allergen Reactions     Augmentin [Amoxicillin-Pot Clavulanate] Swelling       Problem List:    Patient Active Problem List    Diagnosis Date Noted     OSCAR (generalized anxiety disorder) 05/09/2018     Priority: Medium     Major depressive disorder, recurrent episode, moderate (H) 05/09/2018     Priority: Medium     Panic attack 05/09/2018     Priority: Medium     ACP (advance care planning) 04/29/2016     Priority: Medium     Advance Care Planning 4/29/2016: ACP Review of Chart / Resources Provided:  Reviewed chart for advance care plan.  Ritesh Soliman has been provided information and resources to begin or update their advance care plan.  Added by Ngoc Duong             Tobacco abuse      Priority: Medium     Depression with anxiety      Priority: Medium     Recurrent UTI 02/27/2014     Priority: Medium        Past Medical History:    Past Medical History:   Diagnosis Date     Depression with anxiety      Tobacco abuse        Past Surgical History:    Past Surgical History:   Procedure Laterality Date     BIOPSY BREAST Right 10/19/2017    Procedure: BIOPSY BREAST;  EXCISIONAL BIOPSY RIGHT BREAST;  Surgeon: Kiko Newberry MD;  Location: [facility]     ESOPHAGOSCOPY, GASTROSCOPY, DUODENOSCOPY (EGD), COMBINED N/A 10/1/2018    Procedure: COMBINED ESOPHAGOSCOPY, GASTROSCOPY, DUODENOSCOPY (EGD);  UPPER ENDOSCOPY WITH BIOPSIES;  Surgeon: Kiko Newberry MD;  " Location: [facility]     OOPHORECTOMY      tubal pregnancy     TUBAL LIGATION         Family History:    Family History   Problem Relation Age of Onset     Coronary Artery Disease Mother      Hypertension Mother      Coronary Artery Disease Father      C.A.D. No family hx of      Breast Cancer No family hx of      Cancer - colorectal No family hx of      Diabetes No family hx of      Thyroid Disease No family hx of      Asthma No family hx of      Colon Cancer No family hx of      Hyperlipidemia No family hx of        Social History:  Marital Status:  Single [1]  Social History     Tobacco Use     Smoking status: Current Some Day Smoker     Packs/day: 0.50     Types: Cigarettes     Smokeless tobacco: Never Used     Tobacco comment: rarely smokes. declines quitplan referral 9/18/18   Substance Use Topics     Alcohol use: Yes     Alcohol/week: 0.0 standard drinks     Comment: occassionally     Drug use: No        Medications:    hydrOXYzine (VISTARIL) 50 MG capsule  traZODone (DESYREL) 50 MG tablet  venlafaxine (EFFEXOR) 75 MG tablet          Review of Systems   Constitutional: Positive for activity change. Negative for chills and fever.   Gastrointestinal: Negative.    Musculoskeletal:        Right wrist and hand pain   Skin: Positive for color change.   Neurological: Positive for numbness (tingling in fingers and hand).       Physical Exam   BP: 120/80  Pulse: 96  Temp: 98.3  F (36.8  C)  Resp: 18  SpO2: 96 %      Physical Exam  Vitals signs and nursing note reviewed.   Constitutional:       General: She is in acute distress.      Appearance: Normal appearance.   Cardiovascular:      Rate and Rhythm: Normal rate.   Pulmonary:      Effort: Pulmonary effort is normal.   Musculoskeletal:         General: Swelling, tenderness and signs of injury present.      Right hand: She exhibits decreased range of motion, tenderness, bony tenderness and swelling. Decreased sensation is not present in the ulnar distribution and is not  present in the radial distribution. Decreased strength noted. She exhibits finger abduction, thumb/finger opposition and wrist extension trouble.        Hands:       Comments: She does not have thumb to finger opposition in any of her fingers. She can  Not flex, extend, or rotate wrist. She has tender ness over the second, third, and fourth metacarpals, especially over the distal end of the third metacarpal.    Skin:     General: Skin is warm and dry.      Findings: Bruising present.   Neurological:      Mental Status: She is alert and oriented to person, place, and time.   Psychiatric:         Mood and Affect: Mood normal.         Behavior: Behavior normal.         ED Course        Procedures            Results for orders placed or performed during the hospital encounter of 10/17/19 (from the past 24 hour(s))   XR Wrist Right 3 Views    Narrative    PROCEDURE:  XR WRIST RT G/E 3 VW    HISTORY: injury last night. states her ulna was out of place and she  reduced it.    COMPARISON:  None.    TECHNIQUE:  4 views of the right wrist were obtained.    FINDINGS:  No fracture or dislocation is identified. The joint spaces  are preserved.        Impression    IMPRESSION: Normal right wrist    RACIEL JORGE MD   XR Hand Right G/E 3 Views    Narrative    PROCEDURE:  XR HAND RT G/E 3 VW    HISTORY: pain over thrid metacarpal, and thumb bruised    COMPARISON:  None.    TECHNIQUE:  3 views of the right hand were obtained.    FINDINGS:  No fracture or dislocation is identified. The joint spaces  are preserved.        Impression    IMPRESSION: No acute fracture.      RACIEL JORGE MD       Medications   ibuprofen (ADVIL/MOTRIN) tablet 600 mg (600 mg Oral Given 10/17/19 1354)       Assessments & Plan (with Medical Decision Making)     I have reviewed the nursing notes.    I have reviewed the findings, diagnosis, plan and need for follow up with the patient.  Right wrist sprain and hand injury that occurred last evening when  she was wrestling with her nephew. X-ray reviewed and per radiology is negative for pathology. Right wrist splint applied. Refuses ketorolac. Discharge instructions and medications reviewed with her and understanding verbalized.      Discharge Medication List as of 10/17/2019  2:32 PM          Final diagnoses:   Wrist sprain, right, initial encounter       10/17/2019 ENT  [facility]     Yuliya Holt, CNP  10/17/19 6539

## 2019-10-17 NOTE — DISCHARGE INSTRUCTIONS
Keep affected extremity elevated as much as possible for next 24 - 48 hours. Ice to affected area 20 minutes every hour as needed for comfort. After 48 hours you can apply heat. Ibuprofen 600 to 800 mg (3 - 4 tabs of over the counter med) every six to eight hours as needed;not to exceed maximum amount of 3200 mg in 24 hours.Tylenol 650 to 1000 mg every four to six hours as needed (not to exceed more than 4000 mg in a 24 hour period). May use interchangeably. Suggest medicating around the clock for the next 24-48 hours. Use wrist splint  until you can move your arm without pain. Slowly start to wiggle your wrist  and move fingers and hands as often as possible but not beyond the point of pain. Follow up with primary provider as needed

## 2019-10-17 NOTE — ED AVS SNAPSHOT
[facility]  [address] [address]  Phone:  [phone]                                    Ritesh Soliman   MRN: [id number]    Department:  [facility]   Date of Visit:  10/17/2019           After Visit Summary Signature Page    I have received my discharge instructions, and my questions have been answered. I have discussed any challenges I see with this plan with the nurse or doctor.    ..........................................................................................................................................  Patient/Patient Representative Signature      ..........................................................................................................................................  Patient Representative Print Name and Relationship to Patient    ..................................................               ................................................  Date                                   Time    ..........................................................................................................................................  Reviewed by Signature/Title    ...................................................              ..............................................  Date                                               Time          22EPIC Rev 08/18

## 2019-10-21 DIAGNOSIS — F41.1 GAD (GENERALIZED ANXIETY DISORDER): ICD-10-CM

## 2019-10-23 RX ORDER — VENLAFAXINE 75 MG/1
75 TABLET ORAL 3 TIMES DAILY
Qty: 30 TABLET | Refills: 1 | Status: SHIPPED | OUTPATIENT
Start: 2019-10-23 | End: 2019-12-16

## 2019-10-29 NOTE — PROGRESS NOTES
Subjective     Ritesh Soliman is a 44 year old female who presents to clinic today for the following health issues:    HPI   Anxiety Follow-Up  3 week follow up Hydroxyzine     How are you doing with your anxiety since your last visit? Improved     Are you having other symptoms that might be associated with anxiety? No    Have you had a significant life event? No     Are you feeling depressed? No    Do you have any concerns with your use of alcohol or other drugs? No    Social History     Tobacco Use     Smoking status: Current Some Day Smoker     Packs/day: 0.50     Types: Cigarettes     Smokeless tobacco: Never Used     Tobacco comment: rarely smokes. declines quitplan referral 9/18/18   Substance Use Topics     Alcohol use: Yes     Alcohol/week: 0.0 standard drinks     Comment: occassionally     Drug use: No     OSCAR-7 SCORE 8/28/2018 10/3/2019 11/5/2019   Total Score 14 17 16     PHQ 8/28/2018 10/3/2019 11/5/2019   PHQ-9 Total Score 22 23 18   Q9: Thoughts of better off dead/self-harm past 2 weeks More than half the days Several days Not at all   F/U: Thoughts of suicide or self-harm - No -   F/U: Self harm-plan - - -   F/U: Self-harm action - - -   F/U: Safety concerns - No -           How many servings of fruits and vegetables do you eat daily?  0-1    On average, how many sweetened beverages do you drink each day (soda, juice, sweet tea, etc)?   0    How many days per week do you miss taking your medication? 0    She feels she is much better taking the hydroxyzine tid, the best she has felt. She is not having symptoms of fatigue    ED/UC Followup:    Facility:  [facility] [place]  Date of visit: 10/17/19  Reason for visit: right wrist injury  Current Status: hasn't improved at all, wearing wrist brace all day, hard to turn her wrist, unable to lift up anything heavy.      X rays of the right hand and wrist were negative. She had a previous fracture of the metacarpals of that hand in her 20's. She initially wasn't able to  close her fist or touch her fingers with her thumb and had significant ecchymosis of the hand but this has improved. She is right handed and continues to wear her brace.      Patient Active Problem List   Diagnosis     Recurrent UTI     Tobacco abuse     Depression with anxiety     ACP (advance care planning)     OSCAR (generalized anxiety disorder)     Major depressive disorder, recurrent episode, moderate (H)     Panic attack     Past Surgical History:   Procedure Laterality Date     BIOPSY BREAST Right 10/19/2017    Procedure: BIOPSY BREAST;  EXCISIONAL BIOPSY RIGHT BREAST;  Surgeon: Kiko Newberry MD;  Location: [facility]     ESOPHAGOSCOPY, GASTROSCOPY, DUODENOSCOPY (EGD), COMBINED N/A 10/1/2018    Procedure: COMBINED ESOPHAGOSCOPY, GASTROSCOPY, DUODENOSCOPY (EGD);  UPPER ENDOSCOPY WITH BIOPSIES;  Surgeon: Kiko Newberry MD;  Location: [facility]     OOPHORECTOMY      tubal pregnancy     TUBAL LIGATION         Social History     Tobacco Use     Smoking status: Current Some Day Smoker     Packs/day: 0.50     Types: Cigarettes     Smokeless tobacco: Never Used     Tobacco comment: rarely smokes. declines quitplan referral 9/18/18   Substance Use Topics     Alcohol use: Yes     Alcohol/week: 0.0 standard drinks     Comment: occassionally     Family History   Problem Relation Age of Onset     Coronary Artery Disease Mother      Hypertension Mother      Coronary Artery Disease Father      C.A.D. No family hx of      Breast Cancer No family hx of      Cancer - colorectal No family hx of      Diabetes No family hx of      Thyroid Disease No family hx of      Asthma No family hx of      Colon Cancer No family hx of      Hyperlipidemia No family hx of          Current Outpatient Medications   Medication Sig Dispense Refill     hydrOXYzine (VISTARIL) 50 MG capsule Take 1 capsule (50 mg) by mouth 3 times daily as needed for anxiety 90 capsule 3     nabumetone (RELAFEN) 500 MG tablet Take 1 tablet (500 mg) by mouth 2 times daily  "60 tablet 1     traZODone (DESYREL) 50 MG tablet TAKE 1 TABLET (50 MG) BY MOUTH NIGHTLY AS NEEDED FOR SLEEP 30 tablet 1     venlafaxine (EFFEXOR) 75 MG tablet Take 1 tablet (75 mg) by mouth 3 times daily 30 tablet 1     Allergies   Allergen Reactions     Augmentin [Amoxicillin-Pot Clavulanate] Swelling     BP Readings from Last 3 Encounters:   11/05/19 122/74   10/17/19 120/80   10/03/19 120/60    Wt Readings from Last 3 Encounters:   11/05/19 72.6 kg (160 lb)   10/03/19 72.6 kg (160 lb)   10/09/18 70.8 kg (156 lb)                      Reviewed and updated as needed this visit by Provider         Review of Systems   ROS COMP: Constitutional, HEENT, cardiovascular, pulmonary, gi and gu systems are negative, except as otherwise noted.      Objective    /74   Pulse 68   Resp 19   Ht 1.575 m (5' 2\")   Wt 72.6 kg (160 lb)   SpO2 99%   BMI 29.26 kg/m    Body mass index is 29.26 kg/m .  Physical Exam   GENERAL APPEARANCE: healthy, alert and no distress  MS: mild edema of the right hand through the palm. Tenderness throughout the palm and dorsum of the hand, the base of the thumb. Basilar joint and MCPs are not tender. She can flex her MCPs to 30 degrees, cannot make a closed fist. She can touch all fingers with her thumb though this is painful. Full ROM of wrist. Extensor tendon of the thumb is intact  SKIN: no ecchymosis noted    NEURO: Normal strength and tone, mentation intact and speech normal  PSYCH: mentation appears normal and affect normal/bright            Assessment & Plan     1. OSCAR (generalized anxiety disorder)  Improved with the addition of hydroxyzine, continue this and recheck in 3 months    2. Contusion of right hand, subsequent encounter  Discussed options, including referral to orthopedics or evaluating further with MRI. Decided to wait yet and add Relafen bid with food. If not improving after 3-4 weeks she is asked to call and will refer to Dr Garcia for further evaluation  - nabumetone " "(RELAFEN) 500 MG tablet; Take 1 tablet (500 mg) by mouth 2 times daily  Dispense: 60 tablet; Refill: 1    3. Panic attack  Continue this, refilled. Working well.   - hydrOXYzine (VISTARIL) 50 MG capsule; Take 1 capsule (50 mg) by mouth 3 times daily as needed for anxiety  Dispense: 90 capsule; Refill: 3     Tobacco Cessation:   reports that she has been smoking cigarettes. She has been smoking about 0.50 packs per day. She has never used smokeless tobacco.  Tobacco Cessation Action Plan: Information offered: Patient not interested at this time      BMI:   Estimated body mass index is 29.26 kg/m  as calculated from the following:    Height as of this encounter: 1.575 m (5' 2\").    Weight as of this encounter: 72.6 kg (160 lb).   Weight management plan: Discussed healthy diet and exercise guidelines            Return in about 3 months (around 2/5/2020) for mood change.    Olivia Weiss MD  [facility] [place]      "

## 2019-11-05 ENCOUNTER — OFFICE VISIT (OUTPATIENT)
Dept: FAMILY MEDICINE | Facility: OTHER | Age: 44
End: 2019-11-05
Attending: FAMILY MEDICINE
Payer: COMMERCIAL

## 2019-11-05 VITALS
BODY MASS INDEX: 29.44 KG/M2 | SYSTOLIC BLOOD PRESSURE: 122 MMHG | OXYGEN SATURATION: 99 % | DIASTOLIC BLOOD PRESSURE: 74 MMHG | RESPIRATION RATE: 19 BRPM | WEIGHT: 160 LBS | HEART RATE: 68 BPM | HEIGHT: 62 IN

## 2019-11-05 DIAGNOSIS — S60.221D CONTUSION OF RIGHT HAND, SUBSEQUENT ENCOUNTER: ICD-10-CM

## 2019-11-05 DIAGNOSIS — F41.0 PANIC ATTACK: ICD-10-CM

## 2019-11-05 DIAGNOSIS — F41.1 GAD (GENERALIZED ANXIETY DISORDER): Primary | ICD-10-CM

## 2019-11-05 PROCEDURE — G0463 HOSPITAL OUTPT CLINIC VISIT: HCPCS

## 2019-11-05 PROCEDURE — 99213 OFFICE O/P EST LOW 20 MIN: CPT | Performed by: FAMILY MEDICINE

## 2019-11-05 RX ORDER — HYDROXYZINE PAMOATE 50 MG/1
50 CAPSULE ORAL 3 TIMES DAILY PRN
Qty: 90 CAPSULE | Refills: 3 | Status: SHIPPED | OUTPATIENT
Start: 2019-11-05 | End: 2020-07-16

## 2019-11-05 RX ORDER — NABUMETONE 500 MG/1
500 TABLET, FILM COATED ORAL 2 TIMES DAILY
Qty: 60 TABLET | Refills: 1 | Status: SHIPPED | OUTPATIENT
Start: 2019-11-05 | End: 2021-04-13

## 2019-11-05 ASSESSMENT — ANXIETY QUESTIONNAIRES
5. BEING SO RESTLESS THAT IT IS HARD TO SIT STILL: MORE THAN HALF THE DAYS
4. TROUBLE RELAXING: NEARLY EVERY DAY
2. NOT BEING ABLE TO STOP OR CONTROL WORRYING: NEARLY EVERY DAY
6. BECOMING EASILY ANNOYED OR IRRITABLE: SEVERAL DAYS
IF YOU CHECKED OFF ANY PROBLEMS ON THIS QUESTIONNAIRE, HOW DIFFICULT HAVE THESE PROBLEMS MADE IT FOR YOU TO DO YOUR WORK, TAKE CARE OF THINGS AT HOME, OR GET ALONG WITH OTHER PEOPLE: SOMEWHAT DIFFICULT
3. WORRYING TOO MUCH ABOUT DIFFERENT THINGS: NEARLY EVERY DAY
1. FEELING NERVOUS, ANXIOUS, OR ON EDGE: NEARLY EVERY DAY
GAD7 TOTAL SCORE: 16
7. FEELING AFRAID AS IF SOMETHING AWFUL MIGHT HAPPEN: SEVERAL DAYS

## 2019-11-05 ASSESSMENT — PATIENT HEALTH QUESTIONNAIRE - PHQ9: SUM OF ALL RESPONSES TO PHQ QUESTIONS 1-9: 18

## 2019-11-05 ASSESSMENT — MIFFLIN-ST. JEOR: SCORE: 1319.01

## 2019-11-05 ASSESSMENT — PAIN SCALES - GENERAL: PAINLEVEL: NO PAIN (0)

## 2019-11-05 NOTE — NURSING NOTE
"Chief Complaint   Patient presents with     Depression     Musculoskeletal Problem       Initial /74   Pulse 68   Resp 19   Ht 1.575 m (5' 2\")   Wt 72.6 kg (160 lb)   SpO2 99%   BMI 29.26 kg/m   Estimated body mass index is 29.26 kg/m  as calculated from the following:    Height as of this encounter: 1.575 m (5' 2\").    Weight as of this encounter: 72.6 kg (160 lb).  Medication Reconciliation: complete  Emily Álvarez LPN    "

## 2019-11-06 ASSESSMENT — ANXIETY QUESTIONNAIRES: GAD7 TOTAL SCORE: 16

## 2019-11-25 ENCOUNTER — TELEPHONE (OUTPATIENT)
Dept: FAMILY MEDICINE | Facility: OTHER | Age: 44
End: 2019-11-25

## 2019-11-25 DIAGNOSIS — M79.641 PAIN OF RIGHT HAND: ICD-10-CM

## 2019-11-25 DIAGNOSIS — M25.531 RIGHT WRIST PAIN: Primary | ICD-10-CM

## 2019-11-25 NOTE — TELEPHONE ENCOUNTER
Pt is still having pain in her right hand. States she spoke with provider at previous appointment. Swelling and bruising has subsided. She would like to have an MRI done. Please call pt with any questions.

## 2019-12-06 ENCOUNTER — HOSPITAL ENCOUNTER (OUTPATIENT)
Dept: MRI IMAGING | Facility: HOSPITAL | Age: 44
End: 2019-12-06
Attending: FAMILY MEDICINE
Payer: COMMERCIAL

## 2019-12-06 ENCOUNTER — HOSPITAL ENCOUNTER (OUTPATIENT)
Dept: MRI IMAGING | Facility: HOSPITAL | Age: 44
Discharge: HOME | End: 2019-12-06
Attending: FAMILY MEDICINE | Admitting: FAMILY MEDICINE
Payer: COMMERCIAL

## 2019-12-06 DIAGNOSIS — M25.531 RIGHT WRIST PAIN: ICD-10-CM

## 2019-12-06 DIAGNOSIS — M79.641 PAIN OF RIGHT HAND: ICD-10-CM

## 2019-12-06 PROCEDURE — 73221 MRI JOINT UPR EXTREM W/O DYE: CPT | Mod: TC,RT

## 2019-12-06 PROCEDURE — 73221 MRI JOINT UPR EXTREM W/O DYE: CPT | Mod: TC,RT,XS

## 2019-12-09 DIAGNOSIS — S62.302A CLOSED NONDISPLACED FRACTURE OF THIRD METACARPAL BONE OF RIGHT HAND, UNSPECIFIED PORTION OF METACARPAL, INITIAL ENCOUNTER: Primary | ICD-10-CM

## 2019-12-09 DIAGNOSIS — S62.304A CLOSED NONDISPLACED FRACTURE OF FOURTH METACARPAL BONE OF RIGHT HAND, UNSPECIFIED PORTION OF METACARPAL, INITIAL ENCOUNTER: ICD-10-CM

## 2019-12-26 DIAGNOSIS — F51.01 PRIMARY INSOMNIA: ICD-10-CM

## 2019-12-27 NOTE — TELEPHONE ENCOUNTER
Trazodone      Last Written Prescription Date:  10/3/19  Last Fill Quantity: 30,   # refills: 1  Last Office Visit: 11/5/19  Future Office visit:    Next 5 appointments (look out 90 days)    Feb 06, 2020 11:00 AM CST  (Arrive by 10:45 AM)  SHORT with Olivia Weiss MD  [facility] ([facility] ) [address]  [place] [address]  [phone]           Routing refill request to provider for review/approval because:

## 2019-12-30 RX ORDER — TRAZODONE HYDROCHLORIDE 50 MG/1
TABLET, FILM COATED ORAL
Qty: 30 TABLET | Refills: 1 | Status: SHIPPED | OUTPATIENT
Start: 2019-12-30 | End: 2020-07-16

## 2020-02-11 DIAGNOSIS — F41.1 GAD (GENERALIZED ANXIETY DISORDER): ICD-10-CM

## 2020-02-14 RX ORDER — VENLAFAXINE 75 MG/1
75 TABLET ORAL 3 TIMES DAILY
Qty: 90 TABLET | Refills: 0 | Status: SHIPPED | OUTPATIENT
Start: 2020-02-14 | End: 2020-05-21

## 2020-04-06 ENCOUNTER — TELEPHONE (OUTPATIENT)
Dept: FAMILY MEDICINE | Facility: OTHER | Age: 45
End: 2020-04-06

## 2020-04-06 DIAGNOSIS — N92.4 EXCESSIVE BLEEDING IN PREMENOPAUSAL PERIOD: Primary | ICD-10-CM

## 2020-04-06 NOTE — TELEPHONE ENCOUNTER
I would recommend that she see Dr Roger again in follow up for recheck blood work and reevaluation. I have done a referral for him

## 2020-04-06 NOTE — TELEPHONE ENCOUNTER
"Reason for call:  Symptom   Symptom or request: Excessive Bleeding in Premenopausal Period      Duration (how long have symptoms been present): 2 years started premenopausal period. 1 year of excessive bleeding     Have you been treated for this before? Yes   Patient was seen 1 year ago in the ED for this. ]  ER notes on 10/4/2018  History           Chief Complaint   Patient presents with     Vaginal Bleeding       for 3 weeks     HPI  Ritesh oSliman is a 43 year old female who presents with 3 week h/o heavy vaginal bleeding. Her menstrual cycles have been regular and usually lasting 3 days up until 3 weeks ago. She does not believe she is going through menopause. No abdominal pain. No dizziness or lightheadedness. States she is going through a tampon every 2 hours  Star is hemodynamically stable. Hemoglobin WNL at 14.2. Pelvic exam reveal moderate bleeding from cervix, otherwise normal. Consulted with Dr. Roger who recommends Norethindrone and f/u with OB/Gyn following outpatient pelvic US. Pt happy with this plan and was discharged home following. Outpatient US ordered.      Plan: Take the Norethindrone as prescribed to help stop the bleeding. You should be getting a call from the ultrasound department to set up your ultrasound. Call OB to make an appointment for after the ultrasound to follow up. Return here with any new or worsening symptoms.    Additional comments: Patient never followed up with her PCP after her ED visit on 10/04.Patient stated the periods have continued to be heavy but only lasted a few days. Until her current cycle. Patient stated that she is concerned because she has been bleeding heavy for 3 weeks now.  Patient stated that she is saturating a combining total of pads and tampons daily. Patient has noticed \"lots\" of blood clots as well  Patient was prescribed Norethindrone 5mg from the ED provider on call. And was wondering if her PCP would prescribe her the same med.   Told patient that I " would notify her PCP or that another Family practice available of her symptoms/concerns. ANd they will advise her what she should do       Phone number to reach patient: yes

## 2020-04-07 ENCOUNTER — VIRTUAL VISIT (OUTPATIENT)
Dept: OBGYN | Facility: OTHER | Age: 45
End: 2020-04-07
Attending: OBSTETRICS & GYNECOLOGY
Payer: COMMERCIAL

## 2020-04-07 DIAGNOSIS — N92.0 MENORRHAGIA WITH REGULAR CYCLE: Primary | ICD-10-CM

## 2020-04-07 PROCEDURE — 99442 ZZC PHYSICIAN TELEPHONE EVALUATION 11-20 MIN: CPT | Performed by: OBSTETRICS & GYNECOLOGY

## 2020-04-07 RX ORDER — TRANEXAMIC ACID 650 MG/1
1300 TABLET ORAL 3 TIMES DAILY
Qty: 30 TABLET | Refills: 2 | Status: SHIPPED | OUTPATIENT
Start: 2020-04-07 | End: 2020-04-21

## 2020-04-07 NOTE — PROGRESS NOTES
"Subjective     Ritesh Soliman is a 44 year old female who is being evaluated via a billable telephone visit.      The patient has been notified of following:     \"This telephone visit will be conducted via a call between you and your physician/provider. We have found that certain health care needs can be provided without the need for a physical exam.  This service lets us provide the care you need with a short phone conversation.  If a prescription is necessary we can send it directly to your pharmacy.  If lab work is needed we can place an order for that and you can then stop by our lab to have the test done at a later time.    If during the course of the call the physician/provider feels a telephone visit is not appropriate, you will not be charged for this service.\"     Patient has given verbal consent for Telephone visit?  Yes    Ritesh Soliman complains of   Chief Complaint   Patient presents with     Abnormal Uterine Bleeding     Consult from Dr JAIMIE Weiss     HPI:  44-year-old para 2 (CS)  female with menorrhagia.  She describes regular cycles lasting up to 3 weeks with 5 days of heavy flow where she is changing pads hourly with gushing and clots.  Her menstrual flow was in her rupturing activities of daily living.  She denies dysmenorrhea, pelvic pain.  She has had a tubal ligation for birth control.  She was seen in the emergency room in 2018 with similar complaints and was given a prescription for norethindrone at that time.  She was to follow-up with her PCM but did not.  Her last Pap smear was 2016.  Her gynecologic history is otherwise unremarkable.  Denies intermenstrual bleeding.  She is a casual smoker.  No other complaints.  Medical records were reviewed on EMR.        ALLERGIES  Augmentin [amoxicillin-pot clavulanate]    Reviewed and updated as needed this visit by Provider         Review of Systems   ROS COMP: Constitutional, HEENT, cardiovascular, pulmonary, gi and gu systems are negative, except as " otherwise noted.       Objective   Reported vitals:  There were no vitals taken for this visit.    Alert and oriented times 3;    Diagnostic Test Results:  Labs reviewed in Epic  No results found for this or any previous visit (from the past 24 hour(s)).        Assessment/Plan:  Menorrhagia: Recommend further work-up with CBC, TSH, ferritin, pelvic ultrasound, examination and Pap smear/endometrial biopsy.  This will be scheduled after current pandemic.  She will tentatively be scheduled in May for a pelvic ultrasound.  She will come in for her labs  this week to ensure she is not getting significantly anemic.  We talked about medical treatment options with progesterone or tranexamic acid in the interim.  Risks, benefits, side effects discussed and she is desiring to try a prescription for tranexamic acid.  We will tentatively plan for a follow-up appointment in the clinic in May for completion of work-up as above.  Bleeding precautions discussed.  She will call or follow-up PRN if problems in the interim.      Phone call duration:  20  minutes    Juan Roger MD

## 2020-04-21 ENCOUNTER — TELEPHONE (OUTPATIENT)
Dept: FAMILY MEDICINE | Facility: OTHER | Age: 45
End: 2020-04-21

## 2020-04-21 DIAGNOSIS — N92.0 MENORRHAGIA WITH REGULAR CYCLE: ICD-10-CM

## 2020-04-21 RX ORDER — TRANEXAMIC ACID 650 MG/1
1300 TABLET ORAL 3 TIMES DAILY
Qty: 30 TABLET | Refills: 2 | Status: ON HOLD | OUTPATIENT
Start: 2020-04-21 | End: 2020-07-01

## 2020-04-21 NOTE — TELEPHONE ENCOUNTER
Patient was prescribed tranexamic acid tablet per pharmacy at [facility], this medication is on backorder. Would you  Like to change this to a different medication. Please advise.

## 2020-05-08 ENCOUNTER — HOSPITAL ENCOUNTER (OUTPATIENT)
Dept: ULTRASOUND IMAGING | Facility: HOSPITAL | Age: 45
Discharge: HOME | End: 2020-05-08
Attending: OBSTETRICS & GYNECOLOGY | Admitting: OBSTETRICS & GYNECOLOGY
Payer: COMMERCIAL

## 2020-05-08 DIAGNOSIS — N92.0 MENORRHAGIA WITH REGULAR CYCLE: ICD-10-CM

## 2020-05-08 PROCEDURE — 76856 US EXAM PELVIC COMPLETE: CPT | Mod: TC

## 2020-05-14 ENCOUNTER — OFFICE VISIT (OUTPATIENT)
Dept: OBGYN | Facility: OTHER | Age: 45
End: 2020-05-14
Attending: OBSTETRICS & GYNECOLOGY
Payer: COMMERCIAL

## 2020-05-14 VITALS
HEIGHT: 63 IN | HEART RATE: 68 BPM | DIASTOLIC BLOOD PRESSURE: 61 MMHG | WEIGHT: 160 LBS | SYSTOLIC BLOOD PRESSURE: 105 MMHG | OXYGEN SATURATION: 95 % | BODY MASS INDEX: 28.35 KG/M2

## 2020-05-14 DIAGNOSIS — Z12.4 SCREENING FOR CERVICAL CANCER: Primary | ICD-10-CM

## 2020-05-14 DIAGNOSIS — N92.1 MENORRHAGIA WITH IRREGULAR CYCLE: ICD-10-CM

## 2020-05-14 PROCEDURE — 99214 OFFICE O/P EST MOD 30 MIN: CPT | Mod: 25 | Performed by: OBSTETRICS & GYNECOLOGY

## 2020-05-14 PROCEDURE — 88305 TISSUE EXAM BY PATHOLOGIST: CPT | Mod: TC | Performed by: OBSTETRICS & GYNECOLOGY

## 2020-05-14 PROCEDURE — G0463 HOSPITAL OUTPT CLINIC VISIT: HCPCS | Mod: 25

## 2020-05-14 PROCEDURE — 58100 BIOPSY OF UTERUS LINING: CPT | Performed by: OBSTETRICS & GYNECOLOGY

## 2020-05-14 PROCEDURE — G0476 HPV COMBO ASSAY CA SCREEN: HCPCS | Mod: ZL | Performed by: OBSTETRICS & GYNECOLOGY

## 2020-05-14 PROCEDURE — 87624 HPV HI-RISK TYP POOLED RSLT: CPT | Mod: ZL | Performed by: OBSTETRICS & GYNECOLOGY

## 2020-05-14 PROCEDURE — G0123 SCREEN CERV/VAG THIN LAYER: HCPCS | Mod: ZL | Performed by: OBSTETRICS & GYNECOLOGY

## 2020-05-14 ASSESSMENT — MIFFLIN-ST. JEOR: SCORE: 1330.92

## 2020-05-14 ASSESSMENT — PAIN SCALES - GENERAL: PAINLEVEL: NO PAIN (0)

## 2020-05-14 NOTE — NURSING NOTE
"Chief Complaint   Patient presents with     Follow Up     menorrhagia       Initial /61 (BP Location: Left arm, Cuff Size: Adult Regular)   Pulse 68   Ht 1.594 m (5' 2.75\")   Wt 72.6 kg (160 lb)   SpO2 95%   BMI 28.57 kg/m   Estimated body mass index is 28.57 kg/m  as calculated from the following:    Height as of this encounter: 1.594 m (5' 2.75\").    Weight as of this encounter: 72.6 kg (160 lb).  Medication Reconciliation: complete  Pamela Barnett LPN  "

## 2020-05-15 LAB — COPATH REPORT: NORMAL

## 2020-05-15 NOTE — PROGRESS NOTES
S:  F/u Menorrhagia.        See my prior eval.   44-year-old para 2 (CS)  female with menorrhagia.  She describes regular cycles lasting up to 3 weeks with 5 days of heavy flow where she is changing pads hourly with gushing and clots.  Her menstrual flow was in her rupturing activities of daily living.  She denies dysmenorrhea, pelvic pain.  She has had a tubal ligation for birth control.  She was seen in the emergency room in 2018 with similar complaints and was given a prescription for norethindrone at that time.  She was to follow-up with her PCM but did not.  Her last Pap smear was 2016.  Her gynecologic history is otherwise unremarkable.  Denies intermenstrual bleeding.  She is a casual smoker.  No other complaints.  Medical records were reviewed on EMR.  Bleeding heaviness somewhat improved on TXA but still irregular.  US showed heterogenous endometrium and follicular ovarian cyst.  No discreet fibroids.        Patient Active Problem List   Diagnosis     Recurrent UTI     Tobacco abuse     Depression with anxiety     ACP (advance care planning)     OSCAR (generalized anxiety disorder)     Major depressive disorder, recurrent episode, moderate (H)     Panic attack            Past Medical History:   Diagnosis Date     Depression with anxiety      Tobacco abuse             Past Surgical History:   Procedure Laterality Date     BIOPSY BREAST Right 10/19/2017    Procedure: BIOPSY BREAST;  EXCISIONAL BIOPSY RIGHT BREAST;  Surgeon: Kiko Newberry MD;  Location: [facility] OR     ESOPHAGOSCOPY, GASTROSCOPY, DUODENOSCOPY (EGD), COMBINED N/A 10/1/2018    Procedure: COMBINED ESOPHAGOSCOPY, GASTROSCOPY, DUODENOSCOPY (EGD);  UPPER ENDOSCOPY WITH BIOPSIES;  Surgeon: Kiko Newberry MD;  Location: [facility] OR     OOPHORECTOMY      tubal pregnancy     TUBAL LIGATION              Social History     Tobacco Use     Smoking status: Current Some Day Smoker     Packs/day: 0.50     Types: Cigarettes     Smokeless tobacco: Never Used     Tobacco  "comment: rarely smokes. declines quitplan referral 9/18/18   Substance Use Topics     Alcohol use: Yes     Alcohol/week: 0.0 standard drinks     Comment: occassionally            Family History   Problem Relation Age of Onset     Coronary Artery Disease Mother      Hypertension Mother      Coronary Artery Disease Father      C.A.D. No family hx of      Breast Cancer No family hx of      Cancer - colorectal No family hx of      Diabetes No family hx of      Thyroid Disease No family hx of      Asthma No family hx of      Colon Cancer No family hx of      Hyperlipidemia No family hx of                Allergies   Allergen Reactions     Augmentin [Amoxicillin-Pot Clavulanate] Swelling            Current Outpatient Medications   Medication Sig Dispense Refill     tranexamic acid (LYSTEDA) 650 MG tablet Take 2 tablets (1,300 mg) by mouth 3 times daily With menses 30 tablet 2     traZODone (DESYREL) 50 MG tablet TAKE 1 TABLET (50 MG) BY MOUTH NIGHTLY AS NEEDED FOR SLEEP 30 tablet 1     venlafaxine (EFFEXOR) 75 MG tablet Take 1 tablet (75 mg) by mouth 3 times daily 90 tablet 0     hydrOXYzine (VISTARIL) 50 MG capsule Take 1 capsule (50 mg) by mouth 3 times daily as needed for anxiety (Patient not taking: Reported on 5/14/2020) 90 capsule 3     nabumetone (RELAFEN) 500 MG tablet Take 1 tablet (500 mg) by mouth 2 times daily (Patient not taking: Reported on 4/7/2020) 60 tablet 1          Review Of Systems  Constitutional:  Denies fever  GI/ negative except as noted per hpi    O:   /61 (BP Location: Left arm, Cuff Size: Adult Regular)   Pulse 68   Ht 1.594 m (5' 2.75\")   Wt 72.6 kg (160 lb)   SpO2 95%   BMI 28.57 kg/m    Gen:  NAD, A and O    Abd soft, NT, ND  Lymphadenopathy:  neg  Vulva: No lesions, erythema, BUS wnl  Vagina: Pink, moist mucosa with rugae,no lesions  Cervix: No lesions, no CMT  Uterus: Midposition, normal size and count our, mobile, NT  Adnexa: Non-palpable, NT, no masses  Anus without " lesions  Ext.  neg  Pap done.  PROCEDURE:  After informed consent was obtained from the patient, a speculum was placed in the vagina to visualize the cervix.  The cervix was then swabbed with a betadine.  Tenaculum was applied to the anterior cervical lip. Endometrial biopsy pipelle was passed through the cervical os and tissue obtained.  Uterus sounded to 8 cm.  Tenaculum was removed and sites were hemostatic. There were no complications. The patient tolerated the procedure well with a minimal amount of cramping noted.  Specimen was sent to pathology.             A:  Menorrhagia.  Thickened endometrium on US.   Pap and endometrial bx pending.     P:  Discussed expectant,  medical, IUD and and surgical options(endometrial ablation/hysterectomy). Patient would like to proceed with Hysteroscopy/endometrial ablation if results return of w/u return benign.   Reviewed goals, risks, alternatives for planned procedure.  Failure rates and potential need for hysterectomy in the future discussed.  Handouts on procedure reviewed with pt.  Risks ncluding risk of bleeding, infection, damage to nerves, blood vessels, bowel and bladder discussed. Discussed recovery period and expected discomfort.. All questions were answered.  Will call with pathology results when available and schedule accordingly.   Pt has my card and phone number to call as needed if problems in the interim or she does not hear her results.   25 minutes were spent with the patient with greater than 50% of the visit spent in face-to-face counseling and coordination of care.

## 2020-05-19 DIAGNOSIS — F41.1 GAD (GENERALIZED ANXIETY DISORDER): ICD-10-CM

## 2020-05-19 LAB
COPATH REPORT: NORMAL
PAP: NORMAL

## 2020-05-19 NOTE — TELEPHONE ENCOUNTER
venlafaxine      Last Written Prescription Date:  02/14/2020  Last Fill Quantity: 90,   # refills: 0  Last Office Visit: 02/06/2020  Future Office visit:

## 2020-05-21 RX ORDER — VENLAFAXINE 75 MG/1
75 TABLET ORAL 3 TIMES DAILY
Qty: 90 TABLET | Refills: 0 | Status: SHIPPED | OUTPATIENT
Start: 2020-05-21 | End: 2020-07-16

## 2020-05-21 NOTE — TELEPHONE ENCOUNTER
Serotonin-Norepinephrine Reuptake Inhibitors  Failed     Rerun Protocol  (5/19/2020 9:59 AM)       Normal serum creatinine on file in past 12 months         Recent Labs   Lab Test 10/04/18  1859   CR 0.90

## 2020-05-22 ENCOUNTER — PREP FOR PROCEDURE (OUTPATIENT)
Dept: OBGYN | Facility: OTHER | Age: 45
End: 2020-05-22

## 2020-05-22 DIAGNOSIS — N92.0 MENORRHAGIA: Primary | ICD-10-CM

## 2020-05-22 DIAGNOSIS — R93.89 ENDOMETRIAL THICKENING ON ULTRASOUND: ICD-10-CM

## 2020-05-22 LAB
FINAL DIAGNOSIS: ABNORMAL
HPV HR 12 DNA CVX QL NAA+PROBE: POSITIVE
HPV16 DNA SPEC QL NAA+PROBE: NEGATIVE
HPV18 DNA SPEC QL NAA+PROBE: NEGATIVE
SPECIMEN DESCRIPTION: ABNORMAL
SPECIMEN SOURCE CVX/VAG CYTO: ABNORMAL

## 2020-06-03 ENCOUNTER — TELEPHONE (OUTPATIENT)
Dept: FAMILY MEDICINE | Facility: OTHER | Age: 45
End: 2020-06-03

## 2020-06-03 NOTE — PROGRESS NOTES
[facility] Surgery Consultation    CC: Right breast skin lesion     HPI:  This 42 year old year old female is seen at the request of Dr. Olivia Weiss for evaluation and scheduling of right breast lesion. She reports that over the last 6 months she has had a lateral breast lesion that changes in size. It appears to become inflamed at times and will respond to antibiotics. She notes that it will scale at times. She had a normal mammogram one year ago. She denies any family history of breast cancer. Her age at menarche was 8. She has had 2 children. She denies hormone therapy. She is pre-menopausal.      Past Medical History:   Diagnosis Date     Depression with anxiety      Tobacco abuse        Past Surgical History:   Procedure Laterality Date     OOPHORECTOMY      tubal pregnancy     TUBAL LIGATION         Allergies   Allergen Reactions     Augmentin [Amoxicillin-Pot Clavulanate] Swelling       Current Outpatient Prescriptions   Medication     escitalopram (LEXAPRO) 20 MG tablet     sulfamethoxazole-trimethoprim (BACTRIM DS/SEPTRA DS) 800-160 MG per tablet     nabumetone (RELAFEN) 500 MG tablet     traZODone (DESYREL) 50 MG tablet     Phenazopyridine HCl (AZO TABS PO)     No current facility-administered medications for this visit.        HABITS:    Social History   Substance Use Topics     Smoking status: Current Some Day Smoker     Packs/day: 0.50     Types: Cigarettes     Smokeless tobacco: Never Used      Comment: rarely smokes     Alcohol use 0.0 oz/week     0 Standard drinks or equivalent per week      Comment: occassionally       Family History   Problem Relation Age of Onset     Coronary Artery Disease Mother      Hypertension Mother      Coronary Artery Disease Father      C.A.D. No family hx of      Breast Cancer No family hx of      Cancer - colorectal No family hx of      DIABETES No family hx of      Thyroid Disease No family hx of      Asthma No family hx of      Colon Cancer No family hx of       "Hyperlipidemia No family hx of        REVIEW OF SYSTEMS:  Ten point review of systems negative except those mentioned in the HPI.     OBJECTIVE:    /68 (BP Location: Right arm, Patient Position: Chair, Cuff Size: Adult Regular)  Pulse 72  Temp 96.3  F (35.7  C) (Tympanic)  Ht 5' 2.75\" (1.594 m)  Wt 170 lb (77.1 kg)  SpO2 94%  BMI 30.35 kg/m2    GENERAL: Generally appears well, in no distress with appropriate affect.  HEENT:   Sclerae anicteric - No cervical, supra/infraclavciular lymphadenopathy  Respiratory:  No acute distress   Breast: no skin dimpling, no nipple discharge, there is a 1.5 cm irregular pigmented skin lesion on the lateral aspect of the right breast. There is a sunflower seed subcutaneous lesion, possible cyst. No other masses or axillary adenopathy.   Cardiovascular:  Regular Rate and Rhythm   Abdomen:  :  deferred  Extremities:  Extremities normal. No deformities, edema, or skin discoloration.  Skin:  Significant amount of tattoos   Neurological: grossly intact    Psych:  Alert, oriented, affect appropriate with normal decision making ability.    IMPRESSION:    42 y.o. Female with no significant medical history presents to clinic with a right breast lesion. It appears to involve the skin, and by history sounds cystic in nature. Due to the changing nature of lesion as well as 6 month time course feel that this would be better evaluated in the OR with an excisional biopsy. No other suspicious lesions identified on exam.     PLAN:    Excisional biopsy in OR.     Kiko Newberry MD    10/10/2017  11:20 AM    cc:  Olivia Weiss     " 153

## 2020-06-04 ENCOUNTER — TELEPHONE (OUTPATIENT)
Dept: FAMILY MEDICINE | Facility: OTHER | Age: 45
End: 2020-06-04

## 2020-06-04 NOTE — TELEPHONE ENCOUNTER
LM to return call to clinic to schedule pre op appointment with Dr. Olivia Weiss on a Tuesday or a Thursday prior to 7/1/20

## 2020-06-08 ENCOUNTER — NURSE TRIAGE (OUTPATIENT)
Dept: FAMILY MEDICINE | Facility: OTHER | Age: 45
End: 2020-06-08

## 2020-06-08 NOTE — TELEPHONE ENCOUNTER
Pt calling and states her job is making her get Covid tested before she comes back to work.Fever , nausea and vomitted over the weekend. She states she had been dizzy and is not at this time.Updated her that [facility] is near by because she is in [place] because someone told her that is where she can get Covid test and go go there if needed,She states she is SOB at night but not at this time. Advised appt today and she declined. Did update to do [employer] and she verbalized understanding.She states that she thought the testing was in [place] and she is in [place] now and wants to sleep in tomorrow.Asks for the latest test there is.Encouraged earlier appt and she states no that it is her day off and wants to sleep in. She verbalized understanding on below.    Alida Ambrosio RN    Instructions for Patients  Your symptoms show that you may have coronavirus (COVID-19). This illness can cause fever, cough and trouble breathing. Many people get a mild case and get better on their own. Some people can get very sick.     Not all patients are tested for COVID-19. If you need to be tested, your care team will let you know.    How can I protect others?    Without a test, we can t know for sure that you have COVID-19. For safety, it s very important to follow these rules.    Stay home and away from others (self-isolate) until:    You ve had no fever--and no medicine that reduces fever--for 3 full days (72 hours). And      Your other symptoms have resolved (gotten better). For example, your cough or breathing has improved. And     At least 10 days have passed since your symptoms started.    During this time:    Stay in your own room (and use your own bathroom), if you can.    Stay away from others in your home. No hugging, kissing or shaking hands.    No visitors.    Don t go to work, school or anywhere else.     Clean  high touch  surfaces often (doorknobs, counters, handles, etc.). Use a household cleaning spray or  wipes.    Cover your mouth and nose with a mask, tissue or washcloth to avoid spreading germs.    Wash your hands and face often. Use soap and water.    For more tips, go to https://www.cdc.gov/coronavirus/2019-ncov/downloads/10Things.pdf.    How can I take care of myself?    1. Get lots of rest. Drink extra fluids (unless a doctor has told you not to).     2. Take Tylenol (acetaminophen) for fever or pain. If you have liver or kidney problems, ask your family doctor if it's okay to take Tylenol.     Adults can take either:     650 mg (two 325 mg pills) every 4 to 6 hours, or     1,000 mg (two 500 mg pills) every 8 hours as needed.     Note: Don't take more than 3,000 mg in one day.   Acetaminophen is found in many medicines (both prescribed and over-the-counter medicines). Read all labels to be sure you don't take too much.   For children, check the Tylenol bottle for the right dose. The dose is based on  the child's age or weight.    3. If you have other health problems (like cancer, heart failure, an organ transplant or severe kidney disease): Call your specialty clinic if you don't feel better in the next 2 days.    4. Know when to call 911: If your breathing is so bad that it keeps you from doing normal activities, call 911 or go to the emergency room. Tell them that you've been staying home and may have COVID-19.      Thank you for taking steps to prevent the spread of this virus.  o Limit your contact with others.  o Wear a simple mask to cover your cough.  o Wash your hands well and often.  o If you need medical care, go to [email or web address] or contact your health care provider.     For more about COVID-19 and caring for yourself at home, visit the CDC website at https://www.cdc.gov/coronavirus/2019-ncov/about/steps-when-sick.html.     To learn about care at [facility], please go to [email or web address].     Below are the COVID-19 hotlines at the [place] Department of Health  ([place]). Interpreters are available.     For health questions: Call 744-080-4160 or 1-565.570.8999 (7 a.m. to 7 p.m.)    For questions about schools and childcare: Call 917-774-3951 or 1-149.894.6823 (7 a.m. to 7 p.m.)      COVID 19 Nurse Triage Plan/Patient Instructions    Please be aware that novel coronavirus (COVID-19) may be circulating in the community. If you develop symptoms such as fever, cough, or SOB or if you have concerns about the presence of another infection including coronavirus (COVID-19), please contact your health care provider or visit [email or web address].     Disposition/Instructions    Patient to schedule an In Person Visit with provider. Reference Visit Selection Guide.Pt does not want and will schedule physical before the first of the month for  Surgery. Advised [email or web address] as she states that she does have SOB at night but states it is not horrible.Seh states that she dose have high anxiety. If increase of s//s of SOB need to go to ED or call 911.    Thank you for taking steps to prevent the spread of this virus.  o Limit your contact with others.  o Wear a simple mask to cover your cough.  o Wash your hands well and often.    Resources    [facility]: About COVID-19: [email or web address]    CDC: What to Do If You're Sick: www.cdc.gov/coronavirus/2019-ncov/about/steps-when-sick.html    CDC: Ending Home Isolation: www.cdc.gov/coronavirus/2019-ncov/hcp/disposition-in-home-patients.html     CDC: Caring for Someone: www.cdc.gov/coronavirus/2019-ncov/if-you-are-sick/care-for-someone.html     [facility]: Interim Guidance for Hospital Discharge to Home: www.health.[facility].[place]/diseases/coronavirus/hcp/hospdischarge.pdf    [facility] clinical trials (COVID-19 research studies): [email or web address]-trials     Below are the COVID-19 hotlines at the [place] Department of Health ([facility]). Interpreters are available.   o For health questions: Call 847-501-9649 or  1-234.649.8753 (7 a.m. to 7 p.m.)  o For questions about schools and childcare: Call 331-053-7577 or 1-297.905.6454 (7 a.m. to 7 p.m.)                     Reason for Disposition    [1] COVID-19 infection suspected by caller or triager AND [2] mild symptoms (cough, fever, or others) AND [3] no complications or SOB    Additional Information    Negative: SEVERE difficulty breathing (e.g., struggling for each breath, speaks in single words)    Negative: Difficult to awaken or acting confused (e.g., disoriented, slurred speech)    Negative: Bluish (or gray) lips or face now    Negative: Sounds like a life-threatening emergency to the triager    Negative: Shock suspected (e.g., cold/pale/clammy skin, too weak to stand, low BP, rapid pulse)    Negative: [1] COVID-19 exposure AND [2] no symptoms    Negative: COVID-19 and Breastfeeding, questions about    Negative: [1] Adult with possible COVID-19 symptoms AND [2] triager concerned about severity of symptoms or other causes    Negative: SEVERE or constant chest pain or pressure (Exception: mild central chest pain, present only when coughing)    Negative: MODERATE difficulty breathing (e.g., speaks in phrases, SOB even at rest, pulse 100-120)    Negative: Patient sounds very sick or weak to the triager    Negative: MILD difficulty breathing (e.g., minimal/no SOB at rest, SOB with walking, pulse <100)    Negative: Chest pain or pressure    Negative: Fever > 103 F (39.4 C)    Negative: [1] Fever > 101 F (38.3 C) AND [2] age > 60    Negative: [1] Fever > 100.0 F (37.8 C) AND [2] bedridden (e.g., nursing home patient, CVA, chronic illness, recovering from surgery)    Negative: HIGH RISK patient (e.g., age > 64 years, diabetes, heart or lung disease, weak immune system)    Negative: Fever present > 3 days (72 hours)    Negative: [1] Fever returns after gone for over 24 hours AND [2] symptoms worse or not improved    Negative: [1] Continuous (nonstop) coughing interferes with work  "or school AND [2] no improvement using cough treatment per protocol    Answer Assessment - Initial Assessment Questions  1. COVID-19 DIAGNOSIS: \"Who made your Coronavirus (COVID-19) diagnosis?\" \"Was it confirmed by a positive lab test?\" If not diagnosed by a HCP, ask \"Are there lots of cases (community spread) where you live?\" (See public health department website, if unsure)      no  2. ONSET: \"When did the COVID-19 symptoms start?\"       Friday fever, nausea and Saturday vomiting and had to leave work  3. WORST SYMPTOM: \"What is your worst symptom?\" (e.g., cough, fever, shortness of breath, muscle aches)      SOB at night when lays down,did have last night,no cough, no fever today  4. COUGH: \"Do you have a cough?\" If so, ask: \"How bad is the cough?\"        no  5. FEVER: \"Do you have a fever?\" If so, ask: \"What is your temperature, how was it measured, and when did it start?\"      no  6. RESPIRATORY STATUS: \"Describe your breathing?\" (e.g., shortness of breath, wheezing, unable to speak)       No SOB, no wheezing at this time  7. BETTER-SAME-WORSE: \"Are you getting better, staying the same or getting worse compared to yesterday?\"  If getting worse, ask, \"In what way?\"     Feels better today  8. HIGH RISK DISEASE: \"Do you have any chronic medical problems?\" (e.g., asthma, heart or lung disease, weak immune system, etc.)      no  9. PREGNANCY: \"Is there any chance you are pregnant?\" \"When was your last menstrual period?\"      no  10. OTHER SYMPTOMS: \"Do you have any other symptoms?\"  (e.g., chills, fatigue, headache, loss of smell or taste, muscle pain, sore throat)        Not at this time.always fatigue, has HA off and on since couple weeks ago, always muscle pain from back from work,no sore throat    Protocols used: CORONAVIRUS (COVID-19) DIAGNOSED OR [id number] 5.16.20      "

## 2020-06-09 ENCOUNTER — OFFICE VISIT (OUTPATIENT)
Dept: FAMILY MEDICINE | Facility: OTHER | Age: 45
End: 2020-06-09
Attending: FAMILY MEDICINE
Payer: COMMERCIAL

## 2020-06-09 DIAGNOSIS — R11.2 NAUSEA WITH VOMITING: ICD-10-CM

## 2020-06-09 DIAGNOSIS — R05.9 COUGH: Primary | ICD-10-CM

## 2020-06-09 PROCEDURE — 99000 SPECIMEN HANDLING OFFICE-LAB: CPT | Performed by: FAMILY MEDICINE

## 2020-06-09 PROCEDURE — U0003 INFECTIOUS AGENT DETECTION BY NUCLEIC ACID (DNA OR RNA); SEVERE ACUTE RESPIRATORY SYNDROME CORONAVIRUS 2 (SARS-COV-2) (CORONAVIRUS DISEASE [COVID-19]), AMPLIFIED PROBE TECHNIQUE, MAKING USE OF HIGH THROUGHPUT TECHNOLOGIES AS DESCRIBED BY CMS-2020-01-R: HCPCS | Mod: ZL | Performed by: FAMILY MEDICINE

## 2020-06-11 LAB
SARS-COV-2 RNA SPEC QL NAA+PROBE: NOT DETECTED
SPECIMEN SOURCE: NORMAL

## 2020-06-19 DIAGNOSIS — Z01.818 PRE-OP EXAM: Primary | ICD-10-CM

## 2020-06-24 ENCOUNTER — ANESTHESIA EVENT (OUTPATIENT)
Dept: SURGERY | Facility: HOSPITAL | Age: 45
End: 2020-06-24
Payer: COMMERCIAL

## 2020-06-24 ASSESSMENT — LIFESTYLE VARIABLES: TOBACCO_USE: 1

## 2020-06-24 NOTE — PATIENT INSTRUCTIONS
Before Your Surgery      Call your surgeon if there is any change in your health. This includes signs of a cold or flu (such as a sore throat, runny nose, cough, rash or fever).    Do not smoke, drink alcohol or take over the counter medicine (unless your surgeon or primary care doctor tells you to) for the 24 hours before and after surgery.    If you take prescribed drugs: Follow your doctor s orders about which medicines to take and which to stop until after surgery.    Eating and drinking prior to surgery: follow the instructions from your surgeon    Take a shower or bath the night before surgery. Use the soap your surgeon gave you to gently clean your skin. If you do not have soap from your surgeon, use your regular soap. Do not shave or scrub the surgery site.  Wear clean pajamas and have clean sheets on your bed.     Nothing to eat or drink after midnight the night prior to surgery    Take all usual morning medications the morning of surgery with a small sip of water

## 2020-06-24 NOTE — ANESTHESIA PREPROCEDURE EVALUATION
Anesthesia Pre-Procedure Evaluation    Patient: Ritesh Soliman   MRN: [id number] : 1975          Preoperative Diagnosis: Menorrhagia [N92.0]  Endometrial thickening on ultrasound [R93.89]    Procedure(s):  HYSTEROSCOPY, ENDOMETRIAL ABLATION    Past Medical History:   Diagnosis Date     Depression with anxiety      Tobacco abuse      Past Surgical History:   Procedure Laterality Date     BIOPSY BREAST Right 10/19/2017    Procedure: BIOPSY BREAST;  EXCISIONAL BIOPSY RIGHT BREAST;  Surgeon: Kiko Newberry MD;  Location: [facility] OR     ESOPHAGOSCOPY, GASTROSCOPY, DUODENOSCOPY (EGD), COMBINED N/A 10/1/2018    Procedure: COMBINED ESOPHAGOSCOPY, GASTROSCOPY, DUODENOSCOPY (EGD);  UPPER ENDOSCOPY WITH BIOPSIES;  Surgeon: Kiko Newberry MD;  Location: [facility] OR     OOPHORECTOMY      tubal pregnancy     TUBAL LIGATION         Anesthesia Evaluation     . Pt has had prior anesthetic. Type: General and MAC    No history of anesthetic complications          ROS/MED HX    ENT/Pulmonary:     (+)MAGNO risk factors daytime somnolence, tobacco use, Current use 0.10 packs/day  , . .    Neurologic:  - neg neurologic ROS     Cardiovascular: Comment: Stents placed for varicose veins    (+) ----. : . . . :. . Previous cardiac testing date:results:date: results:ECG reviewed date:18 results:SR date: results:          METS/Exercise Tolerance:  >4 METS   Hematologic:  - neg hematologic  ROS       Musculoskeletal:   (+) arthritis,  -       GI/Hepatic:  - neg GI/hepatic ROS       Renal/Genitourinary:  - ROS Renal section negative       Endo:  - neg endo ROS       Psychiatric:     (+) psychiatric history anxiety and depression      Infectious Disease:  - neg infectious disease ROS       Malignancy:      - no malignancy   Other:    - neg other ROS                      Physical Exam  Normal systems: cardiovascular, pulmonary and dental    Airway   Mallampati: III  TM distance: >3 FB  Neck ROM: full    Dental     Cardiovascular   Rhythm and  "rate: regular and normal      Pulmonary    breath sounds clear to auscultation            Lab Results   Component Value Date    WBC 9.4 10/04/2018    HGB 14.2 10/04/2018    HCT 40.8 10/04/2018     10/04/2018     10/04/2018    POTASSIUM 4.1 10/04/2018    CHLORIDE 108 10/04/2018    CO2 23 10/04/2018    BUN 17 10/04/2018    CR 0.90 10/04/2018    GLC 83 10/04/2018    JACKLYN 9.1 10/04/2018    ALBUMIN 4.1 10/04/2018    PROTTOTAL 8.0 10/04/2018    ALT 21 10/04/2018    AST 19 10/04/2018    ALKPHOS 85 10/04/2018    BILITOTAL 0.2 10/04/2018    TSH 0.76 08/08/2018    HCG Negative 10/04/2018       Preop Vitals  BP Readings from Last 3 Encounters:   05/14/20 105/61   11/05/19 122/74   10/17/19 120/80    Pulse Readings from Last 3 Encounters:   05/14/20 68   11/05/19 68   10/17/19 96      Resp Readings from Last 3 Encounters:   11/05/19 19   10/17/19 18   10/03/19 19    SpO2 Readings from Last 3 Encounters:   05/14/20 95%   11/05/19 99%   10/17/19 96%      Temp Readings from Last 1 Encounters:   10/17/19 98.3  F (36.8  C) (Tympanic)    Ht Readings from Last 1 Encounters:   05/14/20 1.594 m (5' 2.75\")      Wt Readings from Last 1 Encounters:   05/14/20 72.6 kg (160 lb)    Estimated body mass index is 28.57 kg/m  as calculated from the following:    Height as of 5/14/20: 1.594 m (5' 2.75\").    Weight as of 5/14/20: 72.6 kg (160 lb).       Anesthesia Plan      History & Physical Review  History and physical reviewed and following examination; no interval change.    ASA Status:  3 .    NPO Status:  > 8 hours    Plan for MAC with Intravenous and Propofol induction. Maintenance will be TIVA.  Reason for MAC:  Deep or markedly invasive procedure (G8) and Extreme anxiety (QS)    Risks and benefits of MAC anesthetic discussed including dental damage, aspiration, loss of airway, conversion to general anesthetic, CV complications, MI, stroke, death. Pt wishes to proceed.     Hp 6/25/20        Postoperative Care  Postoperative pain " management:  IV analgesics.      Consents  Anesthetic plan, risks, benefits and alternatives discussed with:  Patient..                 MARIANN Castellon CRNA

## 2020-06-24 NOTE — PROGRESS NOTES
[facility] - [place]  [address]  [place] [address]  [phone]  Dept: [phone]    PRE-OP EVALUATION:  Today's date: 2020    Ritesh Soliman (: 1975) presents for pre-operative evaluation assessment as requested by Dr. Roger.  She requires evaluation and anesthesia risk assessment prior to undergoing surgery/procedure for treatment of ablasion .    Proposed Surgery/ Procedure: Ablation  Date of Surgery/ Procedure: 2020  Time of Surgery/ Procedure: unknown  Hospital/Surgical Facility: [facility] [place]  Primary Physician: Olivia Weiss  Type of Anesthesia Anticipated: to be determined    Patient has a Health Care Directive or Living Will:  NO    1. YES - Do you have a history of heart attack, stroke, stent, bypass or surgery on an artery in the head, neck, heart or legs? Stents put in leg 5-10 years ago for varicose veins  2. NO - Do you ever have any pain or discomfort in your chest?  3. NO - Do you have a history of  Heart Failure?  4. YES - Are you troubled by shortness of breath when: walking on the level, up a slight hill or at night? Out of shape  5. NO - Do you currently have a cold, bronchitis or other respiratory infection?  6. NO - Do you have a cough, shortness of breath or wheezing?  7. NO - Do you sometimes get pains in the calves of your legs when you walk?  8. YES - Do you or anyone in your family have previous history of blood clots? Mother, father and niece have history of aneurysms, mother of the stomach, father of the heart, and niece of the brain  9. NO - Do you or does anyone in your family have a serious bleeding problem such as prolonged bleeding following surgeries or cuts?  10. NO - Have you ever had problems with anemia or been told to take iron pills?  11. YES - Have you had any abnormal blood loss such as black, tarry or bloody stools, or abnormal vaginal bleeding? Menorrhagia   12. NO - Have you ever had a blood transfusion?  13. NO - Have you or any of your  relatives ever had problems with anesthesia?  14. YES - Do you have sleep apnea, excessive snoring or daytime drowsiness?, yes, no CPAP used at this time   15. NO - Do you have any prosthetic heart valves?  16. NO - Do you have prosthetic joints?  17. NO - Is there any chance that you may be pregnant?      HPI:     HPI related to upcoming procedure: menorrhagia, ongoing           MEDICAL HISTORY:     Patient Active Problem List    Diagnosis Date Noted     Menorrhagia 05/22/2020     Priority: Medium     Added automatically from request for surgery [id number]       Endometrial thickening on ultrasound 05/22/2020     Priority: Medium     Added automatically from request for surgery [id number]       OSCAR (generalized anxiety disorder) 05/09/2018     Priority: Medium     Major depressive disorder, recurrent episode, moderate (H) 05/09/2018     Priority: Medium     Panic attack 05/09/2018     Priority: Medium     ACP (advance care planning) 04/29/2016     Priority: Medium     Advance Care Planning 4/29/2016: ACP Review of Chart / Resources Provided:  Reviewed chart for advance care plan.  Ritesh Soilman has been provided information and resources to begin or update their advance care plan.  Added by Ngoc Duong             Tobacco abuse      Priority: Medium     Depression with anxiety      Priority: Medium     Recurrent UTI 02/27/2014     Priority: Medium      Past Medical History:   Diagnosis Date     Depression with anxiety      Tobacco abuse      Past Surgical History:   Procedure Laterality Date     BIOPSY BREAST Right 10/19/2017    Procedure: BIOPSY BREAST;  EXCISIONAL BIOPSY RIGHT BREAST;  Surgeon: Kiko Newberry MD;  Location: [facility] OR     ESOPHAGOSCOPY, GASTROSCOPY, DUODENOSCOPY (EGD), COMBINED N/A 10/1/2018    Procedure: COMBINED ESOPHAGOSCOPY, GASTROSCOPY, DUODENOSCOPY (EGD);  UPPER ENDOSCOPY WITH BIOPSIES;  Surgeon: Kiko Newberry MD;  Location: [facility] OR     OOPHORECTOMY      tubal pregnancy     TUBAL LIGATION        Current Outpatient Medications   Medication Sig Dispense Refill     traZODone (DESYREL) 50 MG tablet TAKE 1 TABLET (50 MG) BY MOUTH NIGHTLY AS NEEDED FOR SLEEP 30 tablet 1     venlafaxine (EFFEXOR) 75 MG tablet Take 1 tablet (75 mg) by mouth 3 times daily 90 tablet 0     hydrOXYzine (VISTARIL) 50 MG capsule Take 1 capsule (50 mg) by mouth 3 times daily as needed for anxiety (Patient not taking: Reported on 5/14/2020) 90 capsule 3     nabumetone (RELAFEN) 500 MG tablet Take 1 tablet (500 mg) by mouth 2 times daily (Patient not taking: Reported on 4/7/2020) 60 tablet 1     tranexamic acid (LYSTEDA) 650 MG tablet Take 2 tablets (1,300 mg) by mouth 3 times daily With menses (Patient not taking: Reported on 6/25/2020) 30 tablet 2     OTC products: None, except as noted above    Allergies   Allergen Reactions     Augmentin [Amoxicillin-Pot Clavulanate] Swelling      Latex Allergy: NO    Social History     Tobacco Use     Smoking status: Current Some Day Smoker     Packs/day: 0.50     Types: Cigarettes     Smokeless tobacco: Never Used     Tobacco comment: rarely smokes. declines quitplan referral 9/18/18   Substance Use Topics     Alcohol use: Yes     Alcohol/week: 0.0 standard drinks     Comment: occassionally     History   Drug Use No       REVIEW OF SYSTEMS:   CONSTITUTIONAL: NEGATIVE for fever, chills, change in weight  INTEGUMENTARY/SKIN: NEGATIVE for worrisome rashes, moles or lesions  EYES: NEGATIVE for vision changes or irritation  ENT/MOUTH: NEGATIVE for ear, mouth and throat problems  RESP: NEGATIVE for significant cough or SOB  BREAST: NEGATIVE for masses, tenderness or discharge  CV: NEGATIVE for chest pain, palpitations or peripheral edema  GI: NEGATIVE for nausea, abdominal pain, heartburn, or change in bowel habits  : NEGATIVE for frequency, dysuria, or hematuria  MUSCULOSKELETAL: NEGATIVE for significant arthralgias or myalgia  NEURO: NEGATIVE for weakness, dizziness or paresthesias  ENDOCRINE:  NEGATIVE for temperature intolerance, skin/hair changes  HEME: NEGATIVE for bleeding problems  PSYCHIATRIC: NEGATIVE for changes in mood or affect    EXAM:   /66 (BP Location: Right arm, Patient Position: Sitting, Cuff Size: Adult Regular)   Pulse 77   Temp 97.7  F (36.5  C) (Tympanic)   Resp 18   Wt 74.8 kg (165 lb)   SpO2 99%   BMI 29.46 kg/m      GENERAL APPEARANCE: healthy, alert and no distress     EYES: EOMI, PERRL     HENT: ear canals and TM's normal and nose and mouth without ulcers or lesions     NECK: no adenopathy, no asymmetry, masses, or scars and thyroid normal to palpation     RESP: lungs clear to auscultation - no rales, rhonchi or wheezes     CV: regular rates and rhythm, normal S1 S2, no S3 or S4 and no murmur, click or rub     ABDOMEN:  soft, nontender, no HSM or masses and bowel sounds normal     MS: extremities normal- no gross deformities noted, no evidence of inflammation in joints, FROM in all extremities.     SKIN: no suspicious lesions or rashes, tattoos noted over arms and legs     NEURO: Normal strength and tone, sensory exam grossly normal, mentation intact and speech normal     PSYCH: mentation appears normal. and affect normal/bright     LYMPHATICS: No cervical adenopathy    DIAGNOSTICS:   Labs Resulted Today: No results found for any visits on 06/25/20.    Recent Labs   Lab Test 10/04/18  1859 08/28/18  1508 08/08/18  0953 12/13/16  1414   HGB 14.2  --  14.0 14.2     --   --  323    139 140 139   POTASSIUM 4.1 4.0 4.3 4.3   CR 0.90 0.87 0.89 0.90      Results for orders placed or performed in visit on 06/25/20   Lipid Profile (Chol, Trig, HDL, LDL calc)     Status: Abnormal   Result Value Ref Range    Cholesterol 225 (H) <200 mg/dL    Triglycerides 163 (H) <150 mg/dL    HDL Cholesterol 78 >49 mg/dL    LDL Cholesterol Calculated 114 (H) <100 mg/dL    Non HDL Cholesterol 147 (H) <130 mg/dL   CBC with platelets and differential     Status: None   Result Value Ref  Range    WBC 6.1 4.0 - 11.0 10e9/L    RBC Count 4.39 3.8 - 5.2 10e12/L    Hemoglobin 13.8 11.7 - 15.7 g/dL    Hematocrit 40.9 35.0 - 47.0 %    MCV 93 78 - 100 fl    MCH 31.4 26.5 - 33.0 pg    MCHC 33.7 31.5 - 36.5 g/dL    RDW 12.7 10.0 - 15.0 %    Platelet Count 329 150 - 450 10e9/L    Diff Method Automated Method     % Neutrophils 60.4 %    % Lymphocytes 26.5 %    % Monocytes 8.6 %    % Eosinophils 4.0 %    % Basophils 0.3 %    % Immature Granulocytes 0.2 %    Nucleated RBCs 0 0 /100    Absolute Neutrophil 3.7 1.6 - 8.3 10e9/L    Absolute Lymphocytes 1.6 0.8 - 5.3 10e9/L    Absolute Monocytes 0.5 0.0 - 1.3 10e9/L    Absolute Eosinophils 0.2 0.0 - 0.7 10e9/L    Absolute Basophils 0.0 0.0 - 0.2 10e9/L    Abs Immature Granulocytes 0.0 0 - 0.4 10e9/L    Absolute Nucleated RBC 0.0    Comprehensive metabolic panel     Status: None   Result Value Ref Range    Sodium 138 133 - 144 mmol/L    Potassium 3.6 3.4 - 5.3 mmol/L    Chloride 108 94 - 109 mmol/L    Carbon Dioxide 26 20 - 32 mmol/L    Anion Gap 4 3 - 14 mmol/L    Glucose 87 70 - 99 mg/dL    Urea Nitrogen 18 7 - 30 mg/dL    Creatinine 0.85 0.52 - 1.04 mg/dL    GFR Estimate 82 >60 mL/min/[1.73_m2]    GFR Estimate If Black >90 >60 mL/min/[1.73_m2]    Calcium 8.7 8.5 - 10.1 mg/dL    Bilirubin Total 0.3 0.2 - 1.3 mg/dL    Albumin 3.7 3.4 - 5.0 g/dL    Protein Total 7.7 6.8 - 8.8 g/dL    Alkaline Phosphatase 89 40 - 150 U/L    ALT 28 0 - 50 U/L    AST 17 0 - 45 U/L   HCG Qual, Urine ([id number])     Status: None   Result Value Ref Range    HCG Qual Urine Negative NEG^Negative       IMPRESSION:   Reason for surgery/procedure: menorrhagia. She is taking her tranexamic acid as needed and not currently     The proposed surgical procedure is considered LOW risk.    REVISED CARDIAC RISK INDEX  The patient has the following serious cardiovascular risks for perioperative complications such as (MI, PE, VFib and 3  AV Block):  No serious cardiac risks  INTERPRETATION: 0 risks: Class I  (very low risk - 0.4% complication rate)    The patient has the following additional risks for perioperative complications:  No identified additional risks      ICD-10-CM    1. Preop general physical exam  Z01.818 CBC with platelets and differential     Comprehensive metabolic panel     HCG Qual, Urine ([id number])   2. Menorrhagia with regular cycle  N92.0    3. Tobacco abuse  Z72.0    4. Tobacco abuse counseling  Z71.6 She smokes socially, when out, about once every 2 weeks. Not interested in anything to help quitting   5. Anxiety  F41.9 Reviewed PHQ 9 and OSCAR scores she feels she is doing ok and prefers not to increase her medication at this time.    6. Lipid screening  Z13.220 Lipid Profile (Chol, Trig, HDL, LDL calc)   7. Screening for HIV (human immunodeficiency virus)  Z11.4 HIV Antigen Antibody Combo       RECOMMENDATIONS:       Cardiovascular Risk  Performs 4 METs exercise without symptoms (Light housework (dusting, washing dishes), Climb a flight of stairs and Walk on level ground at 15 minutes per mile (4 miles/hour)) .       Obstructive Sleep Apnea (or suspected sleep apnea)    Covid 19 testing scheduled for June 28, 2020    --Patient is to take all scheduled medications on the day of surgery    APPROVAL GIVEN to proceed with proposed procedure, without further diagnostic evaluation       Signed Electronically by: Olivia Weiss MD    Copy of this evaluation report is provided to requesting physician.    [place] Preop Guidelines    Revised Cardiac Risk Index

## 2020-06-25 ENCOUNTER — OFFICE VISIT (OUTPATIENT)
Dept: FAMILY MEDICINE | Facility: OTHER | Age: 45
End: 2020-06-25
Attending: FAMILY MEDICINE
Payer: COMMERCIAL

## 2020-06-25 VITALS
OXYGEN SATURATION: 99 % | TEMPERATURE: 97.7 F | WEIGHT: 165 LBS | BODY MASS INDEX: 29.46 KG/M2 | RESPIRATION RATE: 18 BRPM | SYSTOLIC BLOOD PRESSURE: 110 MMHG | HEART RATE: 77 BPM | DIASTOLIC BLOOD PRESSURE: 66 MMHG

## 2020-06-25 DIAGNOSIS — N92.0 MENORRHAGIA WITH REGULAR CYCLE: ICD-10-CM

## 2020-06-25 DIAGNOSIS — F41.9 ANXIETY: ICD-10-CM

## 2020-06-25 DIAGNOSIS — Z11.4 SCREENING FOR HIV (HUMAN IMMUNODEFICIENCY VIRUS): ICD-10-CM

## 2020-06-25 DIAGNOSIS — Z01.818 PREOP GENERAL PHYSICAL EXAM: Primary | ICD-10-CM

## 2020-06-25 DIAGNOSIS — Z13.220 LIPID SCREENING: ICD-10-CM

## 2020-06-25 DIAGNOSIS — Z72.0 TOBACCO ABUSE: ICD-10-CM

## 2020-06-25 DIAGNOSIS — Z71.6 TOBACCO ABUSE COUNSELING: ICD-10-CM

## 2020-06-25 LAB
ALBUMIN SERPL-MCNC: 3.7 G/DL (ref 3.4–5)
ALP SERPL-CCNC: 89 U/L (ref 40–150)
ALT SERPL W P-5'-P-CCNC: 28 U/L (ref 0–50)
ANION GAP SERPL CALCULATED.3IONS-SCNC: 4 MMOL/L (ref 3–14)
AST SERPL W P-5'-P-CCNC: 17 U/L (ref 0–45)
BASOPHILS # BLD AUTO: 0 10E9/L (ref 0–0.2)
BASOPHILS NFR BLD AUTO: 0.3 %
BILIRUB SERPL-MCNC: 0.3 MG/DL (ref 0.2–1.3)
BUN SERPL-MCNC: 18 MG/DL (ref 7–30)
CALCIUM SERPL-MCNC: 8.7 MG/DL (ref 8.5–10.1)
CHLORIDE SERPL-SCNC: 108 MMOL/L (ref 94–109)
CHOLEST SERPL-MCNC: 225 MG/DL
CO2 SERPL-SCNC: 26 MMOL/L (ref 20–32)
CREAT SERPL-MCNC: 0.85 MG/DL (ref 0.52–1.04)
DIFFERENTIAL METHOD BLD: NORMAL
EOSINOPHIL # BLD AUTO: 0.2 10E9/L (ref 0–0.7)
EOSINOPHIL NFR BLD AUTO: 4 %
ERYTHROCYTE [DISTWIDTH] IN BLOOD BY AUTOMATED COUNT: 12.7 % (ref 10–15)
GFR SERPL CREATININE-BSD FRML MDRD: 82 ML/MIN/{1.73_M2}
GLUCOSE SERPL-MCNC: 87 MG/DL (ref 70–99)
HCG UR QL: NEGATIVE
HCT VFR BLD AUTO: 40.9 % (ref 35–47)
HDLC SERPL-MCNC: 78 MG/DL
HGB BLD-MCNC: 13.8 G/DL (ref 11.7–15.7)
IMM GRANULOCYTES # BLD: 0 10E9/L (ref 0–0.4)
IMM GRANULOCYTES NFR BLD: 0.2 %
LDLC SERPL CALC-MCNC: 114 MG/DL
LYMPHOCYTES # BLD AUTO: 1.6 10E9/L (ref 0.8–5.3)
LYMPHOCYTES NFR BLD AUTO: 26.5 %
MCH RBC QN AUTO: 31.4 PG (ref 26.5–33)
MCHC RBC AUTO-ENTMCNC: 33.7 G/DL (ref 31.5–36.5)
MCV RBC AUTO: 93 FL (ref 78–100)
MONOCYTES # BLD AUTO: 0.5 10E9/L (ref 0–1.3)
MONOCYTES NFR BLD AUTO: 8.6 %
NEUTROPHILS # BLD AUTO: 3.7 10E9/L (ref 1.6–8.3)
NEUTROPHILS NFR BLD AUTO: 60.4 %
NONHDLC SERPL-MCNC: 147 MG/DL
NRBC # BLD AUTO: 0 10*3/UL
NRBC BLD AUTO-RTO: 0 /100
PLATELET # BLD AUTO: 329 10E9/L (ref 150–450)
POTASSIUM SERPL-SCNC: 3.6 MMOL/L (ref 3.4–5.3)
PROT SERPL-MCNC: 7.7 G/DL (ref 6.8–8.8)
RBC # BLD AUTO: 4.39 10E12/L (ref 3.8–5.2)
SODIUM SERPL-SCNC: 138 MMOL/L (ref 133–144)
TRIGL SERPL-MCNC: 163 MG/DL
WBC # BLD AUTO: 6.1 10E9/L (ref 4–11)

## 2020-06-25 PROCEDURE — 85025 COMPLETE CBC W/AUTO DIFF WBC: CPT | Mod: ZL | Performed by: FAMILY MEDICINE

## 2020-06-25 PROCEDURE — 36415 COLL VENOUS BLD VENIPUNCTURE: CPT | Mod: ZL | Performed by: FAMILY MEDICINE

## 2020-06-25 PROCEDURE — 81025 URINE PREGNANCY TEST: CPT | Mod: ZL | Performed by: FAMILY MEDICINE

## 2020-06-25 PROCEDURE — 80061 LIPID PANEL: CPT | Mod: ZL | Performed by: FAMILY MEDICINE

## 2020-06-25 PROCEDURE — 80053 COMPREHEN METABOLIC PANEL: CPT | Mod: ZL | Performed by: FAMILY MEDICINE

## 2020-06-25 PROCEDURE — G0463 HOSPITAL OUTPT CLINIC VISIT: HCPCS

## 2020-06-25 PROCEDURE — 99214 OFFICE O/P EST MOD 30 MIN: CPT | Performed by: FAMILY MEDICINE

## 2020-06-25 PROCEDURE — 87389 HIV-1 AG W/HIV-1&-2 AB AG IA: CPT | Mod: ZL | Performed by: FAMILY MEDICINE

## 2020-06-25 ASSESSMENT — ANXIETY QUESTIONNAIRES
5. BEING SO RESTLESS THAT IT IS HARD TO SIT STILL: SEVERAL DAYS
7. FEELING AFRAID AS IF SOMETHING AWFUL MIGHT HAPPEN: NEARLY EVERY DAY
3. WORRYING TOO MUCH ABOUT DIFFERENT THINGS: NEARLY EVERY DAY
GAD7 TOTAL SCORE: 14
6. BECOMING EASILY ANNOYED OR IRRITABLE: SEVERAL DAYS
4. TROUBLE RELAXING: SEVERAL DAYS
GAD7 TOTAL SCORE: 13
IF YOU CHECKED OFF ANY PROBLEMS ON THIS QUESTIONNAIRE, HOW DIFFICULT HAVE THESE PROBLEMS MADE IT FOR YOU TO DO YOUR WORK, TAKE CARE OF THINGS AT HOME, OR GET ALONG WITH OTHER PEOPLE: SOMEWHAT DIFFICULT
6. BECOMING EASILY ANNOYED OR IRRITABLE: SEVERAL DAYS
IF YOU CHECKED OFF ANY PROBLEMS ON THIS QUESTIONNAIRE, HOW DIFFICULT HAVE THESE PROBLEMS MADE IT FOR YOU TO DO YOUR WORK, TAKE CARE OF THINGS AT HOME, OR GET ALONG WITH OTHER PEOPLE: SOMEWHAT DIFFICULT
1. FEELING NERVOUS, ANXIOUS, OR ON EDGE: MORE THAN HALF THE DAYS
3. WORRYING TOO MUCH ABOUT DIFFERENT THINGS: NEARLY EVERY DAY
5. BEING SO RESTLESS THAT IT IS HARD TO SIT STILL: SEVERAL DAYS
2. NOT BEING ABLE TO STOP OR CONTROL WORRYING: NEARLY EVERY DAY
1. FEELING NERVOUS, ANXIOUS, OR ON EDGE: MORE THAN HALF THE DAYS
2. NOT BEING ABLE TO STOP OR CONTROL WORRYING: NEARLY EVERY DAY
7. FEELING AFRAID AS IF SOMETHING AWFUL MIGHT HAPPEN: MORE THAN HALF THE DAYS

## 2020-06-25 ASSESSMENT — PATIENT HEALTH QUESTIONNAIRE - PHQ9
5. POOR APPETITE OR OVEREATING: SEVERAL DAYS
SUM OF ALL RESPONSES TO PHQ QUESTIONS 1-9: 19

## 2020-06-25 ASSESSMENT — PAIN SCALES - GENERAL: PAINLEVEL: NO PAIN (0)

## 2020-06-25 NOTE — NURSING NOTE
"No chief complaint on file.      Initial /66 (BP Location: Right arm, Patient Position: Sitting, Cuff Size: Adult Regular)   Pulse 77   Temp 97.7  F (36.5  C) (Tympanic)   Resp 18   Wt 74.8 kg (165 lb)   SpO2 99%   BMI 29.46 kg/m   Estimated body mass index is 29.46 kg/m  as calculated from the following:    Height as of 5/14/20: 1.594 m (5' 2.75\").    Weight as of this encounter: 74.8 kg (165 lb).  Medication Reconciliation: complete  Herbert Chatman LPN    "

## 2020-06-25 NOTE — LETTER
June 26, 2020      Ritesh Soliman  [address] [address]        Dear ,    We are writing to inform you of your test results.    pregnency test negative   Labs normal   The 10-year ASCVD risk score ([place] HOWE Jr., et al., 2013) is: 1.6%     Values used to calculate the score:       Age: 45 years       Sex: Female       Is Non- : No       Diabetic: No       Tobacco smoker: Yes       Systolic Blood Pressure: 110 mmHg       Is BP treated: No       HDL Cholesterol: 78 mg/dL       Total Cholesterol: 225 mg/dL   Risk of heart attack in the next 10 years calculated and is low so no lipid medication is recommended. Goal LDL is <130, HDL goal is >50--good profile   HIV testing is pending   Ok for surgery     Resulted Orders   Lipid Profile (Chol, Trig, HDL, LDL calc)   Result Value Ref Range    Cholesterol 225 (H) <200 mg/dL      Comment:      Desirable:       <200 mg/dl    Triglycerides 163 (H) <150 mg/dL      Comment:      Borderline high:  150-199 mg/dl  High:             200-499 mg/dl  Very high:       >499 mg/dl  Fasting specimen      HDL Cholesterol 78 >49 mg/dL    LDL Cholesterol Calculated 114 (H) <100 mg/dL      Comment:      Above desirable:  100-129 mg/dl  Borderline High:  130-159 mg/dL  High:             160-189 mg/dL  Very high:       >189 mg/dl      Non HDL Cholesterol 147 (H) <130 mg/dL      Comment:      Above Desirable:  130-159 mg/dl  Borderline high:  160-189 mg/dl  High:             190-219 mg/dl  Very high:       >219 mg/dl     CBC with platelets and differential   Result Value Ref Range    WBC 6.1 4.0 - 11.0 10e9/L    RBC Count 4.39 3.8 - 5.2 10e12/L    Hemoglobin 13.8 11.7 - 15.7 g/dL    Hematocrit 40.9 35.0 - 47.0 %    MCV 93 78 - 100 fl    MCH 31.4 26.5 - 33.0 pg    MCHC 33.7 31.5 - 36.5 g/dL    RDW 12.7 10.0 - 15.0 %    Platelet Count 329 150 - 450 10e9/L    Diff Method Automated Method     % Neutrophils 60.4 %    % Lymphocytes 26.5 %    % Monocytes 8.6  %    % Eosinophils 4.0 %    % Basophils 0.3 %    % Immature Granulocytes 0.2 %    Nucleated RBCs 0 0 /100    Absolute Neutrophil 3.7 1.6 - 8.3 10e9/L    Absolute Lymphocytes 1.6 0.8 - 5.3 10e9/L    Absolute Monocytes 0.5 0.0 - 1.3 10e9/L    Absolute Eosinophils 0.2 0.0 - 0.7 10e9/L    Absolute Basophils 0.0 0.0 - 0.2 10e9/L    Abs Immature Granulocytes 0.0 0 - 0.4 10e9/L    Absolute Nucleated RBC 0.0    Comprehensive metabolic panel   Result Value Ref Range    Sodium 138 133 - 144 mmol/L    Potassium 3.6 3.4 - 5.3 mmol/L    Chloride 108 94 - 109 mmol/L    Carbon Dioxide 26 20 - 32 mmol/L    Anion Gap 4 3 - 14 mmol/L    Glucose 87 70 - 99 mg/dL      Comment:      Fasting specimen    Urea Nitrogen 18 7 - 30 mg/dL    Creatinine 0.85 0.52 - 1.04 mg/dL    GFR Estimate 82 >60 mL/min/[1.73_m2]      Comment:      Non  GFR Calc  Starting 12/18/2018, serum creatinine based estimated GFR (eGFR) will be   calculated using the Chronic Kidney Disease Epidemiology Collaboration   (CKD-EPI) equation.      GFR Estimate If Black >90 >60 mL/min/[1.73_m2]      Comment:       GFR Calc  Starting 12/18/2018, serum creatinine based estimated GFR (eGFR) will be   calculated using the Chronic Kidney Disease Epidemiology Collaboration   (CKD-EPI) equation.      Calcium 8.7 8.5 - 10.1 mg/dL    Bilirubin Total 0.3 0.2 - 1.3 mg/dL    Albumin 3.7 3.4 - 5.0 g/dL    Protein Total 7.7 6.8 - 8.8 g/dL    Alkaline Phosphatase 89 40 - 150 U/L    ALT 28 0 - 50 U/L    AST 17 0 - 45 U/L   HCG Qual, Urine ([id number])   Result Value Ref Range    HCG Qual Urine Negative NEG^Negative      Comment:      This test is for screening purposes.  Results should be interpreted along with   the clinical picture.  Confirmation testing is available if warranted by   ordering [id number], HCG Quantitative Pregnancy.         If you have any questions or concerns, please call the clinic at the number listed above.       Sincerely,        Olivia  MD Isela

## 2020-06-26 LAB — HIV 1+2 AB+HIV1 P24 AG SERPL QL IA: NONREACTIVE

## 2020-06-26 ASSESSMENT — ANXIETY QUESTIONNAIRES: GAD7 TOTAL SCORE: 13

## 2020-06-28 ENCOUNTER — RESULTS ONLY (OUTPATIENT)
Dept: FAMILY MEDICINE | Facility: OTHER | Age: 45
End: 2020-06-28

## 2020-06-28 ENCOUNTER — IMMUNIZATION (OUTPATIENT)
Dept: FAMILY MEDICINE | Facility: OTHER | Age: 45
End: 2020-06-28
Attending: FAMILY MEDICINE
Payer: COMMERCIAL

## 2020-06-28 DIAGNOSIS — Z01.818 PREOPERATIVE EXAMINATION: Primary | ICD-10-CM

## 2020-06-29 LAB
SARS-COV-2 PCR COMMENT: NORMAL
SARS-COV-2 RNA SPEC QL NAA+PROBE: NEGATIVE
SARS-COV-2 RNA SPEC QL NAA+PROBE: NORMAL
SPECIMEN SOURCE: NORMAL
SPECIMEN SOURCE: NORMAL

## 2020-07-01 ENCOUNTER — HOSPITAL ENCOUNTER (OUTPATIENT)
Facility: HOSPITAL | Age: 45
Discharge: HOME | End: 2020-07-01
Attending: OBSTETRICS & GYNECOLOGY | Admitting: OBSTETRICS & GYNECOLOGY
Payer: COMMERCIAL

## 2020-07-01 ENCOUNTER — ANESTHESIA (OUTPATIENT)
Dept: SURGERY | Facility: HOSPITAL | Age: 45
End: 2020-07-01
Payer: COMMERCIAL

## 2020-07-01 ENCOUNTER — APPOINTMENT (OUTPATIENT)
Dept: LAB | Facility: HOSPITAL | Age: 45
End: 2020-07-01
Attending: OBSTETRICS & GYNECOLOGY
Payer: COMMERCIAL

## 2020-07-01 VITALS
RESPIRATION RATE: 16 BRPM | DIASTOLIC BLOOD PRESSURE: 80 MMHG | OXYGEN SATURATION: 94 % | HEART RATE: 58 BPM | HEIGHT: 63 IN | TEMPERATURE: 96.9 F | BODY MASS INDEX: 31.89 KG/M2 | SYSTOLIC BLOOD PRESSURE: 119 MMHG | WEIGHT: 180 LBS

## 2020-07-01 DIAGNOSIS — R93.89 ENDOMETRIAL THICKENING ON ULTRASOUND: ICD-10-CM

## 2020-07-01 DIAGNOSIS — N92.0 MENORRHAGIA: ICD-10-CM

## 2020-07-01 DIAGNOSIS — Z98.890 POST-OPERATIVE STATE: Primary | ICD-10-CM

## 2020-07-01 LAB — HCG UR QL: NEGATIVE

## 2020-07-01 PROCEDURE — 37000008 ZZH ANESTHESIA TECHNICAL FEE, 1ST 30 MIN: Performed by: OBSTETRICS & GYNECOLOGY

## 2020-07-01 PROCEDURE — 25000125 ZZHC RX 250: Performed by: NURSE ANESTHETIST, CERTIFIED REGISTERED

## 2020-07-01 PROCEDURE — 25800030 ZZH RX IP 258 OP 636: Performed by: NURSE ANESTHETIST, CERTIFIED REGISTERED

## 2020-07-01 PROCEDURE — 27210794 ZZH OR GENERAL SUPPLY STERILE: Performed by: OBSTETRICS & GYNECOLOGY

## 2020-07-01 PROCEDURE — 40000306 ZZH STATISTIC PRE PROC ASSESS II: Performed by: OBSTETRICS & GYNECOLOGY

## 2020-07-01 PROCEDURE — 58558 HYSTEROSCOPY BIOPSY: CPT | Performed by: OBSTETRICS & GYNECOLOGY

## 2020-07-01 PROCEDURE — 25000128 H RX IP 250 OP 636: Performed by: NURSE ANESTHETIST, CERTIFIED REGISTERED

## 2020-07-01 PROCEDURE — 25000132 ZZH RX MED GY IP 250 OP 250 PS 637: Performed by: OBSTETRICS & GYNECOLOGY

## 2020-07-01 PROCEDURE — 88305 TISSUE EXAM BY PATHOLOGIST: CPT | Mod: TC | Performed by: OBSTETRICS & GYNECOLOGY

## 2020-07-01 PROCEDURE — 58563 HYSTEROSCOPY ABLATION: CPT | Performed by: NURSE ANESTHETIST, CERTIFIED REGISTERED

## 2020-07-01 PROCEDURE — 81025 URINE PREGNANCY TEST: CPT | Performed by: OBSTETRICS & GYNECOLOGY

## 2020-07-01 PROCEDURE — 71000027 ZZH RECOVERY PHASE 2 EACH 15 MINS: Performed by: OBSTETRICS & GYNECOLOGY

## 2020-07-01 PROCEDURE — 36000056 ZZH SURGERY LEVEL 3 1ST 30 MIN: Performed by: OBSTETRICS & GYNECOLOGY

## 2020-07-01 PROCEDURE — 25000128 H RX IP 250 OP 636: Performed by: OBSTETRICS & GYNECOLOGY

## 2020-07-01 PROCEDURE — 37000009 ZZH ANESTHESIA TECHNICAL FEE, EACH ADDTL 15 MIN: Performed by: OBSTETRICS & GYNECOLOGY

## 2020-07-01 PROCEDURE — 36000058 ZZH SURGERY LEVEL 3 EA 15 ADDTL MIN: Performed by: OBSTETRICS & GYNECOLOGY

## 2020-07-01 RX ORDER — LIDOCAINE HYDROCHLORIDE 20 MG/ML
INJECTION, SOLUTION INFILTRATION; PERINEURAL PRN
Status: DISCONTINUED | OUTPATIENT
Start: 2020-07-01 | End: 2020-07-01

## 2020-07-01 RX ORDER — ONDANSETRON 2 MG/ML
4 INJECTION INTRAMUSCULAR; INTRAVENOUS EVERY 30 MIN PRN
Status: DISCONTINUED | OUTPATIENT
Start: 2020-07-01 | End: 2020-07-01 | Stop reason: HOSPADM

## 2020-07-01 RX ORDER — MEPERIDINE HYDROCHLORIDE 25 MG/ML
12.5 INJECTION INTRAMUSCULAR; INTRAVENOUS; SUBCUTANEOUS
Status: DISCONTINUED | OUTPATIENT
Start: 2020-07-01 | End: 2020-07-01 | Stop reason: HOSPADM

## 2020-07-01 RX ORDER — HYDROMORPHONE HYDROCHLORIDE 1 MG/ML
.3-.5 INJECTION, SOLUTION INTRAMUSCULAR; INTRAVENOUS; SUBCUTANEOUS EVERY 10 MIN PRN
Status: DISCONTINUED | OUTPATIENT
Start: 2020-07-01 | End: 2020-07-01 | Stop reason: HOSPADM

## 2020-07-01 RX ORDER — ACETAMINOPHEN 325 MG/1
975 TABLET ORAL ONCE
Status: COMPLETED | OUTPATIENT
Start: 2020-07-01 | End: 2020-07-01

## 2020-07-01 RX ORDER — BUPIVACAINE HYDROCHLORIDE 2.5 MG/ML
INJECTION, SOLUTION EPIDURAL; INFILTRATION; INTRACAUDAL PRN
Status: DISCONTINUED | OUTPATIENT
Start: 2020-07-01 | End: 2020-07-01 | Stop reason: HOSPADM

## 2020-07-01 RX ORDER — NALOXONE HYDROCHLORIDE 0.4 MG/ML
.1-.4 INJECTION, SOLUTION INTRAMUSCULAR; INTRAVENOUS; SUBCUTANEOUS
Status: DISCONTINUED | OUTPATIENT
Start: 2020-07-01 | End: 2020-07-01 | Stop reason: HOSPADM

## 2020-07-01 RX ORDER — HYDROCODONE BITARTRATE AND ACETAMINOPHEN 5; 325 MG/1; MG/1
1 TABLET ORAL
Status: DISCONTINUED | OUTPATIENT
Start: 2020-07-01 | End: 2020-07-01 | Stop reason: HOSPADM

## 2020-07-01 RX ORDER — LIDOCAINE 40 MG/G
CREAM TOPICAL
Status: DISCONTINUED | OUTPATIENT
Start: 2020-07-01 | End: 2020-07-01 | Stop reason: HOSPADM

## 2020-07-01 RX ORDER — FENTANYL CITRATE 50 UG/ML
25-50 INJECTION, SOLUTION INTRAMUSCULAR; INTRAVENOUS
Status: DISCONTINUED | OUTPATIENT
Start: 2020-07-01 | End: 2020-07-01 | Stop reason: HOSPADM

## 2020-07-01 RX ORDER — SODIUM CHLORIDE, SODIUM LACTATE, POTASSIUM CHLORIDE, CALCIUM CHLORIDE 600; 310; 30; 20 MG/100ML; MG/100ML; MG/100ML; MG/100ML
INJECTION, SOLUTION INTRAVENOUS CONTINUOUS
Status: DISCONTINUED | OUTPATIENT
Start: 2020-07-01 | End: 2020-07-01 | Stop reason: HOSPADM

## 2020-07-01 RX ORDER — HYDROXYZINE HYDROCHLORIDE 25 MG/1
25 TABLET, FILM COATED ORAL
Status: DISCONTINUED | OUTPATIENT
Start: 2020-07-01 | End: 2020-07-01 | Stop reason: HOSPADM

## 2020-07-01 RX ORDER — ONDANSETRON 4 MG/1
4 TABLET, ORALLY DISINTEGRATING ORAL
Status: DISCONTINUED | OUTPATIENT
Start: 2020-07-01 | End: 2020-07-01 | Stop reason: HOSPADM

## 2020-07-01 RX ORDER — IBUPROFEN 800 MG/1
800 TABLET, FILM COATED ORAL EVERY 8 HOURS PRN
Qty: 30 TABLET | Refills: 1 | Status: SHIPPED | OUTPATIENT
Start: 2020-07-01 | End: 2021-08-27

## 2020-07-01 RX ORDER — PROPOFOL 10 MG/ML
INJECTION, EMULSION INTRAVENOUS PRN
Status: DISCONTINUED | OUTPATIENT
Start: 2020-07-01 | End: 2020-07-01

## 2020-07-01 RX ORDER — ONDANSETRON 4 MG/1
4 TABLET, ORALLY DISINTEGRATING ORAL EVERY 30 MIN PRN
Status: DISCONTINUED | OUTPATIENT
Start: 2020-07-01 | End: 2020-07-01 | Stop reason: HOSPADM

## 2020-07-01 RX ORDER — KETOROLAC TROMETHAMINE 30 MG/ML
30 INJECTION, SOLUTION INTRAMUSCULAR; INTRAVENOUS ONCE
Status: COMPLETED | OUTPATIENT
Start: 2020-07-01 | End: 2020-07-01

## 2020-07-01 RX ORDER — METHOCARBAMOL 750 MG/1
750 TABLET, FILM COATED ORAL
Status: DISCONTINUED | OUTPATIENT
Start: 2020-07-01 | End: 2020-07-01 | Stop reason: HOSPADM

## 2020-07-01 RX ORDER — FENTANYL CITRATE 50 UG/ML
INJECTION, SOLUTION INTRAMUSCULAR; INTRAVENOUS PRN
Status: DISCONTINUED | OUTPATIENT
Start: 2020-07-01 | End: 2020-07-01

## 2020-07-01 RX ORDER — HYDROCODONE BITARTRATE AND ACETAMINOPHEN 5; 325 MG/1; MG/1
1-2 TABLET ORAL EVERY 6 HOURS PRN
Qty: 10 TABLET | Refills: 0 | Status: SHIPPED | OUTPATIENT
Start: 2020-07-01 | End: 2021-04-13

## 2020-07-01 RX ADMIN — PROPOFOL 40 MG: 10 INJECTION, EMULSION INTRAVENOUS at 08:54

## 2020-07-01 RX ADMIN — PROPOFOL 20 MG: 10 INJECTION, EMULSION INTRAVENOUS at 08:56

## 2020-07-01 RX ADMIN — KETOROLAC TROMETHAMINE 30 MG: 30 INJECTION, SOLUTION INTRAMUSCULAR; INTRAVENOUS at 07:48

## 2020-07-01 RX ADMIN — PROPOFOL 40 MG: 10 INJECTION, EMULSION INTRAVENOUS at 09:06

## 2020-07-01 RX ADMIN — PROPOFOL 40 MG: 10 INJECTION, EMULSION INTRAVENOUS at 08:52

## 2020-07-01 RX ADMIN — PROPOFOL 40 MG: 10 INJECTION, EMULSION INTRAVENOUS at 08:59

## 2020-07-01 RX ADMIN — SODIUM CHLORIDE, POTASSIUM CHLORIDE, SODIUM LACTATE AND CALCIUM CHLORIDE: 600; 310; 30; 20 INJECTION, SOLUTION INTRAVENOUS at 08:12

## 2020-07-01 RX ADMIN — PROPOFOL 50 MG: 10 INJECTION, EMULSION INTRAVENOUS at 09:16

## 2020-07-01 RX ADMIN — PROPOFOL 50 MG: 10 INJECTION, EMULSION INTRAVENOUS at 09:18

## 2020-07-01 RX ADMIN — LIDOCAINE HYDROCHLORIDE 40 MG: 20 INJECTION, SOLUTION INFILTRATION; PERINEURAL at 08:52

## 2020-07-01 RX ADMIN — PROPOFOL 20 MG: 10 INJECTION, EMULSION INTRAVENOUS at 09:01

## 2020-07-01 RX ADMIN — MIDAZOLAM 2 MG: 1 INJECTION INTRAMUSCULAR; INTRAVENOUS at 08:46

## 2020-07-01 RX ADMIN — FENTANYL CITRATE 50 MCG: 50 INJECTION, SOLUTION INTRAMUSCULAR; INTRAVENOUS at 09:06

## 2020-07-01 RX ADMIN — ACETAMINOPHEN 975 MG: 325 TABLET, FILM COATED ORAL at 07:48

## 2020-07-01 RX ADMIN — FENTANYL CITRATE 50 MCG: 50 INJECTION, SOLUTION INTRAMUSCULAR; INTRAVENOUS at 09:01

## 2020-07-01 ASSESSMENT — MIFFLIN-ST. JEOR: SCORE: 1416.63

## 2020-07-01 NOTE — OP NOTE
[facility]  Operative Note      SURGEON: Juan Roger MD   ASSISTANT: None.   PREOPERATIVE DIAGNOSIS: Menorrhagia. Thickened endometrium on US.   POSTOPERATIVE DIAGNOSIS: Menorrhagia. Endometrial Polyp  PROCEDURE: Hysteroscopy,  Endometrial polypectomy, Endometrial ablation (NovaSure).   ANESTHESIA: Monitored anesthesia care, paracervical block.   SPECIMENS: None.   OPERATIVE FINDINGS: Endometrial cavity sounding to 10 cm. 1cm pedunculated intracavitary endometrial polyp.  Otherwise normal endometrial cavity and benign-appearing endometrium.   ESTIMATED BLOOD LOSS: Minimal.   SPECIMENS: Endometrial polyp   COMPLICATIONS: None.   OPERATIVE DICTATION: The patient was brought to the operating room and IV sedation was given. She was prepped and draped in the dorsal lithotomy position and her bladder drained. A weighted speculum was placed. The cervix was visualized and grasped anteriorly with a fine-toothed tenaculum.  A cervical/paracervical block was performed with 1% Lidocaine.   The cervix was then gently dilated with Hegar dilators. Hysteroscopy was performed using a 30-degree rigid hysteroscope with normal saline as distention media. Visualization was excellent. Findings were as described above. A hysteroscopic scissors was used to resect the polyp at its base and it was then removed with a grasping forceps.  The hysteroscope was removed. Cavitary measurements were obtained and the NovaSure device was gently inserted to the uterine fundus and deployed in the usual fashion. The cervical collar was advanced and a test cycle completed successfully. The energy cycle was then begun and completed successfully after 151 seconds. The NovaSure was withdrawn. Hysteroscopy was reperformed showing well-ablated endometrial cavity with no evidence of perforation. The hysteroscope and instruments were removed. There was excellent hemostasis so the procedure was terminated. The patient was transferred to the  recovery room in excellent stable condition.   MICKEY MIRELES MD

## 2020-07-01 NOTE — DISCHARGE INSTRUCTIONS
Call MD prior to DC.  No driving today or while on pain meds  Pelvic rest for 2 weeks  Call Dr. Roger [phone] as necessary if problems in interim, no post op appt needed.        Post-Anesthesia Patient Instructions    IMMEDIATELY FOLLOWING SURGERY:  Do not drive or operate machinery for the first twenty four hours after surgery.  Do not make any important decisions for twenty four hours after surgery or while taking narcotic pain medications or sedatives.  If you develop intractable nausea and vomiting or a severe headache please notify your doctor immediately.    FOLLOW-UP:  Please make an appointment with your surgeon as instructed. You do not need to follow up with anesthesia unless specifically instructed to do so.    WOUND CARE INSTRUCTIONS (if applicable):  Keep a dry clean dressing on the anesthesia/puncture wound site if there is drainage.  Once the wound has quit draining you may leave it open to air.  Generally you should leave the bandage intact for twenty four hours unless there is drainage.  If the epidural site drains for more than 36-48 hours please call the anesthesia department.    QUESTIONS?:  Please feel free to call your physician or the hospital  if you have any questions, and they will be happy to assist you.

## 2020-07-01 NOTE — OR NURSING
Patient and responsible adult given discharge instructions with no questions regarding instructions. Vandana score 20. Pain level 0/10.  Discharged from unit via walking. Patient discharged to home with friend kavon.

## 2020-07-01 NOTE — ANESTHESIA CARE TRANSFER NOTE
Patient: Ritesh Soliman    Procedure(s):  HYSTEROSCOPY, ENDOMETRIAL ABLATION,endometrial polypectomy    Diagnosis: Menorrhagia [N92.0]  Endometrial thickening on ultrasound [R93.89]  Diagnosis Additional Information: No value filed.    Anesthesia Type:   MAC     Note:  Airway :Nasal Cannula  Patient transferred to:Phase II  Handoff Report: Identifed the Patient, Identified the Reponsible Provider, Reviewed the pertinent medical history, Discussed the surgical course, Reviewed Intra-OP anesthesia mangement and issues during anesthesia, Set expectations for post-procedure period and Allowed opportunity for questions and acknowledgement of understanding      Vitals: (Last set prior to Anesthesia Care Transfer)    CRNA VITALS  7/1/2020 0856 - 7/1/2020 0927      7/1/2020             NIBP:  96/56    NIBP Mean:  71    Resp Rate (observed):  (!) 2    Resp Rate (set):  8                Electronically Signed By: MARIANN Raymond CRNA  July 1, 2020  9:27 AM

## 2020-07-01 NOTE — ANESTHESIA POSTPROCEDURE EVALUATION
Patient: Ritesh Soliman    Procedure(s):  HYSTEROSCOPY, ENDOMETRIAL ABLATION,endometrial polypectomy    Diagnosis:Menorrhagia [N92.0]  Endometrial thickening on ultrasound [R93.89]  Diagnosis Additional Information: No value filed.    Anesthesia Type:  MAC    Note:  Anesthesia Post Evaluation    Patient location during evaluation: Phase 2  Patient participation: Able to fully participate in evaluation  Level of consciousness: awake and alert  Pain management: adequate  Airway patency: patent  Cardiovascular status: acceptable  Respiratory status: acceptable  Hydration status: acceptable  PONV: none             Last vitals:  Vitals:    07/01/20 0955 07/01/20 1000 07/01/20 1005   BP: 126/83 120/86 119/80   Pulse: 62 62 58   Resp: 16  16   Temp:   96.9  F (36.1  C)   SpO2: (!) 90% 92% 94%         Electronically Signed By: MARIANN Raymond CRNA  July 1, 2020  10:20 AM

## 2020-07-06 LAB — COPATH REPORT: NORMAL

## 2020-07-15 DIAGNOSIS — F41.0 PANIC ATTACK: ICD-10-CM

## 2020-07-15 DIAGNOSIS — F41.1 GAD (GENERALIZED ANXIETY DISORDER): ICD-10-CM

## 2020-07-15 DIAGNOSIS — F51.01 PRIMARY INSOMNIA: ICD-10-CM

## 2020-07-15 NOTE — TELEPHONE ENCOUNTER
Trazodone  Last Written Prescription Date: 12/30/19  Last Fill Quantity: 30 # of Refills: 1  Last Office Visit: 6/25/20

## 2020-07-16 RX ORDER — TRAZODONE HYDROCHLORIDE 50 MG/1
TABLET, FILM COATED ORAL
Qty: 30 TABLET | Refills: 1 | Status: SHIPPED | OUTPATIENT
Start: 2020-07-16 | End: 2020-11-12

## 2020-07-16 RX ORDER — HYDROXYZINE PAMOATE 50 MG/1
50 CAPSULE ORAL 3 TIMES DAILY PRN
Qty: 90 CAPSULE | Refills: 3 | Status: SHIPPED | OUTPATIENT
Start: 2020-07-16 | End: 2021-08-27

## 2020-07-16 RX ORDER — VENLAFAXINE 75 MG/1
75 TABLET ORAL 3 TIMES DAILY
Qty: 90 TABLET | Refills: 0 | Status: SHIPPED | OUTPATIENT
Start: 2020-07-16 | End: 2020-11-12

## 2020-11-10 DIAGNOSIS — F51.01 PRIMARY INSOMNIA: ICD-10-CM

## 2020-11-12 RX ORDER — TRAZODONE HYDROCHLORIDE 50 MG/1
TABLET, FILM COATED ORAL
Qty: 30 TABLET | Refills: 1 | Status: SHIPPED | OUTPATIENT
Start: 2020-11-12 | End: 2021-04-13

## 2020-11-12 NOTE — TELEPHONE ENCOUNTER
trazadone      Last Written Prescription Date:  7/16/20  Last Fill Quantity: 30,   # refills: 1  Last Office Visit: 6/25/20  Future Office visit:

## 2020-11-30 ENCOUNTER — NURSE TRIAGE (OUTPATIENT)
Dept: FAMILY MEDICINE | Facility: OTHER | Age: 45
End: 2020-11-30

## 2020-11-30 ENCOUNTER — OFFICE VISIT (OUTPATIENT)
Dept: FAMILY MEDICINE | Facility: OTHER | Age: 45
End: 2020-11-30
Attending: FAMILY MEDICINE
Payer: COMMERCIAL

## 2020-11-30 DIAGNOSIS — Z20.822 SUSPECTED 2019 NOVEL CORONAVIRUS INFECTION: Primary | ICD-10-CM

## 2020-11-30 DIAGNOSIS — Z20.822 SUSPECTED 2019 NOVEL CORONAVIRUS INFECTION: ICD-10-CM

## 2020-11-30 DIAGNOSIS — Z01.818 PRE-OP EXAM: ICD-10-CM

## 2020-11-30 PROCEDURE — U0003 INFECTIOUS AGENT DETECTION BY NUCLEIC ACID (DNA OR RNA); SEVERE ACUTE RESPIRATORY SYNDROME CORONAVIRUS 2 (SARS-COV-2) (CORONAVIRUS DISEASE [COVID-19]), AMPLIFIED PROBE TECHNIQUE, MAKING USE OF HIGH THROUGHPUT TECHNOLOGIES AS DESCRIBED BY CMS-2020-01-R: HCPCS | Mod: ZL | Performed by: FAMILY MEDICINE

## 2020-11-30 NOTE — TELEPHONE ENCOUNTER
Reason for Disposition    [1] COVID-19 infection suspected by caller or triager AND [2] mild symptoms (cough, fever, or others) AND [3] no complications or SOB    Additional Information    Negative: SEVERE difficulty breathing (e.g., struggling for each breath, speaks in single words)    Negative: Difficult to awaken or acting confused (e.g., disoriented, slurred speech)    Negative: Bluish (or gray) lips or face now    Negative: Shock suspected (e.g., cold/pale/clammy skin, too weak to stand, low BP, rapid pulse)    Negative: Sounds like a life-threatening emergency to the triager    Negative: [1] COVID-19 exposure AND [2] no symptoms    Negative: COVID-19 and Breastfeeding, questions about    Negative: [1] Adult with possible COVID-19 symptoms AND [2] triager concerned about severity of symptoms or other causes    Negative: SEVERE or constant chest pain or pressure (Exception: mild central chest pain, present only when coughing)    Negative: MODERATE difficulty breathing (e.g., speaks in phrases, SOB even at rest, pulse 100-120)    Negative: Patient sounds very sick or weak to the triager    Negative: MILD difficulty breathing (e.g., minimal/no SOB at rest, SOB with walking, pulse <100)    Negative: Chest pain or pressure    Negative: Fever > 103 F (39.4 C)    Negative: [1] Fever > 101 F (38.3 C) AND [2] age > 60    Negative: [1] Fever > 100.0 F (37.8 C) AND [2] bedridden (e.g., nursing home patient, CVA, chronic illness, recovering from surgery)    Negative: HIGH RISK patient (e.g., age > 64 years, diabetes, heart or lung disease, weak immune system) (Exception: Has already been evaluated by healthcare provider and has no new or worsening symptoms)    Negative: Fever present > 3 days (72 hours)    Negative: [1] Fever returns after gone for over 24 hours AND [2] symptoms worse or not improved    Negative: [1] Continuous (nonstop) coughing interferes with work or school AND [2] no improvement using cough  "treatment per protocol    Answer Assessment - Initial Assessment Questions  1. COVID-19 DIAGNOSIS: \"Who made your Coronavirus (COVID-19) diagnosis?\" \"Was it confirmed by a positive lab test?\" If not diagnosed by a HCP, ask \"Are there lots of cases (community spread) where you live?\" (See [facility] health department website, if unsure)      Not confirmed  2. ONSET: \"When did the COVID-19 symptoms start?\"       thursday  3. WORST SYMPTOM: \"What is your worst symptom?\" (e.g., cough, fever, shortness of breath, muscle aches)      headache  4. COUGH: \"Do you have a cough?\" If so, ask: \"How bad is the cough?\"        slight  5. FEVER: \"Do you have a fever?\" If so, ask: \"What is your temperature, how was it measured, and when did it start?\"      no  6. RESPIRATORY STATUS: \"Describe your breathing?\" (e.g., shortness of breath, wheezing, unable to speak)       no  7. BETTER-SAME-WORSE: \"Are you getting better, staying the same or getting worse compared to yesterday?\"  If getting worse, ask, \"In what way?\"      better  8. HIGH RISK DISEASE: \"Do you have any chronic medical problems?\" (e.g., asthma, heart or lung disease, weak immune system, etc.)      no  9. PREGNANCY: \"Is there any chance you are pregnant?\" \"When was your last menstrual period?\"      na  10. OTHER SYMPTOMS: \"Do you have any other symptoms?\"  (e.g., chills, fatigue, headache, loss of smell or taste, muscle pain, sore throat)        Body aches, headache, fatigue, sore throat    Protocols used: CORONAVIRUS (COVID-19) DIAGNOSED OR [id number] 8.4.20      "

## 2020-12-01 LAB
SARS-COV-2 RNA SPEC QL NAA+PROBE: NOT DETECTED
SPECIMEN SOURCE: NORMAL

## 2021-04-13 ENCOUNTER — APPOINTMENT (OUTPATIENT)
Dept: GENERAL RADIOLOGY | Facility: HOSPITAL | Age: 46
End: 2021-04-13
Attending: NURSE PRACTITIONER
Payer: COMMERCIAL

## 2021-04-13 ENCOUNTER — HOSPITAL ENCOUNTER (EMERGENCY)
Facility: HOSPITAL | Age: 46
Discharge: HOME | End: 2021-04-13
Attending: NURSE PRACTITIONER | Admitting: NURSE PRACTITIONER
Payer: COMMERCIAL

## 2021-04-13 VITALS
HEART RATE: 65 BPM | RESPIRATION RATE: 16 BRPM | DIASTOLIC BLOOD PRESSURE: 91 MMHG | TEMPERATURE: 96.9 F | OXYGEN SATURATION: 96 % | SYSTOLIC BLOOD PRESSURE: 128 MMHG

## 2021-04-13 DIAGNOSIS — M54.16 LUMBAR BACK PAIN WITH RADICULOPATHY AFFECTING LEFT LOWER EXTREMITY: ICD-10-CM

## 2021-04-13 PROCEDURE — 99214 OFFICE O/P EST MOD 30 MIN: CPT | Performed by: NURSE PRACTITIONER

## 2021-04-13 PROCEDURE — 250N000011 HC RX IP 250 OP 636: Performed by: NURSE PRACTITIONER

## 2021-04-13 PROCEDURE — 96372 THER/PROPH/DIAG INJ SC/IM: CPT | Performed by: NURSE PRACTITIONER

## 2021-04-13 PROCEDURE — 72100 X-RAY EXAM L-S SPINE 2/3 VWS: CPT

## 2021-04-13 PROCEDURE — G0463 HOSPITAL OUTPT CLINIC VISIT: HCPCS | Mod: 25

## 2021-04-13 RX ORDER — TIZANIDINE 2 MG/1
2 TABLET ORAL 3 TIMES DAILY PRN
Qty: 12 TABLET | Refills: 0 | Status: SHIPPED | OUTPATIENT
Start: 2021-04-13 | End: 2021-04-25

## 2021-04-13 RX ORDER — KETOROLAC TROMETHAMINE 30 MG/ML
60 INJECTION, SOLUTION INTRAMUSCULAR; INTRAVENOUS ONCE
Status: COMPLETED | OUTPATIENT
Start: 2021-04-13 | End: 2021-04-13

## 2021-04-13 RX ORDER — HYDROCODONE BITARTRATE AND ACETAMINOPHEN 5; 325 MG/1; MG/1
1 TABLET ORAL EVERY 6 HOURS PRN
Qty: 10 TABLET | Refills: 0 | Status: SHIPPED | OUTPATIENT
Start: 2021-04-13 | End: 2021-04-16

## 2021-04-13 RX ADMIN — KETOROLAC TROMETHAMINE 60 MG: 30 INJECTION, SOLUTION INTRAMUSCULAR at 10:38

## 2021-04-13 ASSESSMENT — ENCOUNTER SYMPTOMS
ACTIVITY CHANGE: 1
CHILLS: 0
VOMITING: 0
BACK PAIN: 1
NAUSEA: 1
DIZZINESS: 1
FEVER: 0
NUMBNESS: 0

## 2021-04-13 NOTE — ED TRIAGE NOTES
Pt presents with lower back pain for 7 days. Denies any injuries. Has pain shooting down behind her left leg to her ankle but states it throbs behind her left knee.

## 2021-04-13 NOTE — DISCHARGE INSTRUCTIONS
Keep affected extremity elevated as much as possible for next 24 - 48 hours. Ice to affected area 20 minutes every hour as needed for comfort. After 48 hours you can apply heat. Ibuprofen 600 to 800 mg (3 - 4 tabs of over the counter med) every six to eight hours as needed;not to exceed maximum amount of 3200 mg in 24 hours. Take with food. For severe pain take two hydrocodone every four to six hours as needed. For moderate pain take one hydrocodone with 325 to 650 mg of acetaminophen (Tylenol). For mild pain take Tylenol 650 to 1000 mg every four to six hours as needed (not to exceed more than 4000 mg in a 24 hour period). May use interchangeably. Suggest medicating around the clock for the next 24-48 hours. Continue other interventions as you have been. Follow up with primary provider  as needed  Return to ER or call 911 if you develop bowel or bladder retention or incontinency or weakness in your extremities.    Do not drive motor vehicles or operate heavy equipment while taking muscle relaxors.

## 2021-04-13 NOTE — ED PROVIDER NOTES
History     Chief Complaint   Patient presents with     Back Pain     HPI  Ritesh Soliman is a 45 year old female who presents with one week history of exacerbation of low back pain that radiates into her posterior left leg to her knee and then into her foot. Accompanied with dizziness and nausea. Has tried inversion table, chiropractor, tens unit, cold, heat, muscle relaxants and NSAIDs. Pain patches and ice seem to help the best still not pain relieving. Last dose ibuprofen yesterday. Lumbar spine x-ray 2016. Had multilevel degenerative changes at that time. Denies any trauma or recent injury. Smoker. Not received covid vaccine. Denies bowel and bladder retention or incontinency. Denies fevers, chills, vomiting, diarrhea, and shortness of breath.    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  Date 2016  Sees a specialist for back pain:  No  Therapies tried without relief: inversion table, chiropractor, tens unit, cold, heat, muscle relaxants and NSAIDs. Pain patches and ice seem to help the best still not pain relieving. Last dose ibuprofen yesterday.    Alleviating factors:   Improved by: none      Precipitating factors:  Worsened by: Standing, Sitting and Walking    Accompanying Signs & Symptoms:  Risk of Fracture:  None  Risk of Cauda Equina:  None  Risk of Infection:  None  Risk of Cancer:  Unrelenting night time pain  Risk of  Ankylosing Spondylitis:  Onset at age <35, male, AND morning back stiffness. no     Allergies:  Allergies   Allergen Reactions     Augmentin [Amoxicillin-Pot Clavulanate] Swelling       Problem List:    Patient Active Problem List    Diagnosis Date Noted     Menorrhagia 05/22/2020     Priority: Medium     Added automatically from request for surgery [id number]       Endometrial thickening on ultrasound 05/22/2020     Priority: Medium     Added automatically from request for surgery [id number]       OSCAR (generalized anxiety disorder) 05/09/2018     Priority: Medium     Major depressive disorder, recurrent episode, moderate (H) 05/09/2018     Priority: Medium     Panic attack 05/09/2018     Priority: Medium     ACP (advance care planning) 04/29/2016     Priority: Medium     Advance Care Planning 4/29/2016: ACP Review of Chart / Resources Provided:  Reviewed chart for advance care plan.  Ritesh Soliman has been provided information and resources to begin or update their advance care plan.  Added by Ngoc Duong             Tobacco abuse      Priority: Medium     Depression with anxiety      Priority: Medium     Recurrent UTI 02/27/2014     Priority: Medium        Past Medical History:    Past Medical History:   Diagnosis Date     Depression with anxiety      Tobacco abuse        Past Surgical History:    Past Surgical History:   Procedure Laterality Date     BIOPSY BREAST Right 10/19/2017    Procedure: BIOPSY BREAST;  EXCISIONAL BIOPSY RIGHT BREAST;  Surgeon: Kiko Newberry MD;  Location: [facility]     DILATE CERVIX, HYSTEROSCOPY, ABLATE ENDOMETRIUM, COMBINED N/A 7/1/2020    Procedure: HYSTEROSCOPY, ENDOMETRIAL ABLATION,endometrial polypectomy;  Surgeon: Juan Roger MD;  Location: [facility]     ESOPHAGOSCOPY, GASTROSCOPY, DUODENOSCOPY (EGD), COMBINED N/A 10/1/2018    Procedure: COMBINED ESOPHAGOSCOPY, GASTROSCOPY, DUODENOSCOPY (EGD);  UPPER ENDOSCOPY WITH BIOPSIES;  Surgeon: Kiko Newberry MD;  Location: [facility]     HC  PLACE INTRAVASC STENT OPEN/PERQ, EA ADDL VEIN      stents for varicose veins, Dr Martinez     OOPHORECTOMY      tubal pregnancy     TUBAL LIGATION         Family History:    Family History   Problem Relation Age of Onset     Coronary Artery Disease Mother      Hypertension Mother      Aneurysm Mother         stomach     Coronary Artery Disease Father      Aneurysm Father         heart     Aneurysm Niece         brain     C.A.D. No family hx of      Breast Cancer No family hx of      Cancer - colorectal No family hx of      Diabetes No family hx of      Thyroid Disease No family hx of      Asthma No family hx of      Colon Cancer No family hx of      Hyperlipidemia No family hx of        Social History:  Marital Status:  Single [1]  Social History     Tobacco Use     Smoking status: Current Some Day Smoker     Packs/day: 0.10     Types: Cigarettes     Smokeless tobacco: Never Used     Tobacco comment: rarely smokes. declines quitplan referral 9/18/18   Substance Use Topics     Alcohol use: Yes     Alcohol/week: 0.0 standard drinks     Comment: occassionally     Drug use: No        Medications:    HYDROcodone-acetaminophen (NORCO) 5-325 MG tablet  ibuprofen (ADVIL/MOTRIN) 800 MG tablet  tiZANidine (ZANAFLEX) 2 MG tablet  hydrOXYzine (VISTARIL) 50 MG capsule          Review of Systems   Constitutional: Positive for activity change. Negative for chills and fever.   Respiratory: Negative for shortness of breath.    Gastrointestinal: Positive for nausea. Negative for vomiting.   Musculoskeletal: Positive for back pain.        Radiates posterior leg to knee and down to foot left leg   Skin: Negative.    Neurological: Positive for dizziness. Negative for numbness.       Physical Exam   BP: 128/91  Pulse: 65  Temp: 96.9  F (36.1  C)  Resp: 16  SpO2: 96 %      Physical Exam  Vitals signs and nursing note reviewed.   Constitutional:       General: She is in acute distress.      Appearance: Normal appearance.   Cardiovascular:       Rate and Rhythm: Normal rate and regular rhythm.      Heart sounds: Normal heart sounds. No murmur.   Pulmonary:      Effort: Pulmonary effort is normal. No respiratory distress.      Breath sounds: Normal breath sounds. No wheezing.   Musculoskeletal:         General: Tenderness present.      Lumbar back: She exhibits tenderness and pain. She exhibits no bony tenderness.      Comments: Nature of onset chronic. On her feet for many hours for employment.  Location lumbar  Character constant, sharp to dull ache and burning  Radiation left leg, knee, and foot  Pain at night yes    Musculoskeletal: Lumbar and thoracic spine without gross deformity, rash, erythema, or ecchymosis. No trauma noted.        No tenderness, spasms, pain, or step-offs noted with spinal palpation. Paraspinous muscle tenderness over L4 and L5. No numbness or tingling in pelvic or gluteal muscles (spinal anesthesia). Pain radiates into left legs.  Pain is exacerbation of chronic pain. Denies incontinency of bowel or bladder.     Inspection:  Limitations related to pain slight difficulty getting up from chair  Is able to get up from chair yes with slight difficulty  Ambulation ok   Posture ok  Shoulders even bilaterally    iliac crest even bilaterally       Flexion at waist: 90  degrees. extension 15 degrees  Lateral flexion:   R) 30 degrees.  L)30 degrees  Stand on tip toes (S1). yes  Rock on heels (L4).  yes  Flex toes up (L5). Could not flex right toes up or down    L1, 2, 3:  Bend right knee from standing position to 70 degrees before having pain.  Bend left  knee from standing position to 90 degrees before having pain.    L2, 3: quad muscles extend leg and hold against resistance. R) strong  L) strong    L4: (anterior tibialis - dorsi flex foot R) yes  L)yes    L5: press great toe up  R) no  L) yes    S1: gastrocnemius flex toe down R) no L) yes      Sensation bilaterally in feet: L4 - medial malleolus yes         L 5 - dorsal web space 1st  and 2nd toe yes         S1 - lateral malleolus yes  Strength equal and strong against resistance     Skin:     General: Skin is warm and dry.   Neurological:      Mental Status: She is alert and oriented to person, place, and time.      Sensory: Sensation is intact.      Motor: Motor function is intact. No weakness.      Gait: Gait is intact.      Deep Tendon Reflexes:      Reflex Scores:       Patellar reflexes are 3+ on the right side and 3+ on the left side.       Achilles reflexes are 1+ on the right side and 1+ on the left side.  Psychiatric:         Behavior: Behavior normal.         ED Course        Procedures           Results for orders placed or performed during the hospital encounter of 04/13/21 (from the past 24 hour(s))   Lumbar spine XR, 2-3 views    Narrative    PROCEDURE: XR LUMBAR SPINE 2-3 VIEWS 4/13/2021 11:17 AM    HISTORY: worsening lumbar back pain. new left leg sciatica pain    COMPARISONS: 2016    TECHNIQUE: AP and lateral    FINDINGS: There are mild degenerative osteophytes seen at L1-L2 and  L3-L4. The lumbar discs are normal in height. Vertebral arches are  intact. The paravertebral soft tissues appear normal.         Impression    IMPRESSION: Mild degenerative changes of the lumbar spine    RACIEL JORGE MD       Medications   ketorolac (TORADOL) injection 60 mg (60 mg Intramuscular Given 4/13/21 1038)       Assessments & Plan (with Medical Decision Making)     I have reviewed the nursing notes.    I have reviewed the findings, diagnosis, plan and need for follow up with the patient.  (M54.16) Lumbar back pain with radiculopathy affecting left lower extremity  Comment:  45 year old female who presents with one week history of exacerbation of low back pain that radiates into her posterior left leg to her knee and then into her foot. Accompanied with dizziness and nausea. Has tried inversion table, chiropractor, tens unit, cold, heat, muscle relaxants and NSAIDs. Pain patches and ice  seem to help the best still not pain relieving. Last dose ibuprofen yesterday. Lumbar spine x-ray 2016. Had multilevel degenerative changes at that time. Denies any trauma or recent injury. Smoker. Not received covid vaccine. Denies bowel and bladder retention or incontinency. Denies fevers, chills, vomiting, diarrhea, and shortness of breath.    MDM: NHT. Lungs CTA  See back assessment    Lumbar spine x-ray reviewed and per radiology:  FINDINGS: There are mild degenerative osteophytes seen at L1-L2 and L3-L4. The lumbar discs are normal in height. Vertebral arches are intact. The paravertebral soft tissues appear normal.  IMPRESSION: Mild degenerative changes of the lumbar spine     Ketorolac 60 mg given IM did start to decrease her discomfort.     Plan: hydrocodone and tizanidine for pain and discomfort. Education provided and/or discussed for this/these medications and for relieving back pain and RICE.  Ice to affected area 20 minutes every hour as needed for comfort. After 48 hours you can apply heat. Ibuprofen 600 to 800 mg (3 - 4 tabs of over the counter med) every six to eight hours as needed;not to exceed maximum amount of 3200 mg in 24 hours. Take with food. For severe pain take two hydrocodone every four to six hours as needed. For moderate pain take one hydrocodone with 325 to 650 mg of acetaminophen (Tylenol). For mild pain take Tylenol 650 to 1000 mg every four to six hours as needed (not to exceed more than 4000 mg in a 24 hour period). May use interchangeably. Suggest medicating around the clock for the next 24-48 hours. Continue other interventions as you have been. Follow up with primary provider  as needed  Return to ER or call 911 if you develop bowel or bladder retention or incontinency or weakness in your extremities.    Do not drive motor vehicles or operate heavy equipment while taking muscle relaxors.  These discharge instructions and medications were reviewed with her and understanding  verbalized.    Discharge Medication List as of 4/13/2021 11:52 AM      START taking these medications    Details   HYDROcodone-acetaminophen (NORCO) 5-325 MG tablet Take 1 tablet by mouth every 6 hours as needed for severe pain, Disp-10 tablet, R-0, E-Prescribe      tiZANidine (ZANAFLEX) 2 MG tablet Take 1 tablet (2 mg) by mouth 3 times daily as needed for muscle spasms May take one to two tablets tid prn, Disp-12 tablet, R-0, E-Prescribe             Final diagnoses:   Lumbar back pain with radiculopathy affecting left lower extremity       4/13/2021   [facility]       Yuliya Holt, CNP  04/16/21 2054

## 2021-04-16 ASSESSMENT — ENCOUNTER SYMPTOMS: SHORTNESS OF BREATH: 0

## 2021-04-20 ENCOUNTER — TELEPHONE (OUTPATIENT)
Dept: FAMILY MEDICINE | Facility: OTHER | Age: 46
End: 2021-04-20

## 2021-04-20 NOTE — TELEPHONE ENCOUNTER
3:56 PM    Reason for Call: OVERBOOK    Patient is having the following symptoms:Lower to mid back pain which is shooting down left leg for 2 weeks. Pt was in the ER 4- and pain is getting worse.    The patient is requesting an appointment for back pain ASAP with Dr Chelsi Weiss or any other provider..    Was an appointment offered for this call? Yes  If yes : Appointment type              Date 5- pt declined    Preferred method for responding to this message: Telephone Call  What is your phone number ?[phone]    If we cannot reach you directly, may we leave a detailed response at the number you provided? Yes    Can this message wait until your PCP/provider returns, if unavailable today? YES    Margaret Mcintyre

## 2021-04-22 ENCOUNTER — OFFICE VISIT (OUTPATIENT)
Dept: FAMILY MEDICINE | Facility: OTHER | Age: 46
End: 2021-04-22
Attending: FAMILY MEDICINE
Payer: COMMERCIAL

## 2021-04-22 VITALS
WEIGHT: 159 LBS | TEMPERATURE: 97 F | OXYGEN SATURATION: 98 % | SYSTOLIC BLOOD PRESSURE: 122 MMHG | DIASTOLIC BLOOD PRESSURE: 74 MMHG | BODY MASS INDEX: 28.39 KG/M2 | HEART RATE: 75 BPM

## 2021-04-22 DIAGNOSIS — M54.16 LUMBAR RADICULAR PAIN: Primary | ICD-10-CM

## 2021-04-22 PROCEDURE — G0463 HOSPITAL OUTPT CLINIC VISIT: HCPCS

## 2021-04-22 PROCEDURE — 99213 OFFICE O/P EST LOW 20 MIN: CPT | Performed by: FAMILY MEDICINE

## 2021-04-22 RX ORDER — HYDROCODONE BITARTRATE AND ACETAMINOPHEN 5; 325 MG/1; MG/1
1 TABLET ORAL EVERY 6 HOURS PRN
Qty: 18 TABLET | Refills: 0 | Status: SHIPPED | OUTPATIENT
Start: 2021-04-22 | End: 2021-04-25

## 2021-04-22 RX ORDER — GABAPENTIN 300 MG/1
300 CAPSULE ORAL 3 TIMES DAILY
Qty: 90 CAPSULE | Refills: 0 | Status: SHIPPED | OUTPATIENT
Start: 2021-04-22 | End: 2021-05-19

## 2021-04-22 ASSESSMENT — ANXIETY QUESTIONNAIRES
2. NOT BEING ABLE TO STOP OR CONTROL WORRYING: NOT AT ALL
7. FEELING AFRAID AS IF SOMETHING AWFUL MIGHT HAPPEN: NOT AT ALL
6. BECOMING EASILY ANNOYED OR IRRITABLE: NOT AT ALL
3. WORRYING TOO MUCH ABOUT DIFFERENT THINGS: NOT AT ALL
1. FEELING NERVOUS, ANXIOUS, OR ON EDGE: NOT AT ALL
GAD7 TOTAL SCORE: 0
5. BEING SO RESTLESS THAT IT IS HARD TO SIT STILL: NOT AT ALL
4. TROUBLE RELAXING: NOT AT ALL

## 2021-04-22 ASSESSMENT — PATIENT HEALTH QUESTIONNAIRE - PHQ9: SUM OF ALL RESPONSES TO PHQ QUESTIONS 1-9: 0

## 2021-04-22 ASSESSMENT — PAIN SCALES - GENERAL: PAINLEVEL: WORST PAIN (10)

## 2021-04-22 NOTE — PROGRESS NOTES
"    Assessment & Plan     Lumbar radicular pain  Left side  Start Gabapentin tid, advised to slow down to taper if this is making her tired  She is given a short course of hydrocodone for a few days to help get her through work   PT referral done  MRI if not improving after PT   - gabapentin (NEURONTIN) 300 MG capsule; Take 1 capsule (300 mg) by mouth 3 times daily  - PHYSICAL THERAPY REFERRAL; Future  - HYDROcodone-acetaminophen (NORCO) 5-325 MG tablet; Take 1 tablet by mouth every 6 hours as needed for pain      20 minutes spent on the date of the encounter doing chart review, review of outside records, patient visit and documentation        Tobacco Cessation:   reports that she has been smoking cigarettes. She has been smoking about 0.10 packs per day. She has never used smokeless tobacco.  Tobacco Cessation Action Plan: Information offered: Patient not interested at this time    BMI:   Estimated body mass index is 28.39 kg/m  as calculated from the following:    Height as of 7/1/20: 1.594 m (5' 2.75\").    Weight as of this encounter: 72.1 kg (159 lb).           No follow-ups on file.    Olivia Weiss MD  [facility] [place]    Subjective   Star is a 45 year old who presents for the following health issues     HPI     ED/UC Followup:    Facility:  [facility]  Date of visit: 4/13/21  Reason for visit: Lumbar back pain with radiculopathy affecting left lower extremity   Seen in ER 4-13-21  hydrocodone and tizanidine for pain and discomfort. Education provided and/or discussed for this/these medications and for relieving back pain and RICE.  Ice to affected area 20 minutes every hour as needed for comfort. After 48 hours you can apply heat. Ibuprofen 600 to 800 mg (3 - 4 tabs of over the counter med) every six to eight hours as needed;not to exceed maximum amount of 3200 mg in 24 hours. Take with food. For severe pain take two hydrocodone every four to six hours as needed. For moderate pain take one " hydrocodone with 325 to 650 mg of acetaminophen (Tylenol). For mild pain take Tylenol 650 to 1000 mg every four to six hours as needed (not to exceed more than 4000 mg in a 24 hour period). May use interchangeably. Suggest medicating around the clock for the next 24-48 hours. Continue other interventions as you have been. Follow up with primary provider  as needed  Return to ER or call 911 if you develop bowel or bladder retention or incontinency or weakness in your extremities.  Current Status: still having lower back pain (radiates down left leg)- not any better      Pain started around Easter time (April 3, 2021) and hasn't gotten better. She has underlying chronic back issues. She has seen a chiropractor, has been using Ibprofen, heat, ice, an inversion table, and roller. Pain radiates down the left leg. 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 She has to get to work. Standing for prolonged times increases pain. X rays done in the Er were negative for acute change         Review of Systems   Constitutional, HEENT, cardiovascular, pulmonary, gi and gu systems are negative, except as otherwise noted.      Objective    /74   Pulse 75   Temp 97  F (36.1  C) (Tympanic)   Wt 72.1 kg (159 lb)   SpO2 98%   BMI 28.39 kg/m    Body mass index is 28.39 kg/m .  Physical Exam   GENERAL APPEARANCE: alert, no distress and stressed  ORTHO: Lumber/Thoracic Spine Exam: Tender:  left para lumbar muscles, right para lumbar muscles, left SI joint, right SI joint, left sciatic notch  Non-tender:  right sciatic notch  Range of Motion:  lumbar flexion  full, painful, lumbar extension  unable, painful  Strength:  able to heel walk, able to toe walk  Special tests:  negative straight leg raises    SKIN: no suspicious lesions or rashes  NEURO: Normal strength and tone, mentation intact, speech normal and DTR symmetrically normal in lower extremities  PSYCH: mentation appears  normal and worried

## 2021-04-22 NOTE — NURSING NOTE
"Chief Complaint   Patient presents with     ER F/U       Initial /74   Pulse 75   Temp 97  F (36.1  C) (Tympanic)   Wt 72.1 kg (159 lb)   SpO2 98%   BMI 28.39 kg/m   Estimated body mass index is 28.39 kg/m  as calculated from the following:    Height as of 7/1/20: 1.594 m (5' 2.75\").    Weight as of this encounter: 72.1 kg (159 lb).  Medication Reconciliation: complete  Azra España LPN  "

## 2021-04-22 NOTE — TELEPHONE ENCOUNTER
Please schedule patient for date/time: can see today at 3:00, arrive at 2:45     Have patient go to ER/Urgent Care Center. Urgent Care hours are 9:30 am to 8 pm, open 7 days a week. No.    Provider will call patient.No.    Other:

## 2021-04-23 ASSESSMENT — ANXIETY QUESTIONNAIRES: GAD7 TOTAL SCORE: 0

## 2021-04-27 NOTE — PROGRESS NOTES
SUBJECTIVE:   CC: Ritesh Soliman is an 45 year old woman who presents for preventive health visit.       Healthy Habits:    Do you get at least three servings of calcium containing foods daily (dairy, green leafy vegetables, etc.)? No     Amount of exercise or daily activities, outside of work: not right now    Problems taking medications regularly No    Medication side effects: No    Have you had an eye exam in the past two years? no    Do you see a dentist twice per year? no    Do you have sleep apnea, excessive snoring or daytime drowsiness?no      Back Pain       Duration: few weeks         Specific cause: none    Description:   Location of pain: low back bilateral  Character of pain: stabbing and burning  Pain radiation:radiates into the left buttocks, radiates into the left leg and radiates into the left foot  New numbness or weakness in legs, not attributed to pain:  YES    Intensity: Currently 10/10    History:   Pain interferes with job: YES  History of back problems: no prior back problems  Any previous MRI or X-rays: Yes- at [facility].  Date 5/14/21  Sees a specialist for back pain:  Yes, through [facility] pain clinic, contact made with the following plan: hasn't heard yet from them  Therapies tried without relief: acetaminophen (Tylenol), chiropractor, cold, heat, NSAIDs and rest    Alleviating factors:   Improved by: none      Precipitating factors:  Worsened by: Lifting, Bending, Standing, Sitting and Lying Flat          Accompanying Signs & Symptoms:  Risk of Fracture:  None  Risk of Cauda Equina:  None  Risk of Infection:  None  Risk of Cancer:  None  Risk of Ankylosing Spondylitis:  Onset at age <35, male, AND morning back stiffness. no     See previous evaluation. MRI was done in the [facility] in [place] and revealed an extruded disc. She continues to have severe left sided radicular pain. 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 She is out of hydrocodone prescribed  May 11, 42 tablets. She states she only received a small amount. She doesn't feel the gabapentin is working but isn't taking it regularly. Referral was done to [facility] but she hasn't heard back yet .                Today's PHQ-2 Score:   PHQ-2 ( 1999 Pf[employer]) 5/20/2021 4/22/2021   Q1: Little interest or pleasure in doing things 3 0   Q2: Feeling down, depressed or hopeless 2 0   PHQ-2 Score 5 0       Abuse: Current or Past(Physical, Sexual or Emotional)- No  Do you feel safe in your environment? Yes        Social History     Tobacco Use     Smoking status: Current Some Day Smoker     Packs/day: 0.10     Types: Cigarettes     Smokeless tobacco: Never Used     Tobacco comment: rarely smokes. declines quitplan referral 9/18/18   Substance Use Topics     Alcohol use: Yes     Alcohol/week: 0.0 standard drinks     Comment: occassionally     If you drink alcohol do you typically have >3 drinks per day or >7 drinks per week? No                     Reviewed orders with patient.  Reviewed health maintenance and updated orders accordingly - Yes  BP Readings from Last 3 Encounters:   05/20/21 132/80   05/10/21 126/74   04/22/21 122/74    Wt Readings from Last 3 Encounters:   05/20/21 72.1 kg (159 lb)   05/10/21 72.1 kg (159 lb)   04/22/21 72.1 kg (159 lb)                  Patient Active Problem List   Diagnosis     Recurrent UTI     Tobacco abuse     Depression with anxiety     ACP (advance care planning)     OSCAR (generalized anxiety disorder)     Major depressive disorder, recurrent episode, moderate (H)     Panic attack     Menorrhagia     Endometrial thickening on ultrasound     Chronic, continuous use of opioids     Past Surgical History:   Procedure Laterality Date     BIOPSY BREAST Right 10/19/2017    Procedure: BIOPSY BREAST;  EXCISIONAL BIOPSY RIGHT BREAST;  Surgeon: Kiko Newberry MD;  Location: [facility]     DILATE CERVIX, HYSTEROSCOPY, ABLATE ENDOMETRIUM, COMBINED N/A 7/1/2020    Procedure:  HYSTEROSCOPY, ENDOMETRIAL ABLATION,endometrial polypectomy;  Surgeon: Juan Roger MD;  Location: [facility]     ESOPHAGOSCOPY, GASTROSCOPY, DUODENOSCOPY (EGD), COMBINED N/A 10/1/2018    Procedure: COMBINED ESOPHAGOSCOPY, GASTROSCOPY, DUODENOSCOPY (EGD);  UPPER ENDOSCOPY WITH BIOPSIES;  Surgeon: Kiko Newberry MD;  Location: [facility] OR     HC PLACE INTRAVASC STENT OPEN/PERQ, EA ADDL VEIN      stents for varicose veins, Dr Martinez     OOPHORECTOMY      tubal pregnancy     TUBAL LIGATION         Social History     Tobacco Use     Smoking status: Current Some Day Smoker     Packs/day: 0.10     Types: Cigarettes     Smokeless tobacco: Never Used     Tobacco comment: rarely smokes. declines quitplan referral 9/18/18   Substance Use Topics     Alcohol use: Yes     Alcohol/week: 0.0 standard drinks     Comment: occassionally     Family History   Problem Relation Age of Onset     Coronary Artery Disease Mother      Hypertension Mother      Aneurysm Mother         stomach     Coronary Artery Disease Father      Aneurysm Father         heart     Aneurysm Niece         brain     C.A.D. No family hx of      Breast Cancer No family hx of      Cancer - colorectal No family hx of      Diabetes No family hx of      Thyroid Disease No family hx of      Asthma No family hx of      Colon Cancer No family hx of      Hyperlipidemia No family hx of          Current Outpatient Medications   Medication Sig Dispense Refill     diclofenac (VOLTAREN) 1 % topical gel Apply 4 g topically 4 times daily 50 g 0     gabapentin (NEURONTIN) 300 MG capsule TAKE 1 CAPSULE(300 MG) BY MOUTH THREE TIMES DAILY 90 capsule 0     HYDROcodone-acetaminophen (NORCO) 5-325 MG tablet Take 1 tablet by mouth every 6 hours as needed for pain 42 tablet 0     ibuprofen (ADVIL/MOTRIN) 800 MG tablet Take 1 tablet (800 mg) by mouth every 8 hours as needed for pain 30 tablet 1     hydrOXYzine (VISTARIL) 50 MG capsule Take 1 capsule (50 mg) by mouth 3 times daily as needed for  anxiety (Patient not taking: Reported on 5/10/2021) 90 capsule 3     Allergies   Allergen Reactions     Augmentin [Amoxicillin-Pot Clavulanate] Swelling       FSH-7: No flowsheet data found.    Mammogram Screening: Recommended annual mammography  Pertinent mammograms are reviewed under the imaging tab.    Pertinent mammograms are reviewed under the imaging tab.  History of abnormal Pap smear:   YES - other categories - see link Cervical Cytology Screening Guidelines  Last 3 Pap and HPV Results:   PAP / HPV Latest Ref Rng & Units 5/14/2020 12/13/2016   PAP - NIL NIL   HPV 16 DNA NEG:Negative Negative Negative   HPV 18 DNA NEG:Negative Negative Negative   OTHER HR HPV NEG:Negative Positive(A) Negative     PAP / HPV Latest Ref Rng & Units 5/14/2020 12/13/2016   PAP - NIL NIL   HPV 16 DNA NEG:Negative Negative Negative   HPV 18 DNA NEG:Negative Negative Negative   OTHER HR HPV NEG:Negative Positive(A) Negative     Reviewed and updated as needed this visit by clinical staff  Tobacco  Allergies  Meds   Med Hx  Surg Hx  Fam Hx  Soc Hx        Reviewed and updated as needed this visit by Provider  Tobacco  Allergies  Meds   Med Hx  Surg Hx  Fam Hx  Soc Hx       Past Medical History:   Diagnosis Date     Depression with anxiety      Tobacco abuse       Past Surgical History:   Procedure Laterality Date     BIOPSY BREAST Right 10/19/2017    Procedure: BIOPSY BREAST;  EXCISIONAL BIOPSY RIGHT BREAST;  Surgeon: Kiko Newberry MD;  [facility]: [facility]     DILATE CERVIX, HYSTEROSCOPY, ABLATE ENDOMETRIUM, COMBINED N/A 7/1/2020    Procedure: HYSTEROSCOPY, ENDOMETRIAL ABLATION,endometrial polypectomy;  Surgeon: Juan Roger MD;  Location: [facility]     ESOPHAGOSCOPY, GASTROSCOPY, DUODENOSCOPY (EGD), COMBINED N/A 10/1/2018    Procedure: COMBINED ESOPHAGOSCOPY, GASTROSCOPY, DUODENOSCOPY (EGD);  UPPER ENDOSCOPY WITH BIOPSIES;  Surgeon: Kiko Newberry MD;  Location: [facility]     HC PLACE INTRAVASC STENT OPEN/PERQ, EA ADDL VEIN       "stents for varicose veins, Dr Martinez     OOPHORECTOMY      tubal pregnancy     TUBAL LIGATION       OB History    Para Term  AB Living   4 2 2 0 2 2   SAB TAB Ectopic Multiple Live Births   1 0 1 0 0      # Outcome Date GA Lbr Zach/2nd Weight Sex Delivery Anes PTL Lv   4 Ectopic            3 SAB            2 Term            1 Term                ROS:  CONSTITUTIONAL: NEGATIVE for fever, chills, change in weight  INTEGUMENTARY/SKIN: NEGATIVE for worrisome rashes, moles or lesions  EYES: NEGATIVE for vision changes or irritation  ENT: NEGATIVE for ear, mouth and throat problems  RESP: NEGATIVE for significant cough or SOB  BREAST: NEGATIVE for masses, tenderness or discharge  CV: episode where she awakened with severe pain of the back and chest. Resolved when family member adjusted her back   GI: NEGATIVE for nausea, abdominal pain, heartburn, or change in bowel habits  : NEGATIVE for unusual urinary or vaginal symptoms. No vaginal bleeding.  MUSCULOSKELETAL: NEGATIVE for significant arthralgias or myalgia  NEURO: NEGATIVE for weakness, dizziness or paresthesias  PSYCHIATRIC: NEGATIVE for changes in mood or affect     Tubal ligation for birth control   No menses over the last year after having uterine ablation   No hot flashes  OBJECTIVE:   /80   Pulse 94   Temp 97.4  F (36.3  C) (Tympanic)   Resp 20   Ht 1.594 m (5' 2.75\")   Wt 72.1 kg (159 lb)   SpO2 98%   BMI 28.39 kg/m    EXAM:  GENERAL APPEARANCE: healthy, alert and no distress  EYES: Eyes grossly normal to inspection, PERRL and conjunctivae and sclerae normal  HENT: ear canals and TM's normal, nose and mouth without ulcers or lesions, oropharynx clear and oral mucous membranes moist  NECK: no adenopathy, no asymmetry, masses, or scars and thyroid normal to palpation  RESP: lungs clear to auscultation - no rales, rhonchi or wheezes  BREAST: normal without masses, tenderness or nipple discharge and no palpable axillary masses or " adenopathy  CV: regular rate and rhythm, normal S1 S2, no S3 or S4, no murmur, click or rub, no peripheral edema and peripheral pulses strong  ABDOMEN: soft, nontender, no hepatosplenomegaly, no masses and bowel sounds normal  MS: no musculoskeletal defects are noted and gait is age appropriate without ataxia  SKIN: no suspicious lesions or rashes, multiple tattoos covering the body, bilateral nipple and umbilicus piercings    NEURO: Normal strength and tone, sensory exam grossly normal, mentation intact and speech normal  PSYCH: mentation appears normal and affect normal/bright        ASSESSMENT/PLAN:   1. Routine general medical examination at a health care facility      2. Screening for malignant neoplasm of cervix  Pap done  - A pap thin layer screen with  HPV - recommended age 30 - 65 years (select HPV order below)  - HPV High Risk Types DNA Cervical    3. Cervical high risk HPV (human papillomavirus) test positive  Last pap normal with positive HPV    4. Encounter for screening mammogram for breast cancer  Ordered   - MA SCREENING DIGITAL BILATERAL ([place]); Future    5. OSCAR (generalized anxiety disorder)  Ongoing     6. Tobacco abuse  Smokes socially     7. Tobacco abuse counseling  Not ready to quit    8. Lumbar radicular pain  Renewed, discussed increasing to two three times daily but she isn't taking this tid yet so advised this   - gabapentin (NEURONTIN) 300 MG capsule; TAKE 1 CAPSULE(300 MG) BY MOUTH THREE TIMES DAILY  Dispense: 90 capsule; Refill: 0    9. Lumbar back pain with radiculopathy affecting left lower extremity  Renewed  Recheck referral to [facility]   Will call to make sure this was received   - HYDROcodone-acetaminophen (NORCO) 5-325 MG tablet; Take 1 tablet by mouth every 6 hours as needed for pain  Dispense: 42 tablet; Refill: 0    10. Screen for STD (sexually transmitted disease)  Proceed with screening   - GC/Chlamydia by PCR - [facility]    11. Encounter for hepatitis C screening test for low  "risk patient  Discussed, proceed with screening   - Hepatitis C Screen Reflex to HCV RNA Quant and Genotype    12. Screening for HIV (human immunodeficiency virus)  Discussed, proceed with screening   - HIV Antigen Antibody Combo      COUNSELING:   Reviewed preventive health counseling, as reflected in patient instructions       Regular exercise       Healthy diet/nutrition       HIV screeninx in teen years, 1x in adult years, and at intervals if high risk    Estimated body mass index is 28.39 kg/m  as calculated from the following:    Height as of this encounter: 1.594 m (5' 2.75\").    Weight as of this encounter: 72.1 kg (159 lb).    Weight management plan: Discussed healthy diet and exercise guidelines    She reports that she has been smoking cigarettes. She has been smoking about 0.10 packs per day. She has never used smokeless tobacco.  Tobacco Cessation Action Plan:   Information offered: Patient not interested at this time      Counseling Resources:  ATP IV Guidelines  Pooled Cohorts Equation Calculator  Breast Cancer Risk Calculator  BRCA-Related Cancer Risk Assessment: FHS-7 Tool  FRAX Risk Assessment  ICSI Preventive Guidelines  Dietary Guidelines for Americans,   USDA's MyPlate  ASA Prophylaxis  Lung CA Screening    Olivia Weiss MD  [facility] [place]  "

## 2021-05-04 ENCOUNTER — TELEPHONE (OUTPATIENT)
Dept: FAMILY MEDICINE | Facility: OTHER | Age: 46
End: 2021-05-04

## 2021-05-07 ENCOUNTER — NURSE TRIAGE (OUTPATIENT)
Dept: FAMILY MEDICINE | Facility: OTHER | Age: 46
End: 2021-05-07

## 2021-05-07 NOTE — TELEPHONE ENCOUNTER
"Please call patient and advise She would like to be overbooked on Monday, the earlier the better.  [phone].    Reason for Disposition    MODERATE back pain (e.g., interferes with normal activities) and present > 3 days    Answer Assessment - Initial Assessment Questions  1. ONSET: \"When did the pain begin?\"        Has been seen for it already and doing therapy but not feeling better  2. LOCATION: \"Where does it hurt?\" (upper, mid or lower back)      Lower back  1,2,3,and S joint  3. SEVERITY: \"How bad is the pain?\"  (e.g., Scale 1-10; mild, moderate, or severe)    - MILD (1-3): doesn't interfere with normal activities     - MODERATE (4-7): interferes with normal activities or awakens from sleep     - SEVERE (8-10): excruciating pain, unable to do any normal activities       8/10  4. PATTERN: \"Is the pain constant?\" (e.g., yes, no; constant, intermittent)       constant  5. RADIATION: \"Does the pain shoot into your legs or elsewhere?\"      Shoots to soles of her feet  6. CAUSE:  \"What do you think is causing the back pain?\"       Chronic back pain  7. BACK OVERUSE:  \"Any recent lifting of heavy objects, strenuous work or exercise?\"      Patient is working still and is having a terrible time doing so  8. MEDICATIONS: \"What have you taken so far for the pain?\" (e.g., nothing, acetaminophen, NSAIDS)      Tylenol ibuprofen, ice packs, heat almost crying wearing a back brace  9. NEUROLOGIC SYMPTOMS: \"Do you have any weakness, numbness, or problems with bowel/bladder control?\"      Numbness down her legs  10. OTHER SYMPTOMS: \"Do you have any other symptoms?\" (e.g., fever, abdominal pain, burning with urination, blood in urine)        no  11. PREGNANCY: \"Is there any chance you are pregnant?\" (e.g., yes, no; LMP)        npo    Protocols used: BACK PAIN-A-[place]      "

## 2021-05-08 NOTE — TELEPHONE ENCOUNTER
Please schedule patient for date/time:   Can see Monday morning at 9:30, check in 9:15 for back pain only   Have patient go to ER/Urgent Care Center. Urgent Care hours are 9:30 am to 8 pm, open 7 days a week. No.    Provider will call patient.No.    Other:

## 2021-05-10 ENCOUNTER — OFFICE VISIT (OUTPATIENT)
Dept: FAMILY MEDICINE | Facility: OTHER | Age: 46
End: 2021-05-10
Attending: FAMILY MEDICINE
Payer: COMMERCIAL

## 2021-05-10 VITALS
HEART RATE: 61 BPM | OXYGEN SATURATION: 98 % | HEIGHT: 63 IN | BODY MASS INDEX: 28.17 KG/M2 | RESPIRATION RATE: 19 BRPM | WEIGHT: 159 LBS | SYSTOLIC BLOOD PRESSURE: 126 MMHG | TEMPERATURE: 97 F | DIASTOLIC BLOOD PRESSURE: 74 MMHG

## 2021-05-10 DIAGNOSIS — M54.16 LUMBAR BACK PAIN WITH RADICULOPATHY AFFECTING LEFT LOWER EXTREMITY: Primary | ICD-10-CM

## 2021-05-10 PROCEDURE — G0463 HOSPITAL OUTPT CLINIC VISIT: HCPCS | Mod: 25

## 2021-05-10 PROCEDURE — 99213 OFFICE O/P EST LOW 20 MIN: CPT | Performed by: FAMILY MEDICINE

## 2021-05-10 PROCEDURE — G0463 HOSPITAL OUTPT CLINIC VISIT: HCPCS

## 2021-05-10 RX ORDER — HYDROCODONE BITARTRATE AND ACETAMINOPHEN 5; 325 MG/1; MG/1
1 TABLET ORAL EVERY 6 HOURS PRN
Qty: 42 TABLET | Refills: 0 | Status: SHIPPED | OUTPATIENT
Start: 2021-05-10 | End: 2021-05-20

## 2021-05-10 ASSESSMENT — PAIN SCALES - GENERAL: PAINLEVEL: EXTREME PAIN (8)

## 2021-05-10 ASSESSMENT — MIFFLIN-ST. JEOR: SCORE: 1321.38

## 2021-05-10 NOTE — PROGRESS NOTES
"    Assessment & Plan     Lumbar back pain with radiculopathy affecting left lower extremity  Will extend hydrocodone for her for this severe pain  - HYDROcodone-acetaminophen (NORCO) 5-325 MG tablet; Take 1 tablet by mouth every 6 hours as needed for pain  - MR Lumbar Spine w/o Contrast; Future      25 minutes spent on the date of the encounter doing chart review, patient visit and documentation        BMI:   Estimated body mass index is 28.39 kg/m  as calculated from the following:    Height as of this encounter: 1.594 m (5' 2.75\").    Weight as of this encounter: 72.1 kg (159 lb).           Return in about 2 weeks (around 5/24/2021).    Olivia Weiss MD  [facility] [place]    Subjective   Star is a 45 year old who presents for the following health issues     HPI     Back Pain  Onset/Duration: ongoing issue but worsened   Description:   Location of pain: low back left  Character of pain: stabbing, gnawing, burning and constant  Pain radiation: radiates into the left buttocks, radiates into the left leg and radiates into the left foot  New numbness or weakness in legs, not attributed to pain: YES  Intensity: Currently 8/10  Progression of Symptoms: worsening  History:   Specific cause: none  Pain interferes with job: YES  History of back problems: prior back problems  Any previous MRI or X-rays: None  Sees a specialist for back pain: No  Alleviating factors:   Improved by: cold    Precipitating factors:  Worsened by: Lifting, Bending, Standing, Sitting and Walking  Therapies tried and outcome: chiropractor, cold, heat and Physical Therapy    Accompanying Signs & Symptoms:  Risk of Fracture: None  Risk of Cauda Equina: None  Risk of Infection: None  Risk of Cancer: None  Risk of Ankylosing Spondylitis: Onset at age <35, male, AND morning back stiffness  no       She is having ongoing severe pain daily. She is undergoing PT at [facility] and seeing Dr Simpson, chiropractor. She has used her pain " "medication carefully. 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 She has to work. She cut the last hydrocodone in half to  Make it last     Review of Systems   Constitutional, HEENT, cardiovascular, pulmonary, gi and gu systems are negative, except as otherwise noted.      Objective    /74   Pulse 61   Temp 97  F (36.1  C) (Tympanic)   Resp 19   Ht 1.594 m (5' 2.75\")   Wt 72.1 kg (159 lb)   SpO2 98%   BMI 28.39 kg/m    Body mass index is 28.39 kg/m .  Physical Exam   GENERAL APPEARANCE: healthy, alert, mild distress and crying  ORTHO: Lumber/Thoracic Spine Exam: Tender:  left para lumbar muscles, right para lumbar muscles, left sciatic notch  Non-tender:  left SI joint, right SI joint, right sciatic notch  Range of Motion:  lumbar flexion  painful, lumbar extension  painful  Strength:  Intact   Special tests:  negative left straight leg raise, positive right straight leg raise    SKIN: no suspicious lesions or rashes  NEURO: Normal strength and tone, mentation intact, speech normal and DTR symmetrically normal in lower extremities  PSYCH: mentation appears normal, anxious, crying and worried                "

## 2021-05-10 NOTE — NURSING NOTE
"Chief Complaint   Patient presents with     Back Pain       Initial /74   Pulse 61   Temp 97  F (36.1  C) (Tympanic)   Resp 19   Ht 1.594 m (5' 2.75\")   Wt 72.1 kg (159 lb)   SpO2 98%   BMI 28.39 kg/m   Estimated body mass index is 28.39 kg/m  as calculated from the following:    Height as of this encounter: 1.594 m (5' 2.75\").    Weight as of this encounter: 72.1 kg (159 lb).  Medication Reconciliation: complete  Emily Álvarez LPN    "

## 2021-05-12 DIAGNOSIS — M54.50 LOW BACK PAIN, UNSPECIFIED BACK PAIN LATERALITY, UNSPECIFIED CHRONICITY, UNSPECIFIED WHETHER SCIATICA PRESENT: Primary | ICD-10-CM

## 2021-05-12 NOTE — TELEPHONE ENCOUNTER
Pt calling and has MRI for her back coming up. She is requesting an RX for Diclofenac sodium cream to go to her pharmacy.She wants this to go to [facility]. Updated her this writer thinks this is OTC.    She wants it sent to the pharmacy.    Pended partially the gel.      Alida Ambrosio RN

## 2021-05-14 ENCOUNTER — TRANSFERRED RECORDS (OUTPATIENT)
Dept: HEALTH INFORMATION MANAGEMENT | Facility: CLINIC | Age: 46
End: 2021-05-14

## 2021-05-14 ENCOUNTER — TELEPHONE (OUTPATIENT)
Dept: FAMILY MEDICINE | Facility: OTHER | Age: 46
End: 2021-05-14

## 2021-05-14 NOTE — TELEPHONE ENCOUNTER
Received a PA from [employer] for Diclofenac Sodium 1% gel. Submitted on  CMM. Waiting for a response.

## 2021-05-17 ENCOUNTER — TELEPHONE (OUTPATIENT)
Dept: FAMILY MEDICINE | Facility: OTHER | Age: 46
End: 2021-05-17

## 2021-05-17 DIAGNOSIS — M54.16 LUMBAR RADICULAR PAIN: Primary | ICD-10-CM

## 2021-05-17 PROBLEM — F11.90 CHRONIC, CONTINUOUS USE OF OPIOIDS: Chronic | Status: ACTIVE | Noted: 2021-05-17

## 2021-05-17 NOTE — TELEPHONE ENCOUNTER
Call from pt stating she saw her chiropractor Friday Dr. Segundo Simpson and he sent her right down to [facility] in [place] to have a stat MRI done. He got the results and recommends she see a neurosurgeon. Requested results and placed on your desk for review. Advised pt that I had to get the results before we could refer. Pt verbalized understanding.

## 2021-05-17 NOTE — TELEPHONE ENCOUNTER
Pt calling back and wants to know what day  she should take off of work for her appt at the [facility]?    Call back   [phone]      Alida Ambrosio RN

## 2021-05-17 NOTE — TELEPHONE ENCOUNTER
Left message that this should be up to her, unsure of when her appointment will be or if they called her but advised when they do and she gets an appointment that is up to her what she takes off of work for that appointment, unfortunately it can't be our decision.

## 2021-05-18 ENCOUNTER — TELEPHONE (OUTPATIENT)
Dept: FAMILY MEDICINE | Facility: OTHER | Age: 46
End: 2021-05-18

## 2021-05-18 NOTE — TELEPHONE ENCOUNTER
Call from patient inquiring on referral to Spine Center. Patient inquiring if referral has been sent and where the referral has been sent to as she has not had a call to schedule appt.     Notified referral has been sent to [facility] on 5/17/21. Contact information to [facility] provided to patient per request.

## 2021-05-19 DIAGNOSIS — M54.16 LUMBAR RADICULAR PAIN: ICD-10-CM

## 2021-05-19 RX ORDER — GABAPENTIN 300 MG/1
CAPSULE ORAL
Qty: 90 CAPSULE | Refills: 0 | Status: SHIPPED | OUTPATIENT
Start: 2021-05-19 | End: 2021-05-20

## 2021-05-19 NOTE — TELEPHONE ENCOUNTER
Neurontin      Last Written Prescription Date:  4/22/21  Last Fill Quantity: 90,   # refills: 0  Last Office Visit: 5/10/21  Future Office visit:    Next 5 appointments (look out 90 days)    May 20, 2021  8:15 AM  (Arrive by 8:00 AM)  PHYSICAL with Olivia Weiss MD  [facility] [address]  [place] [address]  [phone]   May 25, 2021  9:15 AM  (Arrive by 9:00 AM)  SHORT with Olivia Weiss MD  [facility] ([facility] ) [address]  [place] [address]  [phone]           Routing refill request to provider for review/approval because:

## 2021-05-19 NOTE — TELEPHONE ENCOUNTER
Please let her know insurance will not cover the gel for her back pain. She can buy this OTC or we can try one of the others listed that are covered

## 2021-05-20 ENCOUNTER — OFFICE VISIT (OUTPATIENT)
Dept: FAMILY MEDICINE | Facility: OTHER | Age: 46
End: 2021-05-20
Attending: FAMILY MEDICINE
Payer: COMMERCIAL

## 2021-05-20 VITALS
OXYGEN SATURATION: 98 % | HEART RATE: 94 BPM | SYSTOLIC BLOOD PRESSURE: 132 MMHG | RESPIRATION RATE: 20 BRPM | HEIGHT: 63 IN | WEIGHT: 159 LBS | BODY MASS INDEX: 28.17 KG/M2 | DIASTOLIC BLOOD PRESSURE: 80 MMHG | TEMPERATURE: 97.4 F

## 2021-05-20 DIAGNOSIS — Z72.0 TOBACCO ABUSE: ICD-10-CM

## 2021-05-20 DIAGNOSIS — F41.1 GAD (GENERALIZED ANXIETY DISORDER): ICD-10-CM

## 2021-05-20 DIAGNOSIS — Z11.3 SCREEN FOR STD (SEXUALLY TRANSMITTED DISEASE): ICD-10-CM

## 2021-05-20 DIAGNOSIS — Z00.00 ROUTINE GENERAL MEDICAL EXAMINATION AT A HEALTH CARE FACILITY: Primary | ICD-10-CM

## 2021-05-20 DIAGNOSIS — Z71.6 TOBACCO ABUSE COUNSELING: ICD-10-CM

## 2021-05-20 DIAGNOSIS — M54.16 LUMBAR RADICULAR PAIN: ICD-10-CM

## 2021-05-20 DIAGNOSIS — M54.16 LUMBAR BACK PAIN WITH RADICULOPATHY AFFECTING LEFT LOWER EXTREMITY: ICD-10-CM

## 2021-05-20 DIAGNOSIS — Z12.4 SCREENING FOR MALIGNANT NEOPLASM OF CERVIX: ICD-10-CM

## 2021-05-20 DIAGNOSIS — R87.810 CERVICAL HIGH RISK HPV (HUMAN PAPILLOMAVIRUS) TEST POSITIVE: ICD-10-CM

## 2021-05-20 DIAGNOSIS — Z12.31 ENCOUNTER FOR SCREENING MAMMOGRAM FOR BREAST CANCER: ICD-10-CM

## 2021-05-20 DIAGNOSIS — Z11.59 ENCOUNTER FOR HEPATITIS C SCREENING TEST FOR LOW RISK PATIENT: ICD-10-CM

## 2021-05-20 DIAGNOSIS — Z11.4 SCREENING FOR HIV (HUMAN IMMUNODEFICIENCY VIRUS): ICD-10-CM

## 2021-05-20 PROCEDURE — G0463 HOSPITAL OUTPT CLINIC VISIT: HCPCS | Mod: 25

## 2021-05-20 PROCEDURE — 87591 N.GONORRHOEAE DNA AMP PROB: CPT | Mod: ZL | Performed by: FAMILY MEDICINE

## 2021-05-20 PROCEDURE — 87624 HPV HI-RISK TYP POOLED RSLT: CPT | Mod: ZL | Performed by: FAMILY MEDICINE

## 2021-05-20 PROCEDURE — 36415 COLL VENOUS BLD VENIPUNCTURE: CPT | Mod: ZL | Performed by: FAMILY MEDICINE

## 2021-05-20 PROCEDURE — 99396 PREV VISIT EST AGE 40-64: CPT | Performed by: FAMILY MEDICINE

## 2021-05-20 PROCEDURE — G0123 SCREEN CERV/VAG THIN LAYER: HCPCS | Mod: ZL | Performed by: FAMILY MEDICINE

## 2021-05-20 PROCEDURE — 86803 HEPATITIS C AB TEST: CPT | Mod: ZL | Performed by: FAMILY MEDICINE

## 2021-05-20 PROCEDURE — 87491 CHLMYD TRACH DNA AMP PROBE: CPT | Mod: ZL | Performed by: FAMILY MEDICINE

## 2021-05-20 PROCEDURE — 87389 HIV-1 AG W/HIV-1&-2 AB AG IA: CPT | Mod: ZL | Performed by: FAMILY MEDICINE

## 2021-05-20 RX ORDER — GABAPENTIN 300 MG/1
CAPSULE ORAL
Qty: 90 CAPSULE | Refills: 0 | Status: SHIPPED | OUTPATIENT
Start: 2021-05-20 | End: 2021-08-04

## 2021-05-20 RX ORDER — HYDROCODONE BITARTRATE AND ACETAMINOPHEN 5; 325 MG/1; MG/1
1 TABLET ORAL EVERY 6 HOURS PRN
Qty: 42 TABLET | Refills: 0 | Status: SHIPPED | OUTPATIENT
Start: 2021-05-20 | End: 2021-08-04

## 2021-05-20 ASSESSMENT — MIFFLIN-ST. JEOR: SCORE: 1321.38

## 2021-05-20 ASSESSMENT — PATIENT HEALTH QUESTIONNAIRE - PHQ9: SUM OF ALL RESPONSES TO PHQ QUESTIONS 1-9: 24

## 2021-05-20 ASSESSMENT — PAIN SCALES - GENERAL: PAINLEVEL: WORST PAIN (10)

## 2021-05-20 NOTE — NURSING NOTE
"Chief Complaint   Patient presents with     Physical     Imm/Inj     declined covid vaccine and tdap booster       Initial /80   Pulse 94   Temp 97.4  F (36.3  C) (Tympanic)   Resp 20   Ht 1.594 m (5' 2.75\")   Wt 72.1 kg (159 lb)   SpO2 98%   BMI 28.39 kg/m   Estimated body mass index is 28.39 kg/m  as calculated from the following:    Height as of this encounter: 1.594 m (5' 2.75\").    Weight as of this encounter: 72.1 kg (159 lb).  Medication Reconciliation: complete  Emily Álvarez LPN    "

## 2021-05-21 LAB
C TRACH DNA SPEC QL NAA+PROBE: NOT DETECTED
COPATH REPORT: NORMAL
HCV AB SERPL QL IA: NONREACTIVE
HIV 1+2 AB+HIV1 P24 AG SERPL QL IA: NONREACTIVE
N GONORRHOEA DNA SPEC QL NAA+PROBE: NOT DETECTED
PAP: NORMAL
SPECIMEN SOURCE: NORMAL

## 2021-05-24 ENCOUNTER — TRANSFERRED RECORDS (OUTPATIENT)
Dept: HEALTH INFORMATION MANAGEMENT | Facility: CLINIC | Age: 46
End: 2021-05-24

## 2021-05-25 ENCOUNTER — TELEPHONE (OUTPATIENT)
Dept: MAMMOGRAPHY | Facility: OTHER | Age: 46
End: 2021-05-25

## 2021-05-25 ENCOUNTER — ANCILLARY PROCEDURE (OUTPATIENT)
Dept: MAMMOGRAPHY | Facility: OTHER | Age: 46
End: 2021-05-25
Attending: FAMILY MEDICINE
Payer: COMMERCIAL

## 2021-05-25 DIAGNOSIS — Z12.31 ENCOUNTER FOR SCREENING MAMMOGRAM FOR BREAST CANCER: ICD-10-CM

## 2021-05-25 LAB
FINAL DIAGNOSIS: NORMAL
HPV HR 12 DNA CVX QL NAA+PROBE: NEGATIVE
HPV16 DNA SPEC QL NAA+PROBE: NEGATIVE
HPV18 DNA SPEC QL NAA+PROBE: NEGATIVE
SPECIMEN DESCRIPTION: NORMAL
SPECIMEN SOURCE CVX/VAG CYTO: NORMAL

## 2021-05-25 PROCEDURE — 77063 BREAST TOMOSYNTHESIS BI: CPT | Mod: TC

## 2021-06-22 ENCOUNTER — OFFICE VISIT (OUTPATIENT)
Dept: FAMILY MEDICINE | Facility: OTHER | Age: 46
End: 2021-06-22
Attending: FAMILY MEDICINE
Payer: COMMERCIAL

## 2021-06-22 VITALS
HEART RATE: 70 BPM | BODY MASS INDEX: 27.46 KG/M2 | SYSTOLIC BLOOD PRESSURE: 110 MMHG | DIASTOLIC BLOOD PRESSURE: 68 MMHG | WEIGHT: 155 LBS | HEIGHT: 63 IN | OXYGEN SATURATION: 97 %

## 2021-06-22 DIAGNOSIS — M51.26 LUMBAR DISC HERNIATION: ICD-10-CM

## 2021-06-22 DIAGNOSIS — Z01.818 PREOP GENERAL PHYSICAL EXAM: Primary | ICD-10-CM

## 2021-06-22 DIAGNOSIS — Z72.0 TOBACCO ABUSE: ICD-10-CM

## 2021-06-22 DIAGNOSIS — F41.1 GAD (GENERALIZED ANXIETY DISORDER): ICD-10-CM

## 2021-06-22 LAB
ALBUMIN SERPL-MCNC: 3.7 G/DL (ref 3.4–5)
ALP SERPL-CCNC: 73 U/L (ref 40–150)
ALT SERPL W P-5'-P-CCNC: 24 U/L (ref 0–50)
ANION GAP SERPL CALCULATED.3IONS-SCNC: 6 MMOL/L (ref 3–14)
AST SERPL W P-5'-P-CCNC: 17 U/L (ref 0–45)
BILIRUB SERPL-MCNC: 0.2 MG/DL (ref 0.2–1.3)
BUN SERPL-MCNC: 12 MG/DL (ref 7–30)
CALCIUM SERPL-MCNC: 9 MG/DL (ref 8.5–10.1)
CHLORIDE SERPL-SCNC: 109 MMOL/L (ref 94–109)
CO2 SERPL-SCNC: 24 MMOL/L (ref 20–32)
CREAT SERPL-MCNC: 0.77 MG/DL (ref 0.52–1.04)
ERYTHROCYTE [DISTWIDTH] IN BLOOD BY AUTOMATED COUNT: 11.9 % (ref 10–15)
GFR SERPL CREATININE-BSD FRML MDRD: >90 ML/MIN/{1.73_M2}
GLUCOSE SERPL-MCNC: 95 MG/DL (ref 70–99)
HCG UR QL: NEGATIVE
HCT VFR BLD AUTO: 43.3 % (ref 35–47)
HGB BLD-MCNC: 14.7 G/DL (ref 11.7–15.7)
MCH RBC QN AUTO: 33 PG (ref 26.5–33)
MCHC RBC AUTO-ENTMCNC: 33.9 G/DL (ref 31.5–36.5)
MCV RBC AUTO: 97 FL (ref 78–100)
PLATELET # BLD AUTO: 304 10E9/L (ref 150–450)
POTASSIUM SERPL-SCNC: 4.4 MMOL/L (ref 3.4–5.3)
PROT SERPL-MCNC: 7.5 G/DL (ref 6.8–8.8)
RBC # BLD AUTO: 4.46 10E12/L (ref 3.8–5.2)
SODIUM SERPL-SCNC: 139 MMOL/L (ref 133–144)
WBC # BLD AUTO: 6.9 10E9/L (ref 4–11)

## 2021-06-22 PROCEDURE — G0463 HOSPITAL OUTPT CLINIC VISIT: HCPCS

## 2021-06-22 PROCEDURE — 36415 COLL VENOUS BLD VENIPUNCTURE: CPT | Mod: ZL | Performed by: FAMILY MEDICINE

## 2021-06-22 PROCEDURE — 80053 COMPREHEN METABOLIC PANEL: CPT | Mod: ZL | Performed by: FAMILY MEDICINE

## 2021-06-22 PROCEDURE — 99214 OFFICE O/P EST MOD 30 MIN: CPT | Performed by: FAMILY MEDICINE

## 2021-06-22 PROCEDURE — 81025 URINE PREGNANCY TEST: CPT | Mod: ZL | Performed by: FAMILY MEDICINE

## 2021-06-22 PROCEDURE — 85027 COMPLETE CBC AUTOMATED: CPT | Mod: ZL | Performed by: FAMILY MEDICINE

## 2021-06-22 ASSESSMENT — MIFFLIN-ST. JEOR: SCORE: 1298.24

## 2021-06-22 ASSESSMENT — PAIN SCALES - GENERAL: PAINLEVEL: MODERATE PAIN (5)

## 2021-06-22 NOTE — PATIENT INSTRUCTIONS

## 2021-06-22 NOTE — PROGRESS NOTES
[facility] - [place]  [address]  [facility] [address]  Phone: [phone]  Primary Provider: Olivia Estrada  Pre-op Performing Provider: OLIVIA ESTRADA      PREOPERATIVE EVALUATION:  Today's date: 6/22/2021    Ritesh Soliman is a 46 year old female who presents for a preoperative evaluation.    Surgical Information:  Surgery/Procedure: back surgery  Surgery Location: [facility]  Surgeon:   Surgery Date: 6/29/21  Time of Surgery: unknown  Where patient plans to recover: At home with family  Fax number for surgical facility:     Type of Anesthesia Anticipated: to be determined    Assessment & Plan     The proposed surgical procedure is considered INTERMEDIATE risk.    Preop general physical exam  Covid testing is scheduled for June 26  - CBC with platelets  - Comprehensive metabolic panel  - HCG Qual, Urine ([id number])    Lumbar disc herniation  With ongoing pain    OSCAR (generalized anxiety disorder)  Stable currently     Tobacco abuse  Social smoking, discussed quitting             Risks and Recommendations:  The patient has the following additional risks and recommendations for perioperative complications:   - No identified additional risk factors other than previously addressed    Medication Instructions:  Patient is to take all scheduled medications on the day of surgery    RECOMMENDATION:  APPROVAL GIVEN to proceed with proposed procedure, without further diagnostic evaluation.              35 minutes spent on the date of the encounter doing chart review, review of outside records, interpretation of tests, patient visit and documentation         Subjective     HPI related to upcoming procedure: extruded lumbar disc with severe radicular pain      Preop Questions 6/22/2021   1. Have you ever had a heart attack or stroke? No   2. Have you ever had surgery on your heart or blood vessels, such as a stent placement, a coronary artery bypass, or surgery on an artery in your head, neck,  heart, or legs? No   3. Do you have chest pain with activity? No   4. Do you have a history of  heart failure? No   5. Do you currently have a cold, bronchitis or symptoms of other infection? No   6. Do you have a cough, shortness of breath, or wheezing? No   7. Do you or anyone in your family have previous history of blood clots? YES - niece & father   8. Do you or does anyone in your family have a serious bleeding problem such as prolonged bleeding following surgeries or cuts? No   9. Have you ever had problems with anemia or been told to take iron pills? YES - in the past   10. Have you had any abnormal blood loss such as black, tarry or bloody stools, or abnormal vaginal bleeding? No   11. Have you ever had a blood transfusion? No   12. Are you willing to have a blood transfusion if it is medically needed before, during, or after your surgery? Yes   13. Have you or any of your relatives ever had problems with anesthesia? No   14. Do you have sleep apnea, excessive snoring or daytime drowsiness? YES - sleep problems   14a. Do you have a CPAP machine? No   15. Do you have any artifical heart valves or other implanted medical devices like a pacemaker, defibrillator, or continuous glucose monitor? No   16. Do you have artificial joints? No   17. Are you allergic to latex? No   18. Is there any chance that you may be pregnant? No     Health Care Directive:  Patient does not have a Health Care Directive or Living Will: Discussed advance care planning with patient; however, patient declined at this time.    Preoperative Review of :   reviewed - controlled substances reflected in medication list.          Review of Systems  Constitutional, neuro, ENT, endocrine, pulmonary, cardiac, gastrointestinal, genitourinary, musculoskeletal, integument and psychiatric systems are negative, except as otherwise noted.    Patient Active Problem List    Diagnosis Date Noted     Chronic, continuous use of opioids 05/17/2021      Priority: Medium     Menorrhagia 05/22/2020     Priority: Medium     Added automatically from request for surgery [id number]       Endometrial thickening on ultrasound 05/22/2020     Priority: Medium     Added automatically from request for surgery [id number]       OSCAR (generalized anxiety disorder) 05/09/2018     Priority: Medium     Major depressive disorder, recurrent episode, moderate (H) 05/09/2018     Priority: Medium     Panic attack 05/09/2018     Priority: Medium     ACP (advance care planning) 04/29/2016     Priority: Medium     Advance Care Planning 4/29/2016: ACP Review of Chart / Resources Provided:  Reviewed chart for advance care plan.  Ritesh Soliman has been provided information and resources to begin or update their advance care plan.  Added by Ngoc Duong             Tobacco abuse      Priority: Medium     Depression with anxiety      Priority: Medium     Recurrent UTI 02/27/2014     Priority: Medium      Past Medical History:   Diagnosis Date     Depression with anxiety      Tobacco abuse      Past Surgical History:   Procedure Laterality Date     BIOPSY BREAST Right 10/19/2017    Procedure: BIOPSY BREAST;  EXCISIONAL BIOPSY RIGHT BREAST;  Surgeon: Kiko Newberry MD;  Location: [facility]     DILATE CERVIX, HYSTEROSCOPY, ABLATE ENDOMETRIUM, COMBINED N/A 7/1/2020    Procedure: HYSTEROSCOPY, ENDOMETRIAL ABLATION,endometrial polypectomy;  Surgeon: Juan Roger MD;  Location: [facility]     ESOPHAGOSCOPY, GASTROSCOPY, DUODENOSCOPY (EGD), COMBINED N/A 10/1/2018    Procedure: COMBINED ESOPHAGOSCOPY, GASTROSCOPY, DUODENOSCOPY (EGD);  UPPER ENDOSCOPY WITH BIOPSIES;  Surgeon: Kiko Newberry MD;  Location: [facility]     HC PLACE INTRAVASC STENT OPEN/PERQ, EA ADDL VEIN      stents for varicose veins, Dr Martinez     OOPHORECTOMY      tubal pregnancy     TUBAL LIGATION       Current Outpatient Medications   Medication Sig Dispense Refill     diclofenac (VOLTAREN) 1 % topical gel Apply 4 g topically 4 times daily 50 g 0  "    gabapentin (NEURONTIN) 300 MG capsule TAKE 1 CAPSULE(300 MG) BY MOUTH THREE TIMES DAILY 90 capsule 0     HYDROcodone-acetaminophen (NORCO) 5-325 MG tablet Take 1 tablet by mouth every 6 hours as needed for pain 42 tablet 0     hydrOXYzine (VISTARIL) 50 MG capsule Take 1 capsule (50 mg) by mouth 3 times daily as needed for anxiety 90 capsule 3     ibuprofen (ADVIL/MOTRIN) 800 MG tablet Take 1 tablet (800 mg) by mouth every 8 hours as needed for pain (Patient not taking: Reported on 6/22/2021) 30 tablet 1       Allergies   Allergen Reactions     Augmentin [Amoxicillin-Pot Clavulanate] Swelling        Social History     Tobacco Use     Smoking status: Current Some Day Smoker     Packs/day: 0.10     Types: Cigarettes     Smokeless tobacco: Never Used     Tobacco comment: rarely smokes. declines quitplan referral 9/18/18   Substance Use Topics     Alcohol use: Yes     Alcohol/week: 0.0 standard drinks     Comment: occassionally     Family History   Problem Relation Age of Onset     Coronary Artery Disease Mother      Hypertension Mother      Aneurysm Mother         stomach     Coronary Artery Disease Father      Aneurysm Father         heart     Aneurysm Niece         brain     C.A.D. No family hx of      Breast Cancer No family hx of      Cancer - colorectal No family hx of      Diabetes No family hx of      Thyroid Disease No family hx of      Asthma No family hx of      Colon Cancer No family hx of      Hyperlipidemia No family hx of      History   Drug Use No         Objective     /68 (BP Location: Left arm, Patient Position: Sitting, Cuff Size: Adult Regular)   Pulse 70   Ht 1.594 m (5' 2.75\")   Wt 70.3 kg (155 lb)   SpO2 97%   BMI 27.68 kg/m      Physical Exam    GENERAL APPEARANCE: healthy, alert and no distress, uncomfortable sitting on exam table      EYES: EOMI, PERRL     HENT: ear canals and TM's normal and nose and mouth without ulcers or lesions     NECK: no adenopathy, no asymmetry, masses, " or scars and thyroid normal to palpation     RESP: lungs clear to auscultation - no rales, rhonchi or wheezes     CV: regular rates and rhythm, normal S1 S2, no S3 or S4 and no murmur, click or rub     ABDOMEN:  soft, nontender, no HSM or masses and bowel sounds normal     MS: extremities normal- no gross deformities noted, no evidence of inflammation in joints, FROM in all extremities.     SKIN: no suspicious lesions or rashes     NEURO: Normal strength and tone, sensory exam grossly normal, mentation intact and speech normal     PSYCH: mentation appears normal. and affect normal/bright     LYMPHATICS: No cervical adenopathy    Recent Labs   Lab Test 06/25/20  1028   HGB 13.8         POTASSIUM 3.6   CR 0.85        Diagnostics:  Recent Results (from the past 24 hour(s))   CBC with platelets    Collection Time: 06/22/21  4:22 PM   Result Value Ref Range    WBC 6.9 4.0 - 11.0 10e9/L    RBC Count 4.46 3.8 - 5.2 10e12/L    Hemoglobin 14.7 11.7 - 15.7 g/dL    Hematocrit 43.3 35.0 - 47.0 %    MCV 97 78 - 100 fl    MCH 33.0 26.5 - 33.0 pg    MCHC 33.9 31.5 - 36.5 g/dL    RDW 11.9 10.0 - 15.0 %    Platelet Count 304 150 - 450 10e9/L   Comprehensive metabolic panel    Collection Time: 06/22/21  4:22 PM   Result Value Ref Range    Sodium 139 133 - 144 mmol/L    Potassium 4.4 3.4 - 5.3 mmol/L    Chloride 109 94 - 109 mmol/L    Carbon Dioxide 24 20 - 32 mmol/L    Anion Gap 6 3 - 14 mmol/L    Glucose 95 70 - 99 mg/dL    Urea Nitrogen 12 7 - 30 mg/dL    Creatinine 0.77 0.52 - 1.04 mg/dL    GFR Estimate >90 >60 mL/min/[1.73_m2]    GFR Estimate If Black >90 >60 mL/min/[1.73_m2]    Calcium 9.0 8.5 - 10.1 mg/dL    Bilirubin Total 0.2 0.2 - 1.3 mg/dL    Albumin 3.7 3.4 - 5.0 g/dL    Protein Total 7.5 6.8 - 8.8 g/dL    Alkaline Phosphatase 73 40 - 150 U/L    ALT 24 0 - 50 U/L    AST 17 0 - 45 U/L   HCG Qual, Urine ([id number])    Collection Time: 06/22/21  4:29 PM   Result Value Ref Range    HCG Qual Urine Negative  NEG^Negative      No EKG required, no history of coronary heart disease, significant arrhythmia, peripheral arterial disease or other structural heart disease.    Revised Cardiac Risk Index (RCRI):  The patient has the following serious cardiovascular risks for perioperative complications:   - No serious cardiac risks = 0 points     RCRI Interpretation: 0 points: Class I (very low risk - 0.4% complication rate)           Signed Electronically by: Olivia Weiss MD  Copy of this evaluation report is provided to requesting physician.

## 2021-06-22 NOTE — NURSING NOTE
"Chief Complaint   Patient presents with     Pre-Op Exam       Initial /68 (BP Location: Left arm, Patient Position: Sitting, Cuff Size: Adult Regular)   Pulse 70   Ht 1.594 m (5' 2.75\")   Wt 70.3 kg (155 lb)   SpO2 97%   BMI 27.68 kg/m   Estimated body mass index is 27.68 kg/m  as calculated from the following:    Height as of this encounter: 1.594 m (5' 2.75\").    Weight as of this encounter: 70.3 kg (155 lb).  Medication Reconciliation: complete  Vaishali Cash LPN  "

## 2021-06-26 ENCOUNTER — OFFICE VISIT (OUTPATIENT)
Dept: FAMILY MEDICINE | Facility: OTHER | Age: 46
End: 2021-06-26
Attending: ORTHOPAEDIC SURGERY
Payer: COMMERCIAL

## 2021-06-26 DIAGNOSIS — Z20.822 ENCOUNTER FOR LABORATORY TESTING FOR COVID-19 VIRUS: Primary | ICD-10-CM

## 2021-06-26 LAB
LABORATORY COMMENT REPORT: NORMAL
SARS-COV-2 RNA RESP QL NAA+PROBE: NEGATIVE
SPECIMEN SOURCE: NORMAL

## 2021-06-26 PROCEDURE — U0003 INFECTIOUS AGENT DETECTION BY NUCLEIC ACID (DNA OR RNA); SEVERE ACUTE RESPIRATORY SYNDROME CORONAVIRUS 2 (SARS-COV-2) (CORONAVIRUS DISEASE [COVID-19]), AMPLIFIED PROBE TECHNIQUE, MAKING USE OF HIGH THROUGHPUT TECHNOLOGIES AS DESCRIBED BY CMS-2020-01-R: HCPCS | Mod: ZL | Performed by: ORTHOPAEDIC SURGERY

## 2021-06-26 PROCEDURE — U0005 INFEC AGEN DETEC AMPLI PROBE: HCPCS | Mod: ZL | Performed by: ORTHOPAEDIC SURGERY

## 2021-07-19 ENCOUNTER — MEDICAL CORRESPONDENCE (OUTPATIENT)
Dept: MRI IMAGING | Facility: HOSPITAL | Age: 46
End: 2021-07-19

## 2021-07-29 ENCOUNTER — HOSPITAL ENCOUNTER (OUTPATIENT)
Dept: MRI IMAGING | Facility: HOSPITAL | Age: 46
Discharge: HOME | End: 2021-07-29
Attending: ORTHOPAEDIC SURGERY | Admitting: ORTHOPAEDIC SURGERY
Payer: COMMERCIAL

## 2021-07-29 ENCOUNTER — TELEPHONE (OUTPATIENT)
Dept: FAMILY MEDICINE | Facility: OTHER | Age: 46
End: 2021-07-29

## 2021-07-29 DIAGNOSIS — M54.50 LUMBAR PAIN: ICD-10-CM

## 2021-07-29 PROCEDURE — 72148 MRI LUMBAR SPINE W/O DYE: CPT

## 2021-07-29 NOTE — TELEPHONE ENCOUNTER
Form received unemployment insurance ,placed in provider's wall bin.   After form is completed patient would like form to be picked up call pt at [phone].

## 2021-08-04 DIAGNOSIS — M54.16 LUMBAR RADICULAR PAIN: ICD-10-CM

## 2021-08-04 DIAGNOSIS — M54.16 LUMBAR BACK PAIN WITH RADICULOPATHY AFFECTING LEFT LOWER EXTREMITY: ICD-10-CM

## 2021-08-04 RX ORDER — HYDROCODONE BITARTRATE AND ACETAMINOPHEN 5; 325 MG/1; MG/1
1 TABLET ORAL EVERY 6 HOURS PRN
Qty: 42 TABLET | Refills: 0 | Status: SHIPPED | OUTPATIENT
Start: 2021-08-04 | End: 2021-10-28

## 2021-08-04 RX ORDER — GABAPENTIN 300 MG/1
CAPSULE ORAL
Qty: 90 CAPSULE | Refills: 0 | Status: SHIPPED | OUTPATIENT
Start: 2021-08-04 | End: 2022-07-26

## 2021-08-04 NOTE — TELEPHONE ENCOUNTER
Patient had back surgery 5 weeks ago.  Patient called stating she had an MRI on her back 2 weeks ago and they called today telling her  there is a new crack that she need's another surgery on it. They will [facility] to set up a surgery date  She is requesting something for the pain.  States the pain is shooting down her left leg.  Patient states Sunday morning she was unable to get out of bed because of the pain and Sunday night she was better but she was eating ibuprofen like candy and had some gabapentin left.  She is requesting gabapentin and some more hydrocodone.  Please call her if these are approved.  [phone]    HYDROcodone-acetaminophen (NORCO) 5-325 MG tablet      Last Written Prescription Date:  5/20/21  Last Fill Quantity: 42,   # refills: 0  Last Office Visit: 6/22/21  Future Office visit:       Routing refill request to provider for review/approval because:  Drug not on the [facility], [facility] or [facility] refill protocol or controlled substance      gabapentin (NEURONTIN) 300 MG capsule      Last Written Prescription Date:  5/20/21  Last Fill Quantity: 90,   # refills: 0  Last Office Visit: 6/22/21  Future Office visit:       Routing refill request to provider for review/approval because:  Drug not on the [facility], [facility] or [facility] refill protocol or controlled substance

## 2021-08-05 NOTE — TELEPHONE ENCOUNTER
Please let her know these are approved and were sent to [facility] and get notes from [facility] please

## 2021-08-06 ENCOUNTER — TRANSFERRED RECORDS (OUTPATIENT)
Dept: HEALTH INFORMATION MANAGEMENT | Facility: CLINIC | Age: 46
End: 2021-08-06

## 2021-08-11 ENCOUNTER — TELEPHONE (OUTPATIENT)
Dept: FAMILY MEDICINE | Facility: OTHER | Age: 46
End: 2021-08-11

## 2021-08-11 NOTE — TELEPHONE ENCOUNTER
Form received Unemployment insurance ,placed in Dr. Olivia Weiss's wall bin.   After form is completed patient would like form to be please call pt at [phone] when finished and she will  at the .

## 2021-08-16 NOTE — PATIENT INSTRUCTIONS

## 2021-08-16 NOTE — PROGRESS NOTES
[facility] - [place]  [address]  [place] [address]  Phone: [phone]  Primary Provider: Olivia Weiss  {FV AMB Performing Provider (Optional):685505}    {Provider  Link to PREOP SmartSet  Use this to apply standard patient instructions to AVS; includes medication directions, common orders, guidelines for anemia, warfarin, additional testing   :703436}  PREOPERATIVE EVALUATION:  Today's date: 8/27/2021    Ritesh Soliman is a 46 year old female who presents for a preoperative evaluation.    Surgical Information:  Surgery/Procedure: ***  Surgery Location: [facility]  Surgeon: Dr. Iverson  Surgery Date: 9/9/21  Time of Surgery: TBD  Where patient plans to recover: At home with family  Fax number for surgical facility: ***    Type of Anesthesia Anticipated: to be determined    {2021 Provider Charting Preference for Preop :367573}    Subjective     HPI related to upcoming procedure: ***    {Click here to pull in Questionnaire Data after Qnr completed :405226}  Health Care Directive:  Patient does not have a Health Care Directive or Living Will: {ADVANCE_DIRECTIVE_STATUS:109734}    Preoperative Review of :  {Mnpmpreport:347708}  {Review MNPMP for all patients per ICSI MNPMP Profile:756252}    {Chronic problem details (Optional) :141063}    Review of Systems  {ROS Preop Choices:294751}    Patient Active Problem List    Diagnosis Date Noted     Chronic, continuous use of opioids 05/17/2021     Priority: Medium     Menorrhagia 05/22/2020     Priority: Medium     Added automatically from request for surgery [id number]       Endometrial thickening on ultrasound 05/22/2020     Priority: Medium     Added automatically from request for surgery [id number]       OSCAR (generalized anxiety disorder) 05/09/2018     Priority: Medium     Major depressive disorder, recurrent episode, moderate (H) 05/09/2018     Priority: Medium     Panic attack 05/09/2018     Priority: Medium     ACP (advance care planning)  04/29/2016     Priority: Medium     Advance Care Planning 4/29/2016: ACP Review of Chart / Resources Provided:  Reviewed chart for advance care plan.  Ritesh Soliman has been provided information and resources to begin or update their advance care plan.  Added by Ngoc Duong             Tobacco abuse      Priority: Medium     Depression with anxiety      Priority: Medium     Recurrent UTI 02/27/2014     Priority: Medium      Past Medical History:   Diagnosis Date     Depression with anxiety      Tobacco abuse      Past Surgical History:   Procedure Laterality Date     BIOPSY BREAST Right 10/19/2017    Procedure: BIOPSY BREAST;  EXCISIONAL BIOPSY RIGHT BREAST;  Surgeon: Kiko Newberry MD;  Location: [facility]     DILATE CERVIX, HYSTEROSCOPY, ABLATE ENDOMETRIUM, COMBINED N/A 7/1/2020    Procedure: HYSTEROSCOPY, ENDOMETRIAL ABLATION,endometrial polypectomy;  Surgeon: Juan Roger MD;  Location: [facility]     ESOPHAGOSCOPY, GASTROSCOPY, DUODENOSCOPY (EGD), COMBINED N/A 10/1/2018    Procedure: COMBINED ESOPHAGOSCOPY, GASTROSCOPY, DUODENOSCOPY (EGD);  UPPER ENDOSCOPY WITH BIOPSIES;  Surgeon: Kiko Newberry MD;  Location: [facility]     HC PLACE INTRAVASC STENT OPEN/PERQ, EA ADDL VEIN      stents for varicose veins, Dr Martinez     OOPHORECTOMY      tubal pregnancy     TUBAL LIGATION       Current Outpatient Medications   Medication Sig Dispense Refill     diclofenac (VOLTAREN) 1 % topical gel Apply 4 g topically 4 times daily 50 g 0     gabapentin (NEURONTIN) 300 MG capsule TAKE 1 CAPSULE(300 MG) BY MOUTH THREE TIMES DAILY 90 capsule 0     HYDROcodone-acetaminophen (NORCO) 5-325 MG tablet Take 1 tablet by mouth every 6 hours as needed for pain 42 tablet 0     hydrOXYzine (VISTARIL) 50 MG capsule Take 1 capsule (50 mg) by mouth 3 times daily as needed for anxiety 90 capsule 3     ibuprofen (ADVIL/MOTRIN) 800 MG tablet Take 1 tablet (800 mg) by mouth every 8 hours as needed for pain (Patient not taking: Reported on 6/22/2021) 30  "tablet 1       Allergies   Allergen Reactions     Augmentin [Amoxicillin-Pot Clavulanate] Swelling        Social History     Tobacco Use     Smoking status: Current Some Day Smoker     Packs/day: 0.10     Types: Cigarettes     Smokeless tobacco: Never Used     Tobacco comment: rarely smokes. declines quitplan referral 18   Substance Use Topics     Alcohol use: Yes     Alcohol/week: 0.0 standard drinks     Comment: occassionally     {FAMILY HISTORY (Optional):062042159}  History   Drug Use No         Objective     There were no vitals taken for this visit.    Physical Exam  {EXAM Preop Choices:264985}    Recent Labs   Lab Test 21  1622 20  1028   HGB 14.7 13.8    329    138   POTASSIUM 4.4 3.6   CR 0.77 0.85        Diagnostics:  {LABS:922756}   {EK}    Revised Cardiac Risk Index (RCRI):  The patient has the following serious cardiovascular risks for perioperative complications:  {PREOP REVISED CARDIAC RISK INDEX (RCRI) :012881::\" - No serious cardiac risks = 0 points\"}     RCRI Interpretation: {REVISED CARDIAC RISK INTERPRETATION :101291}         Signed Electronically by: Alysa Lam CNP  Copy of this evaluation report is provided to requesting physician.    {Provider Resources  Preop [facility] Preop Guidelines  Revised Cardiac Risk Index :519747}  "

## 2021-08-23 NOTE — PROGRESS NOTES
[facility] [place]  [address]  [place] [address]  Phone: [phone]  Primary Provider: Olivia Weiss  Pre-op Performing Provider: TRAV MOMIN      PREOPERATIVE EVALUATION:  Today's date: 8/27/2021    Ritesh Soliman is a 46 year old female who presents for a preoperative evaluation.    Surgical Information:  Surgery/Procedure: Lumbar spine revision surgery  Surgery Location: [facility]  Surgeon: Dr. Iverson  Surgery Date: 9/9/2021  Time of Surgery: TBD  Where patient plans to recover: At home with family  Fax number for surgical facility:     Type of Anesthesia Anticipated: to be determined    Assessment & Plan     The proposed surgical procedure is considered INTERMEDIATE risk.      (Z01.498) Preop general physical exam  (primary encounter diagnosis)  Comment:   Plan: UA reflex to Microscopic and Culture - [place],        CBC with platelets and differential, HCG Qual,         Urine ([id number]), Comprehensive metabolic panel         (BMP + Alb, Alk Phos, ALT, AST, Total. Bili,         TP)    (M51.27) Herniated nucleus pulposus, L5-S1  Comment:     (F11.90) Chronic, continuous use of opioids  Comment: Uses her pain pills rarely, for severe pain not controlled by other means only. Denies daily use.     (G47.00) Insomnia, unspecified type  Comment: Has not been sleeping well. Melatonin not working. Has used trazodone in the past and worked well. She would like to restart. I think this is reasonable to optimize her sleep prior to surgery.  Does not use Norco daily; usem rarely. We discussed that she is not to use them the same evening. She is in agreement.     Plan: traZODone (DESYREL) 50 MG tablet             Risks and Recommendations:  The patient has the following additional risks and recommendations for perioperative complications:  Social and Substance:    - Patient is taking medications for chronic pain    Medication Instructions:   - pregabalin, gabapentin: Continue  without modification.   - ibuprofen (Advil, Motrin): HOLD 1 day before surgery.       RECOMMENDATION:  APPROVAL GIVEN to proceed with proposed procedure, without further diagnostic evaluation.      Subjective     HPI related to upcoming procedure: Spine Surgery      Preop Questions 8/27/2021   1. Have you ever had a heart attack or stroke? No   2. Have you ever had surgery on your heart or blood vessels, such as a stent placement, a coronary artery bypass, or surgery on an artery in your head, neck, heart, or legs? No   3. Do you have chest pain with activity? No   4. Do you have a history of  heart failure? No   5. Do you currently have a cold, bronchitis or symptoms of other infection? No   6. Do you have a cough, shortness of breath, or wheezing? No   7. Do you or anyone in your family have previous history of blood clots? YES niece and father   8. Do you or does anyone in your family have a serious bleeding problem such as prolonged bleeding following surgeries or cuts? No   9. Have you ever had problems with anemia or been told to take iron pills? YES remote   10. Have you had any abnormal blood loss such as black, tarry or bloody stools, or abnormal vaginal bleeding? YES -remote past   11. Have you ever had a blood transfusion? No   12. Are you willing to have a blood transfusion if it is medically needed before, during, or after your surgery? Yes   13. Have you or any of your relatives ever had problems with anesthesia? No   14. Do you have sleep apnea, excessive snoring or daytime drowsiness? YES - insomnia   14a. Do you have a CPAP machine? No   15. Do you have any artifical heart valves or other implanted medical devices like a pacemaker, defibrillator, or continuous glucose monitor? No   16. Do you have artificial joints? No   17. Are you allergic to latex? No   18. Is there any chance that you may be pregnant? No     Health Care Directive:  Patient does not have a Health Care Directive or Living Will:  Discussed advance care planning with patient; however, patient declined at this time.    Preoperative Review of :   reviewed - controlled substances reflected in medication list.      Status of Chronic Conditions:  See problem list for active medical problems.  Problems all longstanding and stable, except as noted/documented.  See ROS for pertinent symptoms related to these conditions.      Review of Systems  Constitutional, neuro, ENT, endocrine, pulmonary, cardiac, gastrointestinal, genitourinary, musculoskeletal, integument and psychiatric systems are negative, except as otherwise noted.    Patient Active Problem List    Diagnosis Date Noted     Herniated nucleus pulposus, L5-S1 06/29/2021     Priority: Medium     Chronic, continuous use of opioids 05/17/2021     Priority: Medium     Menorrhagia 05/22/2020     Priority: Medium     Added automatically from request for surgery [id number]       Endometrial thickening on ultrasound 05/22/2020     Priority: Medium     Added automatically from request for surgery [id number]       OSCAR (generalized anxiety disorder) 05/09/2018     Priority: Medium     Major depressive disorder, recurrent episode, moderate (H) 05/09/2018     Priority: Medium     Panic attack 05/09/2018     Priority: Medium     ACP (advance care planning) 04/29/2016     Priority: Medium     Advance Care Planning 4/29/2016: ACP Review of Chart / Resources Provided:  Reviewed chart for advance care plan.  Ritesh Soliman has been provided information and resources to begin or update their advance care plan.  Added by Ngoc Duong             Tobacco abuse      Priority: Medium     Depression with anxiety      Priority: Medium     History of recurrent UTI (urinary tract infection) 02/27/2014     Priority: Medium      Past Medical History:   Diagnosis Date     Depression with anxiety      Tobacco abuse      Past Surgical History:   Procedure Laterality Date     BIOPSY BREAST Right 10/19/2017    Procedure:  BIOPSY BREAST;  EXCISIONAL BIOPSY RIGHT BREAST;  Surgeon: Kiko Newberry MD;  Location: [facility]     DILATE CERVIX, HYSTEROSCOPY, ABLATE ENDOMETRIUM, COMBINED N/A 7/1/2020    Procedure: HYSTEROSCOPY, ENDOMETRIAL ABLATION,endometrial polypectomy;  Surgeon: Juan Roger MD;  Location: [facility]     ESOPHAGOSCOPY, GASTROSCOPY, DUODENOSCOPY (EGD), COMBINED N/A 10/1/2018    Procedure: COMBINED ESOPHAGOSCOPY, GASTROSCOPY, DUODENOSCOPY (EGD);  UPPER ENDOSCOPY WITH BIOPSIES;  Surgeon: Kiko Newberry MD;  Location: [facility] OR     HC PLACE INTRAVASC STENT OPEN/PERQ, EA ADDL VEIN      stents for varicose veins, Dr Martinez     OOPHORECTOMY      tubal pregnancy     TUBAL LIGATION       Current Outpatient Medications   Medication Sig Dispense Refill     gabapentin (NEURONTIN) 300 MG capsule TAKE 1 CAPSULE(300 MG) BY MOUTH THREE TIMES DAILY 90 capsule 0     HYDROcodone-acetaminophen (NORCO) 5-325 MG tablet Take 1 tablet by mouth every 6 hours as needed for pain 42 tablet 0     ibuprofen (ADVIL/MOTRIN) 200 MG capsule Take 600 mg by mouth every 8 hours as needed for fever       traZODone (DESYREL) 50 MG tablet Take 1 tablet (50 mg) by mouth At Bedtime 30 tablet 0       Allergies   Allergen Reactions     Augmentin [Amoxicillin-Pot Clavulanate] Swelling        Social History     Tobacco Use     Smoking status: Current Some Day Smoker     Packs/day: 0.10     Types: Cigarettes     Smokeless tobacco: Never Used     Tobacco comment: rarely smokes. declines quitplan referral 9/18/18   Substance Use Topics     Alcohol use: Yes     Alcohol/week: 0.0 standard drinks     Comment: occassionally     Family History   Problem Relation Age of Onset     Coronary Artery Disease Mother      Hypertension Mother      Aneurysm Mother         stomach     Coronary Artery Disease Father      Aneurysm Father         heart     Aneurysm Niece         brain     Seizure Disorder Niece      C.A.D. No family hx of      Breast Cancer No family hx of      Cancer - colorectal  "No family hx of      Diabetes No family hx of      Thyroid Disease No family hx of      Asthma No family hx of      Colon Cancer No family hx of      Hyperlipidemia No family hx of      History   Drug Use No         Objective     /70   Pulse 64   Temp 98.1  F (36.7  C) (Tympanic)   Resp 18   Ht 1.594 m (5' 2.75\")   Wt 69.9 kg (154 lb)   SpO2 98%   BMI 27.50 kg/m      Physical Exam    GENERAL APPEARANCE: healthy, alert and no distress     EYES: EOMI, PERRL     HENT: ear canals and TM's normal and nose and mouth without ulcers or lesions     NECK: no adenopathy, no asymmetry, masses, or scars and thyroid normal to palpation     RESP: lungs clear to auscultation - no rales, rhonchi or wheezes     CV: regular rates and rhythm, normal S1 S2, no S3 or S4 and no murmur, click or rub     ABDOMEN:  soft, nontender, no HSM or masses and bowel sounds normal     MS: extremities normal- no gross deformities noted, no evidence of inflammation in joints, FROM in all extremities.     SKIN: no suspicious lesions or rashes     NEURO: Normal strength and tone, sensory exam grossly normal, mentation intact and speech normal     PSYCH: mentation appears normal. and affect normal/bright     LYMPHATICS: No cervical adenopathy    Recent Labs   Lab Test 06/22/21  1622 06/25/20  1028   HGB 14.7 13.8    329    138   POTASSIUM 4.4 3.6   CR 0.77 0.85        Diagnostics:  Results for orders placed or performed in visit on 08/27/21   UA reflex to Microscopic and Culture - [place]     Status: Normal    Specimen: Urine, Clean Catch   Result Value Ref Range    Color Urine Light Yellow Colorless, Straw, Light Yellow, Yellow    Appearance Urine Clear Clear    Glucose Urine Negative Negative mg/dL    Bilirubin Urine Negative Negative    Ketones Urine Negative Negative mg/dL    Specific Gravity Urine 1.023 1.003 - 1.035    Blood Urine Negative Negative    pH Urine 6.0 4.7 - 8.0    Protein Albumin Urine Negative Negative mg/dL "    Urobilinogen Urine Normal Normal, 2.0 mg/dL    Nitrite Urine Negative Negative    Leukocyte Esterase Urine Negative Negative    Narrative    Microscopic not indicated   CBC with platelets and differential     Status: Abnormal    Narrative    The following orders were created for panel order CBC with platelets and differential.  Procedure                               Abnormality         Status                     ---------                               -----------         ------                     CBC with platelets and d...[[id number]]  Abnormal            Final result                 Please view results for these tests on the individual orders.   HCG Qual, Urine ([id number])     Status: Normal   Result Value Ref Range    hCG Urine Qualitative Negative Negative   Comprehensive metabolic panel (BMP + Alb, Alk Phos, ALT, AST, Total. Bili, TP)     Status: Normal   Result Value Ref Range    Sodium 141 133 - 144 mmol/L    Potassium 3.6 3.4 - 5.3 mmol/L    Chloride 109 94 - 109 mmol/L    Carbon Dioxide (CO2) 27 20 - 32 mmol/L    Anion Gap 5 3 - 14 mmol/L    Urea Nitrogen 13 7 - 30 mg/dL    Creatinine 0.80 0.52 - 1.04 mg/dL    Calcium 9.3 8.5 - 10.1 mg/dL    Glucose 97 70 - 99 mg/dL    Alkaline Phosphatase 71 40 - 150 U/L    AST 16 0 - 45 U/L    ALT 28 0 - 50 U/L    Protein Total 7.3 6.8 - 8.8 g/dL    Albumin 3.6 3.4 - 5.0 g/dL    Bilirubin Total 0.5 0.2 - 1.3 mg/dL    GFR Estimate 87 >60 mL/min/1.73m2   CBC with platelets and differential     Status: Abnormal   Result Value Ref Range    WBC Count 6.2 4.0 - 11.0 10e3/uL    RBC Count 4.25 3.80 - 5.20 10e6/uL    Hemoglobin 14.2 11.7 - 15.7 g/dL    Hematocrit 40.6 35.0 - 47.0 %    MCV 96 78 - 100 fL    MCH 33.4 (H) 26.5 - 33.0 pg    MCHC 35.0 31.5 - 36.5 g/dL    RDW 11.9 10.0 - 15.0 %    Platelet Count 300 150 - 450 10e3/uL    % Neutrophils 63 %    % Lymphocytes 25 %    % Monocytes 9 %    % Eosinophils 3 %    % Basophils 0 %    % Immature Granulocytes 0 %    NRBCs per 100  WBC 0 <1 /100    Absolute Neutrophils 3.9 1.6 - 8.3 10e3/uL    Absolute Lymphocytes 1.5 0.8 - 5.3 10e3/uL    Absolute Monocytes 0.5 0.0 - 1.3 10e3/uL    Absolute Eosinophils 0.2 0.0 - 0.7 10e3/uL    Absolute Basophils 0.0 0.0 - 0.2 10e3/uL    Absolute Immature Granulocytes 0.0 <=0.0 10e3/uL    Absolute NRBCs 0.0 10e3/uL        No EKG required, no history of coronary heart disease, significant arrhythmia, peripheral arterial disease or other structural heart disease.    Revised Cardiac Risk Index (RCRI):  The patient has the following serious cardiovascular risks for perioperative complications:   - No serious cardiac risks = 0 points     RCRI Interpretation: 0 points: Class I (very low risk - 0.4% complication rate)           Signed Electronically by: Alysa Lam CNP  Copy of this evaluation report is provided to requesting physician.

## 2021-08-27 ENCOUNTER — OFFICE VISIT (OUTPATIENT)
Dept: FAMILY MEDICINE | Facility: OTHER | Age: 46
End: 2021-08-27
Attending: NURSE PRACTITIONER
Payer: COMMERCIAL

## 2021-08-27 VITALS
OXYGEN SATURATION: 98 % | BODY MASS INDEX: 27.29 KG/M2 | HEART RATE: 64 BPM | WEIGHT: 154 LBS | DIASTOLIC BLOOD PRESSURE: 70 MMHG | SYSTOLIC BLOOD PRESSURE: 102 MMHG | RESPIRATION RATE: 18 BRPM | HEIGHT: 63 IN | TEMPERATURE: 98.1 F

## 2021-08-27 DIAGNOSIS — G47.00 INSOMNIA, UNSPECIFIED TYPE: ICD-10-CM

## 2021-08-27 DIAGNOSIS — M51.27 HERNIATED NUCLEUS PULPOSUS, L5-S1: ICD-10-CM

## 2021-08-27 DIAGNOSIS — F11.90 CHRONIC, CONTINUOUS USE OF OPIOIDS: Chronic | ICD-10-CM

## 2021-08-27 DIAGNOSIS — Z01.818 PREOP GENERAL PHYSICAL EXAM: Primary | ICD-10-CM

## 2021-08-27 LAB
ALBUMIN SERPL-MCNC: 3.6 G/DL (ref 3.4–5)
ALBUMIN UR-MCNC: NEGATIVE MG/DL
ALP SERPL-CCNC: 71 U/L (ref 40–150)
ALT SERPL W P-5'-P-CCNC: 28 U/L (ref 0–50)
ANION GAP SERPL CALCULATED.3IONS-SCNC: 5 MMOL/L (ref 3–14)
APPEARANCE UR: CLEAR
AST SERPL W P-5'-P-CCNC: 16 U/L (ref 0–45)
BASOPHILS # BLD AUTO: 0 10E3/UL (ref 0–0.2)
BASOPHILS NFR BLD AUTO: 0 %
BILIRUB SERPL-MCNC: 0.5 MG/DL (ref 0.2–1.3)
BILIRUB UR QL STRIP: NEGATIVE
BUN SERPL-MCNC: 13 MG/DL (ref 7–30)
CALCIUM SERPL-MCNC: 9.3 MG/DL (ref 8.5–10.1)
CHLORIDE BLD-SCNC: 109 MMOL/L (ref 94–109)
CO2 SERPL-SCNC: 27 MMOL/L (ref 20–32)
COLOR UR AUTO: NORMAL
CREAT SERPL-MCNC: 0.8 MG/DL (ref 0.52–1.04)
EOSINOPHIL # BLD AUTO: 0.2 10E3/UL (ref 0–0.7)
EOSINOPHIL NFR BLD AUTO: 3 %
ERYTHROCYTE [DISTWIDTH] IN BLOOD BY AUTOMATED COUNT: 11.9 % (ref 10–15)
GFR SERPL CREATININE-BSD FRML MDRD: 87 ML/MIN/1.73M2
GLUCOSE BLD-MCNC: 97 MG/DL (ref 70–99)
GLUCOSE UR STRIP-MCNC: NEGATIVE MG/DL
HCG UR QL: NEGATIVE
HCT VFR BLD AUTO: 40.6 % (ref 35–47)
HGB BLD-MCNC: 14.2 G/DL (ref 11.7–15.7)
HGB UR QL STRIP: NEGATIVE
IMM GRANULOCYTES # BLD: 0 10E3/UL
IMM GRANULOCYTES NFR BLD: 0 %
KETONES UR STRIP-MCNC: NEGATIVE MG/DL
LEUKOCYTE ESTERASE UR QL STRIP: NEGATIVE
LYMPHOCYTES # BLD AUTO: 1.5 10E3/UL (ref 0.8–5.3)
LYMPHOCYTES NFR BLD AUTO: 25 %
MCH RBC QN AUTO: 33.4 PG (ref 26.5–33)
MCHC RBC AUTO-ENTMCNC: 35 G/DL (ref 31.5–36.5)
MCV RBC AUTO: 96 FL (ref 78–100)
MONOCYTES # BLD AUTO: 0.5 10E3/UL (ref 0–1.3)
MONOCYTES NFR BLD AUTO: 9 %
NEUTROPHILS # BLD AUTO: 3.9 10E3/UL (ref 1.6–8.3)
NEUTROPHILS NFR BLD AUTO: 63 %
NITRATE UR QL: NEGATIVE
NRBC # BLD AUTO: 0 10E3/UL
NRBC BLD AUTO-RTO: 0 /100
PH UR STRIP: 6 [PH] (ref 4.7–8)
PLATELET # BLD AUTO: 300 10E3/UL (ref 150–450)
POTASSIUM BLD-SCNC: 3.6 MMOL/L (ref 3.4–5.3)
PROT SERPL-MCNC: 7.3 G/DL (ref 6.8–8.8)
RBC # BLD AUTO: 4.25 10E6/UL (ref 3.8–5.2)
SODIUM SERPL-SCNC: 141 MMOL/L (ref 133–144)
SP GR UR STRIP: 1.02 (ref 1–1.03)
UROBILINOGEN UR STRIP-MCNC: NORMAL MG/DL
WBC # BLD AUTO: 6.2 10E3/UL (ref 4–11)

## 2021-08-27 PROCEDURE — 36415 COLL VENOUS BLD VENIPUNCTURE: CPT | Mod: ZL | Performed by: NURSE PRACTITIONER

## 2021-08-27 PROCEDURE — 85025 COMPLETE CBC W/AUTO DIFF WBC: CPT | Mod: ZL | Performed by: NURSE PRACTITIONER

## 2021-08-27 PROCEDURE — 99214 OFFICE O/P EST MOD 30 MIN: CPT | Performed by: NURSE PRACTITIONER

## 2021-08-27 PROCEDURE — G0463 HOSPITAL OUTPT CLINIC VISIT: HCPCS

## 2021-08-27 PROCEDURE — 81025 URINE PREGNANCY TEST: CPT | Mod: ZL | Performed by: NURSE PRACTITIONER

## 2021-08-27 PROCEDURE — 82040 ASSAY OF SERUM ALBUMIN: CPT | Mod: ZL | Performed by: NURSE PRACTITIONER

## 2021-08-27 PROCEDURE — 81003 URINALYSIS AUTO W/O SCOPE: CPT | Mod: ZL | Performed by: NURSE PRACTITIONER

## 2021-08-27 RX ORDER — OMEGA-3 FATTY ACIDS/FISH OIL 300-1000MG
600 CAPSULE ORAL EVERY 8 HOURS PRN
COMMUNITY

## 2021-08-27 RX ORDER — TRAZODONE HYDROCHLORIDE 50 MG/1
50 TABLET, FILM COATED ORAL AT BEDTIME
Qty: 30 TABLET | Refills: 0 | Status: SHIPPED | OUTPATIENT
Start: 2021-08-27 | End: 2021-10-28

## 2021-08-27 ASSESSMENT — PAIN SCALES - GENERAL: PAINLEVEL: SEVERE PAIN (6)

## 2021-08-27 ASSESSMENT — MIFFLIN-ST. JEOR: SCORE: 1293.7

## 2021-08-27 NOTE — NURSING NOTE
"Chief Complaint   Patient presents with     Pre-Op Exam       Initial /70   Pulse 64   Temp 98.1  F (36.7  C) (Tympanic)   Resp 18   Ht 1.594 m (5' 2.75\")   Wt 69.9 kg (154 lb)   SpO2 98%   BMI 27.50 kg/m   Estimated body mass index is 27.5 kg/m  as calculated from the following:    Height as of this encounter: 1.594 m (5' 2.75\").    Weight as of this encounter: 69.9 kg (154 lb).  Medication Reconciliation: complete  Anu Rivas LPN    "

## 2021-09-06 ENCOUNTER — OFFICE VISIT (OUTPATIENT)
Dept: FAMILY MEDICINE | Facility: OTHER | Age: 46
End: 2021-09-06
Attending: ORTHOPAEDIC SURGERY
Payer: COMMERCIAL

## 2021-09-06 DIAGNOSIS — Z01.818 PREOP GENERAL PHYSICAL EXAM: Primary | ICD-10-CM

## 2021-09-06 PROCEDURE — U0003 INFECTIOUS AGENT DETECTION BY NUCLEIC ACID (DNA OR RNA); SEVERE ACUTE RESPIRATORY SYNDROME CORONAVIRUS 2 (SARS-COV-2) (CORONAVIRUS DISEASE [COVID-19]), AMPLIFIED PROBE TECHNIQUE, MAKING USE OF HIGH THROUGHPUT TECHNOLOGIES AS DESCRIBED BY CMS-2020-01-R: HCPCS | Mod: ZL

## 2021-09-07 ENCOUNTER — TELEPHONE (OUTPATIENT)
Dept: FAMILY MEDICINE | Facility: OTHER | Age: 46
End: 2021-09-07

## 2021-09-07 LAB — SARS-COV-2 RNA RESP QL NAA+PROBE: NEGATIVE

## 2021-09-07 NOTE — TELEPHONE ENCOUNTER
Urgent Request Fax Received:    Pre-op from 8/27/21, include all EKG's, Labs, and covid appt information and results if applicable     Fax: [phone]    Phone: [phone] Option 1    DOS: 9/9/21  [facility]
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
## 2021-10-20 ENCOUNTER — TRANSFERRED RECORDS (OUTPATIENT)
Dept: HEALTH INFORMATION MANAGEMENT | Facility: CLINIC | Age: 46
End: 2021-10-20

## 2021-10-28 ENCOUNTER — ANCILLARY PROCEDURE (OUTPATIENT)
Dept: GENERAL RADIOLOGY | Facility: OTHER | Age: 46
End: 2021-10-28
Attending: FAMILY MEDICINE
Payer: COMMERCIAL

## 2021-10-28 ENCOUNTER — OFFICE VISIT (OUTPATIENT)
Dept: FAMILY MEDICINE | Facility: OTHER | Age: 46
End: 2021-10-28
Attending: FAMILY MEDICINE
Payer: COMMERCIAL

## 2021-10-28 VITALS
RESPIRATION RATE: 19 BRPM | SYSTOLIC BLOOD PRESSURE: 118 MMHG | HEIGHT: 63 IN | HEART RATE: 82 BPM | OXYGEN SATURATION: 98 % | TEMPERATURE: 97.4 F | BODY MASS INDEX: 27.29 KG/M2 | WEIGHT: 154 LBS | DIASTOLIC BLOOD PRESSURE: 72 MMHG

## 2021-10-28 DIAGNOSIS — S16.1XXA STRAIN OF NECK MUSCLE, INITIAL ENCOUNTER: ICD-10-CM

## 2021-10-28 DIAGNOSIS — M51.27 HERNIATED NUCLEUS PULPOSUS, L5-S1: Primary | ICD-10-CM

## 2021-10-28 DIAGNOSIS — G47.00 INSOMNIA, UNSPECIFIED TYPE: ICD-10-CM

## 2021-10-28 DIAGNOSIS — S60.022A CONTUSION OF LEFT INDEX FINGER WITHOUT DAMAGE TO NAIL, INITIAL ENCOUNTER: ICD-10-CM

## 2021-10-28 PROCEDURE — 72050 X-RAY EXAM NECK SPINE 4/5VWS: CPT | Mod: TC

## 2021-10-28 PROCEDURE — 73140 X-RAY EXAM OF FINGER(S): CPT | Mod: TC,LT

## 2021-10-28 PROCEDURE — 99214 OFFICE O/P EST MOD 30 MIN: CPT | Performed by: FAMILY MEDICINE

## 2021-10-28 PROCEDURE — G0463 HOSPITAL OUTPT CLINIC VISIT: HCPCS | Mod: 25

## 2021-10-28 RX ORDER — TRAZODONE HYDROCHLORIDE 100 MG/1
100 TABLET ORAL AT BEDTIME
Qty: 30 TABLET | Refills: 3 | Status: SHIPPED | OUTPATIENT
Start: 2021-10-28 | End: 2023-12-08

## 2021-10-28 ASSESSMENT — MIFFLIN-ST. JEOR: SCORE: 1293.7

## 2021-10-28 ASSESSMENT — PAIN SCALES - GENERAL: PAINLEVEL: MODERATE PAIN (5)

## 2021-10-28 NOTE — LETTER
Opioid / Opioid Plus Controlled Substance Agreement    This is an agreement between you and your provider about the safe and appropriate use of controlled substance/opioids prescribed by your care team. Controlled substances are medicines that can cause physical and mental dependence (abuse).    There are strict laws about having and using these medicines. We here at [facility] are committing to working with you in your efforts to get better. To support you in this work, we ll help you schedule regular office appointments for medicine refills. If we must cancel or change your appointment for any reason, we ll make sure you have enough medicine to last until your next appointment.     As a Provider, I will:    Listen carefully to your concerns and treat you with respect.     Recommend a treatment plan that I believe is in your best interest. This plan may involve therapies other than opioid pain medication.     Talk with you often about the possible benefits, and the risk of harm of any medicine that we prescribe for you.     Provide a plan on how to taper (discontinue or go off) using this medicine if the decision is made to stop its use.    As a Patient, I understand that opioid(s):     Are a controlled substance prescribed by my care team to help me function or work and manage my condition(s).     Are strong medicines and can cause serious side effects such as:    Drowsiness, which can seriously affect my driving ability    A lower breathing rate, enough to cause death    Harm to my thinking ability     Depression     Abuse of and addiction to this medicine    Need to be taken exactly as prescribed. Combining opioids with certain medicines or chemicals (such as illegal drugs, sedatives, sleeping pills, and benzodiazepines) can be dangerous or even fatal. If I stop opioids suddenly, I may have severe withdrawal symptoms.    Do not work for all types of pain nor for all patients. If they re not helpful, I may  be asked to stop them.        The risks, benefits and side effects of these medicine(s) were explained to me. I agree that:  1. I will take part in other treatments as advised by my care team. This may be psychiatry or counseling, physical therapy, behavioral therapy, group treatment or a referral to a specialist.     2. I will keep all my appointments. I understand that this is part of the monitoring of opioids. My care team may require an office visit for EVERY opioid/controlled substance refill. If I miss appointments or don t follow instructions, my care team may stop my medicine.    3. I will take my medicines as prescribed. I will not change the dose or schedule unless my care team tells me to. There will be no refills if I run out early.     4. I may be asked to come to the clinic and complete a urine drug test or complete a pill count at any time. If I don t give a urine sample or participate in a pill count, the care team may stop my medicine.    5. I will only receive prescriptions from this clinic for chronic pain. If I am treated by another provider for acute pain issues, I will tell them that I am taking opioid pain medication for chronic pain and that I have a treatment agreement with this provider. I will inform my [facility] care team within one business day if I am given a prescription for any pain medication by another healthcare provider. My [facility] care team can contact other providers and pharmacists about my use of any medicines.    6. It is up to me to make sure that I don t run out of my medicines on weekends or holidays. If my care team is willing to refill my opioid prescription without a visit, I must request refills only during office hours. Refills may take up to 3 business days to process. I will use one pharmacy to fill all my opioid and other controlled substance prescriptions. I will notify the clinic about any changes to my insurance or medication  availability.    7. I am responsible for my prescriptions. If the medicine/prescription is lost, stolen or destroyed, it will not be replaced. I also agree not to share controlled substance medicines with anyone.    8. I am aware I should not use any illegal or recreational drugs. I agree not to drink alcohol unless my care team says I can.       9. If I enroll in the [place] [facility] program, I will tell my care team prior to my next refill.     10. I will tell my care team right away if I become pregnant, have a new medical problem treated outside of my regular clinic, or have a change in my medications.    11. I understand that this medicine can affect my thinking, judgment and reaction time. Alcohol and drugs affect the brain and body, which can affect the safety of my driving. Being under the influence of alcohol or drugs can affect my decision-making, behaviors, personal safety, and the safety of others. Driving while impaired (DWI) can occur if a person is driving, operating, or in physical control of a car, motorcycle, boat, snowmobile, ATV, motorbike, off-road vehicle, or any other motor vehicle ([place] Statute 169A.20). I understand the risk if I choose to drive or operate any vehicle or machinery.    I understand that if I do not follow any of the conditions above, my prescriptions or treatment may be stopped or changed.          Opioids  What You Need to Know    What are opioids?   Opioids are pain medicines that must be prescribed by a doctor. They are also known as narcotics.     Examples are:   1. morphine (MS Contin, Landy)  2. oxycodone (Oxycontin)  3. oxycodone and acetaminophen (Percocet)  4. hydrocodone and acetaminophen (Vicodin, Norco)   5. fentanyl patch (Duragesic)   6. hydromorphone (Dilaudid)   7. methadone  8. codeine (Tylenol #3)     What do opioids do well?   Opioids are best for severe short-term pain such as after a surgery or injury. They may work well for cancer pain. They may  help some people with long-lasting (chronic) pain.     What do opioids NOT do well?   Opioids never get rid of pain entirely, and they don t work well for most patients with chronic pain. Opioids don t reduce swelling, one of the causes of pain.                                    Other ways to manage chronic pain and improve function include:       Treat the health problem that may be causing pain    Anti-inflammation medicines, which reduce swelling and tenderness, such as ibuprofen (Advil, Motrin) or naproxen (Aleve)    Acetaminophen (Tylenol)    Antidepressants and anti-seizure medicines, especially for nerve pain    Topical treatments such as patches or creams    Injections or nerve blocks    Chiropractic or osteopathic treatment    Acupuncture, massage, deep breathing, meditation, visual imagery, aromatherapy    Use heat or ice at the pain site    Physical therapy     Exercise    Stop smoking    Take part in therapy       Risks and side effects     Talk to your doctor before you start or decide to keep taking opioids. Possible side effects include:      Lowering your breathing rate enough to cause death    Overdose, including death, especially if taking higher than prescribed doses    Worse depression symptoms; less pleasure in things you usually enjoy    Feeling tired or sluggish    Slower thoughts or cloudy thinking    Being more sensitive to pain over time; pain is harder to control    Trouble sleeping or restless sleep    Changes in hormone levels (for example, less testosterone)    Changes in sex drive or ability to have sex    Constipation    Unsafe driving    Itching and sweating    Dizziness    Nausea, throwing up and dry mouth    What else should I know about opioids?    Opioids may lead to dependence, tolerance, or addiction.      Dependence means that if you stop or reduce the medicine too quickly, you will have withdrawal symptoms. These include loose poop (diarrhea), jitters, flu-like symptoms,  nervousness and tremors. Dependence is not the same as addiction.                       Tolerance means needing higher doses over time to get the same effect. This may increase the chance of serious side effects.      Addiction is when people improperly use a substance that harms their body, their mind or their relations with others. Use of opiates can cause a relapse of addiction if you have a history of drug or alcohol abuse.      People who have used opioids for a long time may have a lower quality of life, worse depression, higher levels of pain and more visits to doctors.    You can overdose on opioids. Take these steps to lower your risk of overdose:    1. Recognize the signs:  Signs of overdose include decrease or loss of consciousness (blackout), slowed breathing, trouble waking up and blue lips. If someone is worried about overdose, they should call 911.    2. Talk to your doctor about Narcan (naloxone).   If you are at risk for overdose, you may be given a prescription for Narcan. This medicine very quickly reverses the effects of opioids.   If you overdose, a friend or family member can give you Narcan while waiting for the ambulance. They need to know the signs of overdose and how to give Narcan.     3. Don't use alcohol or street drugs.   Taking them with opioids can cause death.    4. Do not take any of these medicines unless your doctor says it s OK. Taking these with opioids can cause death:    Benzodiazepines, such as lorazepam (Ativan), alprazolam (Xanax) or diazepam (Valium)    Muscle relaxers, such as cyclobenzaprine (Flexeril)    Sleeping pills like zolpidem (Ambien)     Other opioids      How to keep you and other people safe while taking opioids:    1. Never share your opioids with others.  Opioid medicines are regulated by the Drug Enforcement Agency ([employer]AN[employer]). Selling or sharing medications is a criminal act.    2. Be sure to store opioids in a secure place, locked up if possible. Young children  can easily swallow them and overdose.    3. When you are traveling with your medicines, keep them in the original bottles. If you use a pill box, be sure you also carry a copy of your medicine list from your clinic or pharmacy.    4. Safe disposal of opioids    Most pharmacies have places to get rid of medicine, called disposal kiosks. Medicine disposal options are also available in every [facility]. Search your county and  medication disposal  to find more options. You can find more details at:  [email or web address]     I agree that my provider, clinic care team, and pharmacy may work with any city, state or federal law enforcement agency that investigates the misuse, sale, or other diversion of my controlled medicine. I will allow my provider to discuss my care with, or share a copy of, this agreement with any other treating provider, pharmacy or emergency room where I receive care.    I have read this agreement and have asked questions about anything I did not understand.    _______________________________________________________  Patient Signature - Ritesh Soliman _____________________                   Date     _______________________________________________________  Provider Signature Sujit Weiss MD   _____________________                   Date     _______________________________________________________  Witness Signature (required if provider not present while patient signing)   _____________________                   Date

## 2021-10-28 NOTE — NURSING NOTE
"Chief Complaint   Patient presents with     Back Pain     Neck Pain       Initial /72   Pulse 82   Temp 97.4  F (36.3  C) (Tympanic)   Resp 19   Ht 1.594 m (5' 2.75\")   Wt 69.9 kg (154 lb)   SpO2 98%   BMI 27.50 kg/m   Estimated body mass index is 27.5 kg/m  as calculated from the following:    Height as of this encounter: 1.594 m (5' 2.75\").    Weight as of this encounter: 69.9 kg (154 lb).  Medication Reconciliation: complete  Emily Álvarez LPN    "

## 2021-11-16 ENCOUNTER — NURSE TRIAGE (OUTPATIENT)
Dept: FAMILY MEDICINE | Facility: OTHER | Age: 46
End: 2021-11-16
Payer: COMMERCIAL

## 2021-11-16 DIAGNOSIS — Z20.822 SUSPECTED 2019 NOVEL CORONAVIRUS INFECTION: Primary | ICD-10-CM

## 2021-11-16 NOTE — TELEPHONE ENCOUNTER
Reason for Disposition    COVID-19 Home Isolation, questions about    COVID-19 Testing, questions about    Additional Information    Negative: SEVERE difficulty breathing (e.g., struggling for each breath, speaks in single words)    Negative: Difficult to awaken or acting confused (e.g., disoriented, slurred speech)    Negative: Bluish (or gray) lips or face now    Negative: Shock suspected (e.g., cold/pale/clammy skin, too weak to stand, low BP, rapid pulse)    Negative: Sounds like a life-threatening emergency to the triager    Negative: [1] COVID-19 exposure AND [2] no symptoms    Negative: COVID-19 vaccine reaction suspected (e.g., fever, headache, muscle aches) occurring 1 to 3 days after getting vaccine    Negative: COVID-19 vaccine, questions about    Negative: [1] Lives with someone known to have influenza (flu test positive) AND [2] flu-like symptoms (e.g., cough, runny nose, sore throat, SOB; with or without fever)    Negative: [1] Adult with possible COVID-19 symptoms AND [2] triager concerned about severity of symptoms or other causes    Negative: COVID-19 and breastfeeding, questions about    Negative: SEVERE or constant chest pain or pressure (Exception: mild central chest pain, present only when coughing)    Negative: MODERATE difficulty breathing (e.g., speaks in phrases, SOB even at rest, pulse 100-120)    Negative: [1] Headache AND [2] stiff neck (can't touch chin to chest)    Negative: MILD difficulty breathing (e.g., minimal/no SOB at rest, SOB with walking, pulse <100)    Negative: Chest pain or pressure    Negative: Patient sounds very sick or weak to the triager    Negative: Fever > 103 F (39.4 C)    Negative: [1] Fever > 101 F (38.3 C) AND [2] age > 60 years    Negative: [1] Fever > 100.0 F (37.8 C) AND [2] bedridden (e.g., nursing home patient, CVA, chronic illness, recovering from surgery)    Negative: HIGH RISK for severe COVID complications (e.g., age > 64 years, obesity with BMI >  "25, pregnant, chronic lung disease or other chronic medical condition)  (Exception: Already seen by PCP and no new or worsening symptoms.)    Negative: [1] HIGH RISK patient AND [2] influenza is widespread in the community AND [3] ONE OR MORE respiratory symptoms: cough, sore throat, runny or stuffy nose    Negative: [1] HIGH RISK patient AND [2] influenza exposure within the last 7 days AND [3] ONE OR MORE respiratory symptoms: cough, sore throat, runny or stuffy nose    Negative: [1] COVID-19 infection suspected by caller or triager AND [2] mild symptoms (cough, fever, or others) AND [3] negative COVID-19 rapid test    Negative: Fever present > 3 days (72 hours)    Negative: [1] Fever returns after gone for over 24 hours AND [2] symptoms worse or not improved    Negative: [1] Continuous (nonstop) coughing interferes with work or school AND [2] no improvement using cough treatment per protocol    Negative: Cough present > 3 weeks    Negative: [1] COVID-19 diagnosed by positive lab test AND [2] NO symptoms (e.g., cough, fever, others)    Negative: [1] COVID-19 diagnosed by positive lab test AND [2] mild symptoms (e.g., cough, fever, others) AND [3] no complications or SOB    Negative: [1] COVID-19 diagnosed by doctor (or NP/PA) AND [2] mild symptoms (e.g., cough, fever, others) AND [3] no complications or SOB    Negative: [1] COVID-19 diagnosed AND [2] has mild nausea, vomiting or diarrhea    Answer Assessment - Initial Assessment Questions  1. COVID-19 DIAGNOSIS: \"Who made your Coronavirus (COVID-19) diagnosis?\" \"Was it confirmed by a positive lab test?\" If not diagnosed by a HCP, ask \"Are there lots of cases (community spread) where you live?\" (See public health department website, if unsure)      Not diagnosed  2. COVID-19 EXPOSURE: \"Was there any known exposure to COVID before the symptoms began?\" CDC Definition of close contact: within 6 feet (2 meters) for a total of 15 minutes or more over a 24-hour period. " "      no  3. ONSET: \"When did the COVID-19 symptoms start?\"       yesterday  4. WORST SYMPTOM: \"What is your worst symptom?\" (e.g., cough, fever, shortness of breath, muscle aches)      Congestion and weakness  5. COUGH: \"Do you have a cough?\" If Yes, ask: \"How bad is the cough?\"        Yes. moderate  6. FEVER: \"Do you have a fever?\" If Yes, ask: \"What is your temperature, how was it measured, and when did it start?\"      no  7. RESPIRATORY STATUS: \"Describe your breathing?\" (e.g., shortness of breath, wheezing, unable to speak)       fine  8. BETTER-SAME-WORSE: \"Are you getting better, staying the same or getting worse compared to yesterday?\"  If getting worse, ask, \"In what way?\"      worse  9. HIGH RISK DISEASE: \"Do you have any chronic medical problems?\" (e.g., asthma, heart or lung disease, weak immune system, obesity, etc.)      no  10. PREGNANCY: \"Is there any chance you are pregnant?\" \"When was your last menstrual period?\"        no  11. OTHER SYMPTOMS: \"Do you have any other symptoms?\"  (e.g., chills, fatigue, headache, loss of smell or taste, muscle pain, sore throat; new loss of smell or taste especially support the diagnosis of COVID-19)        Body aches, sore throat    Protocols used: CORONAVIRUS (COVID-19) DIAGNOSED OR [id number] 8.25.2021      "

## 2021-11-17 ENCOUNTER — OFFICE VISIT (OUTPATIENT)
Dept: FAMILY MEDICINE | Facility: OTHER | Age: 46
End: 2021-11-17
Attending: FAMILY MEDICINE
Payer: COMMERCIAL

## 2021-11-17 DIAGNOSIS — Z20.822 SUSPECTED 2019 NOVEL CORONAVIRUS INFECTION: ICD-10-CM

## 2021-11-17 PROCEDURE — U0005 INFEC AGEN DETEC AMPLI PROBE: HCPCS | Mod: ZL

## 2021-11-19 ENCOUNTER — TELEPHONE (OUTPATIENT)
Dept: EMERGENCY MEDICINE | Facility: CLINIC | Age: 46
End: 2021-11-19

## 2021-11-19 LAB — SARS-COV-2 RNA RESP QL NAA+PROBE: POSITIVE

## 2021-11-19 NOTE — TELEPHONE ENCOUNTER
"-Coronavirus (COVID-19) Notification    Caller Name (Patient, parent, daughter/son, grandparent, etc)  patient    Reason for call  Notify of Positive Coronavirus (COVID-19) lab results, assess symptoms,  review  [facility] recommendations    Lab Result    Lab test:  2019-nCoV rRt-PCR or SARS-CoV-2 PCR    Oropharyngeal AND/OR nasopharyngeal swabs is POSITIVE for 2019-nCoV RNA/SARS-COV-2 PCR (COVID-19 virus)    RN Recommendations/Instructions per [facility] Coronavirus COVID-19 recommendations    Brief introduction script  Introduce self then review script:  \"I am calling on behalf of [employer].  We were notified that your Coronavirus test (COVID-19) for was POSITIVE for the virus.  I have some information to relay to you but first I wanted to mention that the [place] Dept of Health will be contacting you shortly [it's possible MD already called Patient] to talk to you more about how you are feeling and other people you have had contact with who might now also have the virus.  Also,  [facility] is Partnering with the [facility] for Covid-19 research, you may be contacted directly by research staff.\"    Assessment (Inquire about Patient's current symptoms)   Assessment   Current Symptoms at time of phone call: (if no symptoms, document No symptoms] Cough and runny nose   Symptoms onset (if applicable) 11/10/2021     If at time of call, Patients symptoms hare worsened, the Patient should contact 911 or have someone drive them to Emergency Dept promptly:      If Patient calling 911, inform 911 personal that you have tested positive for the Coronavirus (COVID-19).  Place mask on and await 911 to arrive.    If Emergency Dept, If possible, please have another adult drive you to the Emergency Dept but you need to wear mask when in contact with other people.      Monoclonal Antibody Administration    You may be eligible to receive a new treatment with a monoclonal antibody for preventing " "hospitalization in patients at high risk for complications from COVID-19.   This medication is still experimental and available on a limited basis; it is given through an IV and must be given at an infusion center. Please note that not all people who are eligible will receive the medication since it is in limited supply.     Are you interested in being considered for this medication?  No.   Does the patient fit the criteria: No    If patient qualifies based on above criteria:  \"You will be contacted if you are selected to receive this treatment in the next 1-2 business days.   This is time sensitive and if you are not selected in the next 1-2 business days, you will not receive the medication.  If you do not receive a call to schedule, you have not been selected.\"      Review information with Patient    Your result was positive. This means you have COVID-19 (coronavirus).  We have sent you a letter that reviews the information that I'll be reviewing with you now.    How can I protect others?    If you have symptoms: stay home and away from others (self-isolate) until:    You've had no fever--and no medicine that reduces fever--for 1 full day (24 hours). And       Your other symptoms have gotten better. For example, your cough or breathing has improved. And     At least 10 days have passed since your symptoms started. (If you've been told by a doctor that you have a weak immune system, wait 20 days.)     If you don't have symptoms: Stay home and away from others (self-isolate) until at least 10 days have passed since your first positive COVID-19 test. (Date test collected)    During this time:    Stay in your own room, including for meals. Use your own bathroom if you can.    Stay away from others in your home. No hugging, kissing or shaking hands. No visitors.     Don't go to work, school or anywhere else.     Clean  high touch  surfaces often (doorknobs, counters, handles, etc.). Use a household cleaning spray or " wipes. You'll find a full list on the EPA website at www.epa.gov/pesticide-registration/list-n-disinfectants-use-against-sars-cov-2.     Cover your mouth and nose with a mask, tissue or other face covering to avoid spreading germs.    Wash your hands and face often with soap and water.    Make a list of people you have been in close contact with recently, even if either of you wore a face covering.   ; Start your list from 2 days before you became ill or had a positive test.  ; Include anyone that was within 6 feet of you for a cumulative total of 15 minutes or more in 24 hours. (Example: if you sat next to Mike for 5 minutes in the morning and 10 minutes in the afternoon, then you were in close contact for 15 minutes total that day. Mike would be added to your list.)    A public health worker will call or text you. It is important that you answer. They will ask you questions about possible exposures to COVID-19, such as people you have been in direct contact with and places you have visited.    Tell the people on your list that you have COVID-19; they should stay away from others for 14 days starting from the last time they were in contact with you (unless you are told something different from a public health worker).     Caregivers in these groups are at risk for severe illness due to COVID-19:  o People 65 years and older  o People who live in a nursing home or long-term care facility  o People with chronic disease (lung, heart, cancer, diabetes, kidney, liver, immunologic)  o People who have a weakened immune system, including those who:  - Are in cancer treatment  - Take medicine that weakens the immune system, such as corticosteroids  - Had a bone marrow or organ transplant  - Have an immune deficiency  - Have poorly controlled HIV or AIDS  - Are obese (body mass index of 40 or higher)  - Smoke regularly    Caregivers should wear gloves while washing dishes, handling laundry and cleaning bedrooms and  bathrooms.    Wash and dry laundry with special caution. Don't shake dirty laundry, and use the warmest water setting you can.    If you have a weakened immune system, ask your doctor about other actions you should take.    For more tips, go to www.cdc.gov/coronavirus/2019-ncov/downloads/10Things.pdf.    You should not go back to work until you meet the guidelines above for ending your home isolation. You don't need to be retested for COVID-19 before going back to work--studies show that you won't spread the virus if it's been at least 10 days since your symptoms started (or 20 days, if you have a weak immune system).    Employers: This document serves as formal notice of your employee's medical guidelines for going back to work. They must meet the above guidelines before going back to work in person.    How can I take care of myself?    1. Get lots of rest. Drink extra fluids (unless a doctor has told you not to).    2. Take Tylenol (acetaminophen) for fever or pain. If you have liver or kidney problems, ask your family doctor if it's okay to take Tylenol.     Take either:     650 mg (two 325 mg pills) every 4 to 6 hours, or     1,000 mg (two 500 mg pills) every 8 hours as needed.     Note: Don't take more than 3,000 mg in one day. Acetaminophen is found in many medicines (both prescribed and over-the-counter medicines). Read all labels to be sure you don't take too much.    For children, check the Tylenol bottle for the right dose (based on their age or weight).    3. If you have other health problems (like cancer, heart failure, an organ transplant or severe kidney disease): Call your specialty clinic if you don't feel better in the next 2 days.    4. Know when to call 911: Emergency warning signs include:    Trouble breathing or shortness of breath    Pain or pressure in the chest that doesn't go away    Feeling confused like you haven't felt before, or not being able to wake up    Bluish-colored lips or  face    5. Sign up for [employer]. We know it's scary to hear that you have COVID-19. We want to track your symptoms to make sure you're okay over the next 2 weeks. Please look for an email from [employer]--this is a free, online program that we'll use to keep in touch. To sign up, follow the link in the email. Learn more at [email or web address].    Where can I get more information?    [facility]: [email or web address]    Coronavirus Basics: www.[email or web address]./[email or web address]/coronavirus/basics.html    What to Do If You're Sick: www.cdc.gov/coronavirus/2019-ncov/about/steps-when-sick.html    Ending Home Isolation: www.cdc.gov/coronavirus/2019-ncov/hcp/disposition-in-home-patients.html     Caring for Someone with COVID-19: www.cdc.gov/coronavirus/2019-ncov/if-you-are-sick/care-for-someone.html     [facility] clinical trials (COVID-19 research studies): [email or web address]     A Positive COVID-19 letter will be sent via [employer] or the mail. (Exception, no letters sent to Presurgerical/Preprocedure Patients)    Yanni Santos LPN

## 2021-11-22 ENCOUNTER — HOSPITAL ENCOUNTER (EMERGENCY)
Facility: HOSPITAL | Age: 46
Discharge: HOME | End: 2021-11-22
Attending: NURSE PRACTITIONER | Admitting: NURSE PRACTITIONER
Payer: COMMERCIAL

## 2021-11-22 VITALS
HEART RATE: 108 BPM | SYSTOLIC BLOOD PRESSURE: 136 MMHG | RESPIRATION RATE: 16 BRPM | OXYGEN SATURATION: 97 % | DIASTOLIC BLOOD PRESSURE: 96 MMHG | TEMPERATURE: 98 F

## 2021-11-22 DIAGNOSIS — R05.9 COUGH: Primary | ICD-10-CM

## 2021-11-22 PROCEDURE — 99213 OFFICE O/P EST LOW 20 MIN: CPT | Performed by: NURSE PRACTITIONER

## 2021-11-22 PROCEDURE — G0463 HOSPITAL OUTPT CLINIC VISIT: HCPCS

## 2021-11-22 RX ORDER — BENZONATATE 100 MG/1
100 CAPSULE ORAL 3 TIMES DAILY PRN
Qty: 10 CAPSULE | Refills: 0 | Status: SHIPPED | OUTPATIENT
Start: 2021-11-22 | End: 2021-11-26

## 2021-11-22 ASSESSMENT — ENCOUNTER SYMPTOMS
SINUS PRESSURE: 0
SINUS PAIN: 0
MYALGIAS: 0
COUGH: 1
EYE PAIN: 0
FEVER: 0
SORE THROAT: 0
PSYCHIATRIC NEGATIVE: 1
FATIGUE: 0
HEADACHES: 0
RHINORRHEA: 1
VOMITING: 0
EYE REDNESS: 0
PALPITATIONS: 0
DIARRHEA: 0
NAUSEA: 0
TROUBLE SWALLOWING: 0
CHILLS: 0
EYE ITCHING: 0

## 2021-11-22 NOTE — ED PROVIDER NOTES
History     Chief Complaint   Patient presents with     Patient Request for Note/Letter     HPI  Ritesh Soliman is a 46 year old female who presents to urgent care today requesting a note to return to work after being COVID positive on 11/17/2021.  Patient states her COVID symptoms started 11/10/2021 and was out of work that entire week.  Continues to have a slight cough and rhinorrhea which is tolerable.  Takes cough drops and mucinex which are helpful, has not taken any today.  Denies any fever, chills, nausea, vomiting, diarrhea, shortness of breath or chest pain.  Occasional smoker, only when drinking approximately once weekly.  No asthma history.  No COVID Vaccines.  Primary concern is getting a note to return to work today.  No other concerns.      Allergies:  Allergies   Allergen Reactions     Augmentin [Amoxicillin-Pot Clavulanate] Swelling       Problem List:    Patient Active Problem List    Diagnosis Date Noted     Herniated nucleus pulposus, L5-S1 06/29/2021     Priority: Medium     Chronic, continuous use of opioids 05/17/2021     Priority: Medium     Menorrhagia 05/22/2020     Priority: Medium     Added automatically from request for surgery [id number]       Endometrial thickening on ultrasound 05/22/2020     Priority: Medium     Added automatically from request for surgery [id number]       OSCAR (generalized anxiety disorder) 05/09/2018     Priority: Medium     Major depressive disorder, recurrent episode, moderate (H) 05/09/2018     Priority: Medium     Panic attack 05/09/2018     Priority: Medium     ACP (advance care planning) 04/29/2016     Priority: Medium     Advance Care Planning 4/29/2016: ACP Review of Chart / Resources Provided:  Reviewed chart for advance care plan.  Ritesh Soliman has been provided information and resources to begin or update their advance care plan.  Added by Ngoc Duong             Tobacco abuse      Priority: Medium     Depression with anxiety      Priority: Medium      History of recurrent UTI (urinary tract infection) 02/27/2014     Priority: Medium        Past Medical History:    Past Medical History:   Diagnosis Date     Depression with anxiety      Tobacco abuse        Past Surgical History:    Past Surgical History:   Procedure Laterality Date     BIOPSY BREAST Right 10/19/2017    Procedure: BIOPSY BREAST;  EXCISIONAL BIOPSY RIGHT BREAST;  Surgeon: Kiko Newberry MD;  Location: [facility]     DILATE CERVIX, HYSTEROSCOPY, ABLATE ENDOMETRIUM, COMBINED N/A 7/1/2020    Procedure: HYSTEROSCOPY, ENDOMETRIAL ABLATION,endometrial polypectomy;  Surgeon: Juan Roger MD;  Location: [facility]     ESOPHAGOSCOPY, GASTROSCOPY, DUODENOSCOPY (EGD), COMBINED N/A 10/1/2018    Procedure: COMBINED ESOPHAGOSCOPY, GASTROSCOPY, DUODENOSCOPY (EGD);  UPPER ENDOSCOPY WITH BIOPSIES;  Surgeon: Kiko Newberry MD;  Location: [facility] OR     HC PLACE INTRAVASC STENT OPEN/PERQ, EA ADDL VEIN      stents for varicose veins, Dr Martinez     OOPHORECTOMY      tubal pregnancy     TUBAL LIGATION         Family History:    Family History   Problem Relation Age of Onset     Coronary Artery Disease Mother      Hypertension Mother      Aneurysm Mother         stomach     Coronary Artery Disease Father      Aneurysm Father         heart     Aneurysm Niece         brain     Seizure Disorder Niece      C.A.D. No family hx of      Breast Cancer No family hx of      Cancer - colorectal No family hx of      Diabetes No family hx of      Thyroid Disease No family hx of      Asthma No family hx of      Colon Cancer No family hx of      Hyperlipidemia No family hx of        Social History:  Marital Status:  Single [1]  Social History     Tobacco Use     Smoking status: Current Some Day Smoker     Packs/day: 0.10     Types: Cigarettes     Smokeless tobacco: Never Used     Tobacco comment: rarely smokes. declines quitplan referral 9/18/18   Substance Use Topics     Alcohol use: Yes     Alcohol/week: 0.0 standard drinks     Comment:  occassionally     Drug use: No        Medications:    benzonatate (TESSALON) 100 MG capsule  gabapentin (NEURONTIN) 300 MG capsule  ibuprofen (ADVIL/MOTRIN) 200 MG capsule  traZODone (DESYREL) 100 MG tablet      Review of Systems   Constitutional: Negative for chills, fatigue and fever.   HENT: Positive for rhinorrhea. Negative for congestion, ear pain, sinus pressure, sinus pain, sore throat and trouble swallowing.    Eyes: Negative for pain, redness and itching.   Respiratory: Positive for cough.    Cardiovascular: Negative for chest pain and palpitations.   Gastrointestinal: Negative for diarrhea, nausea and vomiting.   Genitourinary: Negative for decreased urine volume.   Musculoskeletal: Negative for myalgias.   Skin: Negative for rash.   Neurological: Negative for headaches.   Psychiatric/Behavioral: Negative.      Physical Exam   BP: (S) (!) 145/119 (on the phone talking)  Pulse: 108  Temp: 98  F (36.7  C)  Resp: 16  SpO2: 97 %  Recheck   BP: 136/96  AP: 100  Physical Exam  Vitals and nursing note reviewed.   Constitutional:       General: She is not in acute distress.     Appearance: She is not ill-appearing or toxic-appearing.   HENT:      Head: Normocephalic.      Right Ear: Tympanic membrane, ear canal and external ear normal.      Left Ear: Tympanic membrane, ear canal and external ear normal.      Nose: Nose normal.      Mouth/Throat:      Mouth: Mucous membranes are moist.      Pharynx: Oropharynx is clear. No oropharyngeal exudate or posterior oropharyngeal erythema.   Eyes:      Extraocular Movements: Extraocular movements intact.      Conjunctiva/sclera: Conjunctivae normal.      Pupils: Pupils are equal, round, and reactive to light.   Cardiovascular:      Rate and Rhythm: Normal rate and regular rhythm.      Pulses: Normal pulses.      Heart sounds: Normal heart sounds.   Pulmonary:      Effort: Pulmonary effort is normal.      Breath sounds: Normal breath sounds.   Abdominal:      General: Bowel  sounds are normal.      Palpations: Abdomen is soft.      Tenderness: There is no abdominal tenderness.   Musculoskeletal:      Cervical back: Normal range of motion and neck supple. No rigidity or tenderness.   Lymphadenopathy:      Cervical: No cervical adenopathy.   Neurological:      Mental Status: She is alert.   Psychiatric:         Mood and Affect: Mood normal.       ED Course     No results found for this or any previous visit (from the past 24 hour(s)).    Medications - No data to display    Assessments & Plan (with Medical Decision Making)     I have reviewed the nursing notes.    I have reviewed the findings, diagnosis, plan and need for follow up with the patient.  (R05.9) Cough  (primary encounter diagnosis)  Plan:   Patient ambulatory with a nontoxic appearance.  Patient's primary concern is to get a work note to return to work after being Covid positive on 11-17-21.  Patient states symptoms started on 11-10-21 and was out of work that entire week as well.  Patient states she continues to have a dry intermittent cough at times.  Benzonatate capsules as needed for cough.  Lungs clear throughout.  Occasional smoker.  No history of asthma.  Cough is gradually improving. Denies any fever, chills, nausea, vomiting, diarrhea, shortness of breath or chest pain.  Patient states she just wants a work note so she can go back to work.  Patient has been out of work for 12 days since symptoms started and is afebrile, work note given to patient to return back to work without restrictions.  Patient in agreement with treatment plan and will follow up as needed.    New Prescriptions    BENZONATATE (TESSALON) 100 MG CAPSULE    Take 1 capsule (100 mg) by mouth 3 times daily as needed for cough     Final diagnoses:   Cough     11/22/2021   [facility]     Florencia Beauchamp NP  11/22/21 [id number]

## 2021-11-22 NOTE — Clinical Note
Ritesh Soliman was seen and treated in our emergency department on 11/22/2021.  She may return to work on 11/23/2021.  Patient is able to return to work without restrictions at this time.      If you have any questions or concerns, please don't hesitate to call.      Florencia Beauchamp, NP

## 2021-11-22 NOTE — DISCHARGE INSTRUCTIONS
Benzonatate capsules as needed for cough    Follow-up with primary care provider or return to urgent care-ED with any worsening in condition or additional concerns.

## 2021-11-22 NOTE — ED TRIAGE NOTES
Pt stated that over a week ago and was sick  Got tested Friday and positive for covid.  Needs a note to go back to work.   Pt is feeling good

## 2021-11-23 ENCOUNTER — APPOINTMENT (OUTPATIENT)
Dept: GENERAL RADIOLOGY | Facility: HOSPITAL | Age: 46
End: 2021-11-23
Attending: INTERNAL MEDICINE
Payer: COMMERCIAL

## 2021-11-23 ENCOUNTER — HOSPITAL ENCOUNTER (EMERGENCY)
Facility: HOSPITAL | Age: 46
Discharge: HOME | End: 2021-11-23
Attending: INTERNAL MEDICINE | Admitting: INTERNAL MEDICINE
Payer: COMMERCIAL

## 2021-11-23 VITALS
DIASTOLIC BLOOD PRESSURE: 107 MMHG | WEIGHT: 150 LBS | OXYGEN SATURATION: 95 % | RESPIRATION RATE: 18 BRPM | TEMPERATURE: 98.3 F | BODY MASS INDEX: 26.78 KG/M2 | HEART RATE: 98 BPM | SYSTOLIC BLOOD PRESSURE: 137 MMHG

## 2021-11-23 DIAGNOSIS — R05.9 COUGH: ICD-10-CM

## 2021-11-23 DIAGNOSIS — U07.1 INFECTION DUE TO 2019 NOVEL CORONAVIRUS: ICD-10-CM

## 2021-11-23 PROCEDURE — 94640 AIRWAY INHALATION TREATMENT: CPT

## 2021-11-23 PROCEDURE — 999N000157 HC STATISTIC RCP TIME EA 10 MIN

## 2021-11-23 PROCEDURE — 99283 EMERGENCY DEPT VISIT LOW MDM: CPT | Performed by: INTERNAL MEDICINE

## 2021-11-23 PROCEDURE — 250N000013 HC RX MED GY IP 250 OP 250 PS 637: Performed by: INTERNAL MEDICINE

## 2021-11-23 PROCEDURE — 99283 EMERGENCY DEPT VISIT LOW MDM: CPT | Mod: 25

## 2021-11-23 PROCEDURE — 71045 X-RAY EXAM CHEST 1 VIEW: CPT

## 2021-11-23 RX ORDER — CODEINE PHOSPHATE AND GUAIFENESIN 10; 100 MG/5ML; MG/5ML
5 SOLUTION ORAL EVERY 4 HOURS PRN
Status: DISCONTINUED | OUTPATIENT
Start: 2021-11-23 | End: 2021-11-23 | Stop reason: HOSPADM

## 2021-11-23 RX ORDER — CODEINE PHOSPHATE AND GUAIFENESIN 10; 100 MG/5ML; MG/5ML
1-2 SOLUTION ORAL EVERY 4 HOURS PRN
Qty: 236 ML | Refills: 0 | Status: SHIPPED | OUTPATIENT
Start: 2021-11-23 | End: 2021-11-30

## 2021-11-23 RX ORDER — ALBUTEROL SULFATE 90 UG/1
6 AEROSOL, METERED RESPIRATORY (INHALATION) ONCE
Status: COMPLETED | OUTPATIENT
Start: 2021-11-23 | End: 2021-11-23

## 2021-11-23 RX ADMIN — GUAIFENESIN AND CODEINE PHOSPHATE 5 ML: 100; 10 SOLUTION ORAL at 02:00

## 2021-11-23 RX ADMIN — ALBUTEROL SULFATE 6 PUFF: 90 AEROSOL, METERED RESPIRATORY (INHALATION) at 02:10

## 2021-11-23 ASSESSMENT — ENCOUNTER SYMPTOMS
COUGH: 1
BLOOD IN STOOL: 0
NECK PAIN: 0
COLOR CHANGE: 0
CHEST TIGHTNESS: 0
ABDOMINAL PAIN: 0
NECK STIFFNESS: 0
SHORTNESS OF BREATH: 1
NUMBNESS: 0
PALPITATIONS: 0
FEVER: 0
BACK PAIN: 0
ANAL BLEEDING: 0
FLANK PAIN: 0
HEADACHES: 0
DYSURIA: 0
NAUSEA: 0
LIGHT-HEADEDNESS: 0
CONFUSION: 0
ARTHRALGIAS: 0
WOUND: 0
VOMITING: 0
CHILLS: 0
ABDOMINAL DISTENTION: 0
HEMATURIA: 0
VOICE CHANGE: 0
DIAPHORESIS: 0
WHEEZING: 0
DIZZINESS: 0
MYALGIAS: 0

## 2021-11-23 NOTE — ED TRIAGE NOTES
Pt reports she tested positive for COVID on the 17th but reports her symptoms started on the 10th. Pt reports she feels the shortness of breath and cough have become worse over the past 2 days. Pt denies any fevers. Pt had a dose of Tessalon Pearls approximately 3-4 hours ago along with Ibuprofen.

## 2021-11-23 NOTE — ED PROVIDER NOTES
History     Chief Complaint   Patient presents with     Shortness of Breath     Cough     The history is provided by the patient.   Cough  Cough characteristics:  Dry  Sputum characteristics:  Nondescript  Onset quality:  Gradual  Duration:  3 days  Timing:  Constant  Progression:  Worsening  Chronicity:  New  Associated symptoms: shortness of breath    Associated symptoms: no chest pain, no chills, no diaphoresis, no fever, no headaches, no myalgias, no rash and no wheezing        Allergies:  Allergies   Allergen Reactions     Augmentin [Amoxicillin-Pot Clavulanate] Swelling       Problem List:    Patient Active Problem List    Diagnosis Date Noted     Herniated nucleus pulposus, L5-S1 06/29/2021     Priority: Medium     Chronic, continuous use of opioids 05/17/2021     Priority: Medium     Menorrhagia 05/22/2020     Priority: Medium     Added automatically from request for surgery [id number]       Endometrial thickening on ultrasound 05/22/2020     Priority: Medium     Added automatically from request for surgery [id number]       OSCAR (generalized anxiety disorder) 05/09/2018     Priority: Medium     Major depressive disorder, recurrent episode, moderate (H) 05/09/2018     Priority: Medium     Panic attack 05/09/2018     Priority: Medium     ACP (advance care planning) 04/29/2016     Priority: Medium     Advance Care Planning 4/29/2016: ACP Review of Chart / Resources Provided:  Reviewed chart for advance care plan.  Ritesh Soliman has been provided information and resources to begin or update their advance care plan.  Added by Ngoc Duong             Tobacco abuse      Priority: Medium     Depression with anxiety      Priority: Medium     History of recurrent UTI (urinary tract infection) 02/27/2014     Priority: Medium        Past Medical History:    Past Medical History:   Diagnosis Date     Depression with anxiety      Tobacco abuse        Past Surgical History:    Past Surgical History:   Procedure Laterality  Date     BIOPSY BREAST Right 10/19/2017    Procedure: BIOPSY BREAST;  EXCISIONAL BIOPSY RIGHT BREAST;  Surgeon: Kiko Newberry MD;  Location: [facility]     DILATE CERVIX, HYSTEROSCOPY, ABLATE ENDOMETRIUM, COMBINED N/A 7/1/2020    Procedure: HYSTEROSCOPY, ENDOMETRIAL ABLATION,endometrial polypectomy;  Surgeon: Juan Roger MD;  Location: [facility]     ESOPHAGOSCOPY, GASTROSCOPY, DUODENOSCOPY (EGD), COMBINED N/A 10/1/2018    Procedure: COMBINED ESOPHAGOSCOPY, GASTROSCOPY, DUODENOSCOPY (EGD);  UPPER ENDOSCOPY WITH BIOPSIES;  Surgeon: Kiko Newberry MD;  Location: [facility]     HC PLACE INTRAVASC STENT OPEN/PERQ, EA ADDL VEIN      stents for varicose veins, Dr Martinez     OOPHORECTOMY      tubal pregnancy     TUBAL LIGATION         Family History:    Family History   Problem Relation Age of Onset     Coronary Artery Disease Mother      Hypertension Mother      Aneurysm Mother         stomach     Coronary Artery Disease Father      Aneurysm Father         heart     Aneurysm Niece         brain     Seizure Disorder Niece      C.A.D. No family hx of      Breast Cancer No family hx of      Cancer - colorectal No family hx of      Diabetes No family hx of      Thyroid Disease No family hx of      Asthma No family hx of      Colon Cancer No family hx of      Hyperlipidemia No family hx of        Social History:  Marital Status:  Single [1]  Social History     Tobacco Use     Smoking status: Current Some Day Smoker     Packs/day: 0.10     Types: Cigarettes     Smokeless tobacco: Never Used     Tobacco comment: rarely smokes. declines quitplan referral 9/18/18   Substance Use Topics     Alcohol use: Yes     Alcohol/week: 0.0 standard drinks     Comment: occassionally     Drug use: No        Medications:    gabapentin (NEURONTIN) 300 MG capsule  ibuprofen (ADVIL/MOTRIN) 200 MG capsule  traZODone (DESYREL) 100 MG tablet  albuterol (PROAIR HFA/PROVENTIL HFA/VENTOLIN HFA) 108 (90 Base) MCG/ACT inhaler  benzonatate (TESSALON) 100 MG  capsule          Review of Systems   Constitutional: Negative for chills, diaphoresis and fever.   HENT: Negative for voice change.    Eyes: Negative for visual disturbance.   Respiratory: Positive for cough and shortness of breath. Negative for chest tightness and wheezing.    Cardiovascular: Negative for chest pain, palpitations and leg swelling.   Gastrointestinal: Negative for abdominal distention, abdominal pain, anal bleeding, blood in stool, nausea and vomiting.   Genitourinary: Negative for decreased urine volume, dysuria, flank pain and hematuria.   Musculoskeletal: Negative for arthralgias, back pain, gait problem, myalgias, neck pain and neck stiffness.   Skin: Negative for color change, pallor, rash and wound.   Neurological: Negative for dizziness, syncope, light-headedness, numbness and headaches.   Psychiatric/Behavioral: Negative for confusion and suicidal ideas.       Physical Exam   BP: (!) 137/107  Pulse: 99  Temp: 98.3  F (36.8  C)  Resp: 16  Weight: 68 kg (150 lb)  SpO2: 96 %      Physical Exam  Vitals and nursing note reviewed.   Constitutional:       Appearance: She is well-developed.   HENT:      Head: Normocephalic and atraumatic.      Mouth/Throat:      Pharynx: No oropharyngeal exudate.   Eyes:      Conjunctiva/sclera: Conjunctivae normal.      Pupils: Pupils are equal, round, and reactive to light.   Neck:      Thyroid: No thyromegaly.      Vascular: No JVD.      Trachea: No tracheal deviation.   Cardiovascular:      Rate and Rhythm: Normal rate and regular rhythm.      Heart sounds: Normal heart sounds. No murmur heard.  No friction rub. No gallop.    Pulmonary:      Effort: Pulmonary effort is normal. No respiratory distress.      Breath sounds: Normal breath sounds. No stridor. No wheezing or rales.   Chest:      Chest wall: No tenderness.   Abdominal:      General: Bowel sounds are normal. There is no distension.      Palpations: Abdomen is soft. There is no mass.      Tenderness:  There is no abdominal tenderness. There is no guarding or rebound.   Musculoskeletal:         General: No tenderness. Normal range of motion.      Cervical back: Normal range of motion and neck supple.   Lymphadenopathy:      Cervical: No cervical adenopathy.   Skin:     General: Skin is warm and dry.      Coloration: Skin is not pale.      Findings: No erythema or rash.   Neurological:      Mental Status: She is alert and oriented to person, place, and time.   Psychiatric:         Behavior: Behavior normal.         ED Course        Procedures             No results found for this or any previous visit (from the past 24 hour(s)).    Medications   albuterol (PROAIR HFA/PROVENTIL HFA/VENTOLIN HFA) 108 (90 Base) MCG/ACT inhaler 6 puff (6 puffs Inhalation Given 11/23/21 0210)       Assessments & Plan (with Medical Decision Making)   Cough and sob getting worse over past 3 days, COVID test positive on 11/17, symptoms started 13 days ago  CXR: no acute finding  Cough improved after Robitussion + inhaler  I advised oral hydration , if developed SOB, or felt very weak return to ER, she understood and agreed.     I have reviewed the nursing notes.    I have reviewed the findings, diagnosis, plan and need for follow up with the patient.      Discharge Medication List as of 11/23/2021  3:05 AM      START taking these medications    Details   guaiFENesin-codeine (ROBITUSSIN AC) 100-10 MG/5ML solution Take 5-10 mLs by mouth every 4 hours as needed for cough, Disp-236 mL, R-0, Local Print             Final diagnoses:   Infection due to 2019 novel coronavirus   Cough       11/23/2021   [facility]     Gianfranco West MD  11/24/21 [id number]       Gianfranco West MD  12/07/21 [id number]

## 2021-11-23 NOTE — ED NOTES
Pt's employer called with questions regarding note saying she can go back to work after testing positive on 11/17/21. Called Pt for verbal consent to talk to employer about reasons she is able to go back to work and why provider made not for her to be able to go back on 11/23/21.

## 2021-11-26 DIAGNOSIS — R05.9 COUGH: Primary | ICD-10-CM

## 2021-11-26 RX ORDER — BENZONATATE 100 MG/1
100 CAPSULE ORAL 3 TIMES DAILY PRN
Qty: 10 CAPSULE | Refills: 0 | Status: SHIPPED | OUTPATIENT
Start: 2021-11-26 | End: 2022-04-14

## 2021-11-26 RX ORDER — ALBUTEROL SULFATE 90 UG/1
2 AEROSOL, METERED RESPIRATORY (INHALATION) EVERY 6 HOURS
Qty: 18 G | Refills: 0 | Status: SHIPPED | OUTPATIENT
Start: 2021-11-26 | End: 2022-04-14

## 2021-11-26 NOTE — TELEPHONE ENCOUNTER
Pt calling and would like a refill of some medications that where prescribed from the ED 11.23.2021.She is Covid positive.She went to ED because she was SOB.    She is asking for more Tessalon pearles and Ventolin inhaler.She has been taking the inhaler every two hours since 11.23.2021. These where prescribed in the ED.    She is out of this medication at this time.    She thinks they are helping otherwise she has as constant cough and feels like she is choking.    Pended.Have Albuterol every 6 hours PRN.Change to every four hours?     Advised if s/s worsen go to UC/ED or call immediate attention call 911.    Alida Ambrosio RN

## 2022-01-19 ENCOUNTER — TRANSFERRED RECORDS (OUTPATIENT)
Dept: HEALTH INFORMATION MANAGEMENT | Facility: CLINIC | Age: 47
End: 2022-01-19

## 2022-03-08 ENCOUNTER — TRANSFERRED RECORDS (OUTPATIENT)
Dept: HEALTH INFORMATION MANAGEMENT | Facility: CLINIC | Age: 47
End: 2022-03-08

## 2022-03-17 ENCOUNTER — LAB (OUTPATIENT)
Dept: LAB | Facility: OTHER | Age: 47
End: 2022-03-17
Payer: COMMERCIAL

## 2022-03-17 DIAGNOSIS — H43.89: Primary | ICD-10-CM

## 2022-03-17 LAB
BASOPHILS # BLD AUTO: 0 10E3/UL (ref 0–0.2)
BASOPHILS NFR BLD AUTO: 0 %
CRP SERPL-MCNC: 16.6 MG/L (ref 0–8)
EOSINOPHIL # BLD AUTO: 0.2 10E3/UL (ref 0–0.7)
EOSINOPHIL NFR BLD AUTO: 4 %
ERYTHROCYTE [DISTWIDTH] IN BLOOD BY AUTOMATED COUNT: 11.5 % (ref 10–15)
ERYTHROCYTE [SEDIMENTATION RATE] IN BLOOD BY WESTERGREN METHOD: 9 MM/HR (ref 0–20)
HCT VFR BLD AUTO: 43.9 % (ref 35–47)
HGB BLD-MCNC: 14.7 G/DL (ref 11.7–15.7)
IMM GRANULOCYTES # BLD: 0 10E3/UL
IMM GRANULOCYTES NFR BLD: 0 %
LYMPHOCYTES # BLD AUTO: 1.4 10E3/UL (ref 0.8–5.3)
LYMPHOCYTES NFR BLD AUTO: 28 %
MCH RBC QN AUTO: 32.5 PG (ref 26.5–33)
MCHC RBC AUTO-ENTMCNC: 33.5 G/DL (ref 31.5–36.5)
MCV RBC AUTO: 97 FL (ref 78–100)
MONOCYTES # BLD AUTO: 0.4 10E3/UL (ref 0–1.3)
MONOCYTES NFR BLD AUTO: 8 %
NEUTROPHILS # BLD AUTO: 3 10E3/UL (ref 1.6–8.3)
NEUTROPHILS NFR BLD AUTO: 60 %
NRBC # BLD AUTO: 0 10E3/UL
NRBC BLD AUTO-RTO: 0 /100
PLATELET # BLD AUTO: 238 10E3/UL (ref 150–450)
RBC # BLD AUTO: 4.52 10E6/UL (ref 3.8–5.2)
WBC # BLD AUTO: 5 10E3/UL (ref 4–11)

## 2022-03-17 PROCEDURE — 36415 COLL VENOUS BLD VENIPUNCTURE: CPT | Mod: ZL | Performed by: OPHTHALMOLOGY

## 2022-03-17 PROCEDURE — 85652 RBC SED RATE AUTOMATED: CPT | Mod: ZL | Performed by: OPHTHALMOLOGY

## 2022-03-17 PROCEDURE — 86780 TREPONEMA PALLIDUM: CPT | Mod: ZL | Performed by: OPHTHALMOLOGY

## 2022-03-17 PROCEDURE — 86038 ANTINUCLEAR ANTIBODIES: CPT | Mod: ZL | Performed by: OPHTHALMOLOGY

## 2022-03-17 PROCEDURE — 86617 LYME DISEASE ANTIBODY: CPT | Mod: ZL,59 | Performed by: OPHTHALMOLOGY

## 2022-03-17 PROCEDURE — 85025 COMPLETE CBC W/AUTO DIFF WBC: CPT | Mod: ZL | Performed by: OPHTHALMOLOGY

## 2022-03-17 PROCEDURE — 86431 RHEUMATOID FACTOR QUANT: CPT | Mod: ZL | Performed by: OPHTHALMOLOGY

## 2022-03-17 PROCEDURE — 82164 ANGIOTENSIN I ENZYME TEST: CPT | Mod: ZL | Performed by: OPHTHALMOLOGY

## 2022-03-17 PROCEDURE — 86140 C-REACTIVE PROTEIN: CPT | Mod: ZL | Performed by: OPHTHALMOLOGY

## 2022-03-18 LAB
ANA SER QL IF: NEGATIVE
RHEUMATOID FACT SER NEPH-ACNC: 7 IU/ML
T PALLIDUM AB SER QL: NONREACTIVE

## 2022-03-19 LAB
B BURGDOR IGG SER QL IB: NEGATIVE
B BURGDOR IGM SER QL IB: NEGATIVE

## 2022-03-20 LAB — ACE SERPL-CCNC: 33 U/L

## 2022-04-13 ENCOUNTER — NURSE TRIAGE (OUTPATIENT)
Dept: FAMILY MEDICINE | Facility: OTHER | Age: 47
End: 2022-04-13
Payer: COMMERCIAL

## 2022-04-13 NOTE — TELEPHONE ENCOUNTER
"Patient is establishing with Ashley on 7/26/22.  She no showed two times so far to establish care.      Patient is on her way to work and is requesting an antibiotic be sent to [facility].  She will come in the morning for an appointment if she need's to be seen.  Advised that a note would be sent to the provider to review and a nurse would let her know what she should do.      Patient is very demanding and will not listen when told she may need to be seen.  Stating \" I get these all the time, I know what it is, all I need is an antibiotic\"          Reason for Disposition    Patient wants to be seen    Answer Assessment - Initial Assessment Questions  1. SYMPTOM: \"What's the main symptom you're concerned about?\" (e.g., frequency, incontinence)      Frequency   2. ONSET: \"When did the    start?\"      yesterday  3. PAIN: \"Is there any pain?\" If so, ask: \"How bad is it?\" (Scale: 1-10; mild, moderate, severe)      Pain and burning  4. CAUSE: \"What do you think is causing the symptoms?\"      Bladder infection  5. OTHER SYMPTOMS: \"Do you have any other symptoms?\" (e.g., fever, flank pain, blood in urine, pain with urination)      Pain with urination  6. PREGNANCY: \"Is there any chance you are pregnant?\" \"When was your last menstrual period?\"      no    Protocols used: URINARY SYMPTOMS-A-[place]    "

## 2022-04-14 ENCOUNTER — HOSPITAL ENCOUNTER (EMERGENCY)
Facility: HOSPITAL | Age: 47
Discharge: HOME | End: 2022-04-14
Attending: NURSE PRACTITIONER | Admitting: NURSE PRACTITIONER
Payer: COMMERCIAL

## 2022-04-14 VITALS
SYSTOLIC BLOOD PRESSURE: 135 MMHG | TEMPERATURE: 96.8 F | RESPIRATION RATE: 16 BRPM | OXYGEN SATURATION: 97 % | HEART RATE: 78 BPM | DIASTOLIC BLOOD PRESSURE: 82 MMHG

## 2022-04-14 DIAGNOSIS — R30.0 DYSURIA: ICD-10-CM

## 2022-04-14 LAB
ALBUMIN UR-MCNC: ABNORMAL G/DL
APPEARANCE UR: ABNORMAL
BACTERIA #/AREA URNS HPF: ABNORMAL /HPF
BILIRUB UR QL STRIP: ABNORMAL
COLOR UR AUTO: ABNORMAL
GLUCOSE UR STRIP-MCNC: ABNORMAL MG/DL
HGB UR QL STRIP: ABNORMAL
KETONES UR STRIP-MCNC: ABNORMAL MG/DL
LEUKOCYTE ESTERASE UR QL STRIP: ABNORMAL
MUCOUS THREADS #/AREA URNS LPF: PRESENT /LPF
NITRATE UR QL: ABNORMAL
PH UR STRIP: ABNORMAL [PH]
RBC URINE: 2 /HPF
SP GR UR STRIP: ABNORMAL
SQUAMOUS EPITHELIAL: 8 /HPF
UROBILINOGEN UR STRIP-MCNC: ABNORMAL MG/DL
WBC URINE: 74 /HPF

## 2022-04-14 PROCEDURE — 99213 OFFICE O/P EST LOW 20 MIN: CPT | Performed by: NURSE PRACTITIONER

## 2022-04-14 PROCEDURE — 87186 SC STD MICRODIL/AGAR DIL: CPT | Performed by: NURSE PRACTITIONER

## 2022-04-14 PROCEDURE — G0463 HOSPITAL OUTPT CLINIC VISIT: HCPCS

## 2022-04-14 PROCEDURE — 81001 URINALYSIS AUTO W/SCOPE: CPT | Performed by: NURSE PRACTITIONER

## 2022-04-14 RX ORDER — NITROFURANTOIN 25; 75 MG/1; MG/1
100 CAPSULE ORAL 2 TIMES DAILY
Qty: 10 CAPSULE | Refills: 0 | Status: SHIPPED | OUTPATIENT
Start: 2022-04-14 | End: 2022-04-19

## 2022-04-14 ASSESSMENT — ENCOUNTER SYMPTOMS
PSYCHIATRIC NEGATIVE: 1
NEUROLOGICAL NEGATIVE: 1
DYSURIA: 1
CARDIOVASCULAR NEGATIVE: 1
ENDOCRINE NEGATIVE: 1
GASTROINTESTINAL NEGATIVE: 1
ALLERGIC/IMMUNOLOGIC NEGATIVE: 1
MUSCULOSKELETAL NEGATIVE: 1
FREQUENCY: 1
CONSTITUTIONAL NEGATIVE: 1
EYES NEGATIVE: 1
RESPIRATORY NEGATIVE: 1
HEMATOLOGIC/LYMPHATIC NEGATIVE: 1

## 2022-04-14 NOTE — DISCHARGE INSTRUCTIONS
Recurrent bladder infection symptoms  Previously seen by urology in the past  Continue with the cranberry tabs and juice    Stay hydrated

## 2022-04-14 NOTE — ED PROVIDER NOTES
History     Chief Complaint   Patient presents with     Dysuria     The history is provided by the patient.     Ritesh Soliman is a 46 year old female who presents to the  for symptoms of UTI.  She states usually she gets an antibiotic.  She did take AZO prior to coming into the urgent care - urine is orange today and difficult to see results.  She states she has severe burning and feels like her   Normal bladder infections     Allergies:  Allergies   Allergen Reactions     Augmentin [Amoxicillin-Pot Clavulanate] Swelling       Problem List:    Patient Active Problem List    Diagnosis Date Noted     Herniated nucleus pulposus, L5-S1 06/29/2021     Priority: Medium     Chronic, continuous use of opioids 05/17/2021     Priority: Medium     Menorrhagia 05/22/2020     Priority: Medium     Added automatically from request for surgery [id number]       Endometrial thickening on ultrasound 05/22/2020     Priority: Medium     Added automatically from request for surgery [id number]       OSCAR (generalized anxiety disorder) 05/09/2018     Priority: Medium     Major depressive disorder, recurrent episode, moderate (H) 05/09/2018     Priority: Medium     Panic attack 05/09/2018     Priority: Medium     ACP (advance care planning) 04/29/2016     Priority: Medium     Advance Care Planning 4/29/2016: ACP Review of Chart / Resources Provided:  Reviewed chart for advance care plan.  Ritesh Soliman has been provided information and resources to begin or update their advance care plan.  Added by Ngoc Duong             Tobacco abuse      Priority: Medium     Depression with anxiety      Priority: Medium     History of recurrent UTI (urinary tract infection) 02/27/2014     Priority: Medium        Past Medical History:    Past Medical History:   Diagnosis Date     Depression with anxiety      Tobacco abuse        Past Surgical History:    Past Surgical History:   Procedure Laterality Date     BACK SURGERY Left 09/09/2021    Revision of  L5-S1 discectomy at [facility]     BIOPSY BREAST Right 10/19/2017    Procedure: BIOPSY BREAST;  EXCISIONAL BIOPSY RIGHT BREAST;  Surgeon: Kiko Newberry MD;  Location: [facility]     DILATE CERVIX, HYSTEROSCOPY, ABLATE ENDOMETRIUM, COMBINED N/A 07/01/2020    Procedure: HYSTEROSCOPY, ENDOMETRIAL ABLATION,endometrial polypectomy;  Surgeon: Juan Roger MD;  Location: [facility]     ESOPHAGOSCOPY, GASTROSCOPY, DUODENOSCOPY (EGD), COMBINED N/A 10/01/2018    Procedure: COMBINED ESOPHAGOSCOPY, GASTROSCOPY, DUODENOSCOPY (EGD);  UPPER ENDOSCOPY WITH BIOPSIES;  Surgeon: Kiko Newberry MD;  Location: [facility]     HC PLACE INTRAVASC STENT OPEN/PERQ, EA ADDL VEIN      stents for varicose veins, Dr Martinez     OOPHORECTOMY      tubal pregnancy     TUBAL LIGATION         Family History:    Family History   Problem Relation Age of Onset     Coronary Artery Disease Mother      Hypertension Mother      Aneurysm Mother         stomach     Coronary Artery Disease Father      Aneurysm Father         heart     Aneurysm Niece         brain     Seizure Disorder Niece      C.A.D. No family hx of      Breast Cancer No family hx of      Cancer - colorectal No family hx of      Diabetes No family hx of      Thyroid Disease No family hx of      Asthma No family hx of      Colon Cancer No family hx of      Hyperlipidemia No family hx of        Social History:  Marital Status:  Single [1]  Social History     Tobacco Use     Smoking status: Current Some Day Smoker     Packs/day: 0.10     Types: Cigarettes     Smokeless tobacco: Never Used     Tobacco comment: rarely smokes. declines quitplan referral 9/18/18   Substance Use Topics     Alcohol use: Yes     Alcohol/week: 0.0 standard drinks     Comment: occassionally     Drug use: No        Medications:    gabapentin (NEURONTIN) 300 MG capsule  nitroFURantoin macrocrystal-monohydrate (MACROBID) 100 MG capsule  traZODone (DESYREL) 100 MG tablet  ibuprofen (ADVIL/MOTRIN) 200 MG capsule          Review of  Systems   Constitutional: Negative.    HENT: Negative.    Eyes: Negative.    Respiratory: Negative.    Cardiovascular: Negative.    Gastrointestinal: Negative.    Endocrine: Negative.    Genitourinary: Positive for dysuria, frequency and urgency.   Musculoskeletal: Negative.    Skin: Negative.    Allergic/Immunologic: Negative.    Neurological: Negative.    Hematological: Negative.    Psychiatric/Behavioral: Negative.        Physical Exam   BP: 135/82  Pulse: 78  Temp: 96.8  F (36  C)  Resp: 16  SpO2: 97 %      Physical Exam  Vitals and nursing note reviewed.   Constitutional:       Appearance: She is ill-appearing.   Cardiovascular:      Rate and Rhythm: Normal rate.      Pulses: Normal pulses.   Pulmonary:      Effort: Pulmonary effort is normal. No respiratory distress.      Breath sounds: Normal breath sounds.   Abdominal:      General: Bowel sounds are normal.      Palpations: Abdomen is soft.      Tenderness: There is abdominal tenderness in the suprapubic area.         ED Course                 Procedures              Critical Care time:  none               Results for orders placed or performed during the hospital encounter of 04/14/22 (from the past 24 hour(s))   UA with Microscopic reflex to Culture    Specimen: Urine, Clean Catch   Result Value Ref Range    Color Urine Orange (A) Colorless, Straw, Light Yellow, Yellow    Appearance Urine Slightly Cloudy (A) Clear    Glucose Urine      Bilirubin Urine      Ketones Urine      Specific Gravity Urine      Blood Urine      pH Urine      Protein Albumin Urine      Urobilinogen Urine      Nitrite Urine      Leukocyte Esterase Urine      Bacteria Urine Few (A) None Seen /HPF    Mucus Urine Present (A) None Seen /LPF    RBC Urine 2 <=2 /HPF    WBC Urine 74 (H) <=5 /HPF    Squamous Epithelials Urine 8 (H) <=1 /HPF    Narrative    Urine Culture ordered based on laboratory criteria       Medications - No data to display    Assessments & Plan (with Medical Decision  Making)     I have reviewed the nursing notes.    I have reviewed the findings, diagnosis, plan and need for follow up with the patient.      Discussed results difficult to view due to using Azo.  With her symptoms and her history did opt to start antibiotic while waiting on culture     Patient educated and has no questions.  Encouraged hydration  Can try OTC azo for symptom management  Take medication as directed  Patient given further education sheets for each of the diagnoses.  Follow up with your PCP in 3 days if your symptoms have not resolved.  Follow up with UC/ED if symptoms increase or if fever/further concerns develop.    Discharge Medication List as of 4/14/2022  1:07 PM      START taking these medications    Details   nitroFURantoin macrocrystal-monohydrate (MACROBID) 100 MG capsule Take 1 capsule (100 mg) by mouth 2 times daily for 5 days, Disp-10 capsule, R-0, E-Prescribe             Final diagnoses:   Dysuria       4/14/2022   [facility]     [facility] HospitalMichelle APRN CNP  04/14/22 [id number]

## 2022-04-16 LAB — BACTERIA UR CULT: ABNORMAL

## 2022-07-08 ENCOUNTER — HOSPITAL ENCOUNTER (EMERGENCY)
Facility: HOSPITAL | Age: 47
Discharge: HOME | End: 2022-07-08
Attending: NURSE PRACTITIONER | Admitting: NURSE PRACTITIONER
Payer: COMMERCIAL

## 2022-07-08 VITALS
DIASTOLIC BLOOD PRESSURE: 82 MMHG | WEIGHT: 150 LBS | BODY MASS INDEX: 26.58 KG/M2 | HEIGHT: 63 IN | OXYGEN SATURATION: 96 % | RESPIRATION RATE: 14 BRPM | HEART RATE: 73 BPM | TEMPERATURE: 97.7 F | SYSTOLIC BLOOD PRESSURE: 131 MMHG

## 2022-07-08 DIAGNOSIS — B34.9 VIRAL SYNDROME: Primary | ICD-10-CM

## 2022-07-08 LAB
FLUAV RNA SPEC QL NAA+PROBE: NEGATIVE
FLUBV RNA RESP QL NAA+PROBE: NEGATIVE
GROUP A STREP BY PCR: NOT DETECTED
RSV RNA SPEC NAA+PROBE: NEGATIVE
SARS-COV-2 RNA RESP QL NAA+PROBE: POSITIVE

## 2022-07-08 PROCEDURE — 87651 STREP A DNA AMP PROBE: CPT | Performed by: NURSE PRACTITIONER

## 2022-07-08 PROCEDURE — 87637 SARSCOV2&INF A&B&RSV AMP PRB: CPT | Performed by: NURSE PRACTITIONER

## 2022-07-08 PROCEDURE — G0463 HOSPITAL OUTPT CLINIC VISIT: HCPCS

## 2022-07-08 PROCEDURE — C9803 HOPD COVID-19 SPEC COLLECT: HCPCS

## 2022-07-08 PROCEDURE — 99213 OFFICE O/P EST LOW 20 MIN: CPT | Performed by: NURSE PRACTITIONER

## 2022-07-08 ASSESSMENT — ENCOUNTER SYMPTOMS
PSYCHIATRIC NEGATIVE: 1
MYALGIAS: 0
EYE PAIN: 0
SORE THROAT: 1
TROUBLE SWALLOWING: 0
NAUSEA: 0
PALPITATIONS: 0
EYE ITCHING: 1
FEVER: 0
HEADACHES: 0
RHINORRHEA: 1
CHILLS: 1
VOMITING: 0
FATIGUE: 1
DIARRHEA: 0
EYE REDNESS: 0
SHORTNESS OF BREATH: 0
COUGH: 0

## 2022-07-08 NOTE — ED PROVIDER NOTES
History     Chief Complaint   Patient presents with     Chills     Nasal Congestion     HPI  Ritesh Soliman is a 47 year old female who presents to urgent care today (ambulatory) with complaints of fatigue, congestion, rhinorrhea, eye itching and sore throat.  Denies any current fever, chills, nausea, vomiting, diarrhea, shortness of breath or chest pain.  Patient has mild rash to left forearm and has been using anti-itch cream, no current concerns to rash.  Patient was recently exposed to COVID and primary concern today is COVID testing.  History of COVID-positive infection, not COVID vaccinated.  Symptoms are tolerable.  No asthma/COPD history.  Occasional smoker.  Staying hydrated, normal output.  No other concerns.    Allergies:  Allergies   Allergen Reactions     Augmentin [Amoxicillin-Pot Clavulanate] Swelling       Problem List:    Patient Active Problem List    Diagnosis Date Noted     Herniated nucleus pulposus, L5-S1 06/29/2021     Priority: Medium     Chronic, continuous use of opioids 05/17/2021     Priority: Medium     Menorrhagia 05/22/2020     Priority: Medium     Added automatically from request for surgery [id number]       Endometrial thickening on ultrasound 05/22/2020     Priority: Medium     Added automatically from request for surgery [id number]       OSCAR (generalized anxiety disorder) 05/09/2018     Priority: Medium     Major depressive disorder, recurrent episode, moderate (H) 05/09/2018     Priority: Medium     Panic attack 05/09/2018     Priority: Medium     ACP (advance care planning) 04/29/2016     Priority: Medium     Advance Care Planning 4/29/2016: ACP Review of Chart / Resources Provided:  Reviewed chart for advance care plan.  Ritesh Soliman has been provided information and resources to begin or update their advance care plan.  Added by Ngoc Duong             Tobacco abuse      Priority: Medium     Depression with anxiety      Priority: Medium     History of recurrent UTI (urinary  tract infection) 02/27/2014     Priority: Medium        Past Medical History:    Past Medical History:   Diagnosis Date     Depression with anxiety      Tobacco abuse        Past Surgical History:    Past Surgical History:   Procedure Laterality Date     BACK SURGERY Left 09/09/2021    Revision of L5-S1 discectomy at [facility]     BIOPSY BREAST Right 10/19/2017    Procedure: BIOPSY BREAST;  EXCISIONAL BIOPSY RIGHT BREAST;  Surgeon: Kiko Newberry MD;  Location: [facility]     DILATE CERVIX, HYSTEROSCOPY, ABLATE ENDOMETRIUM, COMBINED N/A 07/01/2020    Procedure: HYSTEROSCOPY, ENDOMETRIAL ABLATION,endometrial polypectomy;  Surgeon: Juan Roger MD;  Location: [facility] OR     ESOPHAGOSCOPY, GASTROSCOPY, DUODENOSCOPY (EGD), COMBINED N/A 10/01/2018    Procedure: COMBINED ESOPHAGOSCOPY, GASTROSCOPY, DUODENOSCOPY (EGD);  UPPER ENDOSCOPY WITH BIOPSIES;  Surgeon: Kiko Newberry MD;  Location: [facility] OR     HC PLACE INTRAVASC STENT OPEN/PERQ, EA ADDL VEIN      stents for varicose veins, Dr Martinez     OOPHORECTOMY      tubal pregnancy     TUBAL LIGATION         Family History:    Family History   Problem Relation Age of Onset     Coronary Artery Disease Mother      Hypertension Mother      Aneurysm Mother         stomach     Coronary Artery Disease Father      Aneurysm Father         heart     Aneurysm Niece         brain     Seizure Disorder Niece      C.A.D. No family hx of      Breast Cancer No family hx of      Cancer - colorectal No family hx of      Diabetes No family hx of      Thyroid Disease No family hx of      Asthma No family hx of      Colon Cancer No family hx of      Hyperlipidemia No family hx of        Social History:  Marital Status:  Single [1]  Social History     Tobacco Use     Smoking status: Current Some Day Smoker     Packs/day: 0.10     Types: Cigarettes     Smokeless tobacco: Never Used     Tobacco comment: rarely smokes. declines quitplan referral 9/18/18   Substance Use Topics     Alcohol use: Yes      "Alcohol/week: 0.0 standard drinks     Comment: occassionally     Drug use: No        Medications:    gabapentin (NEURONTIN) 300 MG capsule  ibuprofen (ADVIL/MOTRIN) 200 MG capsule  traZODone (DESYREL) 100 MG tablet      Review of Systems   Constitutional: Positive for chills (resolved) and fatigue. Negative for fever.   HENT: Positive for congestion, rhinorrhea and sore throat. Negative for ear pain and trouble swallowing.    Eyes: Positive for itching. Negative for pain and redness.   Respiratory: Negative for cough and shortness of breath.    Cardiovascular: Negative for chest pain and palpitations.   Gastrointestinal: Negative for diarrhea, nausea and vomiting.   Genitourinary: Negative for decreased urine volume.   Musculoskeletal: Negative for myalgias.   Skin: Positive for rash (small rash to left forearm where tattoo is).   Neurological: Negative for headaches.   Psychiatric/Behavioral: Negative.      Physical Exam   BP: 131/82  Pulse: 73  Temp: 97.7  F (36.5  C)  Resp: 14  Height: 160 cm (5' 3\")  Weight: 68 kg (150 lb)  SpO2: 96 %    Physical Exam  Vitals and nursing note reviewed.   Constitutional:       General: She is not in acute distress.     Appearance: Normal appearance. She is not ill-appearing or toxic-appearing.   HENT:      Head: Normocephalic.      Right Ear: Tympanic membrane, ear canal and external ear normal.      Left Ear: Tympanic membrane, ear canal and external ear normal.      Nose: Congestion and rhinorrhea present.      Mouth/Throat:      Mouth: Mucous membranes are moist.      Pharynx: Oropharynx is clear. No oropharyngeal exudate or posterior oropharyngeal erythema.   Eyes:      Extraocular Movements: Extraocular movements intact.      Conjunctiva/sclera: Conjunctivae normal.      Pupils: Pupils are equal, round, and reactive to light.   Cardiovascular:      Rate and Rhythm: Normal rate and regular rhythm.      Pulses: Normal pulses.      Heart sounds: Normal heart sounds. "   Pulmonary:      Effort: Pulmonary effort is normal.      Breath sounds: Normal breath sounds.   Abdominal:      General: Bowel sounds are normal.      Palpations: Abdomen is soft.      Tenderness: There is no abdominal tenderness.   Musculoskeletal:      Cervical back: Normal range of motion and neck supple. No rigidity or tenderness.   Lymphadenopathy:      Cervical: No cervical adenopathy.   Neurological:      Mental Status: She is alert.   Psychiatric:         Mood and Affect: Mood normal.       ED Course     No results found for this or any previous visit (from the past 24 hour(s)).    Medications - No data to display    Assessments & Plan (with Medical Decision Making)     I have reviewed the nursing notes.    I have reviewed the findings, diagnosis, plan and need for follow up with the patient.  (B34.9) Viral syndrome  (primary encounter diagnosis)  Plan:   Patient ambulatory with a nontoxic appearance.  Denies any fever, chills, nausea, vomiting, diarrhea, shortness of breath or chest pain.  Patient's states symptoms are tolerable at this time and primary concern today is COVID testing.  COVID, influenza and RSV test pending.  Lungs clear throughout.  No signs of otitis media.  Strep test pending, wants to be notified via telephone of results.  Symptomatic treatment recommendations provided.  Patient to follow-up with primary care provider or return to urgent care-ED with any worsening in condition or additional concerns.  Patient is in agreement with treatment plan.    Patient Education:  Symptomatic treatments recommended.  -Discussed that antibiotics would not help symptoms of viral URI. Education provided on symptoms of secondary bacterial infection such as new fever, chills, rigors, shortness of breath, increased work of breathing, that can occur with viral URI and need for further evaluation, if they occur.   - Ensure you are staying hydrated by drinking plenty of fluids or eating foods such as  popsicles, jello, pudding.  - Honey can be soothing for sore throat  - Warm salt water gurgles can help soothe sore throat  - Rest  - Humidifier can help with congestion and help keep mucus membranes such as throat and nose from drying out.  - Sleeping slightly propped up can help with congestion and postnasal drainage that can worsen cough at bedtime.  - As long as you have never been told to take Tylenol and/or Ibuprofen you can use them to manage fever and body aches per package instructions  Make sure you eat when you take ibuprofen to avoid stomach upset.  - OTC cough medications per package instructions to help with cough. Check to see if the cough/cold medication already has acetaminophen (Tylenol) in it. If it does avoid taking additional Tylenol.  - If sudden onset of new fever, worsening symptoms return for further evaluation.  - OTC nasal steroid such as Flonase can help decrease sinus inflammation to help with congestion.  - Education provided on symptoms of post-viral bacterial infections including ear infection and pneumonia. This would require re-evaluation for treatment.    Follow-up with primary care provider or return to urgent care-ED with any worsening in condition or additional concerns.    Discharge Medication List as of 7/8/2022  4:06 PM        Final diagnoses:   Viral syndrome     7/8/2022   [facility]     Florencia Beauchamp NP  07/08/22 1026

## 2022-07-08 NOTE — ED TRIAGE NOTES
Pt reports chills with fatigue that started approximately 4-5 days ago. Pt reports sinus congestion with a minor sore throat. Pt reports she was exposed to her son who tested positive for COVID last week.

## 2022-07-08 NOTE — ED TRIAGE NOTES
Patient presents to urgent care for chills with fatigue that started approximately 4-5 days ago. Patient states sinus congestion with a minor sore throat.  Patient would like a Covid test and a strep test.

## 2022-07-09 ENCOUNTER — HEALTH MAINTENANCE LETTER (OUTPATIENT)
Age: 47
End: 2022-07-09

## 2022-07-25 NOTE — PROGRESS NOTES
Assessment & Plan     Rash  Consistent with contact dermatitis   - hydrocortisone (CORTAID) 1 % external cream; Apply topically 2 times daily    Depression with anxiety  stable  - Adult Mental Health  Referral; [facility] for therapy   - c/w trazodone     Chronic bilateral low back pain with bilateral sciatica  Follows with [facility] with visit tomorrow  - c/w ibuprofen 600mg     Encounter for screening mammogram for breast cancer  - MA SCREENING DIGITAL BILATERAL ([place]); Future       Nicotine/Tobacco Cessation:  She reports that she has been smoking cigarettes. She has been smoking about 0.10 packs per day. She has never used smokeless tobacco.  Nicotine/Tobacco Cessation Plan:   Information offered: Patient not interested at this time    23 minutes spent on the date of the encounter doing chart review, review of test results, interpretation of tests, patient visit, documentation         See Patient Instructions    Return in about 6 months (around 1/26/2023), or if symptoms worsen or fail to improve, for Lab Work, Physical Exam.    Olivia Ramirez MD  [facility] - BILL Awad is a 47 year old, presenting for the following health issues:  Establish Care      HPI     Rash  Onset/Duration: several weeks ago   Description  Location: on tattoos/arms    Character: itchy   Itching: severe  Intensity:  severe  Progression of Symptoms:  worsening and intermittent  Accompanying signs and symptoms:   Fever: No  Body aches or joint pain: No  Sore throat symptoms: No  Recent cold symptoms: No ( had covid recently)   History:           Previous episodes of similar rash: yes   New exposures:  None  Recent travel: YES- [place] in May 2022  Exposure to similar rash: No  Precipitating or alleviating factors: Hydrocortisone cream helped a little   Therapies tried and outcome: Calamine and bacitracin  helped a little    - just on arms   - h/o this rash before which resolved with  hydrocortisone       Depression and Anxiety Follow-Up    How are you doing with your depression since your last visit? Feeling okay    How are you doing with your anxiety since your last visit?  No change    Are you having other symptoms that might be associated with depression or anxiety? No    Have you had a significant life event? No     Do you have any concerns with your use of alcohol or other drugs? No    - interested in therapy    Social History     Tobacco Use     Smoking status: Current Some Day Smoker     Packs/day: 0.10     Types: Cigarettes     Smokeless tobacco: Never Used     Tobacco comment: rarely smokes. declines quitplan referral 9/18/18   Substance Use Topics     Alcohol use: Yes     Alcohol/week: 0.0 standard drinks     Comment: occassionally     Drug use: No     PHQ 4/22/2021 5/20/2021 7/26/2022   PHQ-9 Total Score 0 24 15   Q9: Thoughts of better off dead/self-harm past 2 weeks Not at all More than half the days Not at all   F/U: Thoughts of suicide or self-harm - - -   F/U: Self harm-plan - - -   F/U: Self-harm action - - -   F/U: Safety concerns - - -     OSCAR-7 SCORE 6/25/2020 4/22/2021 7/26/2022   Total Score 13 0 19     Last PHQ-9 7/26/2022   1.  Little interest or pleasure in doing things 1   2.  Feeling down, depressed, or hopeless 1   3.  Trouble falling or staying asleep, or sleeping too much 3   4.  Feeling tired or having little energy 2   5.  Poor appetite or overeating 3   6.  Feeling bad about yourself 1   7.  Trouble concentrating 2   8.  Moving slowly or restless 2   Q9: Thoughts of better off dead/self-harm past 2 weeks 0   PHQ-9 Total Score 15   Difficulty at work, home, or with people Not difficult at all   In the past two weeks have you had thoughts of suicide or self harm? -   Do you have concerns about your personal safety or the safety of others? -   In the past 2 weeks have you thought about a plan or had intention to harm yourself? -   In the past 2 weeks have you acted  on these thoughts in any way? -     OSCAR-7  7/26/2022   1. Feeling nervous, anxious, or on edge 3   2. Not being able to stop or control worrying 3   3. Worrying too much about different things 3   4. Trouble relaxing 3   5. Being so restless that it is hard to sit still 2   6. Becoming easily annoyed or irritable 3   7. Feeling afraid, as if something awful might happen 2   OSCAR-7 Total Score 19   If you checked any problems, how difficult have they made it for you to do your work, take care of things at home, or get along with other people? Somewhat difficult         Chronic/Recurring Back Pain Follow Up      Where is your back pain located? (Select all that apply) low back bilateral    How would you describe your back pain?  dull ache    Where does your back pain spread? the right and left buttock    Since your last clinic visit for back pain, how has your pain changed? unchanged, is better ,but  Back does go out when it wants to.     Does your back pain interfere with your job? YES    Since your last visit, have you tried any new treatment? Yes -  currently following with [facility].  Has appointment tomorrow.     - s/p left L5-S1 revision of discectomy on 9/9/2021 at [facility]    - has appointment with [facility] tomorrow   - back pain is worsening. Using ice       - h/o gabapentin 300mg tid, methocarbamol    # Immunization: COVID (declines)      Review of Systems   Constitutional: Negative for chills and fever.   HENT: Negative for congestion.    Respiratory: Negative for shortness of breath.    Cardiovascular: Negative for chest pain and palpitations.   Gastrointestinal: Negative for abdominal pain.   Musculoskeletal: Positive for back pain.   Psychiatric/Behavioral: Positive for mood changes (up and down. doing good today) and sleep disturbance (d/t back pain ).          Objective    /80   Pulse 69   Temp 96.9  F (36.1  C) (Tympanic)   Resp 18   Wt 74.4 kg (164 lb)   SpO2  97%   BMI 29.05 kg/m    Body mass index is 29.05 kg/m .  Physical Exam  Constitutional:       General: She is not in acute distress.     Appearance: She is not ill-appearing.   Cardiovascular:      Rate and Rhythm: Normal rate and regular rhythm.      Heart sounds: No murmur heard.  Pulmonary:      Effort: Pulmonary effort is normal. No respiratory distress.      Breath sounds: No wheezing or rales.   Skin:     Comments: Arms: Erythematous, macular rash. Consistent with a contact dermatitis    Neurological:      Mental Status: She is alert.   Psychiatric:         Mood and Affect: Mood normal.          Labs reviewed in Epic              .  ..

## 2022-07-26 ENCOUNTER — OFFICE VISIT (OUTPATIENT)
Dept: FAMILY MEDICINE | Facility: OTHER | Age: 47
End: 2022-07-26
Attending: FAMILY MEDICINE
Payer: COMMERCIAL

## 2022-07-26 VITALS
BODY MASS INDEX: 29.05 KG/M2 | WEIGHT: 164 LBS | DIASTOLIC BLOOD PRESSURE: 80 MMHG | TEMPERATURE: 96.9 F | OXYGEN SATURATION: 97 % | RESPIRATION RATE: 18 BRPM | SYSTOLIC BLOOD PRESSURE: 110 MMHG | HEART RATE: 69 BPM

## 2022-07-26 DIAGNOSIS — Z12.31 ENCOUNTER FOR SCREENING MAMMOGRAM FOR BREAST CANCER: ICD-10-CM

## 2022-07-26 DIAGNOSIS — F41.8 DEPRESSION WITH ANXIETY: ICD-10-CM

## 2022-07-26 DIAGNOSIS — M54.41 CHRONIC BILATERAL LOW BACK PAIN WITH BILATERAL SCIATICA: ICD-10-CM

## 2022-07-26 DIAGNOSIS — G89.29 CHRONIC BILATERAL LOW BACK PAIN WITH BILATERAL SCIATICA: ICD-10-CM

## 2022-07-26 DIAGNOSIS — R21 RASH: Primary | ICD-10-CM

## 2022-07-26 DIAGNOSIS — M54.42 CHRONIC BILATERAL LOW BACK PAIN WITH BILATERAL SCIATICA: ICD-10-CM

## 2022-07-26 PROCEDURE — 99213 OFFICE O/P EST LOW 20 MIN: CPT | Performed by: FAMILY MEDICINE

## 2022-07-26 PROCEDURE — G0463 HOSPITAL OUTPT CLINIC VISIT: HCPCS | Performed by: FAMILY MEDICINE

## 2022-07-26 RX ORDER — BENZOCAINE/MENTHOL 6 MG-10 MG
LOZENGE MUCOUS MEMBRANE 2 TIMES DAILY
Qty: 30 G | Refills: 0 | Status: SHIPPED | OUTPATIENT
Start: 2022-07-26 | End: 2023-12-08

## 2022-07-26 ASSESSMENT — ANXIETY QUESTIONNAIRES
IF YOU CHECKED OFF ANY PROBLEMS ON THIS QUESTIONNAIRE, HOW DIFFICULT HAVE THESE PROBLEMS MADE IT FOR YOU TO DO YOUR WORK, TAKE CARE OF THINGS AT HOME, OR GET ALONG WITH OTHER PEOPLE: SOMEWHAT DIFFICULT
5. BEING SO RESTLESS THAT IT IS HARD TO SIT STILL: MORE THAN HALF THE DAYS
2. NOT BEING ABLE TO STOP OR CONTROL WORRYING: NEARLY EVERY DAY
GAD7 TOTAL SCORE: 19
3. WORRYING TOO MUCH ABOUT DIFFERENT THINGS: NEARLY EVERY DAY
6. BECOMING EASILY ANNOYED OR IRRITABLE: NEARLY EVERY DAY
7. FEELING AFRAID AS IF SOMETHING AWFUL MIGHT HAPPEN: MORE THAN HALF THE DAYS
4. TROUBLE RELAXING: NEARLY EVERY DAY
1. FEELING NERVOUS, ANXIOUS, OR ON EDGE: NEARLY EVERY DAY
GAD7 TOTAL SCORE: 19

## 2022-07-26 ASSESSMENT — ENCOUNTER SYMPTOMS
ABDOMINAL PAIN: 0
PALPITATIONS: 0
CHILLS: 0
SLEEP DISTURBANCE: 1
BACK PAIN: 1
FEVER: 0
SHORTNESS OF BREATH: 0

## 2022-07-26 ASSESSMENT — PATIENT HEALTH QUESTIONNAIRE - PHQ9: SUM OF ALL RESPONSES TO PHQ QUESTIONS 1-9: 15

## 2022-07-26 ASSESSMENT — PAIN SCALES - GENERAL: PAINLEVEL: NO PAIN (0)

## 2022-07-26 NOTE — PATIENT INSTRUCTIONS
It was nice to meet you today.     Try hydrocortisone cream twice per day for the next 14 days.     We put in a referral to [facility] in [place] for therapy    We put an order for mammogram

## 2022-07-26 NOTE — NURSING NOTE
"Chief Complaint   Patient presents with     Establish Care       Initial /80   Pulse 69   Temp 96.9  F (36.1  C) (Tympanic)   Resp 18   Wt 74.4 kg (164 lb)   SpO2 97%   BMI 29.05 kg/m   Estimated body mass index is 29.05 kg/m  as calculated from the following:    Height as of 7/8/22: 1.6 m (5' 3\").    Weight as of this encounter: 74.4 kg (164 lb).  Medication Reconciliation: complete  Anu Rivas LPN    "

## 2022-07-26 NOTE — COMMUNITY RESOURCES LIST (ENGLISH)
07/26/2022   [facility] - Outpatient Clinics  N/A  For questions about this resource list or additional care needs, please contact your primary care clinic or care manager.  Phone: [phone]   Email: N/A   Address: [address] [place] [id number]   Hours: N/A        Mental Health       Individual counseling  1  [facility] [place] Distance: 4.9 miles      COVID-19 Status: Regular Operations   [address] [facility] [place] [address] [place] [address]  Language: English  Hours: Mon - Thu 9:00 AM - 8:00 PM , Fri - Sat 9:00 AM - 5:00 PM Appt. Only  Fees: Insurance, Self Pay   Phone: [phone]) [phone] Ext.1 Website: [email or web address]://[email or web address]/     2  [facility]. - [place] Distance: 5 miles      COVID-19 Status: Regular Operations, COVID-19 Status: Phone/Virtual   [address] [facility] [place] [address] [place] [address]  Language: English  Hours: Mon - Thu 8:00 AM - 5:00 PM  Fees: Insurance, Self Pay, Sliding Fee   Phone: (697) [phone] Website: [email or web address]          Important Numbers & Websites       Emergency Services   911  City Services   311  Poison Control   (667) 182-9531  Suicide Prevention Lifeline   (285) 976-7164 (TALK)  Child Abuse Hotline   (261) 543-2317 (4-A-Child)  Sexual Assault Hotline   (712) 990-7006 (HOPE)  National Runaway Safeline   (400) 815-1078 (RUNAWAY)  All-Options Talkline   (117) 657-5283  Substance Abuse Referral   (541) 495-5521 (HELP)

## 2022-07-27 ENCOUNTER — MEDICAL CORRESPONDENCE (OUTPATIENT)
Dept: MRI IMAGING | Facility: HOSPITAL | Age: 47
End: 2022-07-27

## 2022-08-03 ENCOUNTER — TELEPHONE (OUTPATIENT)
Dept: FAMILY MEDICINE | Facility: OTHER | Age: 47
End: 2022-08-03

## 2022-08-03 DIAGNOSIS — R21 RASH: Primary | ICD-10-CM

## 2022-08-03 RX ORDER — TRIAMCINOLONE ACETONIDE 1 MG/G
CREAM TOPICAL 2 TIMES DAILY
Qty: 15 G | Refills: 0 | Status: SHIPPED | OUTPATIENT
Start: 2022-08-03 | End: 2023-12-19

## 2022-08-03 NOTE — TELEPHONE ENCOUNTER
August 6, 2018      [facility] [address] [address]  Phone: [phone]  Fax: [phone]       Patient: Shawna Mayers   YOB: 1972  Date of Visit: 08/06/2018    To Whom It May Concern:    Jeancarlos Mayers  was at [facility] on 08/06/2018. She may return to work/school on 08/08/2018 with no restrictions. If you have any questions or concerns, or if I can be of further assistance, please do not hesitate to contact me.    Sincerely,        Serina Soto NP      Patient notified and verbalized understanding.

## 2022-08-03 NOTE — TELEPHONE ENCOUNTER
Patient calling and was seen on 7/26 for a rash on both arms. Patient states she was advised to call back if symptoms are not improved and provider will increase hydrocortisone cream. Patient reports the rash gets better, but then gets worse again. Patient would like Rx sent to [facility].     [phone] ok to leave message to call back if needed.

## 2022-08-11 ENCOUNTER — HOSPITAL ENCOUNTER (OUTPATIENT)
Dept: MRI IMAGING | Facility: HOSPITAL | Age: 47
Discharge: HOME | End: 2022-08-11
Attending: ORTHOPAEDIC SURGERY | Admitting: ORTHOPAEDIC SURGERY
Payer: COMMERCIAL

## 2022-08-11 DIAGNOSIS — M48.062 SPINAL STENOSIS, LUMBAR REGION, WITH NEUROGENIC CLAUDICATION: ICD-10-CM

## 2022-08-11 PROCEDURE — 72148 MRI LUMBAR SPINE W/O DYE: CPT

## 2022-08-19 ENCOUNTER — TELEPHONE (OUTPATIENT)
Dept: MAMMOGRAPHY | Facility: OTHER | Age: 47
End: 2022-08-19

## 2022-08-19 ENCOUNTER — ANCILLARY PROCEDURE (OUTPATIENT)
Dept: MAMMOGRAPHY | Facility: OTHER | Age: 47
End: 2022-08-19
Attending: FAMILY MEDICINE
Payer: COMMERCIAL

## 2022-08-19 DIAGNOSIS — Z12.31 ENCOUNTER FOR SCREENING MAMMOGRAM FOR BREAST CANCER: ICD-10-CM

## 2022-08-19 PROCEDURE — 77067 SCR MAMMO BI INCL CAD: CPT | Mod: TC

## 2022-08-22 ENCOUNTER — TELEPHONE (OUTPATIENT)
Dept: FAMILY MEDICINE | Facility: OTHER | Age: 47
End: 2022-08-22

## 2022-08-22 NOTE — TELEPHONE ENCOUNTER
10:53 AM    Reason for Call: OVERBOOK    Patient is having the following symptoms: Patient says she recently had an MRI done and a lesion was found by her pelvic area. She was advised to see her PCP to discuss.     The patient is requesting an appointment for ASAP with Dr Ramirez.    Was an appointment offered for this call? Yes end of Sept is first available. Patient does not want to wait this long -- she is concerned     Preferred method for responding to this message: Telephone Call  What is your phone number ?[phone]    If we cannot reach you directly, may we leave a detailed response at the number you provided? Yes    Can this message wait until your PCP/provider returns, if unavailable today? Not applicable    Enma Ledesma

## 2022-08-29 NOTE — PROGRESS NOTES
Assessment & Plan     Cyst of left ovary  Will start with US. Unclear which ovary was removed  - US Pelvis Complete w/ Transvaginal ([place]); Future    See Patient Instructions    Return if symptoms worsen or fail to improve.    Olivia Ramirez MD  [facility] - [place] Awad is a 47 year old, presenting for the following health issues:  Lesion      HPI     Concern - Lesion  Onset: 08/11/2022  Description: There is a lesion with high signal on the T2-weighted images in the  left side of the pelvis most likely an ovarian cyst. This was not seen  on previous examination. This lesion measures approximately 3 cm in  Diameter (8/11/2022)  Intensity: moderate  Progression of Symptoms:  worsening  Accompanying Signs & Symptoms: Found while having a MRI of Lumbar Spine. Was not seen on last MRI  Previous history of similar problem: na  Precipitating factors:        Worsened by: na  Alleviating factors:        Improved by: na  Therapies tried and outcome:  none     - Ritesh has a friend with ovarian cancer that was not dx in time    Taking gummies at night   Wt Readings from Last 4 Encounters:   09/01/22 81.9 kg (180 lb 8 oz)   07/26/22 74.4 kg (164 lb)   07/08/22 68 kg (150 lb)   11/23/21 68 kg (150 lb)         Review of Systems   Constitutional: Positive for appetite change. Negative for chills and fever.   HENT: Negative for congestion.    Respiratory: Negative for shortness of breath.    Cardiovascular: Negative for chest pain and palpitations.   Gastrointestinal: Negative for abdominal pain.   Genitourinary: Negative for pelvic pain, vaginal bleeding and vaginal discharge.   Musculoskeletal: Positive for back pain.          Objective    /80   Pulse 64   Temp 97.6  F (36.4  C) (Tympanic)   Resp 18   Wt 81.9 kg (180 lb 8 oz)   SpO2 95%   BMI 31.97 kg/m    Body mass index is 31.97 kg/m .  Physical Exam  Constitutional:       General: She is not in acute distress.      Appearance: She is not ill-appearing.   Cardiovascular:      Rate and Rhythm: Normal rate and regular rhythm.      Heart sounds: No murmur heard.  Pulmonary:      Effort: Pulmonary effort is normal. No respiratory distress.      Breath sounds: No wheezing or rales.   Neurological:      Mental Status: She is alert.          MRI lumbar (8/11/2022):  There is a lesion with high signal on the T2-weighted images in the  left side of the pelvis most likely an ovarian cyst. This was not seen  on previous examination. This lesion measures approximately 3 cm in  diameter.                                                                        IMPRESSION: Postoperative changes L5-S1. The asymmetric disc  protrusion seen in July 2021 is no longer visualized.     Asymmetric disc protrusion at L3-L4 on the right compressing the right  L4 nerve root.     Asymmetric disc protrusion L4-L5 on the left mildly impinging on the  left L5 nerve root.                .  ..

## 2022-09-01 ENCOUNTER — OFFICE VISIT (OUTPATIENT)
Dept: FAMILY MEDICINE | Facility: OTHER | Age: 47
End: 2022-09-01
Attending: FAMILY MEDICINE
Payer: COMMERCIAL

## 2022-09-01 VITALS
SYSTOLIC BLOOD PRESSURE: 108 MMHG | TEMPERATURE: 97.6 F | OXYGEN SATURATION: 95 % | DIASTOLIC BLOOD PRESSURE: 80 MMHG | BODY MASS INDEX: 31.97 KG/M2 | WEIGHT: 180.5 LBS | HEART RATE: 64 BPM | RESPIRATION RATE: 18 BRPM

## 2022-09-01 DIAGNOSIS — N83.202 CYST OF LEFT OVARY: Primary | ICD-10-CM

## 2022-09-01 PROCEDURE — G0463 HOSPITAL OUTPT CLINIC VISIT: HCPCS | Mod: 25

## 2022-09-01 PROCEDURE — G0463 HOSPITAL OUTPT CLINIC VISIT: HCPCS

## 2022-09-01 PROCEDURE — 99213 OFFICE O/P EST LOW 20 MIN: CPT | Performed by: FAMILY MEDICINE

## 2022-09-01 ASSESSMENT — ENCOUNTER SYMPTOMS
APPETITE CHANGE: 1
BACK PAIN: 1
FEVER: 0
PALPITATIONS: 0
ABDOMINAL PAIN: 0
CHILLS: 0
SHORTNESS OF BREATH: 0

## 2022-09-01 ASSESSMENT — PAIN SCALES - GENERAL: PAINLEVEL: MODERATE PAIN (5)

## 2022-09-01 NOTE — NURSING NOTE
"Chief Complaint   Patient presents with     Lesion       Initial /80   Pulse 64   Temp 97.6  F (36.4  C) (Tympanic)   Resp 18   Wt 81.9 kg (180 lb 8 oz)   SpO2 95%   BMI 31.97 kg/m   Estimated body mass index is 31.97 kg/m  as calculated from the following:    Height as of 7/8/22: 1.6 m (5' 3\").    Weight as of this encounter: 81.9 kg (180 lb 8 oz).  Medication Reconciliation: complete  Georgina Winn LPN  "

## 2022-09-04 ENCOUNTER — HEALTH MAINTENANCE LETTER (OUTPATIENT)
Age: 47
End: 2022-09-04

## 2022-09-07 ENCOUNTER — HOSPITAL ENCOUNTER (OUTPATIENT)
Dept: ULTRASOUND IMAGING | Facility: HOSPITAL | Age: 47
Discharge: HOME | End: 2022-09-07
Attending: FAMILY MEDICINE | Admitting: FAMILY MEDICINE
Payer: COMMERCIAL

## 2022-09-07 DIAGNOSIS — N83.202 CYST OF LEFT OVARY: ICD-10-CM

## 2022-09-07 PROCEDURE — 76856 US EXAM PELVIC COMPLETE: CPT

## 2022-09-08 DIAGNOSIS — N83.202 CYST OF LEFT OVARY: Primary | ICD-10-CM

## 2022-09-08 DIAGNOSIS — D25.9 UTERINE LEIOMYOMA, UNSPECIFIED LOCATION: ICD-10-CM

## 2022-09-26 ENCOUNTER — OFFICE VISIT (OUTPATIENT)
Dept: OBGYN | Facility: OTHER | Age: 47
End: 2022-09-26
Attending: OBSTETRICS & GYNECOLOGY
Payer: COMMERCIAL

## 2022-09-26 VITALS
HEART RATE: 60 BPM | DIASTOLIC BLOOD PRESSURE: 66 MMHG | SYSTOLIC BLOOD PRESSURE: 102 MMHG | HEIGHT: 63 IN | RESPIRATION RATE: 16 BRPM | WEIGHT: 173 LBS | BODY MASS INDEX: 30.65 KG/M2

## 2022-09-26 DIAGNOSIS — N83.202 CYST OF LEFT OVARY: Primary | ICD-10-CM

## 2022-09-26 PROBLEM — R93.89 ENDOMETRIAL THICKENING ON ULTRASOUND: Status: RESOLVED | Noted: 2020-05-22 | Resolved: 2022-09-26

## 2022-09-26 PROCEDURE — 99215 OFFICE O/P EST HI 40 MIN: CPT | Performed by: OBSTETRICS & GYNECOLOGY

## 2022-09-26 PROCEDURE — G0463 HOSPITAL OUTPT CLINIC VISIT: HCPCS

## 2022-09-26 PROCEDURE — G0463 HOSPITAL OUTPT CLINIC VISIT: HCPCS | Mod: 25

## 2022-09-26 RX ORDER — DEXTROAMPHETAMINE SACCHARATE, AMPHETAMINE ASPARTATE, DEXTROAMPHETAMINE SULFATE AND AMPHETAMINE SULFATE 2.5; 2.5; 2.5; 2.5 MG/1; MG/1; MG/1; MG/1
10 TABLET ORAL 2 TIMES DAILY
COMMUNITY
Start: 2022-09-22 | End: 2023-12-08

## 2022-09-26 ASSESSMENT — PAIN SCALES - GENERAL: PAINLEVEL: MODERATE PAIN (5)

## 2022-09-26 NOTE — PROGRESS NOTES
GYN CONSULT NOTE     CHIEF COMPLAINT / REASON FOR VISIT  Patient presents for Gynecology consultation at the request of her primary provider, Dr. Olivia Ramirez, for ovarian cyst.    HISTORY OF PRESENT ILLNESS  Ritesh Soliman is a 47 year old  with No LMP recorded. Patient has had an ablation. who presents for Gynecology consultation for ovarian cyst.  The patient had an endometrial ablation in  and is amenorrheic.  She has chronic back pain and has had multiple surgical procedures.  The patient had a recent MRI of her back with an incidental finding of a pelvic cyst.  The patient just had a pelvic ultrasound and presents to discuss ovarian cyst.  The patient denies abdominal pelvic pain but she does have chronic back pain as mentioned above.  No nausea, vomiting, diarrhea, change in bowel habits.  No difficulty urinating, dysuria, hematuria, or flank pain.  No abnormal vaginal discharge, itching, irritation, malodor, or bleeding.  She is sexually active denies dyspareunia or postcoital spotting.  No fever or chills.    She has history of  section x2, surgery for ectopic pregnancy in , bilateral tubal ligation in , and endometrial ablation in .     MENSTRUAL HISTORY  Menarche was at age 9.  Menses: Amenorrhea after endometrial ablation  Intermenstrual bleeding: Denies.  Dysmenorrhea: Denies.  She is sexually active and denies issues with intercourse.   Dyspareunia: Denies.  Postcoital spotting: Denies.  Current contraception: Sterilization.  STD History: No STD history.  Last Pap smear history: Normal.  Mammogram history: Normal.    ALLERGIES     Allergies   Allergen Reactions     Augmentin [Amoxicillin-Pot Clavulanate] Swelling       MEDICATIONS    Current Outpatient Medications:      amphetamine-dextroamphetamine (ADDERALL) 10 MG tablet, Take 10 mg by mouth 2 times daily, Disp: , Rfl:      hydrocortisone (CORTAID) 1 % external cream, Apply topically 2 times daily, Disp: 30 g, Rfl: 0      ibuprofen (ADVIL/MOTRIN) 200 MG capsule, Take 600 mg by mouth every 8 hours as needed for fever, Disp: , Rfl:      traZODone (DESYREL) 100 MG tablet, Take 1 tablet (100 mg) by mouth At Bedtime (Patient taking differently: Take 100 mg by mouth nightly as needed), Disp: 30 tablet, Rfl: 3     triamcinolone (KENALOG) 0.1 % external cream, Apply topically 2 times daily Use for maximum of 14 days., Disp: 15 g, Rfl: 0    REVIEW OF SYSTEMS  As per the HPI, otherwise negative.    Past Medical History:   Diagnosis Date     Breast mass      Chronic, continuous use of opioids 2021     OSCAR (generalized anxiety disorder) 2018     Herniated nucleus pulposus, L5-S1 2021     History of recurrent UTI (urinary tract infection) 2014     Major depressive disorder, recurrent episode, moderate (H) 2018     Menorrhagia 2020    Added automatically from request for surgery [id number]     Panic attack 2018     Tobacco abuse        Past Surgical History:   Procedure Laterality Date     BACK SURGERY Left 2021    Revision of L5-S1 discectomy at [facility]     BIOPSY BREAST Right 10/19/2017    Procedure: BIOPSY BREAST;  EXCISIONAL BIOPSY RIGHT BREAST;  Surgeon: Kiko Newberry MD;  Location: [facility] OR      SECTION  1992      SECTION  2009     DILATE CERVIX, HYSTEROSCOPY, ABLATE ENDOMETRIUM, COMBINED N/A 2020    Procedure: HYSTEROSCOPY, ENDOMETRIAL ABLATION,endometrial polypectomy;  Surgeon: Juan Roger MD;  Location: [facility] OR     DISCECTOMY LUMBAR ANTERIOR Left 2021    Left L5-S1 discectomy     ESOPHAGOSCOPY, GASTROSCOPY, DUODENOSCOPY (EGD), COMBINED N/A 10/01/2018    Procedure: COMBINED ESOPHAGOSCOPY, GASTROSCOPY, DUODENOSCOPY (EGD);  UPPER ENDOSCOPY WITH BIOPSIES;  Surgeon: Kiko Newberry MD;  Location: [facility] OR     HC PLACE INTRAVASC STENT OPEN/PERQ, EA ADDL VEIN      stents for varicose veins, Dr Martinez     TUBAL LIGATION Bilateral 2009    WITH   "SECTION     TUBAL/ECTOPIC PREGNANCY      surgery for tubal pregnancy       OB History    Para Term  AB Living   4 2 2 0 2 2   SAB IAB Ectopic Multiple Live Births   1 0 1 0 2      # Outcome Date GA Lbr Zach/2nd Weight Sex Delivery Anes PTL Lv   4 Term 09    M CS-LTranv   RAYSA   3 Ectopic 2005     ECTOPIC   FD   2 Term 92    M CS-LTranv   RAYSA   1 SAB                Social History     Tobacco Use     Smoking status: Current Some Day Smoker     Packs/day: 0.10     Types: Cigarettes     Smokeless tobacco: Never Used     Tobacco comment: rarely smokes. declines quitplan referral 18   Substance Use Topics     Alcohol use: Yes     Alcohol/week: 0.0 standard drinks     Comment: occassionally     History   Sexual Activity     Sexual activity: Yes     Partners: Male     Birth control/ protection: Female Surgical       Family History   Problem Relation Age of Onset     Coronary Artery Disease Mother      Hypertension Mother      Aneurysm Mother         stomach     Coronary Artery Disease Father      Aneurysm Father         heart     Aneurysm Niece         brain     Seizure Disorder Niece      C.A.D. No family hx of      Breast Cancer No family hx of      Cancer - colorectal No family hx of      Diabetes No family hx of      Thyroid Disease No family hx of      Asthma No family hx of      Colon Cancer No family hx of      Hyperlipidemia No family hx of      OBJECTIVE  /66   Pulse 60   Resp 16   Ht 1.6 m (5' 3\")   Wt 78.5 kg (173 lb)   BMI 30.65 kg/m      General:  Well-developed, well-nourished female in no apparent distress.  Neurological: Alert and oriented x3.  Lungs:  Clear to auscultation bilaterally with good inspiratory effort.  No wheezing rhonchi or rales noted. Breathing nonlabored.  Heart:  Regular rate and rhythm without murmur. No JVD.  No peripheral vascular disease.  Abdomen: Soft, nontender, nondistended, positive bowel sounds.  No organomegaly. No rebound, no " guarding.  Pelvic exam: Declined.  Extremities:  No clubbing cyanosis or edema. Nontender bilaterally.    DIAGNOSTICS  2021 Pap NIL, negative HPV    2022 Pelvic ultrasound: The uterus measures 7.8 x 3.7 x 3.4 cm. There is a simple appearing cyst within the anterior myometrium measuring 9 x 9 x 9 mm. There is a second smaller cyst measuring 6 x 5 x 5 mm in the upper fundus. There is a hypoechoic fibroid in the posterior myometrium measuring 1.2 x 1.0 x 1.1 cm. Endometrial thickness is 5 mm. There is a nabothian cyst measuring 1.5 cm. The right ovary is 2.0 x 1.9 x 1.4 cm. The left ovary is 2.7 x 1.9 cm. There is a small left ovarian cyst versus follicle measuring 2.2 x 1.5 x 1.4 cm. Blood flow is detected in both ovaries.  IMPRESSION:  No abnormal endometrial thickening is seen at this time.  Small simple left ovarian cyst versus follicle.  Uterine fibroids and cysts.    ASSESSMENT / PLAN  Ritesh Soliman is a 47 year old  female who presents in consultation for ovarian cyst.    1 Small left ovarian 2.2 cm simple cyst    - I discussed ovarian cyst with the patient.  - I reviewed her pelvic ultrasound which shows normal-sized uterus with some small cysts in the myometrium as well as a 1.2 cm fibroid. Endometrium is normal thickness.  Both ovaries are normal size and there is a small left ovarian 2.2 cm simple appearing cyst.  - I suspect these uterine findings are changes secondary to recent endometrial ablation. They appear benign and she is asymptomatic.  - I discussed simple ovarian cyst with the patient I discussed that most likely this is an ovarian follicle.  - I recommend repeating pelvic ultrasound in 6 to 8 weeks to reassess of the ovarian cyst.  This is ordered.  Radiology will contact the patient to schedule.  - The patient has a benign abdominal exam and she is asymptomatic. Pelvic exam is declined.  - Reassurance is given to the patient.  - The patient asked appropriate questions and  these were answered for her.  - Bleeding precautions are reviewed with the patient.    - Problem list reviewed and updated.  - Follow up in 6-8 weeks for repeat pelvic ultrasound as above.    - 50 minute visit with 45 minutes spent in counseling and coordinating care.    Mike Apodaca MD  Obstetrics and Gynecology      CC: PRIMARY CARE PROVIDER: Olivia Garner.

## 2022-09-26 NOTE — NURSING NOTE
"Chief Complaint   Patient presents with     Consult     Dr. Ramirez-fibroid and ovarian cyst on USG       Initial /66   Pulse 60   Ht 1.6 m (5' 3\")   Wt 78.5 kg (173 lb)   BMI 30.65 kg/m   Estimated body mass index is 30.65 kg/m  as calculated from the following:    Height as of this encounter: 1.6 m (5' 3\").    Weight as of this encounter: 78.5 kg (173 lb).  Medication Reconciliation: complete  Flori Tsang LPN    "

## 2022-10-13 ENCOUNTER — APPOINTMENT (OUTPATIENT)
Dept: GENERAL RADIOLOGY | Facility: HOSPITAL | Age: 47
End: 2022-10-13
Attending: PHYSICIAN ASSISTANT
Payer: COMMERCIAL

## 2022-10-13 ENCOUNTER — HOSPITAL ENCOUNTER (EMERGENCY)
Facility: HOSPITAL | Age: 47
Discharge: HOME | End: 2022-10-13
Attending: PHYSICIAN ASSISTANT | Admitting: PHYSICIAN ASSISTANT
Payer: COMMERCIAL

## 2022-10-13 VITALS
OXYGEN SATURATION: 97 % | BODY MASS INDEX: 30.47 KG/M2 | SYSTOLIC BLOOD PRESSURE: 117 MMHG | TEMPERATURE: 97.9 F | RESPIRATION RATE: 20 BRPM | WEIGHT: 172 LBS | HEART RATE: 67 BPM | DIASTOLIC BLOOD PRESSURE: 81 MMHG

## 2022-10-13 DIAGNOSIS — J20.9 ACUTE BRONCHITIS: ICD-10-CM

## 2022-10-13 PROCEDURE — 99284 EMERGENCY DEPT VISIT MOD MDM: CPT | Mod: 25

## 2022-10-13 PROCEDURE — 71046 X-RAY EXAM CHEST 2 VIEWS: CPT

## 2022-10-13 PROCEDURE — 94640 AIRWAY INHALATION TREATMENT: CPT

## 2022-10-13 PROCEDURE — 250N000009 HC RX 250: Performed by: PHYSICIAN ASSISTANT

## 2022-10-13 PROCEDURE — 99284 EMERGENCY DEPT VISIT MOD MDM: CPT | Performed by: PHYSICIAN ASSISTANT

## 2022-10-13 RX ORDER — IPRATROPIUM BROMIDE AND ALBUTEROL SULFATE 2.5; .5 MG/3ML; MG/3ML
3 SOLUTION RESPIRATORY (INHALATION) ONCE
Status: COMPLETED | OUTPATIENT
Start: 2022-10-13 | End: 2022-10-13

## 2022-10-13 RX ORDER — PREDNISONE 50 MG/1
50 TABLET ORAL DAILY
Qty: 5 TABLET | Refills: 0 | Status: SHIPPED | OUTPATIENT
Start: 2022-10-13 | End: 2022-10-18

## 2022-10-13 RX ORDER — AZITHROMYCIN 250 MG/1
TABLET, FILM COATED ORAL
Qty: 6 TABLET | Refills: 0 | Status: SHIPPED | OUTPATIENT
Start: 2022-10-13 | End: 2022-10-18

## 2022-10-13 RX ADMIN — IPRATROPIUM BROMIDE AND ALBUTEROL SULFATE 3 ML: 2.5; .5 SOLUTION RESPIRATORY (INHALATION) at 15:14

## 2022-10-13 ASSESSMENT — ENCOUNTER SYMPTOMS
PALPITATIONS: 0
APPETITE CHANGE: 0
ACTIVITY CHANGE: 0
FATIGUE: 0
ABDOMINAL PAIN: 0
FEVER: 0
SHORTNESS OF BREATH: 1
COUGH: 1

## 2022-10-13 ASSESSMENT — ACTIVITIES OF DAILY LIVING (ADL)
ADLS_ACUITY_SCORE: 35
ADLS_ACUITY_SCORE: 35

## 2022-10-13 NOTE — ED PROVIDER NOTES
"  History     Chief Complaint   Patient presents with     Shortness of Breath     Patient inhaled chemical a week ago- coughing since and SOB     The history is provided by the patient.     Ritesh Soliman is a 47 year old female who presented to the emergency department ambulatory for evaluation of 1 week history of cough and dyspnea with exertion.  The patient reports that the symptoms significantly worsened after inhaling some fumes at work.  However, she began to experience his symptoms with a \"tickle in my throat and chest about a week ago.\"  She has been using her albuterol inhaler with some improvement.  She denies any significant chest pain.  She smokes intermittently.  She did have COVID a few months ago.  Denies any hemoptysis.  Denies any history of VTE.  Denies any history of unilateral leg swelling.  She does not take birth control.    Allergies:  Allergies   Allergen Reactions     Augmentin [Amoxicillin-Pot Clavulanate] Swelling       Problem List:    Patient Active Problem List    Diagnosis Date Noted     Herniated nucleus pulposus, L5-S1 06/29/2021     Priority: Medium     Chronic, continuous use of opioids 05/17/2021     Priority: Medium     Menorrhagia 05/22/2020     Priority: Medium     Added automatically from request for surgery [id number]       OSCAR (generalized anxiety disorder) 05/09/2018     Priority: Medium     Major depressive disorder, recurrent episode, moderate (H) 05/09/2018     Priority: Medium     Panic attack 05/09/2018     Priority: Medium     ACP (advance care planning) 04/29/2016     Priority: Medium     Advance Care Planning 4/29/2016: ACP Review of Chart / Resources Provided:  Reviewed chart for advance care plan.  Ritesh Soliman has been provided information and resources to begin or update their advance care plan.  Added by Ngoc Duong             Tobacco abuse      Priority: Medium        Past Medical History:    Past Medical History:   Diagnosis Date     Breast mass      " Chronic, continuous use of opioids 2021     OSCAR (generalized anxiety disorder) 2018     Herniated nucleus pulposus, L5-S1 2021     History of recurrent UTI (urinary tract infection) 2014     Major depressive disorder, recurrent episode, moderate (H) 2018     Menorrhagia 2020     Panic attack 2018     Tobacco abuse        Past Surgical History:    Past Surgical History:   Procedure Laterality Date     BACK SURGERY Left 2021    Revision of L5-S1 discectomy at [facility]     BIOPSY BREAST Right 10/19/2017    Procedure: BIOPSY BREAST;  EXCISIONAL BIOPSY RIGHT BREAST;  Surgeon: Kiko Newberry MD;  Location: [facility]      SECTION  1992      SECTION  2009     DILATE CERVIX, HYSTEROSCOPY, ABLATE ENDOMETRIUM, COMBINED N/A 2020    Procedure: HYSTEROSCOPY, ENDOMETRIAL ABLATION,endometrial polypectomy;  Surgeon: Juan Roger MD;  Location: [facility]     DISCECTOMY LUMBAR ANTERIOR Left 2021    Left L5-S1 discectomy     ESOPHAGOSCOPY, GASTROSCOPY, DUODENOSCOPY (EGD), COMBINED N/A 10/01/2018    Procedure: COMBINED ESOPHAGOSCOPY, GASTROSCOPY, DUODENOSCOPY (EGD);  UPPER ENDOSCOPY WITH BIOPSIES;  Surgeon: Kiko Newberry MD;  Location: [facility] OR     HC PLACE INTRAVASC STENT OPEN/PERQ, EA ADDL VEIN      stents for varicose veins, Dr Martinez     TUBAL LIGATION Bilateral 2009    WITH  SECTION     TUBAL/ECTOPIC PREGNANCY      surgery for tubal pregnancy       Family History:    Family History   Problem Relation Age of Onset     Coronary Artery Disease Mother      Hypertension Mother      Aneurysm Mother         stomach     Coronary Artery Disease Father      Aneurysm Father         heart     Aneurysm Niece         brain     Seizure Disorder Niece      C.A.D. No family hx of      Breast Cancer No family hx of      Cancer - colorectal No family hx of      Diabetes No family hx of      Thyroid Disease No family hx of      Asthma No family  hx of      Colon Cancer No family hx of      Hyperlipidemia No family hx of        Social History:  Marital Status:  Single [1]  Social History     Tobacco Use     Smoking status: Some Days     Packs/day: 0.10     Types: Cigarettes     Smokeless tobacco: Never     Tobacco comments:     rarely smokes. declines quitplan referral 9/18/18   Substance Use Topics     Alcohol use: Yes     Alcohol/week: 0.0 standard drinks     Comment: occassionally     Drug use: No        Medications:    azithromycin (ZITHROMAX Z-AIYANA) 250 MG tablet  predniSONE (DELTASONE) 50 MG tablet  amphetamine-dextroamphetamine (ADDERALL) 10 MG tablet  hydrocortisone (CORTAID) 1 % external cream  ibuprofen (ADVIL/MOTRIN) 200 MG capsule  traZODone (DESYREL) 100 MG tablet  triamcinolone (KENALOG) 0.1 % external cream          Review of Systems   Constitutional: Negative for activity change, appetite change, fatigue and fever.   Respiratory: Positive for cough and shortness of breath.    Cardiovascular: Negative for chest pain, palpitations and leg swelling.   Gastrointestinal: Negative for abdominal pain.   Skin: Negative.    Allergic/Immunologic: Negative for immunocompromised state.       Physical Exam   BP: 117/81  Pulse: 98  Temp: 97.7  F (36.5  C)  Resp: 18  Weight: 78 kg (172 lb)  SpO2: 94 %      Physical Exam  Vitals and nursing note reviewed.   Constitutional:       General: She is not in acute distress.     Appearance: Normal appearance. She is normal weight. She is not ill-appearing, toxic-appearing or diaphoretic.   Cardiovascular:      Rate and Rhythm: Normal rate and regular rhythm.   Pulmonary:      Effort: Pulmonary effort is normal.      Breath sounds: Normal breath sounds.      Comments: Harsh cough.  Skin:     General: Skin is warm and dry.      Capillary Refill: Capillary refill takes less than 2 seconds.   Neurological:      General: No focal deficit present.      Mental Status: She is alert and oriented to person, place, and time.    Psychiatric:         Mood and Affect: Mood normal.         ED Course                 Procedures              Critical Care time:  none               Results for orders placed or performed during the hospital encounter of 10/13/22 (from the past 24 hour(s))   XR Chest 2 Views    Narrative    PROCEDURE:  XR CHEST 2 VIEWS    HISTORY: shortness of breath    COMPARISON:  Chest radiograph 11/23/2021    FINDINGS: PA and lateral chest radiographs    Cardiomediastinal silhouette is within normal limits. No acute  airspace opacities. Pleural spaces are clear. No subdiaphragmatic free  air.      Impression    IMPRESSION:    No acute cardiopulmonary process.      SHAHID BAL MD         SYSTEM ID:  [id number]       Medications   ipratropium - albuterol 0.5 mg/2.5 mg/3 mL (DUONEB) neb solution 3 mL (3 mLs Nebulization Given 10/13/22 1514)       Assessments & Plan (with Medical Decision Making)   47-year-old female with a 1 week history of shortness of breath and cough.  Differential is broad.  X-ray is negative.  Discussed other etiologies or could cause similar presentations of dyspnea on the patient including pulmonary embolism, acute coronary syndrome, etc.  Discussed work-up required to rule out these diseases.  The patient refused.  She voiced complete understanding.  She felt significantly improved after DuoNeb.  Patient requested discharge home.  Certainly would fit any reasonable indication to add systemic steroids.  She can fill azithromycin in 72 hours if the symptoms do not improve.  However, she was advised return to the emergency department for any changes in her condition or worsening of her condition whatsoever.    This document was prepared using a combination of typing and voice generated software.  While every attempt was made for accuracy, spelling and grammatical errors may exist.    I have reviewed the nursing notes.    I have reviewed the findings, diagnosis, plan and need for follow up with the patient.           New Prescriptions    AZITHROMYCIN (ZITHROMAX Z-AIYANA) 250 MG TABLET    Two tablets on the first day, then one tablet daily for the next 4 days    PREDNISONE (DELTASONE) 50 MG TABLET    Take 1 tablet (50 mg) by mouth daily for 5 days       Final diagnoses:   Acute bronchitis       10/13/2022   [facility]     Munira Bradshaw PA-C  10/13/22 [id number]

## 2022-10-13 NOTE — DISCHARGE INSTRUCTIONS
Continue the albuterol MDI as prescribed.    Start prednisone as prescribed.    You may fill the azithromycin in 3 days if your symptoms do not improve.    It is very important that she return to this emergency department for any worsening of your condition or new symptoms whatsoever.

## 2022-10-13 NOTE — ED NOTES
Ambulated into ER room 4 independently with steady gait with c/o dyspnea on exertion and SOB when trying to sleep at night for one week. Reports she inhaled a chemical at work but is not sure what the chemical was. States she was told it was a chemical you can not breathe in so she put a shirt over her face but still inhaled the fumes some. States she did feel a bit of a tickle in her chest/throat before inhaling the chemical but denies any other symptoms. Reports she had covid about a month and a half ago. Denies vaping. Reports she does occasionally smoke cigarettes but only when she is drinking. Reports she had an albuterol inhaler left over from when she had covid so has been using that which does help her SOB. Call light within reach.

## 2022-10-13 NOTE — ED TRIAGE NOTES
Patient presents today after being exposed to a chemical a week ago and she has since been coughing more and more, has SOB now and can not sleep from it. She tried using an inhaler she got from when she had COVID but it didn't really help. Did recently start on a new med that has been giving her a headache as well.      Triage Assessment     Row Name 10/13/22 [id number]       Triage Assessment (Adult)    Airway WDL WDL       Respiratory WDL    Respiratory WDL X;cough;rhythm/pattern    Rhythm/Pattern, Respiratory shortness of breath    Cough Frequency infrequent    Cough Type bronchospastic       Skin Circulation/Temperature WDL    Skin Circulation/Temperature WDL WDL       Cardiac WDL    Cardiac WDL WDL       Peripheral/Neurovascular WDL    Peripheral Neurovascular WDL WDL    Capillary Refill, General less than/equal to 3 secs       Cognitive/Neuro/Behavioral WDL    Cognitive/Neuro/Behavioral WDL WDL       [place] Coma Scale    Best Eye Response 4-->(E4) spontaneous    Best Motor Response 6-->(M6) obeys commands    Best Verbal Response 5-->(V5) oriented    [place] Coma Scale Score 15

## 2022-10-13 NOTE — ED NOTES
Discharge instructions reviewed. Verbalized understanding. States is feeling better after neb tx's. Denies any questions or concerns. Ambulated out of ER independently with steady gait.

## 2022-10-19 ENCOUNTER — HOSPITAL ENCOUNTER (EMERGENCY)
Facility: HOSPITAL | Age: 47
Discharge: HOME | End: 2022-10-19
Attending: NURSE PRACTITIONER | Admitting: NURSE PRACTITIONER
Payer: COMMERCIAL

## 2022-10-19 VITALS
TEMPERATURE: 97.9 F | RESPIRATION RATE: 18 BRPM | OXYGEN SATURATION: 95 % | DIASTOLIC BLOOD PRESSURE: 87 MMHG | HEART RATE: 85 BPM | SYSTOLIC BLOOD PRESSURE: 128 MMHG

## 2022-10-19 DIAGNOSIS — J40 BRONCHITIS: ICD-10-CM

## 2022-10-19 LAB
ANION GAP SERPL CALCULATED.3IONS-SCNC: 13 MMOL/L (ref 7–15)
BASOPHILS # BLD AUTO: 0 10E3/UL (ref 0–0.2)
BASOPHILS NFR BLD AUTO: 0 %
BUN SERPL-MCNC: 12.4 MG/DL (ref 6–20)
CALCIUM SERPL-MCNC: 9.2 MG/DL (ref 8.6–10)
CHLORIDE SERPL-SCNC: 104 MMOL/L (ref 98–107)
CREAT SERPL-MCNC: 0.7 MG/DL (ref 0.51–0.95)
D DIMER PPP FEU-MCNC: <0.3 UG/ML FEU (ref 0–0.5)
DEPRECATED HCO3 PLAS-SCNC: 20 MMOL/L (ref 22–29)
EOSINOPHIL # BLD AUTO: 0 10E3/UL (ref 0–0.7)
EOSINOPHIL NFR BLD AUTO: 0 %
ERYTHROCYTE [DISTWIDTH] IN BLOOD BY AUTOMATED COUNT: 12.2 % (ref 10–15)
GFR SERPL CREATININE-BSD FRML MDRD: >90 ML/MIN/1.73M2
GLUCOSE SERPL-MCNC: 125 MG/DL (ref 70–99)
HCT VFR BLD AUTO: 41.9 % (ref 35–47)
HGB BLD-MCNC: 14.7 G/DL (ref 11.7–15.7)
HOLD SPECIMEN: NORMAL
HOLD SPECIMEN: NORMAL
IMM GRANULOCYTES # BLD: 0.1 10E3/UL
IMM GRANULOCYTES NFR BLD: 1 %
LYMPHOCYTES # BLD AUTO: 1.2 10E3/UL (ref 0.8–5.3)
LYMPHOCYTES NFR BLD AUTO: 11 %
MCH RBC QN AUTO: 32.6 PG (ref 26.5–33)
MCHC RBC AUTO-ENTMCNC: 35.1 G/DL (ref 31.5–36.5)
MCV RBC AUTO: 93 FL (ref 78–100)
MONOCYTES # BLD AUTO: 0.2 10E3/UL (ref 0–1.3)
MONOCYTES NFR BLD AUTO: 2 %
NEUTROPHILS # BLD AUTO: 9.3 10E3/UL (ref 1.6–8.3)
NEUTROPHILS NFR BLD AUTO: 86 %
NRBC # BLD AUTO: 0 10E3/UL
NRBC BLD AUTO-RTO: 0 /100
PLATELET # BLD AUTO: 373 10E3/UL (ref 150–450)
POTASSIUM SERPL-SCNC: 4.1 MMOL/L (ref 3.4–5.3)
RBC # BLD AUTO: 4.51 10E6/UL (ref 3.8–5.2)
SODIUM SERPL-SCNC: 137 MMOL/L (ref 136–145)
TROPONIN T SERPL HS-MCNC: <6 NG/L
WBC # BLD AUTO: 10.8 10E3/UL (ref 4–11)

## 2022-10-19 PROCEDURE — 250N000009 HC RX 250: Performed by: NURSE PRACTITIONER

## 2022-10-19 PROCEDURE — 87637 SARSCOV2&INF A&B&RSV AMP PRB: CPT | Performed by: NURSE PRACTITIONER

## 2022-10-19 PROCEDURE — C9803 HOPD COVID-19 SPEC COLLECT: HCPCS

## 2022-10-19 PROCEDURE — 99214 OFFICE O/P EST MOD 30 MIN: CPT | Performed by: NURSE PRACTITIONER

## 2022-10-19 PROCEDURE — 85025 COMPLETE CBC W/AUTO DIFF WBC: CPT | Performed by: NURSE PRACTITIONER

## 2022-10-19 PROCEDURE — 250N000013 HC RX MED GY IP 250 OP 250 PS 637: Performed by: NURSE PRACTITIONER

## 2022-10-19 PROCEDURE — 84484 ASSAY OF TROPONIN QUANT: CPT | Performed by: NURSE PRACTITIONER

## 2022-10-19 PROCEDURE — G0463 HOSPITAL OUTPT CLINIC VISIT: HCPCS

## 2022-10-19 PROCEDURE — 80048 BASIC METABOLIC PNL TOTAL CA: CPT | Performed by: NURSE PRACTITIONER

## 2022-10-19 PROCEDURE — 85379 FIBRIN DEGRADATION QUANT: CPT | Performed by: NURSE PRACTITIONER

## 2022-10-19 PROCEDURE — 36415 COLL VENOUS BLD VENIPUNCTURE: CPT | Performed by: NURSE PRACTITIONER

## 2022-10-19 RX ORDER — DEXAMETHASONE SODIUM PHOSPHATE 10 MG/ML
10 INJECTION INTRAMUSCULAR; INTRAVENOUS ONCE
Status: COMPLETED | OUTPATIENT
Start: 2022-10-19 | End: 2022-10-19

## 2022-10-19 RX ORDER — LEVOFLOXACIN 750 MG/1
750 TABLET, FILM COATED ORAL DAILY
Qty: 5 TABLET | Refills: 0 | Status: SHIPPED | OUTPATIENT
Start: 2022-10-19 | End: 2022-10-24

## 2022-10-19 RX ORDER — CODEINE PHOSPHATE AND GUAIFENESIN 10; 100 MG/5ML; MG/5ML
1-2 SOLUTION ORAL EVERY 4 HOURS PRN
Qty: 120 ML | Refills: 0 | Status: SHIPPED | OUTPATIENT
Start: 2022-10-19 | End: 2023-12-08

## 2022-10-19 RX ADMIN — DEXAMETHASONE SODIUM PHOSPHATE 10 MG: 10 INJECTION INTRAMUSCULAR; INTRAVENOUS at 19:10

## 2022-10-19 RX ADMIN — GUAIFENESIN 10 ML: 100 SOLUTION ORAL at 17:28

## 2022-10-19 ASSESSMENT — ENCOUNTER SYMPTOMS
CHILLS: 0
APPETITE CHANGE: 0
LIGHT-HEADEDNESS: 1
COUGH: 1
SHORTNESS OF BREATH: 1
DIZZINESS: 1
RHINORRHEA: 0
ACTIVITY CHANGE: 1
DIARRHEA: 0
HEADACHES: 1
SORE THROAT: 0
NAUSEA: 0
FATIGUE: 1
VOMITING: 1
EYES NEGATIVE: 1
FEVER: 0
MYALGIAS: 0

## 2022-10-19 ASSESSMENT — ACTIVITIES OF DAILY LIVING (ADL)
ADLS_ACUITY_SCORE: 35
ADLS_ACUITY_SCORE: 35

## 2022-10-19 NOTE — ED PROVIDER NOTES
History     Chief Complaint   Patient presents with     Cough     HPI  Ritesh Soliman is a 47 year old female who has just completed a Z-Gaetano and prednisone for treatment for bronchitis today.  Continues to complain of cough, shortness of breath, dizziness, headaches, lightheadedness, and vomiting from coughing.  Is fatigued from lack of sleep due to coughing.  Has been utilizing albuterol inhaler and cough medications.  No known sick contacts.  Has not had COVID vaccination.  States has had COVID 3 times.  Occasional smoker.  Denies chills, fever, nausea, diarrhea, and body aches.    Allergies:  Allergies   Allergen Reactions     Augmentin [Amoxicillin-Pot Clavulanate] Swelling       Problem List:    Patient Active Problem List    Diagnosis Date Noted     Herniated nucleus pulposus, L5-S1 06/29/2021     Priority: Medium     Chronic, continuous use of opioids 05/17/2021     Priority: Medium     Menorrhagia 05/22/2020     Priority: Medium     Added automatically from request for surgery [id number]       OSCAR (generalized anxiety disorder) 05/09/2018     Priority: Medium     Major depressive disorder, recurrent episode, moderate (H) 05/09/2018     Priority: Medium     Panic attack 05/09/2018     Priority: Medium     ACP (advance care planning) 04/29/2016     Priority: Medium     Advance Care Planning 4/29/2016: ACP Review of Chart / Resources Provided:  Reviewed chart for advance care plan.  Ritesh Soliman has been provided information and resources to begin or update their advance care plan.  Added by Ngoc Duong             Tobacco abuse      Priority: Medium        Past Medical History:    Past Medical History:   Diagnosis Date     Breast mass      Chronic, continuous use of opioids 05/17/2021     OSCAR (generalized anxiety disorder) 05/09/2018     Herniated nucleus pulposus, L5-S1 06/29/2021     History of recurrent UTI (urinary tract infection) 02/27/2014     Major depressive disorder, recurrent episode, moderate  (H) 2018     Menorrhagia 2020     Panic attack 2018     Tobacco abuse        Past Surgical History:    Past Surgical History:   Procedure Laterality Date     BACK SURGERY Left 2021    Revision of L5-S1 discectomy at [facility]     BIOPSY BREAST Right 10/19/2017    Procedure: BIOPSY BREAST;  EXCISIONAL BIOPSY RIGHT BREAST;  Surgeon: Kiko Newberry MD;  Location: [facility]      SECTION  1992      SECTION  2009     DILATE CERVIX, HYSTEROSCOPY, ABLATE ENDOMETRIUM, COMBINED N/A 2020    Procedure: HYSTEROSCOPY, ENDOMETRIAL ABLATION,endometrial polypectomy;  Surgeon: Juan Roger MD;  Location: [facility]     DISCECTOMY LUMBAR ANTERIOR Left 2021    Left L5-S1 discectomy     ESOPHAGOSCOPY, GASTROSCOPY, DUODENOSCOPY (EGD), COMBINED N/A 10/01/2018    Procedure: COMBINED ESOPHAGOSCOPY, GASTROSCOPY, DUODENOSCOPY (EGD);  UPPER ENDOSCOPY WITH BIOPSIES;  Surgeon: Kiko Newberry MD;  Location: [facility] OR     HC PLACE INTRAVASC STENT OPEN/PERQ, EA ADDL VEIN      stents for varicose veins, Dr Martinez     TUBAL LIGATION Bilateral 2009    WITH  SECTION     TUBAL/ECTOPIC PREGNANCY      surgery for tubal pregnancy       Family History:    Family History   Problem Relation Age of Onset     Coronary Artery Disease Mother      Hypertension Mother      Aneurysm Mother         stomach     Coronary Artery Disease Father      Aneurysm Father         heart     Aneurysm Niece         brain     Seizure Disorder Niece      C.A.D. No family hx of      Breast Cancer No family hx of      Cancer - colorectal No family hx of      Diabetes No family hx of      Thyroid Disease No family hx of      Asthma No family hx of      Colon Cancer No family hx of      Hyperlipidemia No family hx of        Social History:  Marital Status:  Single [1]  Social History     Tobacco Use     Smoking status: Some Days     Packs/day: 0.10     Types: Cigarettes     Smokeless tobacco: Never      Tobacco comments:     rarely smokes. declines quitplan referral 9/18/18   Substance Use Topics     Alcohol use: Yes     Alcohol/week: 0.0 standard drinks     Comment: occassionally     Drug use: No        Medications:    guaiFENesin-codeine (ROBITUSSIN AC) 100-10 MG/5ML solution  levofloxacin (LEVAQUIN) 750 MG tablet  amphetamine-dextroamphetamine (ADDERALL) 10 MG tablet  hydrocortisone (CORTAID) 1 % external cream  ibuprofen (ADVIL/MOTRIN) 200 MG capsule  traZODone (DESYREL) 100 MG tablet  triamcinolone (KENALOG) 0.1 % external cream          Review of Systems   Constitutional: Positive for activity change and fatigue (lack of sleep). Negative for appetite change, chills and fever.   HENT: Negative for ear pain, rhinorrhea and sore throat.    Eyes: Negative.         Burning   Respiratory: Positive for cough and shortness of breath.    Gastrointestinal: Positive for vomiting ( from coughing). Negative for diarrhea and nausea.   Genitourinary: Negative.    Musculoskeletal: Negative for myalgias.   Skin: Negative.    Neurological: Positive for dizziness, light-headedness and headaches.       Physical Exam   BP: 128/87  Pulse: 85  Temp: 97.9  F (36.6  C)  Resp: 18  SpO2: 95 %      Physical Exam  Vitals and nursing note reviewed.   Constitutional:       General: She is not in acute distress.     Appearance: She is overweight.   HENT:      Head: Normocephalic.      Right Ear: Tympanic membrane and ear canal normal.      Left Ear: Tympanic membrane and ear canal normal.      Nose: Nose normal.      Right Sinus: No maxillary sinus tenderness or frontal sinus tenderness.      Left Sinus: No maxillary sinus tenderness or frontal sinus tenderness.      Mouth/Throat:      Lips: Pink.      Mouth: Mucous membranes are moist.      Pharynx: Uvula midline. No posterior oropharyngeal erythema.   Eyes:      Conjunctiva/sclera: Conjunctivae normal.   Cardiovascular:      Rate and Rhythm: Normal rate and regular rhythm.      Heart  sounds: Normal heart sounds. No murmur heard.  Pulmonary:      Effort: Pulmonary effort is normal. No respiratory distress.      Breath sounds: Normal breath sounds. No wheezing.   Lymphadenopathy:      Cervical: No cervical adenopathy.   Skin:     General: Skin is warm and dry.   Neurological:      Mental Status: She is alert and oriented to person, place, and time.   Psychiatric:         Behavior: Behavior normal.         ED Course                 Procedures             Results for orders placed or performed during the hospital encounter of 10/19/22 (from the past 24 hour(s))   D dimer quantitative   Result Value Ref Range    D-Dimer Quantitative <0.30 0.00 - 0.50 ug/mL FEU    Narrative    This D-dimer assay is intended for use in conjunction with a clinical pretest probability assessment model to exclude pulmonary embolism (PE) and deep venous thrombosis (DVT) in outpatients suspected of PE or DVT. The cut-off value is 0.50 ug/mL FEU.   CBC with platelets differential    Narrative    The following orders were created for panel order CBC with platelets differential.  Procedure                               Abnormality         Status                     ---------                               -----------         ------                     CBC with platelets and d...[[id number]]  Abnormal            Final result                 Please view results for these tests on the individual orders.   Basic metabolic panel   Result Value Ref Range    Sodium 137 136 - 145 mmol/L    Potassium 4.1 3.4 - 5.3 mmol/L    Chloride 104 98 - 107 mmol/L    Carbon Dioxide (CO2) 20 (L) 22 - 29 mmol/L    Anion Gap 13 7 - 15 mmol/L    Urea Nitrogen 12.4 6.0 - 20.0 mg/dL    Creatinine 0.70 0.51 - 0.95 mg/dL    Calcium 9.2 8.6 - 10.0 mg/dL    Glucose 125 (H) 70 - 99 mg/dL    GFR Estimate >90 >60 mL/min/1.73m2   Troponin T, High Sensitivity   Result Value Ref Range    Troponin T, High Sensitivity <6 <=14 ng/L   CBC with platelets and  differential   Result Value Ref Range    WBC Count 10.8 4.0 - 11.0 10e3/uL    RBC Count 4.51 3.80 - 5.20 10e6/uL    Hemoglobin 14.7 11.7 - 15.7 g/dL    Hematocrit 41.9 35.0 - 47.0 %    MCV 93 78 - 100 fL    MCH 32.6 26.5 - 33.0 pg    MCHC 35.1 31.5 - 36.5 g/dL    RDW 12.2 10.0 - 15.0 %    Platelet Count 373 150 - 450 10e3/uL    % Neutrophils 86 %    % Lymphocytes 11 %    % Monocytes 2 %    % Eosinophils 0 %    % Basophils 0 %    % Immature Granulocytes 1 %    NRBCs per 100 WBC 0 <1 /100    Absolute Neutrophils 9.3 (H) 1.6 - 8.3 10e3/uL    Absolute Lymphocytes 1.2 0.8 - 5.3 10e3/uL    Absolute Monocytes 0.2 0.0 - 1.3 10e3/uL    Absolute Eosinophils 0.0 0.0 - 0.7 10e3/uL    Absolute Basophils 0.0 0.0 - 0.2 10e3/uL    Absolute Immature Granulocytes 0.1 <=0.4 10e3/uL    Absolute NRBCs 0.0 10e3/uL   Extra Tube    Narrative    The following orders were created for panel order Extra Tube.  Procedure                               Abnormality         Status                     ---------                               -----------         ------                     Extra Blue Top Tube[[id number]]                                                         Extra Red Top Tube[[id number]]                               Final result               Extra Heparinized Syringe[[id number]]                        Final result                 Please view results for these tests on the individual orders.   Extra Red Top Tube   Result Value Ref Range    Hold Specimen JIC    Extra Heparinized Syringe   Result Value Ref Range    Hold Specimen JIC        Medications   dexamethasone (DECADRON) injectable solution used ORALLY 10 mg (10 mg Oral Given 10/19/22 1910)       Assessments & Plan (with Medical Decision Making)     I have reviewed the nursing notes.    I have reviewed the findings, diagnosis, plan and need for follow up with the patient.  (J40) Bronchitis  Comment: 47 year old female who has just completed a Z-Gaetano and prednisone for treatment for  bronchitis today.  Continues to complain of cough, shortness of breath, dizziness, headaches, lightheadedness, and vomiting from coughing.  Is fatigued from lack of sleep due to coughing.  Has been utilizing albuterol inhaler and cough medications.  No known sick contacts.  Has not had COVID vaccination.  States has had COVID 3 times.  Occasional smoker.  Denies chills, fever, nausea, diarrhea, and body aches.    MDM: NHT. Lungs CTA  Nonproductive cough    Negative CBC, BMP, troponin, and D-dimer  Multiplex nasopharyngeal swab test results pending    Negative chest x-ray on 10/13/2022    Plan: Guaifenesin with codeine and levofloxacin.  Education provided and/or discussed for this/these medications and bronchitis.  Follow-up with primary care provider as needed  Return to ER/urgent care for worsening of symptoms  These discharge instructions and medications were reviewed with her and understanding verbalized.    This document was prepared using a combination of typing and voice generated software.  While every attempt was made for accuracy, spelling and grammatical errors may exist.    Discharge Medication List as of 10/19/2022  7:10 PM      START taking these medications    Details   guaiFENesin-codeine (ROBITUSSIN AC) 100-10 MG/5ML solution Take 5-10 mLs by mouth every 4 hours as needed for cough, Disp-120 mL, R-0, [facility]      levofloxacin (LEVAQUIN) 750 MG tablet Take 1 tablet (750 mg) by mouth daily for 5 days, Disp-5 tablet, R-0, E-Prescribe             Final diagnoses:   Bronchitis       10/19/2022   [facility]       Yuliya Holt, CNP  10/19/22 2762

## 2022-10-19 NOTE — ED TRIAGE NOTES
Pt presents with c/o ongoing productive cough. Reports that she was just seen for this and given medications (prednisone, zpack, albuteril inhaler). Told to come back if not feeling better. Pt had covid a few months ago.

## 2022-10-19 NOTE — ED TRIAGE NOTES
"Patient presents with complaints of a cough that she states she was seen for and took meds for that is just not getting better. States she was sent home from work today and was told if not better \"to come back here\"      "

## 2022-10-20 LAB
FLUAV RNA SPEC QL NAA+PROBE: NEGATIVE
FLUBV RNA RESP QL NAA+PROBE: NEGATIVE
RSV RNA SPEC NAA+PROBE: NEGATIVE
SARS-COV-2 RNA RESP QL NAA+PROBE: NEGATIVE

## 2022-10-20 NOTE — DISCHARGE INSTRUCTIONS
Follow-up with primary care provider as needed  Return to ER/urgent care for worsening of symptoms

## 2022-10-20 NOTE — ED NOTES
Patient called to clarify where her other medication was. Talked with provider as she states it was insty med for the cough syrup. Patient states she read all the medication information, but didn't realize it was here. Her thought was that she'd be picking up everything at the pharmacy. She states she'll pull it out of the garbage and come get it as she states she threw it away.

## 2022-11-07 ENCOUNTER — HOSPITAL ENCOUNTER (OUTPATIENT)
Dept: ULTRASOUND IMAGING | Facility: HOSPITAL | Age: 47
Discharge: HOME | End: 2022-11-07
Attending: OBSTETRICS & GYNECOLOGY | Admitting: OBSTETRICS & GYNECOLOGY
Payer: COMMERCIAL

## 2022-11-07 DIAGNOSIS — N83.202 CYST OF LEFT OVARY: ICD-10-CM

## 2022-11-07 PROCEDURE — 76856 US EXAM PELVIC COMPLETE: CPT

## 2022-11-08 ENCOUNTER — TELEPHONE (OUTPATIENT)
Dept: OBGYN | Facility: OTHER | Age: 47
End: 2022-11-08

## 2022-11-08 NOTE — TELEPHONE ENCOUNTER
I called the patient to discuss her pelvic ultrasound results.    Pelvic ultrasound shows normal appearing uterus with a small uterine fibroid and a normal right ovary with good blood flow.  The patient has a small 1.7 cm left simple ovarian cyst which has decreased in size from prior ultrasound which was 2.2 cm..  No free fluid in the cul-de-sac.    Reassurance is given to the patient.  The small left ovarian cyst is smaller than prior ultrasound.  This appears to be a simple cyst.  The patient denies abdominal or pelvic pain.  She is sexually active denies dyspareunia or postcoital spotting.  All of the patient's questions were answered.  Mike Apodaca MD

## 2023-03-08 NOTE — TELEPHONE ENCOUNTER
Received a DENIAL from [facility] for Diclofenac Sodium 1% gel.       Forms scanned to Epic.   O-Z Plasty Text: The defect edges were debeveled with a #15 scalpel blade.  Given the location of the defect, shape of the defect and the proximity to free margins an O-Z plasty (double transposition flap) was deemed most appropriate.  Using a sterile surgical marker, the appropriate transposition flaps were drawn incorporating the defect and placing the expected incisions within the relaxed skin tension lines where possible.    The area thus outlined was incised deep to adipose tissue with a #15 scalpel blade.  The skin margins were undermined to an appropriate distance in all directions utilizing iris scissors.  Hemostasis was achieved with electrocautery.  The flaps were then transposed into place, one clockwise and the other counterclockwise, and anchored with interrupted buried subcutaneous sutures.

## 2023-03-09 ENCOUNTER — NURSE TRIAGE (OUTPATIENT)
Dept: FAMILY MEDICINE | Facility: OTHER | Age: 48
End: 2023-03-09

## 2023-03-09 ENCOUNTER — LAB (OUTPATIENT)
Dept: LAB | Facility: OTHER | Age: 48
End: 2023-03-09
Payer: COMMERCIAL

## 2023-03-09 ENCOUNTER — TELEPHONE (OUTPATIENT)
Dept: FAMILY MEDICINE | Facility: OTHER | Age: 48
End: 2023-03-09

## 2023-03-09 DIAGNOSIS — R39.9 URINARY SYMPTOM OR SIGN: ICD-10-CM

## 2023-03-09 DIAGNOSIS — N30.00 ACUTE CYSTITIS WITHOUT HEMATURIA: Primary | ICD-10-CM

## 2023-03-09 DIAGNOSIS — R39.9 URINARY SYMPTOM OR SIGN: Primary | ICD-10-CM

## 2023-03-09 LAB
ALBUMIN UR-MCNC: NEGATIVE MG/DL
APPEARANCE UR: ABNORMAL
BACTERIA #/AREA URNS HPF: ABNORMAL /HPF
BILIRUB UR QL STRIP: NEGATIVE
COLOR UR AUTO: ABNORMAL
GLUCOSE UR STRIP-MCNC: NEGATIVE MG/DL
HGB UR QL STRIP: ABNORMAL
KETONES UR STRIP-MCNC: NEGATIVE MG/DL
LEUKOCYTE ESTERASE UR QL STRIP: ABNORMAL
MUCOUS THREADS #/AREA URNS LPF: PRESENT /LPF
NITRATE UR QL: POSITIVE
PH UR STRIP: 5.5 [PH] (ref 4.7–8)
RBC URINE: 2 /HPF
SP GR UR STRIP: 1.02 (ref 1–1.03)
SQUAMOUS EPITHELIAL: 7 /HPF
UROBILINOGEN UR STRIP-MCNC: NORMAL MG/DL
WBC CLUMPS #/AREA URNS HPF: PRESENT /HPF
WBC URINE: >182 /HPF

## 2023-03-09 PROCEDURE — 87086 URINE CULTURE/COLONY COUNT: CPT | Mod: ZL

## 2023-03-09 PROCEDURE — 81001 URINALYSIS AUTO W/SCOPE: CPT | Mod: ZL

## 2023-03-09 RX ORDER — NITROFURANTOIN 25; 75 MG/1; MG/1
100 CAPSULE ORAL 2 TIMES DAILY
Qty: 10 CAPSULE | Refills: 0 | Status: SHIPPED | OUTPATIENT
Start: 2023-03-09 | End: 2023-03-14

## 2023-03-09 NOTE — TELEPHONE ENCOUNTER
"Patient urgently requesting medication for possible UTI. Patient declines to be seen in clinic today. States prior provider would prescribe sight unseen.   Writer stated most likely provider will request office visit or at least a UA sample.   Patient asked for RX today to be sent to [facility] and will submit UA in the morning.   Pended to provider to review and advise.     Reason for Disposition    Urinating more frequently than usual (i.e., frequency)    Additional Information    Negative: Shock suspected (e.g., cold/pale/clammy skin, too weak to stand, low BP, rapid pulse)    Negative: Sounds like a life-threatening emergency to the triager    Negative: Followed a female genital area injury (e.g., vagina, vulva)    Negative: Followed a male genital area injury (penis, scrotum)    Negative: Vaginal discharge    Negative: Pus (white, yellow) or bloody discharge from end of penis    Negative: Pain or burning with passing urine (urination) and pregnant    Negative: Pain or burning with passing urine (urination) and female    Negative: Pain or burning with passing urine (urination) and male    Negative: Pain or itching in the vulvar area    Negative: Pain in scrotum is main symptom    Negative: Blood in the urine is main symptom    Negative: Symptoms arising from use of a urinary catheter (e.g., coude, Jeronimo)    Negative: Unable to urinate (or only a few drops) > 4 hours and bladder feels very full (e.g., palpable bladder or strong urge to urinate)    Negative: Decreased urination and drinking very little and dehydration suspected (e.g., dark urine, no urine > 12 hours, very dry mouth, very lightheaded)    Negative: Patient sounds very sick or weak to the triager    Negative: Fever > 100.4 F  (38.0 C)    Answer Assessment - Initial Assessment Questions  1. SYMPTOM: \"What's the main symptom you're concerned about?\" (e.g., frequency, incontinence)     Last night: frequency, urgency, burning with urination, cloudy " "urine  2. ONSET: \"When did the    start?\"      Last night  3. PAIN: \"Is there any pain?\" If Yes, ask: \"How bad is it?\" (Scale: 1-10; mild, moderate, severe)      Moderate-severe  4. CAUSE: \"What do you think is causing the symptoms?\"      UTI  5. OTHER SYMPTOMS: \"Do you have any other symptoms?\" (e.g., fever, flank pain, blood in urine, pain with urination)      Pain with urination    Protocols used: URINARY SYMPTOMS-A-OH      "

## 2023-03-09 NOTE — TELEPHONE ENCOUNTER
3:31 PM    Reason for Call: Phone Call    Description: Pt was able to come in for lab. Please call with results to discuss.    Was an appointment offered for this call? No  If yes : Appointment type              Date    Preferred method for responding to this message: Telephone Call  What is your phone number ? [phone]    If we cannot reach you directly, may we leave a detailed response at the number you provided? Yes    Can this message wait until your PCP/provider returns, if available today? Not applicable, provider is in today    Ana Campa

## 2023-03-09 NOTE — TELEPHONE ENCOUNTER
Spoke with patient and relayed information below. Pt stated she would not be coming in to complete the urine sample and would call some friends to find an antibiotic a different way.

## 2023-03-11 LAB — BACTERIA UR CULT: NORMAL

## 2023-07-23 ENCOUNTER — HEALTH MAINTENANCE LETTER (OUTPATIENT)
Age: 48
End: 2023-07-23

## 2023-12-06 RX ORDER — LISDEXAMFETAMINE DIMESYLATE 60 MG/1
60 CAPSULE ORAL EVERY OTHER DAY
COMMUNITY
Start: 2023-02-02 | End: 2024-05-09

## 2023-12-06 NOTE — PROGRESS NOTES
Assessment & Plan     Insomnia, unspecified type  - Pt reports r/t back pain   - Pt reports taking THC gummies at HS to promote sleep and pain relief   - c/w traZODone (DESYREL) 100 MG tablet; Take 1 tablet (100 mg) by mouth nightly as needed for sleep    Herniated nucleus pulposus, L5-S1  - Pt reports loss of bladder at times, specifically by the end of the day  - Neurosurgery Referral - [facility], follows with   - MR Lumbar Spine w/o Contrast; Future  - trial of - methocarbamol (ROBAXIN) 750 MG tablet; Take 1 tablet (750 mg) by mouth 3 times daily as needed for muscle spasms    Arthralgia, unspecified joint  CRP elevated. RF negative other labs pending. No shoulder weakness, CRP most likely elevated due to some smoking, weight, and chronic pain.   - Specifically MEL hands,wrists, ankles, feet accompanied with weakness. Denies AM stiffness, worsening t/o day  - Anti Nuclear Yuliana IgG by IFA with Reflex  - Erythrocyte sedimentation rate auto  - CRP inflammation  - Cyclic Citrullinated Peptide Antibody IgG  - Rheumatoid factor  - BNP-N terminal pro  - TSH with free T4 reflex  - Comprehensive metabolic panel (BMP + Alb, Alk Phos, ALT, AST, Total. Bili, TP)  - CBC with platelets and differential  - methocarbamol (ROBAXIN) 750 MG tablet; Take 1 tablet (750 mg) by mouth 3 times daily as needed for muscle spasms    - consideration for a trial of steroids, has tolerated prednisone in the past      Dysphasia  - Reports symptoms started about 5 years ago, however in the last year has become more frequent. Takes small bites of food however symptoms continue. At times will have to spit out food. Denies heartburn.   - Adult GI  Referral - Procedure Only; Future    Special screening for malignant neoplasms, colon  - Has never had colonoscopy, denies GI changes/symptoms  - Adult GI  Referral - Procedure Only; Future      Return in about 3 months (around 3/8/2024) for recheck .    Salomon Elder NP  Student      I was present with the nurse practitioner student who participated in the service and in the documentation of the note. I have verified the history and personally performed the physical exam and medical decision making. I agree with the assessment and plan of care as documented in the note.       Olivia Ramirez MD  [facility] [place]    Subjective   Star is a 48 year old, presenting for the following health issues:  Back Pain and Arthritis      HPI     Back Pain     Duration: Ongoing        Specific cause: standing f/t work, always had a bad back  Description:   Location of pain: low back bilateral  Character of pain: sharp, dull ache, stabbing, gnawing, burning, fullness, cramping, depends on what she is doing, and constant  Pain radiation:radiates into the right leg, radiates into the right foot, radiates into the left leg, and radiates into the left foot  New numbness or weakness in legs, not attributed to pain:  no   Intensity: At its worst 10/10  History:   Pain interferes with job: YES  History of back problems: previous spinal surgery -   Any previous MRI or X-rays: Yes--at [facility].  Date 07/28/2022  Sees a specialist for back pain:  No  Therapies tried without relief: chiropractor, Physical Therapy, rest, standing, steroid injection, and surgery  Alleviating factors:   Improved by: none    Precipitating factors:  Worsened by: Walking and waking up. Pain does not ever go away        JOINT PAIN    Duration: ongoing  Description  Location: hands feet legs and knees  Intensity:  moderate, 4/10  Accompanying signs and symptoms: swelling and achy to bend  History  Previous similar problem: no   Previous evaluation:  none  Precipitating or alleviating factors:  Trauma or overuse: YES- overuse d/t being a   Aggravating factors include: sitting, standing, walking, climbing stairs, lifting, exercise, overuse, and cold or damp weather  Therapies tried and  outcome: nothing     Answers submitted by the patient for this visit:  Patient Health Questionnaire (Submitted on 2023)  If you checked off any problems, how difficult have these problems made it for you to do your work, take care of things at home, or get along with other people?: Somewhat difficult  PHQ9 TOTAL SCORE: 14  OSCAR-7 (Submitted on 2023)  OSCAR 7 TOTAL SCORE: 14    Review of Systems   Constitutional: Negative.    HENT:  Positive for trouble swallowing.    Eyes: Negative.    Respiratory: Negative.     Cardiovascular:  Positive for peripheral edema.   Gastrointestinal: Negative.    Endocrine: Negative.    Breasts:  negative.    Musculoskeletal:  Positive for arthralgias, back pain and joint swelling.   Skin: Negative.    Allergic/Immunologic: Negative.    Neurological: Negative.    Hematological: Negative.    Psychiatric/Behavioral: Negative.           Objective    BP 98/70   Pulse 53   Temp 97.1  F (36.2  C) (Tympanic)   Resp 18   Wt 78.3 kg (172 lb 9.6 oz)   SpO2 95%   BMI 30.57 kg/m    Body mass index is 30.57 kg/m .  Physical Exam  Constitutional:       Appearance: Normal appearance.   Neck:      Thyroid: No thyroid mass or thyroid tenderness.   Cardiovascular:      Rate and Rhythm: Normal rate and regular rhythm.   Pulmonary:      Effort: Pulmonary effort is normal.      Breath sounds: Normal breath sounds.   Abdominal:      General: Bowel sounds are normal.      Palpations: Abdomen is soft.   Musculoskeletal:         General: Swelling present.      Right hand: Swelling and tenderness present. Decreased strength.      Left hand: Swelling and tenderness present. Decreased strength.      Right lower le+ Edema present.      Left lower le+ Edema present.      Right ankle: Swelling present.      Left ankle: Swelling present.      Right foot: Swelling and tenderness present.      Left foot: Swelling and tenderness present.   Neurological:      Mental Status: She is alert and oriented to  person, place, and time.   Psychiatric:         Mood and Affect: Mood normal.          Results for orders placed or performed in visit on 12/08/23 (from the past 24 hour(s))   Erythrocyte sedimentation rate auto   Result Value Ref Range    Erythrocyte Sedimentation Rate 9 0 - 30 mm/hr   CRP inflammation   Result Value Ref Range    CRP Inflammation 10.94 (H) <5.00 mg/L   BNP-N terminal pro   Result Value Ref Range    N Terminal Pro BNP Outpatient <36 0 - 900 pg/mL   TSH with free T4 reflex   Result Value Ref Range    TSH 2.02 0.30 - 4.20 uIU/mL   Comprehensive metabolic panel (BMP + Alb, Alk Phos, ALT, AST, Total. Bili, TP)   Result Value Ref Range    Sodium 140 135 - 145 mmol/L    Potassium 3.9 3.4 - 5.3 mmol/L    Carbon Dioxide (CO2) 25 22 - 29 mmol/L    Anion Gap 10 7 - 15 mmol/L    Urea Nitrogen 23.5 (H) 6.0 - 20.0 mg/dL    Creatinine 0.95 0.51 - 0.95 mg/dL    GFR Estimate 73 >60 mL/min/1.73m2    Calcium 9.2 8.6 - 10.0 mg/dL    Chloride 105 98 - 107 mmol/L    Glucose 94 70 - 99 mg/dL    Alkaline Phosphatase 78 40 - 150 U/L    AST 18 0 - 45 U/L    ALT 16 0 - 50 U/L    Protein Total 6.7 6.4 - 8.3 g/dL    Albumin 4.2 3.5 - 5.2 g/dL    Bilirubin Total 0.4 <=1.2 mg/dL   CBC with platelets and differential    Narrative    The following orders were created for panel order CBC with platelets and differential.  Procedure                               Abnormality         Status                     ---------                               -----------         ------                     CBC with platelets and d...[[id number]]                      Final result                 Please view results for these tests on the individual orders.   CBC with platelets and differential   Result Value Ref Range    WBC Count 6.7 4.0 - 11.0 10e3/uL    RBC Count 4.35 3.80 - 5.20 10e6/uL    Hemoglobin 14.0 11.7 - 15.7 g/dL    Hematocrit 40.7 35.0 - 47.0 %    MCV 94 78 - 100 fL    MCH 32.2 26.5 - 33.0 pg    MCHC 34.4 31.5 - 36.5 g/dL    RDW 12.4  10.0 - 15.0 %    Platelet Count 282 150 - 450 10e3/uL    % Neutrophils 58 %    % Lymphocytes 27 %    % Monocytes 9 %    % Eosinophils 6 %    % Basophils 0 %    % Immature Granulocytes 0 %    NRBCs per 100 WBC 0 <1 /100    Absolute Neutrophils 3.9 1.6 - 8.3 10e3/uL    Absolute Lymphocytes 1.8 0.8 - 5.3 10e3/uL    Absolute Monocytes 0.6 0.0 - 1.3 10e3/uL    Absolute Eosinophils 0.4 0.0 - 0.7 10e3/uL    Absolute Basophils 0.0 0.0 - 0.2 10e3/uL    Absolute Immature Granulocytes 0.0 <=0.4 10e3/uL    Absolute NRBCs 0.0 10e3/uL

## 2023-12-08 ENCOUNTER — OFFICE VISIT (OUTPATIENT)
Dept: FAMILY MEDICINE | Facility: OTHER | Age: 48
End: 2023-12-08
Attending: FAMILY MEDICINE
Payer: COMMERCIAL

## 2023-12-08 VITALS
OXYGEN SATURATION: 95 % | HEART RATE: 53 BPM | TEMPERATURE: 97.1 F | RESPIRATION RATE: 18 BRPM | SYSTOLIC BLOOD PRESSURE: 98 MMHG | WEIGHT: 172.6 LBS | DIASTOLIC BLOOD PRESSURE: 70 MMHG | BODY MASS INDEX: 30.57 KG/M2

## 2023-12-08 DIAGNOSIS — Z12.11 SPECIAL SCREENING FOR MALIGNANT NEOPLASMS, COLON: ICD-10-CM

## 2023-12-08 DIAGNOSIS — G47.00 INSOMNIA, UNSPECIFIED TYPE: ICD-10-CM

## 2023-12-08 DIAGNOSIS — R47.02 DYSPHASIA: ICD-10-CM

## 2023-12-08 DIAGNOSIS — M51.27 HERNIATED NUCLEUS PULPOSUS, L5-S1: Primary | ICD-10-CM

## 2023-12-08 DIAGNOSIS — M25.50 ARTHRALGIA, UNSPECIFIED JOINT: ICD-10-CM

## 2023-12-08 LAB
ALBUMIN SERPL BCG-MCNC: 4.2 G/DL (ref 3.5–5.2)
ALP SERPL-CCNC: 78 U/L (ref 40–150)
ALT SERPL W P-5'-P-CCNC: 16 U/L (ref 0–50)
ANION GAP SERPL CALCULATED.3IONS-SCNC: 10 MMOL/L (ref 7–15)
AST SERPL W P-5'-P-CCNC: 18 U/L (ref 0–45)
BASOPHILS # BLD AUTO: 0 10E3/UL (ref 0–0.2)
BASOPHILS NFR BLD AUTO: 0 %
BILIRUB SERPL-MCNC: 0.4 MG/DL
BUN SERPL-MCNC: 23.5 MG/DL (ref 6–20)
CALCIUM SERPL-MCNC: 9.2 MG/DL (ref 8.6–10)
CHLORIDE SERPL-SCNC: 105 MMOL/L (ref 98–107)
CREAT SERPL-MCNC: 0.95 MG/DL (ref 0.51–0.95)
CRP SERPL-MCNC: 10.94 MG/L
DEPRECATED HCO3 PLAS-SCNC: 25 MMOL/L (ref 22–29)
EGFRCR SERPLBLD CKD-EPI 2021: 73 ML/MIN/1.73M2
EOSINOPHIL # BLD AUTO: 0.4 10E3/UL (ref 0–0.7)
EOSINOPHIL NFR BLD AUTO: 6 %
ERYTHROCYTE [DISTWIDTH] IN BLOOD BY AUTOMATED COUNT: 12.4 % (ref 10–15)
ERYTHROCYTE [SEDIMENTATION RATE] IN BLOOD BY WESTERGREN METHOD: 9 MM/HR (ref 0–30)
GLUCOSE SERPL-MCNC: 94 MG/DL (ref 70–99)
HCT VFR BLD AUTO: 40.7 % (ref 35–47)
HGB BLD-MCNC: 14 G/DL (ref 11.7–15.7)
IMM GRANULOCYTES # BLD: 0 10E3/UL
IMM GRANULOCYTES NFR BLD: 0 %
LYMPHOCYTES # BLD AUTO: 1.8 10E3/UL (ref 0.8–5.3)
LYMPHOCYTES NFR BLD AUTO: 27 %
MCH RBC QN AUTO: 32.2 PG (ref 26.5–33)
MCHC RBC AUTO-ENTMCNC: 34.4 G/DL (ref 31.5–36.5)
MCV RBC AUTO: 94 FL (ref 78–100)
MONOCYTES # BLD AUTO: 0.6 10E3/UL (ref 0–1.3)
MONOCYTES NFR BLD AUTO: 9 %
NEUTROPHILS # BLD AUTO: 3.9 10E3/UL (ref 1.6–8.3)
NEUTROPHILS NFR BLD AUTO: 58 %
NRBC # BLD AUTO: 0 10E3/UL
NRBC BLD AUTO-RTO: 0 /100
NT-PROBNP SERPL-MCNC: <36 PG/ML (ref 0–900)
PLATELET # BLD AUTO: 282 10E3/UL (ref 150–450)
POTASSIUM SERPL-SCNC: 3.9 MMOL/L (ref 3.4–5.3)
PROT SERPL-MCNC: 6.7 G/DL (ref 6.4–8.3)
RBC # BLD AUTO: 4.35 10E6/UL (ref 3.8–5.2)
RHEUMATOID FACT SERPL-ACNC: <10 IU/ML
SODIUM SERPL-SCNC: 140 MMOL/L (ref 135–145)
TSH SERPL DL<=0.005 MIU/L-ACNC: 2.02 UIU/ML (ref 0.3–4.2)
WBC # BLD AUTO: 6.7 10E3/UL (ref 4–11)

## 2023-12-08 PROCEDURE — 86038 ANTINUCLEAR ANTIBODIES: CPT | Mod: ZL | Performed by: FAMILY MEDICINE

## 2023-12-08 PROCEDURE — 86140 C-REACTIVE PROTEIN: CPT | Mod: ZL | Performed by: FAMILY MEDICINE

## 2023-12-08 PROCEDURE — 84443 ASSAY THYROID STIM HORMONE: CPT | Mod: ZL | Performed by: FAMILY MEDICINE

## 2023-12-08 PROCEDURE — 86431 RHEUMATOID FACTOR QUANT: CPT | Mod: ZL | Performed by: FAMILY MEDICINE

## 2023-12-08 PROCEDURE — 83880 ASSAY OF NATRIURETIC PEPTIDE: CPT | Mod: ZL | Performed by: FAMILY MEDICINE

## 2023-12-08 PROCEDURE — 99214 OFFICE O/P EST MOD 30 MIN: CPT | Performed by: FAMILY MEDICINE

## 2023-12-08 PROCEDURE — 36415 COLL VENOUS BLD VENIPUNCTURE: CPT | Mod: ZL | Performed by: FAMILY MEDICINE

## 2023-12-08 PROCEDURE — 86200 CCP ANTIBODY: CPT | Mod: ZL | Performed by: FAMILY MEDICINE

## 2023-12-08 PROCEDURE — 85652 RBC SED RATE AUTOMATED: CPT | Mod: ZL | Performed by: FAMILY MEDICINE

## 2023-12-08 PROCEDURE — 80053 COMPREHEN METABOLIC PANEL: CPT | Mod: ZL | Performed by: FAMILY MEDICINE

## 2023-12-08 PROCEDURE — 85025 COMPLETE CBC W/AUTO DIFF WBC: CPT | Mod: ZL | Performed by: FAMILY MEDICINE

## 2023-12-08 PROCEDURE — G0463 HOSPITAL OUTPT CLINIC VISIT: HCPCS | Performed by: FAMILY MEDICINE

## 2023-12-08 RX ORDER — TRAZODONE HYDROCHLORIDE 100 MG/1
100 TABLET ORAL
Qty: 30 TABLET | Refills: 5 | Status: SHIPPED | OUTPATIENT
Start: 2023-12-08

## 2023-12-08 RX ORDER — METHOCARBAMOL 750 MG/1
750 TABLET, FILM COATED ORAL 3 TIMES DAILY PRN
Qty: 90 TABLET | Refills: 1 | Status: SHIPPED | OUTPATIENT
Start: 2023-12-08

## 2023-12-08 RX ORDER — DEXTROAMPHETAMINE SACCHARATE, AMPHETAMINE ASPARTATE MONOHYDRATE, DEXTROAMPHETAMINE SULFATE AND AMPHETAMINE SULFATE 6.25; 6.25; 6.25; 6.25 MG/1; MG/1; MG/1; MG/1
CAPSULE, EXTENDED RELEASE ORAL EVERY OTHER DAY
COMMUNITY
Start: 2023-11-28 | End: 2024-05-09

## 2023-12-08 ASSESSMENT — ENCOUNTER SYMPTOMS
ARTHRALGIAS: 1
CONSTITUTIONAL NEGATIVE: 1
JOINT SWELLING: 1
PSYCHIATRIC NEGATIVE: 1
EYES NEGATIVE: 1
ALLERGIC/IMMUNOLOGIC NEGATIVE: 1
RESPIRATORY NEGATIVE: 1
HEMATOLOGIC/LYMPHATIC NEGATIVE: 1
BACK PAIN: 1
GASTROINTESTINAL NEGATIVE: 1
ENDOCRINE NEGATIVE: 1
TROUBLE SWALLOWING: 1
NEUROLOGICAL NEGATIVE: 1

## 2023-12-08 ASSESSMENT — PATIENT HEALTH QUESTIONNAIRE - PHQ9
SUM OF ALL RESPONSES TO PHQ QUESTIONS 1-9: 14
SUM OF ALL RESPONSES TO PHQ QUESTIONS 1-9: 14
10. IF YOU CHECKED OFF ANY PROBLEMS, HOW DIFFICULT HAVE THESE PROBLEMS MADE IT FOR YOU TO DO YOUR WORK, TAKE CARE OF THINGS AT HOME, OR GET ALONG WITH OTHER PEOPLE: SOMEWHAT DIFFICULT

## 2023-12-08 ASSESSMENT — ANXIETY QUESTIONNAIRES
3. WORRYING TOO MUCH ABOUT DIFFERENT THINGS: SEVERAL DAYS
7. FEELING AFRAID AS IF SOMETHING AWFUL MIGHT HAPPEN: NEARLY EVERY DAY
6. BECOMING EASILY ANNOYED OR IRRITABLE: NEARLY EVERY DAY
2. NOT BEING ABLE TO STOP OR CONTROL WORRYING: NEARLY EVERY DAY
GAD7 TOTAL SCORE: 14
1. FEELING NERVOUS, ANXIOUS, OR ON EDGE: MORE THAN HALF THE DAYS
4. TROUBLE RELAXING: SEVERAL DAYS
GAD7 TOTAL SCORE: 14
5. BEING SO RESTLESS THAT IT IS HARD TO SIT STILL: SEVERAL DAYS
IF YOU CHECKED OFF ANY PROBLEMS ON THIS QUESTIONNAIRE, HOW DIFFICULT HAVE THESE PROBLEMS MADE IT FOR YOU TO DO YOUR WORK, TAKE CARE OF THINGS AT HOME, OR GET ALONG WITH OTHER PEOPLE: SOMEWHAT DIFFICULT

## 2023-12-08 ASSESSMENT — PAIN SCALES - PAIN ENJOYMENT GENERAL ACTIVITY SCALE (PEG)
INTERFERED_ENJOYMENT_LIFE: 10 - COMPLETELY INTERFERES
PEG_TOTALSCORE: 9
AVG_PAIN_PASTWEEK: 7
INTERFERED_GENERAL_ACTIVITY: 10 - COMPLETELY INTERFERES

## 2023-12-08 ASSESSMENT — PAIN SCALES - GENERAL: PAINLEVEL: MODERATE PAIN (5)

## 2023-12-08 NOTE — COMMUNITY RESOURCES LIST (ENGLISH)
12/08/2023   [facility]  N/A  For questions about this resource list or additional care needs, please contact your primary care clinic or care manager.  Phone: [phone]   Email: N/A   Address: [address] [place] [id number]   Hours: N/A        Food and Nutrition       Food pantry  1  [facility] Distance: 5.11 miles      Pickup   [address] [place] [address]  Language: English  Hours: Mon 9:00 AM - 11:00 AM , Tue 1:00 PM - 3:00 PM , Wed - Thu 9:00 AM - 11:00 AM  Fees: Free, Self Pay   Phone: (450) 793-9918 Email: [email or web address]@[email or web address] Website: [email or web address].org/[facility]/Manor     2  [facility] [employer] [facility] Free [facility] Distance: 16.34 miles      Pickup   [address] [place] [address]  Language: English  Hours: Mon - Sun Open 24 Hours  Fees: Free   Phone: (933) 837-3588 Email: dion@[employer].org Website: http://www.[employer].org     SNAP application assistance  3  [facility]  Economic Services & Supports [facility] Distance: 4.6 miles      In-Person, Phone/Virtual   [address] [place] [address]  Language: English  Hours: Mon - Fri 8:00 AM - 4:30 PM  Fees: Free   Phone: (370) 672-8622 Website: https://www.[facility].gov/aewsscrneob-y-e/public-health-human-services/economic-services-supports     4  [facility] - Economic Services & [facility] Distance: 16.35 miles      In-Person, Phone/Virtual   [address] [place] [address]  Language: English  Hours: Mon - Fri 8:00 AM - 4:30 PM  Fees: Free   Phone: (392) 543-8854 Email: financialassistance@[facility].gov Website: https://[place].gov/qrodcxsvthh-h-z/public-health-human-services/economic-services-supports     Soup kitchen or free meals  5  [facility]  [facility] Distance: 5.11 miles      Pickup   [address] [place] [address]  Language: English  Hours: Mon - Fri 4:00 PM - 4:45 PM  Fees: Free   Phone: (239) 565-1131 Email: ciara@[facility] Website: [email or web address].org/northern/[place]          Important Numbers & Websites       Emergency Services   911  [facility] Services   311  Poison Control   (876) 153-5799  Suicide Prevention Lifeline   (114) 756-2587 (TALK)  Child Abuse Hotline   (855) 414-5896 (4-A-Child)  Sexual Assault Hotline   (264) 215-1320 (HOPE)  National Runaway Safeline   (563) 572-1231 (RUNAWAY)  All-Options Talkline   (951) 284-6203  Substance Abuse Referral   (498) 258-4057 (HELP)

## 2023-12-08 NOTE — PATIENT INSTRUCTIONS
We will call you with your lab results.     We put a referral to [facility]  Order placed for MRI of your lower back   Try methocarbamol for your back pain. Be careful it can make you drowsy.     We put in orders for EGD and colonoscopy

## 2023-12-09 ENCOUNTER — HEALTH MAINTENANCE LETTER (OUTPATIENT)
Age: 48
End: 2023-12-09

## 2023-12-11 LAB
ANA PAT SER IF-IMP: ABNORMAL
ANA SER QL IF: ABNORMAL
ANA TITR SER IF: ABNORMAL {TITER}

## 2023-12-12 LAB — CCP AB SER IA-ACNC: 0.9 U/ML

## 2023-12-19 ENCOUNTER — PREP FOR PROCEDURE (OUTPATIENT)
Dept: SURGERY | Facility: OTHER | Age: 48
End: 2023-12-19

## 2023-12-19 ENCOUNTER — HOSPITAL ENCOUNTER (OUTPATIENT)
Dept: MRI IMAGING | Facility: HOSPITAL | Age: 48
Discharge: HOME | End: 2023-12-19
Attending: FAMILY MEDICINE
Payer: COMMERCIAL

## 2023-12-19 ENCOUNTER — OFFICE VISIT (OUTPATIENT)
Dept: SURGERY | Facility: OTHER | Age: 48
End: 2023-12-19
Attending: FAMILY MEDICINE
Payer: COMMERCIAL

## 2023-12-19 VITALS
DIASTOLIC BLOOD PRESSURE: 64 MMHG | SYSTOLIC BLOOD PRESSURE: 100 MMHG | HEART RATE: 54 BPM | WEIGHT: 170 LBS | OXYGEN SATURATION: 96 % | HEIGHT: 63 IN | BODY MASS INDEX: 30.12 KG/M2

## 2023-12-19 DIAGNOSIS — M51.27 HERNIATED NUCLEUS PULPOSUS, L5-S1: ICD-10-CM

## 2023-12-19 DIAGNOSIS — M54.42 CHRONIC BILATERAL LOW BACK PAIN WITH BILATERAL SCIATICA: ICD-10-CM

## 2023-12-19 DIAGNOSIS — M51.27 HERNIATED NUCLEUS PULPOSUS, L5-S1: Primary | ICD-10-CM

## 2023-12-19 DIAGNOSIS — G89.29 CHRONIC BILATERAL LOW BACK PAIN WITH BILATERAL SCIATICA: ICD-10-CM

## 2023-12-19 DIAGNOSIS — Z12.11 ENCOUNTER FOR SCREENING COLONOSCOPY: ICD-10-CM

## 2023-12-19 DIAGNOSIS — R47.02 DYSPHASIA: ICD-10-CM

## 2023-12-19 DIAGNOSIS — M54.41 CHRONIC BILATERAL LOW BACK PAIN WITH BILATERAL SCIATICA: ICD-10-CM

## 2023-12-19 DIAGNOSIS — Z12.11 SPECIAL SCREENING FOR MALIGNANT NEOPLASMS, COLON: ICD-10-CM

## 2023-12-19 DIAGNOSIS — R13.10 DYSPHAGIA: Primary | ICD-10-CM

## 2023-12-19 PROCEDURE — G0463 HOSPITAL OUTPT CLINIC VISIT: HCPCS | Mod: 25

## 2023-12-19 PROCEDURE — 99213 OFFICE O/P EST LOW 20 MIN: CPT | Performed by: NURSE PRACTITIONER

## 2023-12-19 PROCEDURE — 72148 MRI LUMBAR SPINE W/O DYE: CPT

## 2023-12-19 PROCEDURE — G0463 HOSPITAL OUTPT CLINIC VISIT: HCPCS

## 2023-12-19 RX ORDER — BISACODYL 5 MG/1
5 TABLET, DELAYED RELEASE ORAL DAILY PRN
Qty: 4 TABLET | Refills: 0 | Status: ON HOLD | OUTPATIENT
Start: 2023-12-19 | End: 2024-01-16

## 2023-12-19 ASSESSMENT — PAIN SCALES - GENERAL: PAINLEVEL: NO PAIN (0)

## 2023-12-19 NOTE — PROGRESS NOTES
CLINIC NOTE - CONSULT  2023    Patient : Ritesh Soliman    Referring Physician :  Dr. Ramirez    Reason for Referral : Upper and lower endoscopy    This is a 48 year old female with a need for an upper and lower endoscopy.  Upper endoscopy is needed for Dysphagia.  Lower endoscopy is needed for Screening colonoscopy.      Last EGD : Never  History of GERD : NO  History of PUD : NO  On PPI's : NO   Drug and Dose : N/A  History of dysphagia : YES   Dysphagia to solids greater than liquids : YES  Hematemesis : NO  Melena : NO    Last colonoscopy : Never  Family history of colon cancer : NO  Family history of colon polyps : NO  Personal history of colon cancer : NO  Personal history of colon polyps : NO  Rectal bleeding : NO  Changes in bowel habits :NO  Personal history of inflammatory bowel disease : NO    Past Medical History:  Past Medical History:   Diagnosis Date    Breast mass     Chronic, continuous use of opioids 2021    OSCAR (generalized anxiety disorder) 2018    Herniated nucleus pulposus, L5-S1 2021    History of recurrent UTI (urinary tract infection) 2014    Major depressive disorder, recurrent episode, moderate (H) 2018    Menorrhagia 2020    Added automatically from request for surgery [id number]    Panic attack 2018    Tobacco abuse        Past Surgical History:  Past Surgical History:   Procedure Laterality Date    BACK SURGERY Left 2021    Revision of L5-S1 discectomy at [facility]    BIOPSY BREAST Right 10/19/2017    Procedure: BIOPSY BREAST;  EXCISIONAL BIOPSY RIGHT BREAST;  Surgeon: Kiko Newberry MD;  Location: [facility]     SECTION  1992     SECTION  2009    DILATE CERVIX, HYSTEROSCOPY, ABLATE ENDOMETRIUM, COMBINED N/A 2020    Procedure: HYSTEROSCOPY, ENDOMETRIAL ABLATION,endometrial polypectomy;  Surgeon: Juan Roger MD;  Location: [facility] OR    DISCECTOMY LUMBAR ANTERIOR Left 2021    Left L5-S1 discectomy     ESOPHAGOSCOPY, GASTROSCOPY, DUODENOSCOPY (EGD), COMBINED N/A 10/01/2018    Procedure: COMBINED ESOPHAGOSCOPY, GASTROSCOPY, DUODENOSCOPY (EGD);  UPPER ENDOSCOPY WITH BIOPSIES;  Surgeon: Kiko Newberry MD;  Location: [facility] OR    HC PLACE INTRAVASC STENT OPEN/PERQ, EA ADDL VEIN      stents for varicose veins, Dr Martinez    TUBAL LIGATION Bilateral 2009    WITH  SECTION    TUBAL/ECTOPIC PREGNANCY      surgery for tubal pregnancy       Family History History:  Family History   Problem Relation Age of Onset    Coronary Artery Disease Mother     Hypertension Mother     Aneurysm Mother         stomach    Coronary Artery Disease Father     Aneurysm Father         heart    Aneurysm Niece         brain    Seizure Disorder Niece     C.A.D. No family hx of     Breast Cancer No family hx of     Cancer - colorectal No family hx of     Diabetes No family hx of     Thyroid Disease No family hx of     Asthma No family hx of     Colon Cancer No family hx of     Hyperlipidemia No family hx of        History of Tobacco Use:  History   Smoking Status    Some Days    Packs/day: 0.10    Types: Cigarettes   Smokeless Tobacco    Never     Comment: rarely smokes. declines quitplan referral 18       Current Medications:  Current Outpatient Medications   Medication Sig Dispense Refill    amphetamine-dextroamphetamine (ADDERALL XR) 25 MG 24 hr capsule Take by mouth daily      ibuprofen (ADVIL/MOTRIN) 200 MG capsule Take 600 mg by mouth every 8 hours as needed for fever      methocarbamol (ROBAXIN) 750 MG tablet Take 1 tablet (750 mg) by mouth 3 times daily as needed for muscle spasms 90 tablet 1    traZODone (DESYREL) 100 MG tablet Take 1 tablet (100 mg) by mouth nightly as needed for sleep 30 tablet 5    VYVANSE 60 MG capsule Take 60 mg by mouth daily         Allergies:  Allergies   Allergen Reactions    Augmentin [Amoxicillin-Pot Clavulanate] Swelling       ROS:  Constitutional: negative  Eyes: negative  Ears, nose, mouth,  "throat, and face: positive for dysphagia  Respiratory: negative  Cardiovascular: negative  Gastrointestinal: positive for dysphagia  Genitourinary:negative  Integument/breast: negative  Hematologic/lymphatic: negative  Musculoskeletal: positive for back pain  Neurological: negative  Behavioral/Psych: positive for tobacco use--occasional  Endocrine: negative  Allergic/Immunologic: negative    PHYSICAL EXAM:     Vital signs: /64 (BP Location: Left arm, Cuff Size: Adult Regular)   Pulse 54   Ht 1.594 m (5' 2.75\")   Wt 77.1 kg (170 lb)   SpO2 96%   BMI 30.35 kg/m     BMI: Body mass index is 30.35 kg/m .   General: Normal, healthy, cooperative, in no acute distress, alert   Skin: no rashes   Lungs: clear to auscultation   CV: Regular rate and rhythm    Abdominal: non-distended, soft, non-tender to palpation   Extremities: No cyanosis, clubbing or edema noted bilaterally in Upper and Lower Extremities   Neurological: without deficit    Assessment:   48 year old female with need for upper endoscopy for Dysphagia and lower endoscopy for Screening colonoscopy:    Plan:   Will schedule an esophagogastroduodenoscopy with possible balloon dilation and colonoscopy.  The procedures with their risks, benefits and alternatives were explained.  Risks include but are not limited to bleeding, perforation, missing lesions, need for additional procedures, reaction to anesthesia.  All the patients questions were answered.  The patient consents to proceed.  The procedures will be scheduled.      "

## 2023-12-21 ENCOUNTER — HOSPITAL ENCOUNTER (OUTPATIENT)
Dept: MRI IMAGING | Facility: HOSPITAL | Age: 48
Discharge: HOME | End: 2023-12-21
Attending: FAMILY MEDICINE | Admitting: FAMILY MEDICINE
Payer: COMMERCIAL

## 2023-12-21 DIAGNOSIS — M54.41 CHRONIC BILATERAL LOW BACK PAIN WITH BILATERAL SCIATICA: ICD-10-CM

## 2023-12-21 DIAGNOSIS — M51.27 HERNIATED NUCLEUS PULPOSUS, L5-S1: ICD-10-CM

## 2023-12-21 DIAGNOSIS — M54.42 CHRONIC BILATERAL LOW BACK PAIN WITH BILATERAL SCIATICA: ICD-10-CM

## 2023-12-21 DIAGNOSIS — G89.29 CHRONIC BILATERAL LOW BACK PAIN WITH BILATERAL SCIATICA: ICD-10-CM

## 2023-12-21 PROCEDURE — 72149 MRI LUMBAR SPINE W/DYE: CPT

## 2023-12-21 PROCEDURE — 255N000002 HC RX 255 OP 636: Mod: JZ | Performed by: RADIOLOGY

## 2023-12-21 PROCEDURE — A9585 GADOBUTROL INJECTION: HCPCS | Mod: JZ | Performed by: RADIOLOGY

## 2023-12-21 RX ORDER — GADOBUTROL 604.72 MG/ML
7.5 INJECTION INTRAVENOUS ONCE
Status: COMPLETED | OUTPATIENT
Start: 2023-12-21 | End: 2023-12-21

## 2023-12-21 RX ADMIN — GADOBUTROL 7.5 ML: 604.72 INJECTION INTRAVENOUS at 09:52

## 2024-01-09 ENCOUNTER — TELEPHONE (OUTPATIENT)
Dept: FAMILY MEDICINE | Facility: OTHER | Age: 49
End: 2024-01-09

## 2024-01-09 NOTE — OR NURSING
Instructed to hold NSAIDs*ibuprofen, ASA, vitamins & Supplements starting today 1/09, continue other medications as prescribed up to night before precedure.

## 2024-01-10 ENCOUNTER — ANESTHESIA EVENT (OUTPATIENT)
Dept: SURGERY | Facility: HOSPITAL | Age: 49
End: 2024-01-10
Payer: COMMERCIAL

## 2024-01-10 ASSESSMENT — LIFESTYLE VARIABLES: TOBACCO_USE: 1

## 2024-01-10 NOTE — ANESTHESIA PREPROCEDURE EVALUATION
Anesthesia Pre-Procedure Evaluation    Patient: Ritesh Soliman   MRN: [id number] : 1975        Procedure : Procedure(s):  upper endoscopy with possible balloon dilation and colonoscopy with possible biopsy, possible polypectomy          Past Medical History:   Diagnosis Date     Breast mass      Chronic, continuous use of opioids 2021     OSCAR (generalized anxiety disorder) 2018     Herniated nucleus pulposus, L5-S1 2021     History of recurrent UTI (urinary tract infection) 2014     Major depressive disorder, recurrent episode, moderate (H) 2018     Menorrhagia 2020    Added automatically from request for surgery [id number]     Panic attack 2018     Tobacco abuse       Past Surgical History:   Procedure Laterality Date     BACK SURGERY Left 2021    Revision of L5-S1 discectomy at [facility]     BIOPSY BREAST Right 10/19/2017    Procedure: BIOPSY BREAST;  EXCISIONAL BIOPSY RIGHT BREAST;  Surgeon: Kiko Newberry MD;  Location: [facility]      SECTION  1992      SECTION  2009     DILATE CERVIX, HYSTEROSCOPY, ABLATE ENDOMETRIUM, COMBINED N/A 2020    Procedure: HYSTEROSCOPY, ENDOMETRIAL ABLATION,endometrial polypectomy;  Surgeon: Juan Roger MD;  Location: [facility]     DISCECTOMY LUMBAR ANTERIOR Left 2021    Left L5-S1 discectomy     ESOPHAGOSCOPY, GASTROSCOPY, DUODENOSCOPY (EGD), COMBINED N/A 10/01/2018    Procedure: COMBINED ESOPHAGOSCOPY, GASTROSCOPY, DUODENOSCOPY (EGD);  UPPER ENDOSCOPY WITH BIOPSIES;  Surgeon: Kiko Newberry MD;  Location: [facility] OR     HC PLACE INTRAVASC STENT OPEN/PERQ, EA ADDL VEIN      stents for varicose veins, Dr Martinez     TUBAL LIGATION Bilateral 2009    WITH  SECTION     TUBAL/ECTOPIC PREGNANCY      surgery for tubal pregnancy      Allergies   Allergen Reactions     Augmentin [Amoxicillin-Pot Clavulanate] Swelling      Social History     Tobacco Use     Smoking status: Some Days      Packs/day: .1     Types: Cigarettes     Smokeless tobacco: Never     Tobacco comments:     rarely smokes. declines quitplan referral 9/18/18   Substance Use Topics     Alcohol use: Yes     Alcohol/week: 0.0 standard drinks of alcohol     Comment: occassionally      Wt Readings from Last 1 Encounters:   12/19/23 77.1 kg (170 lb)        Anesthesia Evaluation   Pt has had prior anesthetic. Type: MAC and General.        ROS/MED HX  ENT/Pulmonary: Comment: Dysphagia to solids greater than liquids    (+)                tobacco use, Current use,                       Neurologic:  - neg neurologic ROS     Cardiovascular: Comment: HC PLACE INTRAVASC STENT OPEN/PERQ, EA ADDL VEIN    (+)  - -   -  - -                                 Previous cardiac testing   Echo: Date: Results:    Stress Test:  Date: Results:    ECG Reviewed:  Date: 2018 Results:  HR 63, sinus rhythm with short CT  Cath:  Date: Results:      METS/Exercise Tolerance:     Hematologic:  - neg hematologic  ROS     Musculoskeletal: Comment: Herniated nucleus pulposus, L5-S1  Recent borderline positive MAURI with elevated CRP 12/23      GI/Hepatic:  - neg GI/hepatic ROS   (+)        bowel prep,         (-) GERD   Renal/Genitourinary: Comment: Hx recurrent UTI's - neg Renal ROS     Endo:     (+)               Obesity,       Psychiatric/Substance Use: Comment: adderall    (+) psychiatric history anxiety and depression (panic attacks)  H/O chronic opiod use . : THC Gummies for back pain (not prescribed)    Infectious Disease:  - neg infectious disease ROS     Malignancy:  - neg malignancy ROS     Other:  - neg other ROS          Physical Exam    Airway  airway exam normal      Mallampati: II   TM distance: > 3 FB   Neck ROM: full   Mouth opening: > 3 cm    Respiratory Devices and Support         Dental       (+) Minor Abnormalities - some fillings, tiny chips      Cardiovascular   cardiovascular exam normal       Rhythm and rate: regular and normal     Pulmonary    "pulmonary exam normal        breath sounds clear to auscultation       OUTSIDE LABS:  CBC:   Lab Results   Component Value Date    WBC 6.7 12/08/2023    WBC 10.8 10/19/2022    HGB 14.0 12/08/2023    HGB 14.7 10/19/2022    HCT 40.7 12/08/2023    HCT 41.9 10/19/2022     12/08/2023     10/19/2022     BMP:   Lab Results   Component Value Date     12/08/2023     10/19/2022    POTASSIUM 3.9 12/08/2023    POTASSIUM 4.1 10/19/2022    CHLORIDE 105 12/08/2023    CHLORIDE 104 10/19/2022    CO2 25 12/08/2023    CO2 20 (L) 10/19/2022    BUN 23.5 (H) 12/08/2023    BUN 12.4 10/19/2022    CR 0.95 12/08/2023    CR 0.70 10/19/2022    GLC 94 12/08/2023     (H) 10/19/2022     COAGS: No results found for: \"PTT\", \"INR\", \"FIBR\"  POC:   Lab Results   Component Value Date    HCG Negative 08/27/2021     HEPATIC:   Lab Results   Component Value Date    ALBUMIN 4.2 12/08/2023    PROTTOTAL 6.7 12/08/2023    ALT 16 12/08/2023    AST 18 12/08/2023    ALKPHOS 78 12/08/2023    BILITOTAL 0.4 12/08/2023     OTHER:   Lab Results   Component Value Date    JACKLYN 9.2 12/08/2023    TSH 2.02 12/08/2023    CRP 16.6 (H) 03/17/2022    SED 9 12/08/2023       Anesthesia Plan    ASA Status:  3    NPO Status:  NPO Appropriate    Anesthesia Type: MAC.     - Reason for MAC: chronic cardiopulmonary disease, straight local not clinically adequate              Consents    Anesthesia Plan(s) and associated risks, benefits, and realistic alternatives discussed. Questions answered and patient/representative(s) expressed understanding.     - Discussed: Risks, Benefits and Alternatives for BOTH SEDATION and the PROCEDURE were discussed     - Discussed with:  Patient      - Extended Intubation/Ventilatory Support Discussed: No.      - Patient is DNR/DNI Status: No     Use of blood products discussed: No .     Postoperative Care            Comments:    Other Comments: Edna 12/19/23 with lung/heart sounds  BMI 30.35           Selene OHARA" "MARIANN Del Rio CNP    I have reviewed the pertinent notes and labs in the chart from the past 30 days and (re)examined the patient.  Any updates or changes from those notes are reflected in this note.              # Obesity: Estimated body mass index is 30.35 kg/m  as calculated from the following:    Height as of 12/19/23: 1.594 m (5' 2.75\").    Weight as of 12/19/23: 77.1 kg (170 lb).      "

## 2024-01-16 ENCOUNTER — ANESTHESIA (OUTPATIENT)
Dept: SURGERY | Facility: HOSPITAL | Age: 49
End: 2024-01-16
Payer: COMMERCIAL

## 2024-01-16 ENCOUNTER — HOSPITAL ENCOUNTER (OUTPATIENT)
Facility: HOSPITAL | Age: 49
Discharge: HOME | End: 2024-01-16
Attending: SURGERY | Admitting: SURGERY
Payer: COMMERCIAL

## 2024-01-16 VITALS
WEIGHT: 163 LBS | DIASTOLIC BLOOD PRESSURE: 69 MMHG | RESPIRATION RATE: 18 BRPM | TEMPERATURE: 97.4 F | HEIGHT: 62 IN | HEART RATE: 67 BPM | BODY MASS INDEX: 30 KG/M2 | SYSTOLIC BLOOD PRESSURE: 113 MMHG | OXYGEN SATURATION: 100 %

## 2024-01-16 DIAGNOSIS — K21.9 GASTROESOPHAGEAL REFLUX DISEASE, UNSPECIFIED WHETHER ESOPHAGITIS PRESENT: Primary | ICD-10-CM

## 2024-01-16 PROCEDURE — 45380 COLONOSCOPY AND BIOPSY: CPT | Performed by: SURGERY

## 2024-01-16 PROCEDURE — 43249 ESOPH EGD DILATION <30 MM: CPT | Performed by: NURSE ANESTHETIST, CERTIFIED REGISTERED

## 2024-01-16 PROCEDURE — 999N000141 HC STATISTIC PRE-PROCEDURE NURSING ASSESSMENT: Performed by: SURGERY

## 2024-01-16 PROCEDURE — 370N000017 HC ANESTHESIA TECHNICAL FEE, PER MIN: Performed by: SURGERY

## 2024-01-16 PROCEDURE — 360N000075 HC SURGERY LEVEL 2, PER MIN: Performed by: SURGERY

## 2024-01-16 PROCEDURE — 88305 TISSUE EXAM BY PATHOLOGIST: CPT | Mod: 26 | Performed by: PATHOLOGY

## 2024-01-16 PROCEDURE — 258N000003 HC RX IP 258 OP 636: Performed by: NURSE ANESTHETIST, CERTIFIED REGISTERED

## 2024-01-16 PROCEDURE — 272N000001 HC OR GENERAL SUPPLY STERILE: Performed by: SURGERY

## 2024-01-16 PROCEDURE — 43249 ESOPH EGD DILATION <30 MM: CPT | Performed by: SURGERY

## 2024-01-16 PROCEDURE — 250N000011 HC RX IP 250 OP 636: Performed by: NURSE ANESTHETIST, CERTIFIED REGISTERED

## 2024-01-16 PROCEDURE — 710N000012 HC RECOVERY PHASE 2, PER MINUTE: Performed by: SURGERY

## 2024-01-16 PROCEDURE — 88305 TISSUE EXAM BY PATHOLOGIST: CPT | Mod: TC | Performed by: SURGERY

## 2024-01-16 PROCEDURE — C1726 CATH, BAL DIL, NON-VASCULAR: HCPCS | Performed by: SURGERY

## 2024-01-16 PROCEDURE — 250N000009 HC RX 250: Performed by: NURSE ANESTHETIST, CERTIFIED REGISTERED

## 2024-01-16 RX ORDER — HYDRALAZINE HYDROCHLORIDE 20 MG/ML
2.5-5 INJECTION INTRAMUSCULAR; INTRAVENOUS EVERY 10 MIN PRN
Status: DISCONTINUED | OUTPATIENT
Start: 2024-01-16 | End: 2024-01-16 | Stop reason: HOSPADM

## 2024-01-16 RX ORDER — NALOXONE HYDROCHLORIDE 0.4 MG/ML
0.2 INJECTION, SOLUTION INTRAMUSCULAR; INTRAVENOUS; SUBCUTANEOUS
Status: DISCONTINUED | OUTPATIENT
Start: 2024-01-16 | End: 2024-01-16 | Stop reason: HOSPADM

## 2024-01-16 RX ORDER — ACETAMINOPHEN 325 MG/1
325-650 TABLET ORAL EVERY 6 HOURS PRN
COMMUNITY

## 2024-01-16 RX ORDER — PROPOFOL 10 MG/ML
INJECTION, EMULSION INTRAVENOUS PRN
Status: DISCONTINUED | OUTPATIENT
Start: 2024-01-16 | End: 2024-01-16

## 2024-01-16 RX ORDER — OMEPRAZOLE 40 MG/1
40 CAPSULE, DELAYED RELEASE ORAL DAILY
Qty: 60 CAPSULE | Refills: 3 | Status: SHIPPED | OUTPATIENT
Start: 2024-01-16

## 2024-01-16 RX ORDER — LIDOCAINE HYDROCHLORIDE 20 MG/ML
INJECTION, SOLUTION INFILTRATION; PERINEURAL PRN
Status: DISCONTINUED | OUTPATIENT
Start: 2024-01-16 | End: 2024-01-16

## 2024-01-16 RX ORDER — OXYCODONE HYDROCHLORIDE 5 MG/1
5 TABLET ORAL
Status: DISCONTINUED | OUTPATIENT
Start: 2024-01-16 | End: 2024-01-16 | Stop reason: HOSPADM

## 2024-01-16 RX ORDER — LABETALOL 20 MG/4 ML (5 MG/ML) INTRAVENOUS SYRINGE
10
Status: DISCONTINUED | OUTPATIENT
Start: 2024-01-16 | End: 2024-01-16 | Stop reason: HOSPADM

## 2024-01-16 RX ORDER — FENTANYL CITRATE 50 UG/ML
50 INJECTION, SOLUTION INTRAMUSCULAR; INTRAVENOUS EVERY 5 MIN PRN
Status: DISCONTINUED | OUTPATIENT
Start: 2024-01-16 | End: 2024-01-16 | Stop reason: HOSPADM

## 2024-01-16 RX ORDER — SODIUM CHLORIDE, SODIUM LACTATE, POTASSIUM CHLORIDE, CALCIUM CHLORIDE 600; 310; 30; 20 MG/100ML; MG/100ML; MG/100ML; MG/100ML
INJECTION, SOLUTION INTRAVENOUS CONTINUOUS
Status: DISCONTINUED | OUTPATIENT
Start: 2024-01-16 | End: 2024-01-16 | Stop reason: HOSPADM

## 2024-01-16 RX ORDER — ONDANSETRON 2 MG/ML
4 INJECTION INTRAMUSCULAR; INTRAVENOUS EVERY 30 MIN PRN
Status: DISCONTINUED | OUTPATIENT
Start: 2024-01-16 | End: 2024-01-16 | Stop reason: HOSPADM

## 2024-01-16 RX ORDER — NALOXONE HYDROCHLORIDE 0.4 MG/ML
0.4 INJECTION, SOLUTION INTRAMUSCULAR; INTRAVENOUS; SUBCUTANEOUS
Status: DISCONTINUED | OUTPATIENT
Start: 2024-01-16 | End: 2024-01-16 | Stop reason: HOSPADM

## 2024-01-16 RX ORDER — LIDOCAINE 40 MG/G
CREAM TOPICAL
Status: DISCONTINUED | OUTPATIENT
Start: 2024-01-16 | End: 2024-01-16 | Stop reason: HOSPADM

## 2024-01-16 RX ORDER — ONDANSETRON 4 MG/1
4 TABLET, ORALLY DISINTEGRATING ORAL EVERY 30 MIN PRN
Status: DISCONTINUED | OUTPATIENT
Start: 2024-01-16 | End: 2024-01-16 | Stop reason: HOSPADM

## 2024-01-16 RX ORDER — PROPOFOL 10 MG/ML
INJECTION, EMULSION INTRAVENOUS CONTINUOUS PRN
Status: DISCONTINUED | OUTPATIENT
Start: 2024-01-16 | End: 2024-01-16

## 2024-01-16 RX ADMIN — PROPOFOL 30 MG: 10 INJECTION, EMULSION INTRAVENOUS at 10:40

## 2024-01-16 RX ADMIN — PROPOFOL 20 MG: 10 INJECTION, EMULSION INTRAVENOUS at 10:37

## 2024-01-16 RX ADMIN — PROPOFOL 100 MCG/KG/MIN: 10 INJECTION, EMULSION INTRAVENOUS at 10:25

## 2024-01-16 RX ADMIN — LIDOCAINE HYDROCHLORIDE 60 MG: 20 INJECTION, SOLUTION INFILTRATION; PERINEURAL at 10:25

## 2024-01-16 RX ADMIN — SODIUM CHLORIDE, POTASSIUM CHLORIDE, SODIUM LACTATE AND CALCIUM CHLORIDE: 600; 310; 30; 20 INJECTION, SOLUTION INTRAVENOUS at 09:25

## 2024-01-16 RX ADMIN — PROPOFOL 50 MG: 10 INJECTION, EMULSION INTRAVENOUS at 10:24

## 2024-01-16 RX ADMIN — MIDAZOLAM 2 MG: 1 INJECTION INTRAMUSCULAR; INTRAVENOUS at 10:21

## 2024-01-16 ASSESSMENT — ACTIVITIES OF DAILY LIVING (ADL)
ADLS_ACUITY_SCORE: 35
ADLS_ACUITY_SCORE: 35

## 2024-01-16 NOTE — OP NOTE
REPORT OF OPERATION  DATE OF PROCEDURE: 1/16/2024    PATIENT: Ritesh Soliman    SURGERY PERFORMED:    Esophagogastroduodenoscopy with biopsies and balloon dilation   Colonoscopy     with biopsy      PREOPERATIVE DIAGNOSIS:   Dysphagia   Screening colonoscopy    POSTOPERATIVE DIAGNOSIS:    Normal Esophagogastroduodenoscopy   Small hiatal hernia   Schatzki's ring   Squamous columnar junction at 35   Colon polyps, 20 cm   Diverticulosis was not identified.   Hemorrhoids  were  identified.    SURGEON: Herbert Apodaca MD    ASSISTANTS: None    ANESTHESIA: Monitored Anesthesia Care    COMPLICATIONS: None apparent    TRANSFUSIONS: None    TISSUE TO PATHOLOGY:    Duodenal, Antral, and Distal Esophageal   Polyps from  20 cm    FINDINGS:   Hiatal Hernia and Esophageal stricture   Colon polyps, 20 cm   Diverticulosis was not identified.   Hemorrhoids  were  identified.    INDICATIONS: This is a 48 year old female in need of an Esophagogastroduodenoscopy for Dysphagia and a colonoscopy for Screening colonoscopy.  The patient will be taken to the endoscopy suite for those procedures.    DESCRIPTIONS OF PROCEDURE IN DETAIL: After consent was obtained the patient was taken to the operative suite and teddy in the left lateral decubitus position.  The patient was identified and the correct patient was confirmed. Monitored Anesthesia Care was given by anesthesia. A time out was performed verifying the correct patient and the correct procedure.  The entire operative team was in agreement.  All necessary equipment and supplies were in the room.    The endoscope was inserted into the mouth and passed without difficulty to the third portion of the duodenum.  Duodenal biopsies were taken.  The endoscope was then withdrawn through the duodenum, the duodenal bulb and pyloric channel and no abnormalities were noted.  The endoscope was brought back into the stomach and antral biopsies were obtained.  The endoscope was then retroflexed and no  lesions of the fundus body or antrum were seen.  The endoscope was straightened back out and brought into the distal esophagus and a well-defined squamocolumnar junction was identified at 35 cm. Biopsies of the distal esophagus were taken.  At this point a Schatzki's ring was identified.  This was balloon dilated sequentially from 15 to 20 mm.  The endoscope was slowly withdrawn through the remaining esophagus no other abnormalities are seen,  The endoscope was withdrawn from the patient and the patient was positioned for colonoscopy.    Rectal exam was performed and no lesions of the anal canal were noted.  The colonoscope was inserted into the anus and passed without difficulty to the cecum.  The cecum was identified by the ileocecal valve, the coalescence of the tinea and the appendiceal orifice.  Upon withdrawal all walls of the colon were visualized.  Polyps were identified at  20 cm.  These were removed by cold biopsy forceps.  Tattooing their of the location was not done.  Large colon masses and colitis were not seen.colon  Diverticulosis was not seen.  Upon reaching the rectum the scope was retroflexed and internal hemorrhoids  were  seen.  The scope was straightened back out and removed from the patient.  The patient was then taken to the recovery room in stable condition tolerating the procedure well.      Prep: fair    Withdrawal time was 6 minutes.    It is recommended that the patient have another colonoscopy in 5 years.

## 2024-01-16 NOTE — ANESTHESIA CARE TRANSFER NOTE
Patient: Ritesh Soliman    Procedure: Procedure(s):  upper endoscopy with biopsies balloon dilation and colonoscopy with polypectomy       Diagnosis: Dysphagia [R13.10]  Encounter for screening colonoscopy [Z12.11]  Diagnosis Additional Information: No value filed.    Anesthesia Type:   MAC     Note:    Oropharynx: spontaneously breathing  Level of Consciousness: awake  Oxygen Supplementation: room air    Independent Airway: airway patency satisfactory and stable  Dentition: dentition unchanged  Vital Signs Stable: post-procedure vital signs reviewed and stable  Report to RN Given: handoff report given  Patient transferred to: Phase II    Handoff Report: Identifed the Patient, Identified the Reponsible Provider, Reviewed the pertinent medical history, Discussed the surgical course, Reviewed Intra-OP anesthesia mangement and issues during anesthesia, Set expectations for post-procedure period and Allowed opportunity for questions and acknowledgement of understanding    Vitals:  Vitals Value Taken Time   /69 01/16/24 1125   Temp 97.4  F (36.3  C) 01/16/24 1054   Pulse 67 01/16/24 1125   Resp 18 01/16/24 1125   SpO2 70 % 01/16/24 1126   Vitals shown include unfiled device data.    Electronically Signed By: MARIANN Raymond CRNA  January 16, 2024  1:49 PM

## 2024-01-16 NOTE — OR NURSING
Patient and responsible adult given discharge instructions with no questions regarding instructions. Vandana score 20. Pain level 0/10.  Discharged from unit via ambulation. Patient discharged to home with s.o.

## 2024-01-16 NOTE — ANESTHESIA POSTPROCEDURE EVALUATION
Condition:: acne
Patient: Ritesh Soliman    Procedure: Procedure(s):  upper endoscopy with biopsies balloon dilation and colonoscopy with polypectomy       Anesthesia Type:  MAC    Note:  Disposition: Outpatient   Postop Pain Control: Uneventful            Sign Out: Well controlled pain   PONV: No   Neuro/Psych: Uneventful            Sign Out: Acceptable/Baseline neuro status   Airway/Respiratory: Uneventful            Sign Out: Acceptable/Baseline resp. status   CV/Hemodynamics: Uneventful            Sign Out: Acceptable CV status; No obvious hypovolemia; No obvious fluid overload   Other NRE: NONE   DID A NON-ROUTINE EVENT OCCUR? No         Last vitals:  Vitals Value Taken Time   /69 01/16/24 1125   Temp 97.4  F (36.3  C) 01/16/24 1054   Pulse 67 01/16/24 1125   Resp 18 01/16/24 1125   SpO2 70 % 01/16/24 1126   Vitals shown include unfiled device data.    Electronically Signed By: MARIANN Raymond CRNA  January 16, 2024  1:49 PM  
Please Describe Your Condition:: 12 yo male seen via telehealth via doxy. me 2/2 to covid 19 presenting for 2 mo f/u of acne, contining to get moderate, painful breakouts on face - complaint with doxy 100mg bid, benzaclin and tretinoin\\ndoes endorse xerosis by nose

## 2024-01-16 NOTE — DISCHARGE INSTRUCTIONS
UPPER ENDOSCOPY    AFTER THE PROCEDURE  You will return to Same Day Surgery to rest for about an hour before you go home.  The doctor will talk with you and your family.  A family member/friend may visit with you.  You may burp up any air remaining in your stomach.  You may feel dizzy or light-headed from the medicine.  Your nurse will go over the discharge instructions with you and your caregiver and answer any of your questions.    You will be contacted the next day to check on how you are doing.  If biopsies were taken, you will be contacted with the results usually within 7-10 days.  BACK AT HOME  Rest for an hour or two after you get home.  When your throat is no longer numb and you have a gag reflex, take a few sips of cool water.  If you can swallow comfortably, you may start eating again.  You may have a mild sore throat for the rest of the day.  You will be contacted with results by the surgeons office within a week. If not contacted in one week, call the surgeons office.  WHAT TO WATCH FOR:  Problems rarely occur after the exam, but it is important to be aware of the early signs of a complication.  Call your doctor immediately if you have:  Difficulty swallowing or breathing  Unusual pain in your stomach or chest  Vomiting blood or dark material that looks like coffee grounds  Black or tarry stools  Temperature over 101.5 degrees    MORE QUESTIONS?  Please ask your doctor or nurse before the exam begins  or call your doctor at the clinic.    IF YOU MUST CANCEL YOUR PROCEDURE THE EVENING/NIGHT BEFORE, PLEASE CALL HOSPITAL ADMITTING -[phone] OR TOLL FREE [phone], [phone].    Phone Numbers:  [facility] - [phone]  [facility] - [phone]  Surgery Patient Education - [phone] or toll free [phone]    INSTRUCTIONS AFTER COLONOSCOPY    WHEN YOU ARE BACK HOME:  Plan to rest for an hour or two after you get home.  You may have some cramping or pressure until you  pass gas.  You may resume your regular medications.  Eat a small, light meal at first, and then gradually return to normal meal sizes.  You will be contacted the next day to see how you are doing.  If you had a polyp removed:  Slight bleeding may occur.  You may have a slight blood stain on the toilet paper after a bowel movement.  To lessen the chance of bleeding, avoid heavy exercise for ONE WEEK.  This includes heavy lifting, vigorous sport activities, and heavy physical labor.  You may resume your normal sexual activity.    Avoid aspirin or aspirin products if instructed by your doctor.  If there is a polyp or biopsy, you will be contacted with results within 7-10 days.     WHAT TO WATCH FOR:  Problems rarely occur after the exam; however, it is important for you to watch for early signs of possible problems.  If you have   Unusual pain in your abdomen  Nausea and vomiting that persists  Excessive bleeding  Black or bloody bowel movements  Fever or temperature above 100.6 F  Please call your doctor ([facility] [phone]) or go to the nearest hospital emergency room.  After Anesthesia (Sleep Medicine)  What should I do after anesthesia?  You should rest and relax for the next 24 hours. Avoid risky or difficult (strenuous) activity. A responsible adult should stay with you overnight.  Don't drive or use any heavy equipment for 24 hours. Even if you feel normal, your reactions may be affected by the sleep medicine given to you.  Don't drink alcohol or make any important decisions for 24 hours.  Slowly get back to your regular diet, as you feel able.  How should I expect to feel?  It's normal to feel dizzy, light-headed, or faint for up to a full day after anesthesia or while taking pain medicine. If this happens:   Sit down for a few minutes before standing.  Have someone help you when you get up to walk or use the bathroom.  If you have nausea (feel sick to your stomach) or vomit (throw up):   Drink clear  liquids (such as apple juice, ginger ale, broth, or 7UP) until you feel better.  If you feel sick to your stomach, or you keep vomiting for 24 hours, please call the doctor.  What else should I know?  You might have a dry mouth, sore throat, muscle aches, or trouble sleeping. These should go away after 24 hours.  Please contact your doctor if you have any other symptoms that concern you, such as fever, pain, bleeding, fluid drainage, swelling, or headache, or if it's been over 8 to 10 hours and you still aren't able to pee (urinate).  If you have a history of sleep apnea, it's very important to use your CPAP machine for the next 24 hours when you nap or sleep.   For informational purposes only. Not to replace the advice of your health care provider. Copyright   2023 [employer] [facility]. All rights reserved. Clinically reviewed by Garret Gonzalez MD. [employer] [id number] - REV 09/23.

## 2024-01-18 LAB
PATH REPORT.COMMENTS IMP SPEC: NORMAL
PATH REPORT.FINAL DX SPEC: NORMAL
PATH REPORT.GROSS SPEC: NORMAL
PATH REPORT.MICROSCOPIC SPEC OTHER STN: NORMAL
PATH REPORT.RELEVANT HX SPEC: NORMAL
PHOTO IMAGE: NORMAL

## 2024-01-19 NOTE — PROGRESS NOTES
Assessment & Plan     Chronic bilateral low back pain with bilateral sciatica  Follows with [facility] with an appt for next week    Chronic right shoulder pain  Exam consistent with impingement. Will start with PT   - Physical Therapy Referral; Future  - low threshold for MRI   - c/w OTC lidocaine    Dysphasia  S/p EGD  - okay to start omeprazole 40mg    Arthralgia, unspecified joint / MAURI positive  MAURI borderline positive with speckled pattern which is often a false positive. CRP positive Due to duration of symptoms and progression, will refer to [facility] rheumatology   - Adult Rheumatology  Referral; Future  - Uric acid; Future  - Uric acid  - will obtain 2nd tier labs       See Patient Instructions    Return in about 2 months (around 3/22/2024).    Subjective   Ritesh is a 48 year old, presenting for the following health issues:  Results (MRI) and Back Pain    HPI       Chronic/Recurring Back Pain Follow Up    Where is your back pain located? (Select all that apply) low back bilateral  How would you describe your back pain?  burning, cramping, dull ache, fullness, gnawing, sharp, shooting, and stabbing  Where does your back pain spread? Down both legs  Since your last clinic visit for back pain, how has your pain changed? gradually worsening  Does your back pain interfere with your job? YES  Since your last visit, have you tried any new treatment? No    - MRI lumbar (12/21/2023): disc herniation L2-L3 impinging slightly on thecal sac  - follows with [facility] on January 28 or 29th     Musculoskeletal problem/pain    Duration: One month  Description  Location: right shoulder  Intensity:  severe  Accompanying signs and symptoms: achy  History  Previous similar problem: no   Previous evaluation:  none  Precipitating or alleviating factors:  Trauma or overuse: YES  Aggravating factors include: lifting, exercise, and overuse  Therapies tried and outcome: acetaminophen and Ibuprofen   - started achy  at work   - no injury   - does a lot of lifting at work (works as a )  - using lidocaine for pain     States that her whole body hurts including hands (swelling)  - hands are getting weaker    Answers submitted by the patient for this visit:  Patient Health Questionnaire (Submitted on 1/22/2024)  If you checked off any problems, how difficult have these problems made it for you to do your work, take care of things at home, or get along with other people?: Somewhat difficult  PHQ9 TOTAL SCORE: 16      Review of Systems  Constitutional, HEENT, cardiovascular, pulmonary, gi and gu systems are negative, except as otherwise noted.      Objective    /80   Pulse 61   Temp 96.8  F (36  C) (Tympanic)   Resp 17   Wt 78.3 kg (172 lb 11.2 oz)   SpO2 96%   BMI 31.59 kg/m    Body mass index is 31.59 kg/m .  Physical Exam  Constitutional:       General: She is not in acute distress.     Appearance: She is not ill-appearing.   Cardiovascular:      Rate and Rhythm: Normal rate and regular rhythm.      Heart sounds: No murmur heard.  Pulmonary:      Effort: Pulmonary effort is normal. No respiratory distress.      Breath sounds: No wheezing or rales.   Musculoskeletal:      Comments: Right shoulder: TTP over AC joint, clavicle, spine and scapula.  Nontender to palpation over biceps groove.  Flexion to 180 degrees.  Abduction to approximately 60 degrees with discomfort.  Internal rotation to approximately L4.  External rotation full.  Dillon negative.  Neer's strongly positive.   Neurological:      Mental Status: She is alert.                MR Lumbar Spine w Contrast    Result Date: 12/22/2023  PROCEDURE: MR LUMBAR SPINE W CONTRAST, 12/21/2023 10:08 AM  HISTORY:Female, age 48 years, intrathecal filling defect in the left subarticular zone at L2-3,; Herniated nucleus pulposus, L5-S1; Chronic bilateral low back pain with bilateral sciatica; Chronic bilateral low back pain with bilateral sciatica; Chronic  bilateral low back pain with bilateral sciatica COMPARISON: MRI lumbar spine 12/19/2023 TECHNIQUE: Sagittal T2 and T1 postcontrast fat saturation; axial T1 and T1 postcontrast fat saturation. FINDINGS: The small low signal focus seen previously within the left lateral recess at L2-3 has appearance of a small disc herniation with peripheral enhancement. Bulging annulus and small bilateral parasagittal disc protrusions again seen at L3-4.     IMPRESSION: Low dense focus seen in the left lateral recess at L2-3 on the prior study appears to represent a small disc herniation, impinging slightly upon the left ventrolateral aspect of the thecal sac. THANG VILLALOBOS MD   SYSTEM ID:  [id number]    MR Lumbar Spine w/o Contrast    Result Date: 12/19/2023  MR LUMBAR SPINE W/O CONTRAST HISTORY: Herniated nucleus pulposus, L5-S1. . TECHNIQUE: Sagittal T1, T2 and STIR as well as axial T2 images of the lumbar spine were obtained. COMPARISON: 8/11/2022. FINDINGS:  Postoperative changes of prior left L5-S1 hemilaminectomy for microdiscectomy are redemonstrated. No fusion hardware is seen. There is trace degenerative retrolisthesis of L5 on S1, associated with focal moderate disc height loss and mild Modic type II endplate changes. The vertebral body heights are preserved. No suspicious marrow lesion is identified. The distal cord and conus medullaris have a normal caliber and morphology. The conus terminates at L1. No abnormal cord signal is seen in the distal cord or conus. There is a new 5 mm intrathecal filling defect within the left subarticular zone just caudal to the left L2-3 disc best seen on series 7 image 13 and series 10 image 17. At L1-2, the central spinal canal and neural foramina are patent. At L2-3, there is a minimal asymmetric left disc bulge. The central spinal canal and neural foramina are patent. At L3-4, there is a mild symmetric disc bulge with mild facet degenerative hypertrophy. Spinal canal is patent.  Foraminal narrowing is mild bilaterally At L4-5, there is a mild asymmetric left disc bulge with mild facet degenerative hypertrophy. The spinal canal is patent. Foraminal narrowing is mild bilaterally At L5-S1, postoperative changes of prior microdiscectomy are redemonstrated. A 6 x 10 mm left subarticular/paracentral ventral epidural filling defect persists, contacting the descending left S1 nerve root. The spinal canal is patent. Foraminal narrowing is mild.     IMPRESSION: New small intrathecal filling defect in the left subarticular zone at L2-3, nonspecific. Recommend MR lumbar spine with contrast for further assessment. Postoperative changes at L5-S1, with arachnoid granulation tissue versus a small recurrent or residual protrusion. Postcontrast imaging will likely help distinguish these entities. ANDREW KAPLAN MD   SYSTEM ID:  [id number]               Signed Electronically by: Olivia Ramirez MD

## 2024-01-22 ENCOUNTER — OFFICE VISIT (OUTPATIENT)
Dept: FAMILY MEDICINE | Facility: OTHER | Age: 49
End: 2024-01-22
Attending: FAMILY MEDICINE
Payer: COMMERCIAL

## 2024-01-22 VITALS
WEIGHT: 172.7 LBS | SYSTOLIC BLOOD PRESSURE: 112 MMHG | TEMPERATURE: 96.8 F | RESPIRATION RATE: 17 BRPM | OXYGEN SATURATION: 96 % | HEART RATE: 61 BPM | DIASTOLIC BLOOD PRESSURE: 80 MMHG | BODY MASS INDEX: 31.59 KG/M2

## 2024-01-22 DIAGNOSIS — R76.8 ANA POSITIVE: ICD-10-CM

## 2024-01-22 DIAGNOSIS — R47.02 DYSPHASIA: ICD-10-CM

## 2024-01-22 DIAGNOSIS — G89.29 CHRONIC BILATERAL LOW BACK PAIN WITH BILATERAL SCIATICA: Primary | ICD-10-CM

## 2024-01-22 DIAGNOSIS — M25.50 ARTHRALGIA, UNSPECIFIED JOINT: ICD-10-CM

## 2024-01-22 DIAGNOSIS — G89.29 CHRONIC RIGHT SHOULDER PAIN: ICD-10-CM

## 2024-01-22 DIAGNOSIS — M54.42 CHRONIC BILATERAL LOW BACK PAIN WITH BILATERAL SCIATICA: Primary | ICD-10-CM

## 2024-01-22 DIAGNOSIS — M25.511 CHRONIC RIGHT SHOULDER PAIN: ICD-10-CM

## 2024-01-22 DIAGNOSIS — M54.41 CHRONIC BILATERAL LOW BACK PAIN WITH BILATERAL SCIATICA: Primary | ICD-10-CM

## 2024-01-22 LAB — URATE SERPL-MCNC: 5.6 MG/DL (ref 2.4–5.7)

## 2024-01-22 PROCEDURE — 86235 NUCLEAR ANTIGEN ANTIBODY: CPT | Mod: ZL | Performed by: FAMILY MEDICINE

## 2024-01-22 PROCEDURE — 36415 COLL VENOUS BLD VENIPUNCTURE: CPT | Mod: ZL | Performed by: FAMILY MEDICINE

## 2024-01-22 PROCEDURE — 99214 OFFICE O/P EST MOD 30 MIN: CPT | Performed by: FAMILY MEDICINE

## 2024-01-22 PROCEDURE — G0463 HOSPITAL OUTPT CLINIC VISIT: HCPCS | Mod: 25

## 2024-01-22 PROCEDURE — G0463 HOSPITAL OUTPT CLINIC VISIT: HCPCS

## 2024-01-22 PROCEDURE — 84550 ASSAY OF BLOOD/URIC ACID: CPT | Mod: ZL | Performed by: FAMILY MEDICINE

## 2024-01-22 PROCEDURE — 86225 DNA ANTIBODY NATIVE: CPT | Mod: ZL | Performed by: FAMILY MEDICINE

## 2024-01-22 ASSESSMENT — PATIENT HEALTH QUESTIONNAIRE - PHQ9
SUM OF ALL RESPONSES TO PHQ QUESTIONS 1-9: 16
10. IF YOU CHECKED OFF ANY PROBLEMS, HOW DIFFICULT HAVE THESE PROBLEMS MADE IT FOR YOU TO DO YOUR WORK, TAKE CARE OF THINGS AT HOME, OR GET ALONG WITH OTHER PEOPLE: SOMEWHAT DIFFICULT
SUM OF ALL RESPONSES TO PHQ QUESTIONS 1-9: 16

## 2024-01-22 ASSESSMENT — PAIN SCALES - GENERAL: PAINLEVEL: MODERATE PAIN (5)

## 2024-01-22 NOTE — PATIENT INSTRUCTIONS
Okay to start omeprazole. Take 30 to 60 minutes before your first meal of the day     We put in a referral to [facility] rheumatology    We will call you with your lab results.     Referral placed to physical therapy for your shoulder pain

## 2024-01-23 LAB
CENTROMERE B AB SER-ACNC: <0.7 U/ML
CENTROMERE B AB SER-ACNC: NEGATIVE
DSDNA AB SER-ACNC: 1.1 IU/ML
ENA SM IGG SER IA-ACNC: <0.7 U/ML
ENA SM IGG SER IA-ACNC: NEGATIVE
ENA SS-A AB SER IA-ACNC: <0.5 U/ML
ENA SS-A AB SER IA-ACNC: NEGATIVE
ENA SS-B IGG SER IA-ACNC: <0.6 U/ML
ENA SS-B IGG SER IA-ACNC: NEGATIVE
U1 SNRNP IGG SER IA-ACNC: 1.1 U/ML
U1 SNRNP IGG SER IA-ACNC: NEGATIVE

## 2024-01-29 ENCOUNTER — MEDICAL CORRESPONDENCE (OUTPATIENT)
Dept: HEALTH INFORMATION MANAGEMENT | Facility: HOSPITAL | Age: 49
End: 2024-01-29

## 2024-01-29 ENCOUNTER — TRANSFERRED RECORDS (OUTPATIENT)
Dept: HEALTH INFORMATION MANAGEMENT | Facility: CLINIC | Age: 49
End: 2024-01-29

## 2024-02-05 ENCOUNTER — TELEPHONE (OUTPATIENT)
Dept: INTERVENTIONAL RADIOLOGY/VASCULAR | Facility: HOSPITAL | Age: 49
End: 2024-02-05

## 2024-02-05 NOTE — TELEPHONE ENCOUNTER
Called patient to remind them of their appt on 2/8. Also reminded patient to not take any antibiotics, steroids, or immunizations two weeks before and after this appt.  And they also need a .     ZAYDA JACKSON

## 2024-02-08 ENCOUNTER — HOSPITAL ENCOUNTER (OUTPATIENT)
Dept: GENERAL RADIOLOGY | Facility: HOSPITAL | Age: 49
Discharge: HOME | End: 2024-02-08
Attending: ORTHOPAEDIC SURGERY

## 2024-02-08 ENCOUNTER — HOSPITAL ENCOUNTER (OUTPATIENT)
Facility: HOSPITAL | Age: 49
Discharge: HOME | End: 2024-02-08
Attending: RADIOLOGY | Admitting: RADIOLOGY

## 2024-02-08 DIAGNOSIS — M48.062 SPINAL STENOSIS OF LUMBAR REGION WITH NEUROGENIC CLAUDICATION: ICD-10-CM

## 2024-02-08 PROCEDURE — 250N000009 HC RX 250: Performed by: STUDENT IN AN ORGANIZED HEALTH CARE EDUCATION/TRAINING PROGRAM

## 2024-02-08 PROCEDURE — 250N000011 HC RX IP 250 OP 636: Performed by: STUDENT IN AN ORGANIZED HEALTH CARE EDUCATION/TRAINING PROGRAM

## 2024-02-08 PROCEDURE — 64483 NJX AA&/STRD TFRM EPI L/S 1: CPT

## 2024-02-08 RX ORDER — IOPAMIDOL 612 MG/ML
15 INJECTION, SOLUTION INTRATHECAL ONCE
Status: COMPLETED | OUTPATIENT
Start: 2024-02-08 | End: 2024-02-08

## 2024-02-08 RX ORDER — LIDOCAINE HYDROCHLORIDE 10 MG/ML
5 INJECTION, SOLUTION EPIDURAL; INFILTRATION; INTRACAUDAL; PERINEURAL ONCE
Status: COMPLETED | OUTPATIENT
Start: 2024-02-08 | End: 2024-02-08

## 2024-02-08 RX ORDER — DEXAMETHASONE SODIUM PHOSPHATE 10 MG/ML
10 INJECTION, SOLUTION INTRAMUSCULAR; INTRAVENOUS ONCE
Status: COMPLETED | OUTPATIENT
Start: 2024-02-08 | End: 2024-02-08

## 2024-02-08 RX ADMIN — IOPAMIDOL 2 ML: 612 INJECTION, SOLUTION INTRATHECAL at 09:45

## 2024-02-08 RX ADMIN — DEXAMETHASONE SODIUM PHOSPHATE 10 MG: 10 INJECTION, SOLUTION INTRAMUSCULAR; INTRAVENOUS at 09:44

## 2024-02-08 RX ADMIN — LIDOCAINE HYDROCHLORIDE 5 ML: 10 INJECTION, SOLUTION EPIDURAL; INFILTRATION; INTRACAUDAL; PERINEURAL at 09:45

## 2024-02-08 ASSESSMENT — ACTIVITIES OF DAILY LIVING (ADL): ADLS_ACUITY_SCORE: 35

## 2024-02-08 NOTE — DISCHARGE INSTRUCTIONS
Cell number on file:    Telephone Information:   Mobile [phone]     Is it ok to leave a message at this number(s)? Yes    Dr. Roach completed your procedure on 2/8/2024.    Current Pain Level (0-10 Scale): 5/10  Post Pain Level (0-10):  0/10    Radiology Discharge instructions for Steroid Injection    Activity Level:     Do not do any heavy activity or exercise for 24 hours.   Do not drive for 4 hours after your injection.  Diet:   Return to your normal diet.  Medications:   If you have stopped taking your Aspirin, Coumadin/Warfarin, Ibuprofen, or any   other blood thinner for this procedure you may resume in the morning unless   your primary care provider has given you other instructions.    Diabetics may see an increase in blood sugar after steroid injections. If you are concerned about your blood sugar, please contact your family doctor.    Site Care:  Remove the bandage and bathe or shower the morning after the procedure.      Please allow two weeks to experience improvement in your pain.  If you have any further issues, please contact your provider.    Call your Primary Care Provider if you have the following (if your primary care provider is not available please seek emergency care):   Nausea with vomiting   Severe headache   Drowsiness or confusion   Redness or drainage at the injection or puncture site   Temperature over 101 degrees F   Other concerns   Worsening back pain   Stiff neck

## 2024-02-21 ENCOUNTER — TELEPHONE (OUTPATIENT)
Dept: INTERVENTIONAL RADIOLOGY/VASCULAR | Facility: HOSPITAL | Age: 49
End: 2024-02-21

## 2024-02-21 NOTE — TELEPHONE ENCOUNTER
INJECTION POST CALL    Procedure: Epidural TF LT L2-3  Radiologist(s): DR. JEREMIAS VILLEDA   Date of Procedure:  2/8/24    Relief of pain from this injection    A = 90%  A- = 85%  B = 80%  B- =75%  C = 70%  C- = 65%  D = 60%  D- = 50%  F = less than 50%    Do you feel this injection was beneficial? Yes, during the day it is about 50% of relief    If yes, how long did it last? Jazlyn    Instruct patient to follow up with their provider for any further care they may need.       Jolene Ng     
Hospitals[facility]iatric Facilities

## 2024-02-27 ENCOUNTER — TELEPHONE (OUTPATIENT)
Dept: INTERVENTIONAL RADIOLOGY/VASCULAR | Facility: HOSPITAL | Age: 49
End: 2024-02-27

## 2024-02-27 NOTE — TELEPHONE ENCOUNTER
Left a message to remind patient of their viv on 3/1. Also reminded patient to not take any antibiotics, steroids, or immunizations two weeks before and after this appt. And they need a .      ZAYDA JACKSON

## 2024-03-01 ENCOUNTER — HOSPITAL ENCOUNTER (OUTPATIENT)
Dept: GENERAL RADIOLOGY | Facility: HOSPITAL | Age: 49
Discharge: HOME | End: 2024-03-01
Attending: ORTHOPAEDIC SURGERY
Payer: COMMERCIAL

## 2024-03-01 ENCOUNTER — HOSPITAL ENCOUNTER (OUTPATIENT)
Facility: HOSPITAL | Age: 49
Discharge: HOME | End: 2024-03-01
Attending: RADIOLOGY | Admitting: RADIOLOGY
Payer: COMMERCIAL

## 2024-03-01 DIAGNOSIS — M48.062 SPINAL STENOSIS, LUMBAR REGION, WITH NEUROGENIC CLAUDICATION: ICD-10-CM

## 2024-03-01 PROCEDURE — 64483 NJX AA&/STRD TFRM EPI L/S 1: CPT

## 2024-03-01 PROCEDURE — 250N000011 HC RX IP 250 OP 636: Performed by: RADIOLOGY

## 2024-03-01 RX ORDER — DEXAMETHASONE SODIUM PHOSPHATE 10 MG/ML
10 INJECTION, SOLUTION INTRAMUSCULAR; INTRAVENOUS ONCE
Status: COMPLETED | OUTPATIENT
Start: 2024-03-01 | End: 2024-03-01

## 2024-03-01 RX ORDER — IOPAMIDOL 612 MG/ML
15 INJECTION, SOLUTION INTRATHECAL ONCE
Status: COMPLETED | OUTPATIENT
Start: 2024-03-01 | End: 2024-03-01

## 2024-03-01 RX ORDER — LIDOCAINE HYDROCHLORIDE 10 MG/ML
5 INJECTION, SOLUTION EPIDURAL; INFILTRATION; INTRACAUDAL; PERINEURAL ONCE
Status: DISCONTINUED | OUTPATIENT
Start: 2024-03-01 | End: 2024-03-01

## 2024-03-01 RX ADMIN — DEXAMETHASONE SODIUM PHOSPHATE 10 MG: 10 INJECTION INTRAMUSCULAR; INTRAVENOUS at 13:23

## 2024-03-01 RX ADMIN — IOPAMIDOL 4 ML: 612 INJECTION, SOLUTION INTRATHECAL at 13:23

## 2024-03-01 ASSESSMENT — ACTIVITIES OF DAILY LIVING (ADL): ADLS_ACUITY_SCORE: 35

## 2024-03-01 NOTE — DISCHARGE INSTRUCTIONS
Cell number on file:    Telephone Information:   Mobile [phone]     Is it ok to leave a message at this number(s)? Yes    Dr. Mcgee completed your procedure on 3/1/2024.    Current Pain Level (0-10 Scale): 5/10  Post Pain Level (0-10):  0/10    Radiology Discharge instructions for Steroid Injection    Activity Level:     Do not do any heavy activity or exercise for 24 hours.   Do not drive for 4 hours after your injection.  Diet:   Return to your normal diet.  Medications:   If you have stopped taking your Aspirin, Coumadin/Warfarin, Ibuprofen, or any   other blood thinner for this procedure you may resume in the morning unless   your primary care provider has given you other instructions.    Diabetics may see an increase in blood sugar after steroid injections. If you are concerned about your blood sugar, please contact your family doctor.    Site Care:  Remove the bandage and bathe or shower the morning after the procedure.      Please allow two weeks to experience improvement in your pain.  If you have any further issues, please contact your provider.    Call your Primary Care Provider if you have the following (if your primary care provider is not available please seek emergency care):   Nausea with vomiting   Severe headache   Drowsiness or confusion   Redness or drainage at the injection or puncture site   Temperature over 101 degrees F   Other concerns   Worsening back pain   Stiff neck

## 2024-03-14 ENCOUNTER — TELEPHONE (OUTPATIENT)
Dept: INTERVENTIONAL RADIOLOGY/VASCULAR | Facility: HOSPITAL | Age: 49
End: 2024-03-14

## 2024-03-14 NOTE — TELEPHONE ENCOUNTER
INJECTION POST CALL    Procedure: Epidural TF RT L4-5  Radiologist(s): Dr. Travon Mcgee  Date of Procedure:  3/1/24    Left a message for patient to call IR department back.    Jolene Ng

## 2024-03-21 ENCOUNTER — TELEPHONE (OUTPATIENT)
Dept: INTERVENTIONAL RADIOLOGY/VASCULAR | Facility: HOSPITAL | Age: 49
End: 2024-03-21

## 2024-03-21 NOTE — PROGRESS NOTES
Assessment & Plan     Chronic bilateral low back pain with bilateral sciatica  Working towards basivertebral nerve radiofrequency ablation at L5 and S1. (Intracept procedure)  - c/w methocarbamol     MAURI positive  No change in arthralgia  Number given for scheduling rheumatology visit    Major depressive disorder, recurrent episode, moderate (H) / OSCAR (generalized anxiety disorder)  stable        See Patient Instructions    Return in about 3 months (around 6/22/2024) for Physical Exam.    Subjective   Star is a 48 year old, presenting for the following health issues:  Pain    HPI       Chronic/Recurring Back Pain Follow Up    Where is your back pain located? (Select all that apply) low back bilateral and hip bilateral  How would you describe your back pain?  burning and shooting  Where does your back pain spread? Legs, feet and ankles bilateral  Since your last clinic visit for back pain, how has your pain changed? unchanged  Does your back pain interfere with your job? YES  Since your last visit, have you tried any new treatment? Yes -  steroid injection  - follows with [facility] / Everardo Miranda   with last visit 3/18/2024. Note reviewed  - s/p  left L5-S1 hemilaminectomy/discectomy with Dr. Iverson on 6/29/2021. Her pain returned shortly after undergoing this procedure and a revision left L5-S1 hemilaminectomy discectomy was performed on 9/9/2021   - recommendation for basivertebral nerve radiofrequency ablation at L5 and S1. (Intracept procedure). Not scheduled   - s/p injections with Dr. Gomes, helping some     # Rheumatology: arthralgia  - referral placed to [facility] rheum - no appt    Answers submitted by the patient for this visit:  Patient Health Questionnaire (Submitted on 3/22/2024)  If you checked off any problems, how difficult have these problems made it for you to do your work, take care of things at home, or get along with other people?: Very difficult  PHQ9 TOTAL SCORE: 13    - mood stable  -  "h/o squatters in her home. Squatters have finally moved on    # Wellness visit end of May      Review of Systems   Respiratory:  Negative for shortness of breath.    Cardiovascular:  Negative for chest pain.   Musculoskeletal:  Positive for arthralgias and back pain (no changes).   Psychiatric/Behavioral:  Positive for dysphoric mood.            Objective    /70   Pulse 62   Temp (!) 96.7  F (35.9  C) (Tympanic)   Resp 17   Ht 1.575 m (5' 2\")   Wt 78.8 kg (173 lb 12.8 oz)   SpO2 95%   BMI 31.79 kg/m    Body mass index is 31.79 kg/m .  Physical Exam  Constitutional:       General: She is not in acute distress.     Appearance: She is not ill-appearing.   Cardiovascular:      Rate and Rhythm: Normal rate and regular rhythm.      Heart sounds: No murmur heard.  Pulmonary:      Effort: Pulmonary effort is normal. No respiratory distress.      Breath sounds: No wheezing or rales.   Neurological:      Mental Status: She is alert.   Psychiatric:         Mood and Affect: Mood normal.              Signed Electronically by: Olivia Ramirez MD        "

## 2024-03-21 NOTE — TELEPHONE ENCOUNTER
INJECTION POST CALL    Procedure: Epidural TF RT L4-5  Radiologist(s): Dr. Travon Mcgee  Date of Procedure:  3/1/24    Relief of pain from this injection    A = 90%  A- = 85%  B = 80%  B- =75%  C = 70%  C- = 65%  D = 60%  D- = 50%  F = less than 50%      Do you feel this injection was beneficial? Somewhat patient felt 50% for a week    If yes, how long did it last? 1 week    Instruct patient to follow up with their provider for any further care they may need.       Jolene Ng

## 2024-03-22 ENCOUNTER — OFFICE VISIT (OUTPATIENT)
Dept: FAMILY MEDICINE | Facility: OTHER | Age: 49
End: 2024-03-22
Attending: FAMILY MEDICINE
Payer: COMMERCIAL

## 2024-03-22 VITALS
RESPIRATION RATE: 17 BRPM | SYSTOLIC BLOOD PRESSURE: 104 MMHG | HEIGHT: 62 IN | OXYGEN SATURATION: 95 % | BODY MASS INDEX: 31.98 KG/M2 | HEART RATE: 62 BPM | DIASTOLIC BLOOD PRESSURE: 70 MMHG | WEIGHT: 173.8 LBS | TEMPERATURE: 96.7 F

## 2024-03-22 DIAGNOSIS — M54.42 CHRONIC BILATERAL LOW BACK PAIN WITH BILATERAL SCIATICA: Primary | ICD-10-CM

## 2024-03-22 DIAGNOSIS — F33.1 MAJOR DEPRESSIVE DISORDER, RECURRENT EPISODE, MODERATE (H): ICD-10-CM

## 2024-03-22 DIAGNOSIS — M54.41 CHRONIC BILATERAL LOW BACK PAIN WITH BILATERAL SCIATICA: Primary | ICD-10-CM

## 2024-03-22 DIAGNOSIS — R76.8 ANA POSITIVE: ICD-10-CM

## 2024-03-22 DIAGNOSIS — F41.1 GAD (GENERALIZED ANXIETY DISORDER): ICD-10-CM

## 2024-03-22 DIAGNOSIS — G89.29 CHRONIC BILATERAL LOW BACK PAIN WITH BILATERAL SCIATICA: Primary | ICD-10-CM

## 2024-03-22 PROCEDURE — 99213 OFFICE O/P EST LOW 20 MIN: CPT | Performed by: FAMILY MEDICINE

## 2024-03-22 PROCEDURE — G0463 HOSPITAL OUTPT CLINIC VISIT: HCPCS

## 2024-03-22 PROCEDURE — G0463 HOSPITAL OUTPT CLINIC VISIT: HCPCS | Mod: 25

## 2024-03-22 ASSESSMENT — PAIN SCALES - GENERAL: PAINLEVEL: SEVERE PAIN (6)

## 2024-03-22 ASSESSMENT — PATIENT HEALTH QUESTIONNAIRE - PHQ9
SUM OF ALL RESPONSES TO PHQ QUESTIONS 1-9: 13
10. IF YOU CHECKED OFF ANY PROBLEMS, HOW DIFFICULT HAVE THESE PROBLEMS MADE IT FOR YOU TO DO YOUR WORK, TAKE CARE OF THINGS AT HOME, OR GET ALONG WITH OTHER PEOPLE: VERY DIFFICULT
SUM OF ALL RESPONSES TO PHQ QUESTIONS 1-9: 13

## 2024-03-22 ASSESSMENT — PAIN SCALES - PAIN ENJOYMENT GENERAL ACTIVITY SCALE (PEG)
INTERFERED_GENERAL_ACTIVITY: 9
AVG_PAIN_PASTWEEK: 7
AVG_PAIN_PASTWEEK: 7
PEG_TOTALSCORE: 7.67
PEG_TOTALSCORE: 7.67
INTERFERED_ENJOYMENT_LIFE: 7
INTERFERED_ENJOYMENT_LIFE: 7
INTERFERED_GENERAL_ACTIVITY: 9

## 2024-03-22 ASSESSMENT — ENCOUNTER SYMPTOMS
ARTHRALGIAS: 1
SHORTNESS OF BREATH: 0
BACK PAIN: 1
DYSPHORIC MOOD: 1

## 2024-04-23 ENCOUNTER — TRANSFERRED RECORDS (OUTPATIENT)
Dept: HEALTH INFORMATION MANAGEMENT | Facility: CLINIC | Age: 49
End: 2024-04-23

## 2024-05-09 ENCOUNTER — TELEPHONE (OUTPATIENT)
Dept: FAMILY MEDICINE | Facility: OTHER | Age: 49
End: 2024-05-09

## 2024-05-09 ENCOUNTER — OFFICE VISIT (OUTPATIENT)
Dept: FAMILY MEDICINE | Facility: OTHER | Age: 49
End: 2024-05-09
Attending: FAMILY MEDICINE
Payer: COMMERCIAL

## 2024-05-09 VITALS
DIASTOLIC BLOOD PRESSURE: 78 MMHG | BODY MASS INDEX: 32.13 KG/M2 | WEIGHT: 174.6 LBS | TEMPERATURE: 97 F | HEIGHT: 62 IN | HEART RATE: 71 BPM | OXYGEN SATURATION: 97 % | SYSTOLIC BLOOD PRESSURE: 113 MMHG | RESPIRATION RATE: 18 BRPM

## 2024-05-09 DIAGNOSIS — R21 RASH: ICD-10-CM

## 2024-05-09 DIAGNOSIS — M54.42 CHRONIC BILATERAL LOW BACK PAIN WITH BILATERAL SCIATICA: ICD-10-CM

## 2024-05-09 DIAGNOSIS — G89.29 CHRONIC BILATERAL LOW BACK PAIN WITH BILATERAL SCIATICA: ICD-10-CM

## 2024-05-09 DIAGNOSIS — E66.09 CLASS 1 OBESITY DUE TO EXCESS CALORIES WITHOUT SERIOUS COMORBIDITY WITH BODY MASS INDEX (BMI) OF 31.0 TO 31.9 IN ADULT: ICD-10-CM

## 2024-05-09 DIAGNOSIS — R47.02 DYSPHASIA: ICD-10-CM

## 2024-05-09 DIAGNOSIS — M54.41 CHRONIC BILATERAL LOW BACK PAIN WITH BILATERAL SCIATICA: ICD-10-CM

## 2024-05-09 DIAGNOSIS — Z01.818 PREOPERATIVE EXAMINATION: Primary | ICD-10-CM

## 2024-05-09 DIAGNOSIS — E66.811 CLASS 1 OBESITY DUE TO EXCESS CALORIES WITHOUT SERIOUS COMORBIDITY WITH BODY MASS INDEX (BMI) OF 31.0 TO 31.9 IN ADULT: ICD-10-CM

## 2024-05-09 LAB
ALBUMIN SERPL BCG-MCNC: 4.2 G/DL (ref 3.5–5.2)
ALP SERPL-CCNC: 74 U/L (ref 40–150)
ALT SERPL W P-5'-P-CCNC: 13 U/L (ref 0–50)
ANION GAP SERPL CALCULATED.3IONS-SCNC: 10 MMOL/L (ref 7–15)
AST SERPL W P-5'-P-CCNC: 20 U/L (ref 0–45)
ATRIAL RATE - MUSE: 52 BPM
BASOPHILS # BLD AUTO: 0 10E3/UL (ref 0–0.2)
BASOPHILS NFR BLD AUTO: 1 %
BILIRUB SERPL-MCNC: 0.2 MG/DL
BUN SERPL-MCNC: 13.9 MG/DL (ref 6–20)
CALCIUM SERPL-MCNC: 9.3 MG/DL (ref 8.6–10)
CHLORIDE SERPL-SCNC: 105 MMOL/L (ref 98–107)
CREAT SERPL-MCNC: 0.8 MG/DL (ref 0.51–0.95)
DEPRECATED HCO3 PLAS-SCNC: 25 MMOL/L (ref 22–29)
DIASTOLIC BLOOD PRESSURE - MUSE: NORMAL MMHG
EGFRCR SERPLBLD CKD-EPI 2021: 89 ML/MIN/1.73M2
EOSINOPHIL # BLD AUTO: 0.2 10E3/UL (ref 0–0.7)
EOSINOPHIL NFR BLD AUTO: 4 %
ERYTHROCYTE [DISTWIDTH] IN BLOOD BY AUTOMATED COUNT: 11.9 % (ref 10–15)
GLUCOSE SERPL-MCNC: 85 MG/DL (ref 70–99)
HCT VFR BLD AUTO: 42 % (ref 35–47)
HGB BLD-MCNC: 14.7 G/DL (ref 11.7–15.7)
IMM GRANULOCYTES # BLD: 0 10E3/UL
IMM GRANULOCYTES NFR BLD: 0 %
INTERPRETATION ECG - MUSE: NORMAL
LYMPHOCYTES # BLD AUTO: 1.6 10E3/UL (ref 0.8–5.3)
LYMPHOCYTES NFR BLD AUTO: 26 %
MCH RBC QN AUTO: 32.4 PG (ref 26.5–33)
MCHC RBC AUTO-ENTMCNC: 35 G/DL (ref 31.5–36.5)
MCV RBC AUTO: 93 FL (ref 78–100)
MONOCYTES # BLD AUTO: 0.4 10E3/UL (ref 0–1.3)
MONOCYTES NFR BLD AUTO: 7 %
NEUTROPHILS # BLD AUTO: 4 10E3/UL (ref 1.6–8.3)
NEUTROPHILS NFR BLD AUTO: 63 %
NRBC # BLD AUTO: 0 10E3/UL
NRBC BLD AUTO-RTO: 0 /100
P AXIS - MUSE: 38 DEGREES
PLATELET # BLD AUTO: 289 10E3/UL (ref 150–450)
POTASSIUM SERPL-SCNC: 4.3 MMOL/L (ref 3.4–5.3)
PR INTERVAL - MUSE: 120 MS
PROT SERPL-MCNC: 7 G/DL (ref 6.4–8.3)
QRS DURATION - MUSE: 96 MS
QT - MUSE: 440 MS
QTC - MUSE: 409 MS
R AXIS - MUSE: 47 DEGREES
RBC # BLD AUTO: 4.54 10E6/UL (ref 3.8–5.2)
SODIUM SERPL-SCNC: 140 MMOL/L (ref 135–145)
SYSTOLIC BLOOD PRESSURE - MUSE: NORMAL MMHG
T AXIS - MUSE: 45 DEGREES
VENTRICULAR RATE- MUSE: 52 BPM
WBC # BLD AUTO: 6.2 10E3/UL (ref 4–11)

## 2024-05-09 PROCEDURE — 36415 COLL VENOUS BLD VENIPUNCTURE: CPT | Mod: ZL | Performed by: FAMILY MEDICINE

## 2024-05-09 PROCEDURE — 93010 ELECTROCARDIOGRAM REPORT: CPT | Performed by: INTERNAL MEDICINE

## 2024-05-09 PROCEDURE — 99214 OFFICE O/P EST MOD 30 MIN: CPT | Performed by: FAMILY MEDICINE

## 2024-05-09 PROCEDURE — 93005 ELECTROCARDIOGRAM TRACING: CPT | Performed by: FAMILY MEDICINE

## 2024-05-09 PROCEDURE — G0463 HOSPITAL OUTPT CLINIC VISIT: HCPCS

## 2024-05-09 PROCEDURE — 85025 COMPLETE CBC W/AUTO DIFF WBC: CPT | Mod: ZL | Performed by: FAMILY MEDICINE

## 2024-05-09 PROCEDURE — 80053 COMPREHEN METABOLIC PANEL: CPT | Mod: ZL | Performed by: FAMILY MEDICINE

## 2024-05-09 RX ORDER — TRIAMCINOLONE ACETONIDE 1 MG/G
CREAM TOPICAL 2 TIMES DAILY
Qty: 80 G | Refills: 0 | Status: SHIPPED | OUTPATIENT
Start: 2024-05-09

## 2024-05-09 ASSESSMENT — PATIENT HEALTH QUESTIONNAIRE - PHQ9
10. IF YOU CHECKED OFF ANY PROBLEMS, HOW DIFFICULT HAVE THESE PROBLEMS MADE IT FOR YOU TO DO YOUR WORK, TAKE CARE OF THINGS AT HOME, OR GET ALONG WITH OTHER PEOPLE: VERY DIFFICULT
SUM OF ALL RESPONSES TO PHQ QUESTIONS 1-9: 17
SUM OF ALL RESPONSES TO PHQ QUESTIONS 1-9: 17

## 2024-05-09 ASSESSMENT — ANXIETY QUESTIONNAIRES
8. IF YOU CHECKED OFF ANY PROBLEMS, HOW DIFFICULT HAVE THESE MADE IT FOR YOU TO DO YOUR WORK, TAKE CARE OF THINGS AT HOME, OR GET ALONG WITH OTHER PEOPLE?: SOMEWHAT DIFFICULT
2. NOT BEING ABLE TO STOP OR CONTROL WORRYING: NEARLY EVERY DAY
GAD7 TOTAL SCORE: 16
GAD7 TOTAL SCORE: 16
4. TROUBLE RELAXING: MORE THAN HALF THE DAYS
3. WORRYING TOO MUCH ABOUT DIFFERENT THINGS: NEARLY EVERY DAY
7. FEELING AFRAID AS IF SOMETHING AWFUL MIGHT HAPPEN: NEARLY EVERY DAY
1. FEELING NERVOUS, ANXIOUS, OR ON EDGE: MORE THAN HALF THE DAYS
6. BECOMING EASILY ANNOYED OR IRRITABLE: NEARLY EVERY DAY
GAD7 TOTAL SCORE: 16
7. FEELING AFRAID AS IF SOMETHING AWFUL MIGHT HAPPEN: NEARLY EVERY DAY
IF YOU CHECKED OFF ANY PROBLEMS ON THIS QUESTIONNAIRE, HOW DIFFICULT HAVE THESE PROBLEMS MADE IT FOR YOU TO DO YOUR WORK, TAKE CARE OF THINGS AT HOME, OR GET ALONG WITH OTHER PEOPLE: SOMEWHAT DIFFICULT
5. BEING SO RESTLESS THAT IT IS HARD TO SIT STILL: NOT AT ALL

## 2024-05-09 NOTE — PROGRESS NOTES
Preoperative Evaluation  [facility] [place]  [address]  [place] [address]  Phone: [phone]  Primary Provider: Olivia Ramirez  Pre-op Performing Provider: SEBASTIAN MARIN  May 9, 2024       Ritesh is a 48 year old, presenting for the following:  Pre-Op Exam        5/9/2024    10:02 AM   Additional Questions   Roomed by Nadine delgado   Accompanied by none         5/9/2024    10:02 AM   Patient Reported Additional Medications   Patient reports taking the following new medications none     Surgical Information  Surgery/Procedure: L5 and S1 basivertebral nerve radiofrequency ablation Surgery Location: [facility]  Surgeon: Esequiel  Surgery Date: 5/24/24  Time of Surgery: TBD  Where patient plans to recover: At home with family  Fax number for surgical facility: [phone]    Assessment & Plan     The proposed surgical procedure is considered LOW-INTERMEDIATE risk.    Preoperative examination  - EKG 12-lead complete w/read - Clinics  - CBC with Platelets & Differential  - Comprehensive metabolic panel    Chronic bilateral low back pain with bilateral sciatica    Dysphasia  Advised her to restart her PPI.  No current trouble swallowing.    Rash  - triamcinolone (KENALOG) 0.1 % external cream; Apply topically 2 times daily    Class 1 obesity due to excess calories without serious comorbidity with body mass index (BMI) of 31.0 to 31.9 in adult             - No identified additional risk factors other than previously addressed    Antiplatelet or Anticoagulation Medication Instructions   - Patient is on no antiplatelet or anticoagulation medications.    Additional Medication Instructions  May take routine medications.  Asked her to verify if she should hold/continue ibuprofen/NSAIDs (and for how long) prior to surgery.    Recommendation  APPROVAL GIVEN to proceed with proposed procedure, without further diagnostic evaluation.          Subjective       HPI related to upcoming  procedure: preop RF ablation        5/9/2024     9:43 AM   Preop Questions   1. Have you ever had a heart attack or stroke? No   2. Have you ever had surgery on your heart or blood vessels, such as a stent placement, a coronary artery bypass, or surgery on an artery in your head, neck, heart, or legs? YES - stent placement in left leg for varicose veins   3. Do you have chest pain with activity? No   4. Do you have a history of  heart failure? No   5. Do you currently have a cold, bronchitis or symptoms of other infection? No   6. Do you have a cough, shortness of breath, or wheezing? No   7. Do you or anyone in your family have previous history of blood clots? No   8. Do you or does anyone in your family have a serious bleeding problem such as prolonged bleeding following surgeries or cuts? No   9. Have you ever had problems with anemia or been told to take iron pills? YES - low iron in past   10. Have you had any abnormal blood loss such as black, tarry or bloody stools, or abnormal vaginal bleeding? No   11. Have you ever had a blood transfusion? No   12. Are you willing to have a blood transfusion if it is medically needed before, during, or after your surgery? Yes   13. Have you or any of your relatives ever had problems with anesthesia? No   14. Do you have sleep apnea, excessive snoring or daytime drowsiness? YES - daytime drowsiness   14a. Do you have a CPAP machine? No   15. Do you have any artifical heart valves or other implanted medical devices like a pacemaker, defibrillator, or continuous glucose monitor? No   16. Do you have artificial joints? No   17. Are you allergic to latex? No   18. Is there any chance that you may be pregnant? No       Health Care Directive  Patient does not have a Health Care Directive or Living Will: Discussed advance care planning with patient; however, patient declined at this time.    Preoperative Review of    reviewed - no record of controlled substances  prescribed.      Status of Chronic Conditions:  Trazodone for sleep - takes maybe once a month  S/p esophageal dilation for dysphagia/esophageal stricture maybe a few months ago (Jan), supposed to be on PPI since then but taking intermittently - we discussed taking this regularly  BMI 32    Concern of a rash - states Rx hydrocortisone helped in past, but OTC isn't helping, asking for Rx.        Patient Active Problem List    Diagnosis Date Noted    Chronic bilateral low back pain with bilateral sciatica 01/22/2024     Priority: Medium    Chronic right shoulder pain 01/22/2024     Priority: Medium    Dysphasia 01/22/2024     Priority: Medium    Herniated nucleus pulposus, L5-S1 06/29/2021     Priority: Medium    Chronic, continuous use of opioids 05/17/2021     Priority: Medium    Menorrhagia 05/22/2020     Priority: Medium     Added automatically from request for surgery [id number]      OSCAR (generalized anxiety disorder) 05/09/2018     Priority: Medium    Major depressive disorder, recurrent episode, moderate (H) 05/09/2018     Priority: Medium    Panic attack 05/09/2018     Priority: Medium    ACP (advance care planning) 04/29/2016     Priority: Medium     Advance Care Planning 4/29/2016: ACP Review of Chart / Resources Provided:  Reviewed chart for advance care plan.  Ritesh Soliman has been provided information and resources to begin or update their advance care plan.  Added by Ngoc Duong            Tobacco abuse      Priority: Medium      Past Medical History:   Diagnosis Date    Breast mass     Chronic, continuous use of opioids 05/17/2021    OSCAR (generalized anxiety disorder) 05/09/2018    Herniated nucleus pulposus, L5-S1 06/29/2021    History of recurrent UTI (urinary tract infection) 02/27/2014    Major depressive disorder, recurrent episode, moderate (H) 05/09/2018    Menorrhagia 05/22/2020    Added automatically from request for surgery [id number]    Panic attack 05/09/2018    Tobacco abuse      Past  Surgical History:   Procedure Laterality Date    BACK SURGERY Left 2021    Revision of L5-S1 discectomy at [facility]    BIOPSY BREAST Right 10/19/2017    Procedure: BIOPSY BREAST;  EXCISIONAL BIOPSY RIGHT BREAST;  Surgeon: Kiko Newberry MD;  Location: [facility] OR     SECTION  1992     SECTION  2009    DILATE CERVIX, HYSTEROSCOPY, ABLATE ENDOMETRIUM, COMBINED N/A 2020    Procedure: HYSTEROSCOPY, ENDOMETRIAL ABLATION,endometrial polypectomy;  Surgeon: Juan Roger MD;  Location: [facility] OR    DISCECTOMY LUMBAR ANTERIOR Left 2021    Left L5-S1 discectomy    ENDOSCOPY UPPER, COLONOSCOPY, COMBINED N/A 2024    Procedure: upper endoscopy with biopsies balloon dilation and colonoscopy with polypectomy;  Surgeon: Herbert Apodaca MD;  Location: [facility] OR    ESOPHAGOSCOPY, GASTROSCOPY, DUODENOSCOPY (EGD), COMBINED N/A 10/01/2018    Procedure: COMBINED ESOPHAGOSCOPY, GASTROSCOPY, DUODENOSCOPY (EGD);  UPPER ENDOSCOPY WITH BIOPSIES;  Surgeon: Kiko Newberry MD;  Location: [facility] OR    HC PLACE INTRAVASC STENT OPEN/PERQ, EA ADDL VEIN      stents for varicose veins, Dr Martinez    TUBAL LIGATION Bilateral 2009    WITH  SECTION    TUBAL/ECTOPIC PREGNANCY      surgery for tubal pregnancy     Current Outpatient Medications   Medication Sig Dispense Refill    acetaminophen (TYLENOL) 325 MG tablet Take 325-650 mg by mouth every 6 hours as needed for mild pain      ibuprofen (ADVIL/MOTRIN) 200 MG capsule Take 600 mg by mouth every 8 hours as needed for fever      methocarbamol (ROBAXIN) 750 MG tablet Take 1 tablet (750 mg) by mouth 3 times daily as needed for muscle spasms 90 tablet 1    omeprazole (PRILOSEC) 40 MG DR capsule Take 1 capsule (40 mg) by mouth daily 60 capsule 3    traZODone (DESYREL) 100 MG tablet Take 1 tablet (100 mg) by mouth nightly as needed for sleep 30 tablet 5    amphetamine-dextroamphetamine (ADDERALL XR) 25 MG 24 hr capsule Take by mouth every  "other day (Patient not taking: Reported on 5/9/2024)      VYVANSE 60 MG capsule Take 60 mg by mouth every other day (Patient not taking: Reported on 5/9/2024)         Allergies   Allergen Reactions    Augmentin [Amoxicillin-Pot Clavulanate] Swelling        Social History     Tobacco Use    Smoking status: Some Days     Current packs/day: 0.10     Types: Cigarettes    Smokeless tobacco: Never    Tobacco comments:     rarely smokes. declines quitplan referral 9/18/18   Substance Use Topics    Alcohol use: Yes     Alcohol/week: 0.0 standard drinks of alcohol     Comment: occassionally       History   Drug Use No         Review of Systems   Constitutional:  Negative for fever.   Respiratory:  Negative for shortness of breath.    Cardiovascular:  Negative for chest pain, palpitations and peripheral edema.   Gastrointestinal:  Negative for abdominal pain.   Neurological:  Negative for light-headedness and headaches.         Objective    /78 (BP Location: Left arm, Patient Position: Sitting, Cuff Size: Adult Large)   Pulse 71   Temp 97  F (36.1  C) (Tympanic)   Resp 18   Ht 1.575 m (5' 2\")   Wt 79.2 kg (174 lb 9.6 oz)   SpO2 97%   BMI 31.93 kg/m     Estimated body mass index is 31.93 kg/m  as calculated from the following:    Height as of this encounter: 1.575 m (5' 2\").    Weight as of this encounter: 79.2 kg (174 lb 9.6 oz).  Physical Exam  Constitutional:       General: She is not in acute distress.     Appearance: Normal appearance.   HENT:      Head: Normocephalic and atraumatic.      Right Ear: Tympanic membrane normal.      Left Ear: Tympanic membrane normal.      Mouth/Throat:      Mouth: Mucous membranes are moist.      Pharynx: Oropharynx is clear.   Eyes:      Conjunctiva/sclera: Conjunctivae normal.      Pupils: Pupils are equal, round, and reactive to light.   Neck:      Vascular: No carotid bruit.   Cardiovascular:      Rate and Rhythm: Normal rate and regular rhythm.      Heart sounds: Normal " heart sounds. No murmur heard.  Pulmonary:      Effort: Pulmonary effort is normal.      Breath sounds: Normal breath sounds.   Abdominal:      General: Bowel sounds are normal.      Palpations: Abdomen is soft.      Tenderness: There is no abdominal tenderness.   Musculoskeletal:      Right lower leg: No edema.      Left lower leg: No edema.   Skin:     Comments: Mild dermatitis   Neurological:      Mental Status: She is alert and oriented to person, place, and time.           Recent Labs   Lab Test 12/08/23  0905 10/19/22  1735   HGB 14.0 14.7    373    137   POTASSIUM 3.9 4.1   CR 0.95 0.70        Diagnostics  Recent Results (from the past 168 hour(s))   Comprehensive metabolic panel    Collection Time: 05/09/24 10:33 AM   Result Value Ref Range    Sodium 140 135 - 145 mmol/L    Potassium 4.3 3.4 - 5.3 mmol/L    Carbon Dioxide (CO2) 25 22 - 29 mmol/L    Anion Gap 10 7 - 15 mmol/L    Urea Nitrogen 13.9 6.0 - 20.0 mg/dL    Creatinine 0.80 0.51 - 0.95 mg/dL    GFR Estimate 89 >60 mL/min/1.73m2    Calcium 9.3 8.6 - 10.0 mg/dL    Chloride 105 98 - 107 mmol/L    Glucose 85 70 - 99 mg/dL    Alkaline Phosphatase 74 40 - 150 U/L    AST 20 0 - 45 U/L    ALT 13 0 - 50 U/L    Protein Total 7.0 6.4 - 8.3 g/dL    Albumin 4.2 3.5 - 5.2 g/dL    Bilirubin Total 0.2 <=1.2 mg/dL   CBC with platelets and differential    Collection Time: 05/09/24 10:33 AM   Result Value Ref Range    WBC Count 6.2 4.0 - 11.0 10e3/uL    RBC Count 4.54 3.80 - 5.20 10e6/uL    Hemoglobin 14.7 11.7 - 15.7 g/dL    Hematocrit 42.0 35.0 - 47.0 %    MCV 93 78 - 100 fL    MCH 32.4 26.5 - 33.0 pg    MCHC 35.0 31.5 - 36.5 g/dL    RDW 11.9 10.0 - 15.0 %    Platelet Count 289 150 - 450 10e3/uL    % Neutrophils 63 %    % Lymphocytes 26 %    % Monocytes 7 %    % Eosinophils 4 %    % Basophils 1 %    % Immature Granulocytes 0 %    NRBCs per 100 WBC 0 <1 /100    Absolute Neutrophils 4.0 1.6 - 8.3 10e3/uL    Absolute Lymphocytes 1.6 0.8 - 5.3 10e3/uL     Absolute Monocytes 0.4 0.0 - 1.3 10e3/uL    Absolute Eosinophils 0.2 0.0 - 0.7 10e3/uL    Absolute Basophils 0.0 0.0 - 0.2 10e3/uL    Absolute Immature Granulocytes 0.0 <=0.4 10e3/uL    Absolute NRBCs 0.0 10e3/uL   EKG 12-lead complete w/read - Clinics    Collection Time: 05/09/24 10:53 AM   Result Value Ref Range    Systolic Blood Pressure  mmHg    Diastolic Blood Pressure  mmHg    Ventricular Rate 52 BPM    Atrial Rate 52 BPM    MA Interval 120 ms    QRS Duration 96 ms     ms    QTc 409 ms    P Axis 38 degrees    R AXIS 47 degrees    T Axis 45 degrees    Interpretation ECG       Sinus bradycardia  Otherwise normal ECG  No previous ECGs available  Confirmed by MD Vandana, Adam ([id number]) on 5/9/2024 11:22:25 AM              Signed Electronically by: SEBASTIAN MARIN DO  Copy of this evaluation report is provided to requesting physician.         Answers submitted by the patient for this visit:  Patient Health Questionnaire (Submitted on 5/9/2024)  If you checked off any problems, how difficult have these problems made it for you to do your work, take care of things at home, or get along with other people?: Very difficult  PHQ9 TOTAL SCORE: 17  OSCAR-7 (Submitted on 5/9/2024)  OSCAR 7 TOTAL SCORE: 16

## 2024-05-15 ASSESSMENT — ENCOUNTER SYMPTOMS
SHORTNESS OF BREATH: 0
ABDOMINAL PAIN: 0
HEADACHES: 0
LIGHT-HEADEDNESS: 0
PALPITATIONS: 0
FEVER: 0

## 2024-05-17 ENCOUNTER — TELEPHONE (OUTPATIENT)
Dept: FAMILY MEDICINE | Facility: OTHER | Age: 49
End: 2024-05-17

## 2024-06-03 ENCOUNTER — TELEPHONE (OUTPATIENT)
Dept: FAMILY MEDICINE | Facility: OTHER | Age: 49
End: 2024-06-03

## 2024-06-03 NOTE — TELEPHONE ENCOUNTER
12:18 PM    Reason for Call: OVERBOOK    Pt surgery was cancelled and may need a preop for rescheduled surgery. Did just have preop on 05/09/2024. Surgery reschedule is for 06/14/2024. If needing preop, please call to schedule ASAP. If not, please communicate to patient.     preop /06/14/2024 surgery date/[facility]/back surgery     The patient is requesting an appointment for ASAP with Dr. Ramirez or covering provider.    Was an appointment offered for this call? No  If yes : Appointment type              Date    Preferred method for responding to this message: Telephone Call  What is your phone number ?[phone]     If we cannot reach you directly, may we leave a detailed response at the number you provided? Yes    Can this message wait until your PCP/provider returns, if unavailable today? Not applicable    Miladys Hall

## 2024-06-06 PROBLEM — F11.90 CHRONIC, CONTINUOUS USE OF OPIOIDS: Chronic | Status: RESOLVED | Noted: 2021-05-17 | Resolved: 2024-06-06

## 2024-06-06 NOTE — PATIENT INSTRUCTIONS

## 2024-06-07 ENCOUNTER — TELEPHONE (OUTPATIENT)
Dept: FAMILY MEDICINE | Facility: OTHER | Age: 49
End: 2024-06-07

## 2024-06-07 NOTE — TELEPHONE ENCOUNTER
Patient has appt today for bladder biopsy.  CPT codes 44567 & 89840  ICD 10: D49.4 Bladder tumor    Fax: [phone] - Dr. Bee    Talked with patient regarding appointment

## 2024-06-07 NOTE — TELEPHONE ENCOUNTER
9:21 AM    Reason for Call: Phone Call    Description: Patient returned Georgina's call from 06/03 to answer any questions that Georgina had. Please call her back at [phone].    Was an appointment offered for this call? No  If yes : Appointment type              Date    Preferred method for responding to this message: Telephone Call  What is your phone number ? [phone]    If we cannot reach you directly, may we leave a detailed response at the number you provided? Yes    Can this message wait until your PCP/provider returns, if available today? Not applicable    Chhaya Dickens

## 2024-06-10 ENCOUNTER — OFFICE VISIT (OUTPATIENT)
Dept: FAMILY MEDICINE | Facility: OTHER | Age: 49
End: 2024-06-10
Attending: FAMILY MEDICINE
Payer: COMMERCIAL

## 2024-06-10 ENCOUNTER — LAB (OUTPATIENT)
Dept: LAB | Facility: OTHER | Age: 49
End: 2024-06-10
Attending: FAMILY MEDICINE
Payer: COMMERCIAL

## 2024-06-10 VITALS
HEIGHT: 62 IN | HEART RATE: 82 BPM | SYSTOLIC BLOOD PRESSURE: 118 MMHG | DIASTOLIC BLOOD PRESSURE: 78 MMHG | WEIGHT: 172.3 LBS | TEMPERATURE: 98.2 F | BODY MASS INDEX: 31.71 KG/M2 | OXYGEN SATURATION: 95 % | RESPIRATION RATE: 17 BRPM

## 2024-06-10 DIAGNOSIS — M54.41 CHRONIC BILATERAL LOW BACK PAIN WITH BILATERAL SCIATICA: ICD-10-CM

## 2024-06-10 DIAGNOSIS — M54.42 CHRONIC BILATERAL LOW BACK PAIN WITH BILATERAL SCIATICA: ICD-10-CM

## 2024-06-10 DIAGNOSIS — G89.29 CHRONIC BILATERAL LOW BACK PAIN WITH BILATERAL SCIATICA: ICD-10-CM

## 2024-06-10 DIAGNOSIS — Z01.818 PREOP GENERAL PHYSICAL EXAM: Primary | ICD-10-CM

## 2024-06-10 DIAGNOSIS — F41.1 GAD (GENERALIZED ANXIETY DISORDER): ICD-10-CM

## 2024-06-10 DIAGNOSIS — F33.1 MAJOR DEPRESSIVE DISORDER, RECURRENT EPISODE, MODERATE (H): ICD-10-CM

## 2024-06-10 LAB
ANION GAP SERPL CALCULATED.3IONS-SCNC: 13 MMOL/L (ref 7–15)
BUN SERPL-MCNC: 22 MG/DL (ref 6–20)
CALCIUM SERPL-MCNC: 9.5 MG/DL (ref 8.6–10)
CHLORIDE SERPL-SCNC: 103 MMOL/L (ref 98–107)
CREAT SERPL-MCNC: 0.93 MG/DL (ref 0.51–0.95)
DEPRECATED HCO3 PLAS-SCNC: 23 MMOL/L (ref 22–29)
EGFRCR SERPLBLD CKD-EPI 2021: 74 ML/MIN/1.73M2
ERYTHROCYTE [DISTWIDTH] IN BLOOD BY AUTOMATED COUNT: 11.8 % (ref 10–15)
GLUCOSE SERPL-MCNC: 79 MG/DL (ref 70–99)
HCT VFR BLD AUTO: 44.2 % (ref 35–47)
HGB BLD-MCNC: 15.4 G/DL (ref 11.7–15.7)
HOLD SPECIMEN: NORMAL
HOLD SPECIMEN: NORMAL
MCH RBC QN AUTO: 32.1 PG (ref 26.5–33)
MCHC RBC AUTO-ENTMCNC: 34.8 G/DL (ref 31.5–36.5)
MCV RBC AUTO: 92 FL (ref 78–100)
PLATELET # BLD AUTO: 327 10E3/UL (ref 150–450)
POTASSIUM SERPL-SCNC: 4.1 MMOL/L (ref 3.4–5.3)
RBC # BLD AUTO: 4.8 10E6/UL (ref 3.8–5.2)
SODIUM SERPL-SCNC: 139 MMOL/L (ref 135–145)
WBC # BLD AUTO: 5.8 10E3/UL (ref 4–11)

## 2024-06-10 PROCEDURE — G0463 HOSPITAL OUTPT CLINIC VISIT: HCPCS

## 2024-06-10 PROCEDURE — 80048 BASIC METABOLIC PNL TOTAL CA: CPT | Mod: ZL | Performed by: FAMILY MEDICINE

## 2024-06-10 PROCEDURE — 99214 OFFICE O/P EST MOD 30 MIN: CPT | Performed by: FAMILY MEDICINE

## 2024-06-10 PROCEDURE — 36415 COLL VENOUS BLD VENIPUNCTURE: CPT | Mod: ZL

## 2024-06-10 PROCEDURE — 85027 COMPLETE CBC AUTOMATED: CPT | Mod: ZL | Performed by: FAMILY MEDICINE

## 2024-06-10 ASSESSMENT — PAIN SCALES - GENERAL: PAINLEVEL: MODERATE PAIN (4)

## 2024-06-10 ASSESSMENT — PATIENT HEALTH QUESTIONNAIRE - PHQ9: SUM OF ALL RESPONSES TO PHQ QUESTIONS 1-9: 16

## 2024-06-10 NOTE — PROGRESS NOTES
Preoperative Evaluation  [facility] [place]  [address]  [place] [address]  Phone: [phone]  Primary Provider: Olivia Ramirez MD  Pre-op Performing Provider: Olivia Ramirez MD  Pedro 10, 2024           6/10/2024   Surgical Information   What procedure is being done? L5 and S1 basivertebral nerve radiofrequency ablation   Facility or Hospital where procedure/surgery will be performed: [facility]   Who is doing the procedure / surgery? Dr Iraheta   Date of surgery / procedure: 6/14/2024   Time of surgery / procedure: 12:09PM   Where do you plan to recover after surgery? at home with family     Fax number for surgical facility: [phone]     Assessment & Plan     The proposed surgical procedure is considered INTERMEDIATE risk.    Preop general physical exam  No concerning lab, EKG, or exam findings  - CBC with platelets  - Basic metabolic panel    Chronic bilateral low back pain with bilateral sciatica  Reason for the procedure   - c/w methocarbamol  - uses ibu/apap     OSCAR (generalized anxiety disorder) / Major depressive disorder, recurrent episode, moderate (H)  stable      Risks and Recommendations  The patient has the following additional risks and recommendations for perioperative complications:  Social and Substance:    - Active nicotine user, advised smoking cessation    Preoperative Medication Instructions  Antiplatelet or Anticoagulation Medication Instructions   - Patient is on no antiplatelet or anticoagulation medications.    Additional Medication Instructions  Take all scheduled medications on the day of surgery   - ibuprofen (Advil, Motrin): DO NOT TAKE 7 day before surgery.     Recommendation  Approval given to proceed with proposed procedure, without further diagnostic evaluation.        Subjective   Ritesh is a 49 year old, presenting for the following:  Pre-Op Exam        HPI related to upcoming procedure: Ritesh has a many year history of back pain. She has failed  previous injections. History of back surgery. She will undergoing the above procedure         6/10/2024   Pre-Op Questionnaire   Have you ever had a heart attack or stroke? No   Have you ever had surgery on your heart or blood vessels, such as a stent placement, a coronary artery bypass, or surgery on an artery in your head, neck, heart, or legs? (!) YES stents in legs for varicose veins, most likely vein stripping    Do you have chest pain with activity? No   Do you have a history of heart failure? No   Do you currently have a cold, bronchitis or symptoms of other infection? No   Do you have a cough, shortness of breath, or wheezing? No   Do you or anyone in your family have previous history of blood clots? (!) YES father had an aorta aneurism    Do you or does anyone in your family have a serious bleeding problem such as prolonged bleeding following surgeries or cuts? No   Have you ever had problems with anemia or been told to take iron pills? (!) YES has in the past and PRN OTC   Have you had any abnormal blood loss such as black, tarry or bloody stools, or abnormal vaginal bleeding? No   Have you ever had a blood transfusion? No   Are you willing to have a blood transfusion if it is medically needed before, during, or after your surgery? YES   Have you or any of your relatives ever had problems with anesthesia? No   Do you have sleep apnea, excessive snoring or daytime drowsiness? (!) YES hard time sleeping at night    Do you have a CPAP machine? (!) NO    Do you have any artifical heart valves or other implanted medical devices like a pacemaker, defibrillator, or continuous glucose monitor? No   Do you have artificial joints? No   Are you allergic to latex? No     Health Care Directive  Patient does not have a Health Care Directive or Living Will: Discussed advance care planning with patient; however, patient declined at this time.    Preoperative Review of    reviewed - controlled substances reflected in  medication list.      Status of Chronic Conditions:  See problem list for active medical problems.  Problems all longstanding and stable, except as noted/documented.  See ROS for pertinent symptoms related to these conditions.    DEPRESSION - Patient has a long history of Depression of moderate severity requiring medication for control with recent symptoms being stable..Current symptoms of depression include depressed mood, insomnia, anxiety.     Patient Active Problem List    Diagnosis Date Noted    Chronic bilateral low back pain with bilateral sciatica 01/22/2024     Priority: Medium    Chronic right shoulder pain 01/22/2024     Priority: Medium    Dysphasia 01/22/2024     Priority: Medium    Herniated nucleus pulposus, L5-S1 06/29/2021     Priority: Medium    Menorrhagia 05/22/2020     Priority: Medium     Added automatically from request for surgery [id number]      OSCAR (generalized anxiety disorder) 05/09/2018     Priority: Medium    Major depressive disorder, recurrent episode, moderate (H) 05/09/2018     Priority: Medium    Panic attack 05/09/2018     Priority: Medium    ACP (advance care planning) 04/29/2016     Priority: Medium     Advance Care Planning 4/29/2016: ACP Review of Chart / Resources Provided:  Reviewed chart for advance care plan.  Ritesh Soliman has been provided information and resources to begin or update their advance care plan.  Added by Ngoc Duong            Tobacco abuse      Priority: Medium      Past Medical History:   Diagnosis Date    Breast mass     Chronic, continuous use of opioids 05/17/2021    OSCAR (generalized anxiety disorder) 05/09/2018    Herniated nucleus pulposus, L5-S1 06/29/2021    History of recurrent UTI (urinary tract infection) 02/27/2014    Major depressive disorder, recurrent episode, moderate (H) 05/09/2018    Menorrhagia 05/22/2020    Added automatically from request for surgery [id number]    Panic attack 05/09/2018    Tobacco abuse      Past Surgical History:    Procedure Laterality Date    BACK SURGERY Left 2021    Revision of L5-S1 discectomy at [facility]    BIOPSY BREAST Right 10/19/2017    Procedure: BIOPSY BREAST;  EXCISIONAL BIOPSY RIGHT BREAST;  Surgeon: Kiko Newberry MD;  Location: [facility] OR     SECTION  1992     SECTION  2009    DILATE CERVIX, HYSTEROSCOPY, ABLATE ENDOMETRIUM, COMBINED N/A 2020    Procedure: HYSTEROSCOPY, ENDOMETRIAL ABLATION,endometrial polypectomy;  Surgeon: Juan Roger MD;  Location: [facility] OR    DISCECTOMY LUMBAR ANTERIOR Left 2021    Left L5-S1 discectomy    ENDOSCOPY UPPER, COLONOSCOPY, COMBINED N/A 2024    Procedure: upper endoscopy with biopsies balloon dilation and colonoscopy with polypectomy;  Surgeon: Herbert Apodaca MD;  Location: [facility] OR    ESOPHAGOSCOPY, GASTROSCOPY, DUODENOSCOPY (EGD), COMBINED N/A 10/01/2018    Procedure: COMBINED ESOPHAGOSCOPY, GASTROSCOPY, DUODENOSCOPY (EGD);  UPPER ENDOSCOPY WITH BIOPSIES;  Surgeon: Kiko Newberry MD;  Location: [facility] OR    HC PLACE INTRAVASC STENT OPEN/PERQ, EA ADDL VEIN      stents for varicose veins, Dr Martinez    TUBAL LIGATION Bilateral 2009    WITH  SECTION    TUBAL/ECTOPIC PREGNANCY      surgery for tubal pregnancy     Current Outpatient Medications   Medication Sig Dispense Refill    acetaminophen (TYLENOL) 325 MG tablet Take 325-650 mg by mouth every 6 hours as needed for mild pain      omeprazole (PRILOSEC) 40 MG DR capsule Take 1 capsule (40 mg) by mouth daily 60 capsule 3    traZODone (DESYREL) 100 MG tablet Take 1 tablet (100 mg) by mouth nightly as needed for sleep 30 tablet 5    triamcinolone (KENALOG) 0.1 % external cream Apply topically 2 times daily 80 g 0    ibuprofen (ADVIL/MOTRIN) 200 MG capsule Take 600 mg by mouth every 8 hours as needed for fever (Patient not taking: Reported on 6/10/2024)      methocarbamol (ROBAXIN) 750 MG tablet Take 1 tablet (750 mg) by mouth 3 times daily as needed for  "muscle spasms (Patient not taking: Reported on 6/10/2024) 90 tablet 1       Allergies   Allergen Reactions    Augmentin [Amoxicillin-Pot Clavulanate] Swelling        Social History     Tobacco Use    Smoking status: Some Days     Current packs/day: 0.10     Types: Cigarettes    Smokeless tobacco: Never    Tobacco comments:     rarely smokes. declines quitplan referral 9/18/18   Substance Use Topics    Alcohol use: Yes     Alcohol/week: 0.0 standard drinks of alcohol     Comment: occassionally       History   Drug Use No             Review of Systems  Constitutional, HEENT, cardiovascular, pulmonary, gi and gu systems are negative, except as otherwise noted. Back pain     Objective    /78 (BP Location: Left arm, Patient Position: Sitting, Cuff Size: Adult Regular)   Pulse 82   Temp 98.2  F (36.8  C) (Tympanic)   Resp 17   Ht 1.575 m (5' 2\")   Wt 78.2 kg (172 lb 4.8 oz)   SpO2 95%   BMI 31.51 kg/m     Estimated body mass index is 31.51 kg/m  as calculated from the following:    Height as of this encounter: 1.575 m (5' 2\").    Weight as of this encounter: 78.2 kg (172 lb 4.8 oz).  Physical Exam  Constitutional:       General: She is not in acute distress.     Appearance: She is well-developed.   HENT:      Head: Normocephalic and atraumatic.      Right Ear: Hearing and tympanic membrane normal.      Left Ear: Hearing and tympanic membrane normal.      Mouth/Throat:      Mouth: Mucous membranes are moist.      Pharynx: No oropharyngeal exudate.   Eyes:      Extraocular Movements: Extraocular movements intact.      Conjunctiva/sclera: Conjunctivae normal.   Neck:      Thyroid: No thyromegaly.   Cardiovascular:      Rate and Rhythm: Normal rate and regular rhythm.      Pulses: Normal pulses.      Heart sounds: Normal heart sounds. No murmur heard.  Pulmonary:      Effort: Pulmonary effort is normal. No respiratory distress.      Breath sounds: Normal breath sounds. No wheezing or rales.   Abdominal:      " General: Bowel sounds are normal. There is no distension.      Palpations: Abdomen is soft.      Tenderness: There is no abdominal tenderness. There is no guarding.   Musculoskeletal:         General: Normal range of motion.      Cervical back: Normal range of motion and neck supple.      Right lower leg: No edema.      Left lower leg: No edema.   Lymphadenopathy:      Cervical: No cervical adenopathy.   Skin:     General: Skin is dry.   Neurological:      Mental Status: She is alert.   Psychiatric:         Mood and Affect: Mood normal.         Recent Labs   Lab Test 05/09/24  1033 12/08/23  0905   HGB 14.7 14.0    282    140   POTASSIUM 4.3 3.9   CR 0.80 0.95        Diagnostics  Recent Results (from the past 24 hour(s))   CBC with platelets    Collection Time: 06/10/24  2:13 PM   Result Value Ref Range    WBC Count 5.8 4.0 - 11.0 10e3/uL    RBC Count 4.80 3.80 - 5.20 10e6/uL    Hemoglobin 15.4 11.7 - 15.7 g/dL    Hematocrit 44.2 35.0 - 47.0 %    MCV 92 78 - 100 fL    MCH 32.1 26.5 - 33.0 pg    MCHC 34.8 31.5 - 36.5 g/dL    RDW 11.8 10.0 - 15.0 %    Platelet Count 327 150 - 450 10e3/uL   Basic metabolic panel    Collection Time: 06/10/24  2:13 PM   Result Value Ref Range    Sodium 139 135 - 145 mmol/L    Potassium 4.1 3.4 - 5.3 mmol/L    Chloride 103 98 - 107 mmol/L    Carbon Dioxide (CO2) 23 22 - 29 mmol/L    Anion Gap 13 7 - 15 mmol/L    Urea Nitrogen 22.0 (H) 6.0 - 20.0 mg/dL    Creatinine 0.93 0.51 - 0.95 mg/dL    GFR Estimate 74 >60 mL/min/1.73m2    Calcium 9.5 8.6 - 10.0 mg/dL    Glucose 79 70 - 99 mg/dL   Extra Green Top (Lithium Heparin) Tube    Collection Time: 06/10/24  2:13 PM   Result Value Ref Range    Hold Specimen JIC    Extra Purple Top Tube    Collection Time: 06/10/24  2:13 PM   Result Value Ref Range    Hold Specimen JIC       EKG (5/24/2024): sinus lindsey to 52, normal axis, normal intervals, no acute ST/T changes c/w ischemia, no LVH by voltage criteria, unchanged from previous  tracings (7/19/2018)      Revised Cardiac Risk Index (RCRI)  The patient has the following serious cardiovascular risks for perioperative complications:   - No serious cardiac risks = 0 points     RCRI Interpretation: 0 points: Class I (very low risk - 0.4% complication rate)         Signed Electronically by: Olivia Ramirez MD  Copy of this evaluation report is provided to requesting physician.

## 2024-06-11 ENCOUNTER — TELEPHONE (OUTPATIENT)
Dept: FAMILY MEDICINE | Facility: OTHER | Age: 49
End: 2024-06-11

## 2024-07-25 PROBLEM — M54.51 VERTEBROGENIC LOW BACK PAIN: Status: ACTIVE | Noted: 2024-06-11

## 2024-08-21 ENCOUNTER — NURSE TRIAGE (OUTPATIENT)
Dept: FAMILY MEDICINE | Facility: OTHER | Age: 49
End: 2024-08-21

## 2024-08-21 ENCOUNTER — TELEPHONE (OUTPATIENT)
Dept: FAMILY MEDICINE | Facility: OTHER | Age: 49
End: 2024-08-21

## 2024-08-21 NOTE — TELEPHONE ENCOUNTER
Pt scheduled as advised by PCP's nurse. Left pt VM.    Future Appointments 8/21/2024 - 2/17/2025        Date Visit Type Length Department Provider     8/29/2024  3:30 PM OFFICE VISIT 30 min [facility] Olivia Ramirez MD    Location Instructions:     From [place]: Take [address]. Turn left at [address]. Turn left at the first stoplight on [address]. At the first stop sign, take a right onto [address]. The upper level parking lot will be on the left. East Entrance Door number 10.   From [place]: Take [address]. Take a right at [address]. At the first stop sign, take a right onto [address]. The upper level parking lot will be on the left. East Entrance Door number 10.   From [place]: Take [address]. Take the [address] ramp towards [place]. Turn left onto [address]. Take a slight right onto [address]. Turn left at the first stoplight on [address]. At the first stop sign, take a right onto [address]. The upper level parking lot will be on the left. East Entrance Door number 10.                  Latrice Miller RN

## 2024-08-21 NOTE — TELEPHONE ENCOUNTER
"8/21/2024 10:58 AM    Pt is requesting overbook next week with PCP.     Pt reports most concerning symptom is intermittent dizziness, experiencing for a month now. Pt reports sx are \"easing up now\". Pt denies dizziness at this time. Pt reports intermittent HA, denies HA at this time. Pt reports pain in left eye again pt reports hx of this and no changes, pt also advised to call her ophthalmologist to give updates regarding her eye concern. Pt denies any other concerns or sx at this time. Pt denies abd pain.      Patient advised - If you have any new or worsening symptoms or need immediate medical attention call 911, please go to the Emergency Room and/or Urgent Care. Patient verbalizes understanding and agrees to plan.     Reason for Disposition   [1] MODERATE dizziness (e.g., vertigo; feels very unsteady, interferes with normal activities) AND [2] has been evaluated by doctor (or NP/PA) for this    Additional Information   Negative: [1] Weakness (i.e., paralysis, loss of muscle strength) of the face, arm or leg on one side of the body AND [2] sudden onset AND [3] present now   Negative: [1] Numbness (i.e., loss of sensation) of the face, arm or leg on one side of the body AND [2] sudden onset AND [3] present now   Negative: [1] Loss of speech or garbled speech AND [2] sudden onset AND [3] present now   Negative: Difficult to awaken or acting confused (e.g., disoriented, slurred speech)   Negative: Sounds like a life-threatening emergency to the triager   Negative: Followed a head injury   Negative: Followed an ear injury   Negative: Localized weakness or numbness is main symptom   Negative: Dizziness relates to riding in a car, going to an amusement park, etc.   Negative: [1] Dizziness is main symptom AND [2] NO spinning sensation (i.e., vertigo)   Negative: SEVERE dizziness (vertigo) (e.g., unable to walk without assistance)   Negative: Severe headache (e.g., excruciating)   (Exception: Similar to previous " "migraines.)   Negative: [1] Dizziness (vertigo) present now AND [2] one or more STROKE RISK FACTORS (i.e., hypertension, diabetes, prior stroke/TIA, heart attack)  (Exception: Prior doctor or NP/PA evaluation for this AND no different/worse than usual.)   Negative: [1] Dizziness (vertigo) present now AND [2] age > 59   (Exception: Prior doctor or NP/PA evaluation for this AND no different/worse than usual.)   Negative: [1] Weakness (i.e., paralysis, loss of muscle strength) of the face, arm / hand, or leg / foot on one side of the body AND [2] sudden onset AND [3] brief (now gone)   Negative: [1] Numbness (i.e., loss of sensation) of the face, arm / hand, or leg / foot on one side of the body AND [2] sudden onset AND [3] brief (now gone)   Negative: [1] Loss of speech or garbled speech AND [2] sudden onset AND [3] brief (now gone)   Negative: Loss of vision or double vision  (Exception: Similar to previous migraines.)   Negative: Patient sounds very sick or weak to the triager   Negative: Taking a medicine that could cause dizziness (e.g., phenytoin [Dilantin], carbamazepine [Tegretol], primidone [Mysoline])   Negative: [1] MODERATE dizziness (e.g., vertigo; feels very unsteady, interferes with normal activities) AND [2] has NOT been evaluated by doctor (or NP/PA) for this   Negative: [1] NO dizziness now AND [2] one or more stroke risk factors (i.e., hypertension, diabetes, prior stroke/TIA/heart attack)   Negative: [1] NO dizziness now AND [2] age > 59   Negative: Earache   Negative: Vomiting occurs with dizziness    Answer Assessment - Initial Assessment Questions  1. DESCRIPTION: \"Describe your dizziness.\"      Room spinning, currently denies spinning   2. VERTIGO: \"Do you feel like either you or the room is spinning or tilting?\"       yes  3. LIGHTHEADED: \"Do you feel lightheaded?\" (e.g., somewhat faint, woozy, weak upon standing)      Previously yes but not now  4. SEVERITY: \"How bad is it?\"  \"Can you walk?\"   " " - MILD: Feels slightly dizzy and unsteady, but is walking normally.    - MODERATE: Feels unsteady when walking, but not falling; interferes with normal activities (e.g., school, work).    - SEVERE: Unable to walk without falling, or requires assistance to walk without falling.      moderate  5. ONSET:  \"When did the dizziness begin?\"      One monh  6. AGGRAVATING FACTORS: \"Does anything make it worse?\" (e.g., standing, change in head position)      Laying down worse  7. CAUSE: \"What do you think is causing the dizziness?\"      unknown  8. RECURRENT SYMPTOM: \"Have you had dizziness before?\" If Yes, ask: \"When was the last time?\" \"What happened that time?\"      Pt reports eye hx, pt reports hx of dizziness  9. OTHER SYMPTOMS: \"Do you have any other symptoms?\" (e.g., headache, weakness, numbness, vomiting, earache)      HA  10. PREGNANCY: \"Is there any chance you are pregnant?\" \"When was your last menstrual period?\"        no    Protocols used: Dizziness - Vertigo-A-AH    "

## 2024-08-21 NOTE — TELEPHONE ENCOUNTER
Symptom or reason needing to speak to RN: dizzy and pain in left eye and throat closing and abdomen feels hard, possible cysts.     Best number to return call: [phone]      Best time to return call: anytime

## 2024-09-05 NOTE — PROGRESS NOTES
Assessment & Plan     Benign paroxysmal positional vertigo of left ear  Consistent with BPPV left  - Physical Therapy  Referral; Future  - meclizine (ANTIVERT) 25 MG tablet; Take 1 tablet (25 mg) by mouth 3 times daily as needed for dizziness.    Skin lesion  Consistent with viral   - US Lower Extremity Non Vascular Left; Future  - acyclovir (ZOVIRAX) 5 % external ointment; Apply topically 6 times daily for 7 days.    Class 1 obesity due to excess calories without serious comorbidity with body mass index (BMI) of 33.0 to 33.9 in adult  Will start with appt with Stacey for weight mgt    Chronic bilateral low back pain with bilateral sciatica / leg pain b/l  S/p L5/S1 basivertebral nerve radiofrequency ablation  Suspect leg pain is d/t back issues  - c/w methocarbamol  - c/w ibu  - ABIs    Dysphasia  - Speech Therapy  Referral; Future    Arthralgia  Ritesh will make an appt with [facility] rheumatology, referral previously sent    31 minutes spent on the date of the encounter doing chart review, review of test results, interpretation of tests, patient visit, documentation    The longitudinal plan of care for the diagnosis(es)/condition(s) as documented were addressed during this visit. Due to the added complexity in care, I will continue to support Ritesh in the subsequent management and with ongoing continuity of care.    See Patient Instructions    Next visit 10/24/2024    Ivone Awad is a 49 year old, presenting for the following health issues:  Dizziness        9/6/2024     9:56 AM   Additional Questions   Roomed by markie delgado   Accompanied by none         9/6/2024     9:56 AM   Patient Reported Additional Medications   Patient reports taking the following new medications none     History of Present Illness       Reason for visit:  Severl issues  Symptom onset:  More than a month  Symptoms include:  Dizzy  Symptom intensity:  Mild  Symptom progression:  Staying the same  Had these symptoms before:   "No  What makes it worse:  No yes  What makes it better:  Not sure   She is taking medications regularly.         Dizziness  Onset/Duration: few months  Description:   Do you feel faint: No  Does it feel like the surroundings (bed, room) are moving: YES  Unsteady/off balance: YES  Have you passed out or fallen: YES- fall  Intensity: moderate  Progression of Symptoms: improving  Accompanying Signs & Symptoms:  Heart palpitations or chest pain: No  Nausea, vomiting: No  Weakness or lack of coordination in arms or legs: No  Vision or speech changes: YES  Numbness or tingling: No  Ringing in ears (Tinnitus): No  Hearing Loss: No  History:   Head trauma/concussion history: No  Previous similar symptoms: No  Recent bleeding history: No  Any new medications (BP?): No  Precipitating factors:   Worse with activity: No  Worse with head movement: YES  Alleviating factors:   Does staying in a fixed position give relief: no   Therapies tried and outcome: None    Worse with turning her head to the left    # lump on leg  - started last year and resolved. Thought it was spider bite  - hot compress and there where drainage holes,   - hydrocortisone cream made it worse       # Weight  - gained 40lbs in the last year  - no change in diet  - sister with scleroderma   - resulting in increased depression     Wt Readings from Last 4 Encounters:   09/06/24 82.1 kg (181 lb 1.6 oz)   06/10/24 78.2 kg (172 lb 4.8 oz)   05/09/24 79.2 kg (174 lb 9.6 oz)   03/22/24 78.8 kg (173 lb 12.8 oz)       # back pain  - legs are hurting   - walking all day at work     Review of Systems  Constitutional, HEENT, cardiovascular, pulmonary, gi and gu systems are negative, except as otherwise noted.      Objective    BP (!) 124/90 (BP Location: Right arm, Patient Position: Sitting, Cuff Size: Adult Large)   Pulse 64   Temp (!) 96.2  F (35.7  C) (Tympanic)   Resp 16   Ht 1.575 m (5' 2\")   Wt 82.1 kg (181 lb 1.6 oz)   SpO2 96%   BMI 33.12 kg/m    Body mass " index is 33.12 kg/m .  Physical Exam  Constitutional:       General: She is not in acute distress.     Appearance: She is not ill-appearing.   Skin:     Comments: Left thigh: fluid fill small blisters on erythematous boarder. TTP.    Neurological:      Mental Status: She is alert.   Psychiatric:         Mood and Affect: Mood normal.                    Signed Electronically by: Olivia Ramirez MD

## 2024-09-06 ENCOUNTER — TELEPHONE (OUTPATIENT)
Dept: FAMILY MEDICINE | Facility: OTHER | Age: 49
End: 2024-09-06

## 2024-09-06 ENCOUNTER — OFFICE VISIT (OUTPATIENT)
Dept: FAMILY MEDICINE | Facility: OTHER | Age: 49
End: 2024-09-06
Attending: FAMILY MEDICINE
Payer: COMMERCIAL

## 2024-09-06 VITALS
SYSTOLIC BLOOD PRESSURE: 124 MMHG | DIASTOLIC BLOOD PRESSURE: 90 MMHG | HEART RATE: 64 BPM | RESPIRATION RATE: 16 BRPM | HEIGHT: 62 IN | TEMPERATURE: 96.2 F | WEIGHT: 181.1 LBS | OXYGEN SATURATION: 96 % | BODY MASS INDEX: 33.33 KG/M2

## 2024-09-06 DIAGNOSIS — E66.09 CLASS 1 OBESITY DUE TO EXCESS CALORIES WITHOUT SERIOUS COMORBIDITY WITH BODY MASS INDEX (BMI) OF 33.0 TO 33.9 IN ADULT: ICD-10-CM

## 2024-09-06 DIAGNOSIS — M79.662 PAIN IN BOTH LOWER LEGS: ICD-10-CM

## 2024-09-06 DIAGNOSIS — L98.9 SKIN LESION: ICD-10-CM

## 2024-09-06 DIAGNOSIS — M54.41 CHRONIC BILATERAL LOW BACK PAIN WITH BILATERAL SCIATICA: ICD-10-CM

## 2024-09-06 DIAGNOSIS — E66.811 CLASS 1 OBESITY DUE TO EXCESS CALORIES WITHOUT SERIOUS COMORBIDITY WITH BODY MASS INDEX (BMI) OF 33.0 TO 33.9 IN ADULT: ICD-10-CM

## 2024-09-06 DIAGNOSIS — M79.661 PAIN IN BOTH LOWER LEGS: ICD-10-CM

## 2024-09-06 DIAGNOSIS — M25.50 ARTHRALGIA, UNSPECIFIED JOINT: ICD-10-CM

## 2024-09-06 DIAGNOSIS — M54.42 CHRONIC BILATERAL LOW BACK PAIN WITH BILATERAL SCIATICA: ICD-10-CM

## 2024-09-06 DIAGNOSIS — G89.29 CHRONIC BILATERAL LOW BACK PAIN WITH BILATERAL SCIATICA: ICD-10-CM

## 2024-09-06 DIAGNOSIS — H81.12 BENIGN PAROXYSMAL POSITIONAL VERTIGO OF LEFT EAR: Primary | ICD-10-CM

## 2024-09-06 DIAGNOSIS — R47.02 DYSPHASIA: ICD-10-CM

## 2024-09-06 PROCEDURE — 99214 OFFICE O/P EST MOD 30 MIN: CPT | Performed by: FAMILY MEDICINE

## 2024-09-06 PROCEDURE — G2211 COMPLEX E/M VISIT ADD ON: HCPCS | Performed by: FAMILY MEDICINE

## 2024-09-06 PROCEDURE — G0463 HOSPITAL OUTPT CLINIC VISIT: HCPCS

## 2024-09-06 RX ORDER — MECLIZINE HYDROCHLORIDE 25 MG/1
25 TABLET ORAL 3 TIMES DAILY PRN
Qty: 45 TABLET | Refills: 1 | Status: SHIPPED | OUTPATIENT
Start: 2024-09-06

## 2024-09-06 RX ORDER — ACYCLOVIR 50 MG/G
OINTMENT TOPICAL
Qty: 15 G | Refills: 1 | Status: SHIPPED | OUTPATIENT
Start: 2024-09-06 | End: 2024-09-13

## 2024-09-06 ASSESSMENT — PATIENT HEALTH QUESTIONNAIRE - PHQ9
10. IF YOU CHECKED OFF ANY PROBLEMS, HOW DIFFICULT HAVE THESE PROBLEMS MADE IT FOR YOU TO DO YOUR WORK, TAKE CARE OF THINGS AT HOME, OR GET ALONG WITH OTHER PEOPLE: SOMEWHAT DIFFICULT
SUM OF ALL RESPONSES TO PHQ QUESTIONS 1-9: 13
SUM OF ALL RESPONSES TO PHQ QUESTIONS 1-9: 13

## 2024-09-06 NOTE — TELEPHONE ENCOUNTER
Swelling / expanding throat -> referral placed to speech therapy for consideration for swallow study  Leg pain: after further thought, ultrasound order placed to check blood flow to legs  Olivia Ramirez MD

## 2024-09-06 NOTE — PATIENT INSTRUCTIONS
Referral placed to physical therapy for vertigo    I will put in a prescription for anti viral ointment     Please make an appointment with [facility] rheumatology   [phone]     We will make an appointment with Stacey for weight management

## 2024-09-06 NOTE — TELEPHONE ENCOUNTER
Pt stopped at the front and asked I send a message back. Pt commented that she was unsure of what Dr. Ramirez wanted her to do about her swelling/expanding throat. Mentioned that it happens about 4 times per week. Nothing makes it better or worse. She says it is nothing urgent at this time, but is still concerned.     Pt is requesting a call back regarding this aspect of the appt. She said there were multiple topics of discussion and was unsure about this specific issue.

## 2024-09-06 NOTE — TELEPHONE ENCOUNTER
"11:07 AM    Reason for Call: Phone Call    Description:     Was an appointment offered for this call? {YES / NO:460020::\"Yes\"}  If yes : Appointment type              Date    Preferred method for responding to this message: {Contact Preference:[id number]}  What is your phone number ?    If we cannot reach you directly, may we leave a detailed response at the number you provided? {YES / NO:[id number]::\"Yes\"}    Can this message wait until your PCP/provider returns, if available today? {:[id number]}    Josie Ramos  "

## 2024-09-12 NOTE — TELEPHONE ENCOUNTER
Patient notified of below.  Akanksha Mitchell LPN    Swelling / expanding throat -> referral placed to speech therapy for consideration for swallow study  Leg pain: after further thought, ultrasound order placed to check blood flow to legs  Olivia Ramirez MD

## 2024-09-13 ENCOUNTER — HOSPITAL ENCOUNTER (OUTPATIENT)
Dept: ULTRASOUND IMAGING | Facility: HOSPITAL | Age: 49
Discharge: HOME | End: 2024-09-13
Attending: FAMILY MEDICINE
Payer: COMMERCIAL

## 2024-09-13 DIAGNOSIS — L98.9 SKIN LESION: ICD-10-CM

## 2024-09-13 DIAGNOSIS — M79.662 PAIN IN BOTH LOWER LEGS: ICD-10-CM

## 2024-09-13 DIAGNOSIS — M79.661 PAIN IN BOTH LOWER LEGS: ICD-10-CM

## 2024-09-13 PROCEDURE — 93922 UPR/L XTREMITY ART 2 LEVELS: CPT

## 2024-09-13 PROCEDURE — 76882 US LMTD JT/FCL EVL NVASC XTR: CPT | Mod: LT

## 2024-09-14 ENCOUNTER — HEALTH MAINTENANCE LETTER (OUTPATIENT)
Age: 49
End: 2024-09-14

## 2024-10-04 ENCOUNTER — THERAPY VISIT (OUTPATIENT)
Dept: PHYSICAL THERAPY | Facility: HOSPITAL | Age: 49
End: 2024-10-04
Attending: FAMILY MEDICINE
Payer: COMMERCIAL

## 2024-10-04 DIAGNOSIS — H81.12 BENIGN PAROXYSMAL POSITIONAL VERTIGO OF LEFT EAR: ICD-10-CM

## 2024-10-04 PROCEDURE — 97161 PT EVAL LOW COMPLEX 20 MIN: CPT | Mod: GP

## 2024-10-04 PROCEDURE — 95992 CANALITH REPOSITIONING PROC: CPT | Mod: GP

## 2024-10-04 NOTE — PROGRESS NOTES
PHYSICAL THERAPY EVALUATION  Type of Visit: Evaluation       Fall Risk Screen:  Fall screen completed by: PT  Have you fallen 2 or more times in the past year?: Yes  Have you fallen and had an injury in the past year?: No  Is patient a fall risk?: Yes; Department fall risk interventions implemented    Subjective       Presenting condition or subjective complaint: virtago  Date of onset: 24    Relevant medical history:     PAST MEDICAL HISTORY:   Past Medical History:   Diagnosis Date    Breast mass     Chronic, continuous use of opioids 2021    OSCAR (generalized anxiety disorder) 2018    Herniated nucleus pulposus, L5-S1 2021    History of recurrent UTI (urinary tract infection) 2014    Major depressive disorder, recurrent episode, moderate (H) 2018    Menorrhagia 2020    Added automatically from request for surgery [id number]    Panic attack 2018    Tobacco abuse        PAST SURGICAL HISTORY:   Past Surgical History:   Procedure Laterality Date    BACK SURGERY Left 2021    Revision of L5-S1 discectomy at [facility]    BIOPSY BREAST Right 10/19/2017    Procedure: BIOPSY BREAST;  EXCISIONAL BIOPSY RIGHT BREAST;  Surgeon: Kiko Newberry MD;  Location: [facility] OR     SECTION  1992     SECTION  2009    DILATE CERVIX, HYSTEROSCOPY, ABLATE ENDOMETRIUM, COMBINED N/A 2020    Procedure: HYSTEROSCOPY, ENDOMETRIAL ABLATION,endometrial polypectomy;  Surgeon: Juan Roger MD;  Location: [facility] OR    DISCECTOMY LUMBAR ANTERIOR Left 2021    Left L5-S1 discectomy    ENDOSCOPY UPPER, COLONOSCOPY, COMBINED N/A 2024    Procedure: upper endoscopy with biopsies balloon dilation and colonoscopy with polypectomy;  Surgeon: Herbert Apodaca MD;  Location: [facility] OR    ESOPHAGOSCOPY, GASTROSCOPY, DUODENOSCOPY (EGD), COMBINED N/A 10/01/2018    Procedure: COMBINED ESOPHAGOSCOPY, GASTROSCOPY, DUODENOSCOPY (EGD);  UPPER ENDOSCOPY WITH BIOPSIES;   Surgeon: Kiko Newberry MD;  Location: [facility] OR    HC PLACE INTRAVASC STENT OPEN/PERQ, EA ADDL VEIN      stents for varicose veins, Dr Martinez    INTERVENTIONAL RADIOLOGY PROCEDURE  2024    L5 and S1 basivertebral nerve radiofrequency ablation    TUBAL LIGATION Bilateral 2009    WITH  SECTION    TUBAL/ECTOPIC PREGNANCY      surgery for tubal pregnancy       FAMILY HISTORY:   Family History   Problem Relation Age of Onset    Coronary Artery Disease Mother     Hypertension Mother     Aneurysm Mother         stomach    Coronary Artery Disease Father     Aneurysm Father         heart    Aneurysm Niece         brain    Seizure Disorder Niece     C.A.D. No family hx of     Breast Cancer No family hx of     Cancer - colorectal No family hx of     Diabetes No family hx of     Thyroid Disease No family hx of     Asthma No family hx of     Colon Cancer No family hx of     Hyperlipidemia No family hx of        SOCIAL HISTORY:   Social History     Tobacco Use    Smoking status: Some Days     Current packs/day: 0.10     Types: Cigarettes    Smokeless tobacco: Never    Tobacco comments:     rarely smokes. declines quitplan referral 18   Substance Use Topics    Alcohol use: Yes     Alcohol/week: 0.0 standard drinks of alcohol     Comment: occassionally       Dates & types of surgery: back 3times    Prior diagnostic imaging/testing results: MRI; CT scan; X-ray; EMG; Bone scan; Video fluoroscopic swallow study     Prior therapy history for the same diagnosis, illness or injury:    Not for this issue    Prior Level of Function  Transfers: Independent  Ambulation: Independent    Living Environment  Social support: With family members   Type of home: 1 level   Ramp: No   Stairs inside the home: No       Help at home: None    Employment: Yes    Hobbies/Interests:  Traveling, BF in [place], works as  most days    Patient goals for therapy:  Decreased dizziness     Objective     Screening  questions:  Recent medication changes?  No  Recent visual changes?  No  History of migraines?  No  Alar Ligament Test   Negative  Transverse Ligament Instability Test  Negative   Cognition/Mental Status: Alert & Oriented x 3  Communications: English    Patient endorses recent URI, inner ear symptoms, or sinus congestion.  Functional Status  Functional Activity  Currently Working: Yes  Medical History  Surgery History, Medical Conditions, Medication History, Comments:  Reviewed medical history form with patient.  See chart for details.  Symptom Report: DHI      OBJECTIVE   Objective  Oculomotor & Vestibulo-Ocular Exam  Examination Findings  Skew Deviation - None noted   Resting nystagmus - None noted   Gaze-Evoked Nystagmus - WNL   Saccades - WNL  VOR- Cancellation - WNL  Head Thrust Right - WNL  Head Thrust Left - WNL  AROM  WFL to hallpike kanu testing     General Physical Status  Vertebral Artery Testing Position: Modified Upright testing  Right - Negative  Left -Negative  Coordination Testing  Rapid alternating movement upper extremities  WNL  Finger to nose  WNL  Infrared Frenzel Lense Exam  Test  Left  Hallpike-Kanu  Unilateral and uptorsional nystagmus was noted, replicating the patients symptoms of dizziness at a  10/10 level on a verbal analog scale for dizziness. Nystagmus/symptoms lasted approximately 10 seconds in duration. A reversal of nystagmus was not noted upon supine to sit transition.      Assessment & Plan   CLINICAL IMPRESSIONS  Medical Diagnosis: BPPV    Treatment Diagnosis: BPPV   Impression/Assessment: Patient is a 49 year old female with Vertigo complaints consistent with left posterior canalithiasis.  The following significant findings have been identified: Impaired balance, Impaired sensation, Dizziness, and Disequilibrium . These impairments interfere with their ability to perform self care tasks, work tasks, recreational activities, household chores, driving , household mobility, and  community mobility as compared to previous level of function.     Clinical Decision Making (Complexity):  Clinical Presentation: Stable/Uncomplicated  Clinical Presentation Rationale: based on medical and personal factors listed in PT evaluation  Clinical Decision Making (Complexity): Low complexity    PLAN OF CARE  Treatment Interventions:  Interventions: Gait Training, Manual Therapy, Neuromuscular Re-education, Therapeutic Activity, Therapeutic Exercise, Self-Care/Home Management, Canalith Repositioning    Long Term Goals     PT Goal 1  Goal Identifier: STG 1  Goal Description: In 2 treatment sessions this patient will report 50% improvement in vertigo/dizziness symptoms with horizontal head movement such as rolling over in bed so as to safely roll over without falling out of bed  Target Date: 10/18/24  PT Goal 2  Goal Identifier: LTG 1  Goal Description: In 6 treatments this patient will be able to complete rotational head movements with 100% improvement in symptoms of vertigo/dizziness so as to be able to safely roll over in bed without falling out of bed  Target Date: 11/29/24      Frequency of Treatment: 1x/week tapered to discharge  Duration of Treatment: 8 weeks    Education Assessment:   Learner/Method: Patient;Listening;Reading;Demonstration;Pictures/Video;No Barriers to Learning    Risks and benefits of evaluation/treatment have been explained.   Patient/Family/caregiver agrees with Plan of Care.     Evaluation Time:     PT Eval, Low Complexity Minutes (50407): 25       Signing Clinician: Leia Manzano, PT        [facility]                                                                                   OUTPATIENT PHYSICAL THERAPY      PLAN OF TREATMENT FOR OUTPATIENT REHABILITATION   Patient's Last Name, First Name, Ritesh Gama YOB: 1975   Provider's Name   [facility]   Medical Record No.  [id number]     Onset Date:  09/06/24  Start of Care Date: 10/04/24     Medical Diagnosis:  BPPV      PT Treatment Diagnosis:  BPPV Plan of Treatment  Frequency/Duration: 1x/week tapered to discharge/ 8 weeks    Certification date from 10/04/24 to 11/29/24         See note for plan of treatment details and functional goals     Leia Manzano, PT                         I CERTIFY THE NEED FOR THESE SERVICES FURNISHED UNDER        THIS PLAN OF TREATMENT AND WHILE UNDER MY CARE     (Physician attestation of this document indicates review and certification of the therapy plan).              Referring Provider:  Olivia Ramirez    Initial Assessment  See Epic Evaluation- Start of Care Date: 10/04/24

## 2024-10-29 NOTE — PROGRESS NOTES
Ritesh Soliman is presenting for evaluation of medical weight management. She has had weight problems for the past few years. Started after she used marijuana gummies for her back pain-always wanted to eat. Maximum weight is 184 pounds-her current weight. Goal weight is 140. She would like to lose 40-45 pounds. Other methods the patient has tried to lose weight include Vyvanse and Adderall.     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  Psychologic issues: significant anxiety  Family Support: good  Activity/Exercise: limited, difficult with her back pain; does  and is often walking  Insight/Motivation: good; wanting to feel better  Sleep habits: poor, snores rarely  Cravings: none  Appetite: good  Medications contributing to weight issues: none, was on gabapentin in the past    Co-morbidities   Patient Active Problem List   Diagnosis    Tobacco abuse    ACP (advance care planning)    OSCAR (generalized anxiety disorder)    Major depressive disorder, recurrent episode, moderate (H)    Panic attack    Menorrhagia    Herniated nucleus pulposus, L5-S1    Chronic bilateral low back pain with bilateral sciatica    Chronic right shoulder pain    Dysphasia    Vertebrogenic low back pain         Meds:     Current Outpatient Medications   Medication Sig Dispense Refill    acetaminophen (TYLENOL) 325 MG tablet Take 325-650 mg by mouth every 6 hours as needed for mild pain      HYDROcodone-acetaminophen (NORCO) 5-325 MG tablet Take 1 tablet by mouth      ibuprofen (ADVIL/MOTRIN) 200 MG capsule Take 600 mg by mouth every 8 hours as needed for fever.      meclizine (ANTIVERT) 25 MG tablet Take 1 tablet (25 mg) by mouth 3 times daily as needed for dizziness. 45 tablet 1    methocarbamol (ROBAXIN) 750 MG tablet Take 1 tablet (750 mg) by mouth 3 times daily as needed for muscle spasms 90 tablet 1    omeprazole (PRILOSEC) 40 MG   "capsule Take 1 capsule (40 mg) by mouth daily 60 capsule 3    traZODone (DESYREL) 100 MG tablet Take 1 tablet (100 mg) by mouth nightly as needed for sleep 30 tablet 5    triamcinolone (KENALOG) 0.1 % external cream Apply topically 2 times daily 80 g 0     No current facility-administered medications for this visit.       No current facility-administered medications for this visit.     Current Eating Patterns:   Generally 3 meals per day.   Breakfast: diet coke or water  Midmorning snack: occasional cup of soup  Lunch: occasional salad or burger-eats half  Mid afternoon snack: other half of lunch  Supper: pizza, lasagna, burgers, polish, chicken with carbs  Grazing: sweets    OBJECTIVE:   /84   Pulse 60   Temp (!) 96  F (35.6  C) (Tympanic)   Resp 16   Ht 1.575 m (5' 2\")   Wt 83.6 kg (184 lb 4.8 oz)   SpO2 98%   BMI 33.71 kg/m        Physical Exam   Exam:  Constitutional: healthy, alert, no distress, and over weight  Psychiatric: mentation appears normal and affect normal/bright        ASSESSMENT:   Obesity Stage 1. Body mass index is 33.71.  -Glucose was normal on 5/9/24.   -Will check TSH and Vit D today. Will notify patient of the results when available and intervene accordingly.    -No issues with GERD. Does take omeprazole for esophageal strictures.   -Anxiety and depression; controlled with trazodone before bed. A big part of her anxiety is being overweight.     Protein Goal: 70-90 grams  Carb Limit: less than 50 grams  Fat Goal: 60-70% of calories  Calorie Goal: 1200    PLAN: Initiated a low carb diet. Will avoid high carb foods. Went over protein/carbohydrate/fat recommendations. Reminded patient to focus on getting appropriate amounts of protein. Discussed the rational for eating higher fat. This equates to around 60- 70% fat. Limit or avoid sugar, starches, bread products, rice and cereal, most grains and simple carbohydrates. Goal set for 6 lb weight loss over the next 4 weeks.         "

## 2024-10-31 ENCOUNTER — OFFICE VISIT (OUTPATIENT)
Dept: FAMILY MEDICINE | Facility: OTHER | Age: 49
End: 2024-10-31
Attending: NURSE PRACTITIONER
Payer: COMMERCIAL

## 2024-10-31 VITALS
DIASTOLIC BLOOD PRESSURE: 84 MMHG | TEMPERATURE: 96 F | SYSTOLIC BLOOD PRESSURE: 108 MMHG | RESPIRATION RATE: 16 BRPM | HEIGHT: 62 IN | BODY MASS INDEX: 33.92 KG/M2 | HEART RATE: 60 BPM | WEIGHT: 184.3 LBS | OXYGEN SATURATION: 98 %

## 2024-10-31 DIAGNOSIS — E66.09 CLASS 1 OBESITY DUE TO EXCESS CALORIES WITHOUT SERIOUS COMORBIDITY WITH BODY MASS INDEX (BMI) OF 33.0 TO 33.9 IN ADULT: Primary | ICD-10-CM

## 2024-10-31 DIAGNOSIS — E66.811 CLASS 1 OBESITY DUE TO EXCESS CALORIES WITHOUT SERIOUS COMORBIDITY WITH BODY MASS INDEX (BMI) OF 33.0 TO 33.9 IN ADULT: Primary | ICD-10-CM

## 2024-10-31 DIAGNOSIS — G47.00 INSOMNIA, UNSPECIFIED TYPE: ICD-10-CM

## 2024-10-31 DIAGNOSIS — K21.9 GASTROESOPHAGEAL REFLUX DISEASE, UNSPECIFIED WHETHER ESOPHAGITIS PRESENT: ICD-10-CM

## 2024-10-31 DIAGNOSIS — R10.13 DYSPEPSIA: ICD-10-CM

## 2024-10-31 DIAGNOSIS — F41.1 GAD (GENERALIZED ANXIETY DISORDER): ICD-10-CM

## 2024-10-31 DIAGNOSIS — F33.1 MAJOR DEPRESSIVE DISORDER, RECURRENT EPISODE, MODERATE (H): ICD-10-CM

## 2024-10-31 LAB — TSH SERPL DL<=0.005 MIU/L-ACNC: 1.78 UIU/ML (ref 0.3–4.2)

## 2024-10-31 PROCEDURE — 36415 COLL VENOUS BLD VENIPUNCTURE: CPT | Mod: ZL | Performed by: NURSE PRACTITIONER

## 2024-10-31 PROCEDURE — 82306 VITAMIN D 25 HYDROXY: CPT | Mod: ZL | Performed by: NURSE PRACTITIONER

## 2024-10-31 PROCEDURE — G0463 HOSPITAL OUTPT CLINIC VISIT: HCPCS

## 2024-10-31 PROCEDURE — 84443 ASSAY THYROID STIM HORMONE: CPT | Mod: ZL | Performed by: NURSE PRACTITIONER

## 2024-10-31 RX ORDER — OMEPRAZOLE 40 MG/1
40 CAPSULE, DELAYED RELEASE ORAL DAILY
Qty: 60 CAPSULE | Refills: 3 | Status: SHIPPED | OUTPATIENT
Start: 2024-10-31

## 2024-10-31 RX ORDER — TRAZODONE HYDROCHLORIDE 100 MG/1
100 TABLET ORAL
Qty: 30 TABLET | Refills: 5 | Status: SHIPPED | OUTPATIENT
Start: 2024-10-31

## 2024-10-31 ASSESSMENT — PAIN SCALES - GENERAL: PAINLEVEL_OUTOF10: MODERATE PAIN (5)

## 2024-11-01 LAB — VIT D+METAB SERPL-MCNC: 23 NG/ML (ref 20–50)

## 2024-11-23 ENCOUNTER — HEALTH MAINTENANCE LETTER (OUTPATIENT)
Age: 49
End: 2024-11-23

## 2025-03-03 ENCOUNTER — MYC MEDICAL ADVICE (OUTPATIENT)
Dept: FAMILY MEDICINE | Facility: OTHER | Age: 50
End: 2025-03-03

## 2025-03-03 ASSESSMENT — ANXIETY QUESTIONNAIRES
3. WORRYING TOO MUCH ABOUT DIFFERENT THINGS: MORE THAN HALF THE DAYS
7. FEELING AFRAID AS IF SOMETHING AWFUL MIGHT HAPPEN: MORE THAN HALF THE DAYS
7. FEELING AFRAID AS IF SOMETHING AWFUL MIGHT HAPPEN: MORE THAN HALF THE DAYS
IF YOU CHECKED OFF ANY PROBLEMS ON THIS QUESTIONNAIRE, HOW DIFFICULT HAVE THESE PROBLEMS MADE IT FOR YOU TO DO YOUR WORK, TAKE CARE OF THINGS AT HOME, OR GET ALONG WITH OTHER PEOPLE: SOMEWHAT DIFFICULT
GAD7 TOTAL SCORE: 15
8. IF YOU CHECKED OFF ANY PROBLEMS, HOW DIFFICULT HAVE THESE MADE IT FOR YOU TO DO YOUR WORK, TAKE CARE OF THINGS AT HOME, OR GET ALONG WITH OTHER PEOPLE?: SOMEWHAT DIFFICULT
GAD7 TOTAL SCORE: 15
GAD7 TOTAL SCORE: 15
5. BEING SO RESTLESS THAT IT IS HARD TO SIT STILL: SEVERAL DAYS
4. TROUBLE RELAXING: MORE THAN HALF THE DAYS
6. BECOMING EASILY ANNOYED OR IRRITABLE: NEARLY EVERY DAY
1. FEELING NERVOUS, ANXIOUS, OR ON EDGE: MORE THAN HALF THE DAYS
2. NOT BEING ABLE TO STOP OR CONTROL WORRYING: NEARLY EVERY DAY

## 2025-03-03 NOTE — TELEPHONE ENCOUNTER
Patient completed [employer] PHQ-9/OSCAR-7 on 3/3/2025 for Depression Remission quality patient outreach per [facility] guidelines. Noted most current PHQ-9 total score is increased and noted most current OSCAR-7 total score is slightly decreased when compared to last completed assessments done on 10/31/2024.  Patient has no future scheduled clinic appointments with Dr. Ramirez at this time. Will outreach via [employer] messaging to encourage a mental health med review with PCP.     Current PHQ-9 question #9 is NEGATIVE for thoughts of self-harm or SI.    Depression Screening Follow-up        3/3/2025    10:04 AM   PHQ   PHQ-9 Total Score 16    Q9: Thoughts of better off dead/self-harm past 2 weeks Not at all       Patient-reported         3/3/2025    10:04 AM   Last PHQ-9   1.  Little interest or pleasure in doing things 1   2.  Feeling down, depressed, or hopeless 2   3.  Trouble falling or staying asleep, or sleeping too much 3   4.  Feeling tired or having little energy 2   5.  Poor appetite or overeating 3   6.  Feeling bad about yourself 2   7.  Trouble concentrating 1   8.  Moving slowly or restless 2   Q9: Thoughts of better off dead/self-harm past 2 weeks 0   PHQ-9 Total Score 16        Patient-reported         Does the patient currently have a mental health provider?  No  Follow Up Actions Taken  Patient to follow up with PCP. Clinic staff to schedule appointment if able.     Radha Walker RN             9/6/2024     9:54 AM 10/31/2024     8:43 AM 3/3/2025    10:04 AM   PHQ   PHQ-9 Total Score 13 14  16    Q9: Thoughts of better off dead/self-harm past 2 weeks Not at all Not at all Not at all       Patient-reported          12/8/2023     7:58 AM 5/9/2024     9:44 AM 3/3/2025    10:06 AM   OSCAR-7 SCORE   Total Score 14 (moderate anxiety) 16 (severe anxiety) 15 (severe anxiety)   Total Score 14 16 15        Patient-reported        Rahda Walker RN

## 2025-05-20 ENCOUNTER — TELEPHONE (OUTPATIENT)
Dept: FAMILY MEDICINE | Facility: OTHER | Age: 50
End: 2025-05-20

## 2025-05-20 NOTE — TELEPHONE ENCOUNTER
1:36 PM    Reason for Call: OVERBOOK    Patient is having the following symptoms: Lower back pain, pain is getting worse, chronic. Patient has had multiple back surgeries. She hurt her back a couple of weeks ago and is in pain.    The patient is requesting an appointment for exam earlier than 07/02 with Dr. Ramirez.    Was an appointment offered for this call? Yes  If yes : Appointment type: office visit              Date: 07/02    Preferred method for responding to this message: Telephone Call  What is your phone number ?  [phone]     If we cannot reach you directly, may we leave a detailed response at the number you provided? Yes    Can this message wait until your PCP/provider returns, if unavailable today? Not applicable    Chhaya Dickens

## 2025-05-23 ENCOUNTER — RESULTS FOLLOW-UP (OUTPATIENT)
Dept: FAMILY MEDICINE | Facility: OTHER | Age: 50
End: 2025-05-23

## 2025-05-28 ENCOUNTER — TELEPHONE (OUTPATIENT)
Dept: FAMILY MEDICINE | Facility: OTHER | Age: 50
End: 2025-05-28

## 2025-06-13 ENCOUNTER — HOSPITAL ENCOUNTER (OUTPATIENT)
Dept: MRI IMAGING | Facility: HOSPITAL | Age: 50
Discharge: HOME | End: 2025-06-13
Attending: FAMILY MEDICINE | Admitting: RADIOLOGY
Payer: COMMERCIAL

## 2025-06-13 ENCOUNTER — RESULTS FOLLOW-UP (OUTPATIENT)
Dept: FAMILY MEDICINE | Facility: OTHER | Age: 50
End: 2025-06-13

## 2025-06-13 DIAGNOSIS — G89.29 CHRONIC BILATERAL LOW BACK PAIN WITH BILATERAL SCIATICA: ICD-10-CM

## 2025-06-13 DIAGNOSIS — M54.42 CHRONIC BILATERAL LOW BACK PAIN WITH BILATERAL SCIATICA: ICD-10-CM

## 2025-06-13 DIAGNOSIS — M54.41 CHRONIC BILATERAL LOW BACK PAIN WITH BILATERAL SCIATICA: ICD-10-CM

## 2025-06-13 PROCEDURE — 255N000002 HC RX 255 OP 636: Performed by: STUDENT IN AN ORGANIZED HEALTH CARE EDUCATION/TRAINING PROGRAM

## 2025-06-13 PROCEDURE — 72158 MRI LUMBAR SPINE W/O & W/DYE: CPT

## 2025-06-13 PROCEDURE — A9585 GADOBUTROL INJECTION: HCPCS | Performed by: STUDENT IN AN ORGANIZED HEALTH CARE EDUCATION/TRAINING PROGRAM

## 2025-06-13 PROCEDURE — 72158 MRI LUMBAR SPINE W/O & W/DYE: CPT | Mod: 26 | Performed by: RADIOLOGY

## 2025-06-13 RX ORDER — GADOBUTROL 604.72 MG/ML
10 INJECTION INTRAVENOUS ONCE
Status: COMPLETED | OUTPATIENT
Start: 2025-06-13 | End: 2025-06-13

## 2025-06-13 RX ADMIN — GADOBUTROL 10 ML: 604.72 INJECTION INTRAVENOUS at 10:05

## 2025-06-30 NOTE — PROGRESS NOTES
Assessment & Plan     Chronic bilateral low back pain with bilateral sciatica  Worsening. Ritesh will call for an appt with [facility] neurosx. Tolerating lyrcia, so will increase. Short course of prednisone to help with pain. Short Rx of tramadol given.  [place] PDMP reviewed and appropriate    - pregabalin (LYRICA) 75 MG capsule; Take 1 capsule (75 mg) by mouth 3 times daily.  - traMADol (ULTRAM) 50 MG tablet; Take 1 tablet (50 mg) by mouth every 6 hours as needed for severe pain. - okay to refill if requested  - predniSONE (DELTASONE) 20 MG tablet; Take 2 tablets (40 mg) by mouth daily for 5 days.  - consideration for butrans patch     Hip pain, right  Xray not completed today   - XR Hip Right 2-3 Views (Clinic Performed); Future  - predniSONE (DELTASONE) 20 MG tablet; Take 2 tablets (40 mg) by mouth daily for 5 days.    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 Due to the added complexity in care, I will continue to support Ritesh in the subsequent management and with ongoing continuity of care.      Follow-up  Return in about 3 weeks (around 7/23/2025) for chronic pain.    Ivone Awad is a 50 year old, presenting for the following health issues:  Pain        7/2/2025     8:42 AM   Additional Questions   Roomed by Akanksha Mitchell     History of Present Illness       Reason for visit:  NA   She is taking medications regularly.        Pain History:  When did you first notice your pain? Chronic   Have you seen this provider for your pain in the past? Yes   Where in your body do you have pain? Lower back  Are you seeing anyone else for your pain? No        3/3/2025    10:04 AM 5/23/2025     3:31 PM 7/2/2025     8:49 AM   PHQ-9 SCORE    PHQ-9 Total Score MyChart 16 (Moderately severe depression) 12 (Moderate depression) 17 (Moderately severe depression)   PHQ-9 Total Score 16  12  17        Patient-reported           3/3/2025    10:06 AM 5/23/2025     3:32 PM 7/2/2025     8:49 AM   OSCAR-7 SCORE   Total Score 15 (severe anxiety) 9 (mild anxiety) Incomplete   Total Score 15  9         Patient-reported               3/3/2025    10:04 AM 5/23/2025     3:31 PM 7/2/2025     8:49 AM   PHQ-9 SCORE   PHQ-9 Total Score MyChart 16 (Moderately severe depression) 12 (Moderate depression) 17 (Moderately severe depression)   PHQ-9 Total Score 16  12  17        Patient-reported           3/3/2025    10:06 AM 5/23/2025     3:32 PM 7/2/2025     8:49 AM   OSCAR-7 SCORE   Total Score 15 (severe anxiety) 9 (mild anxiety) Incomplete   Total Score 15  9         Patient-reported           12/8/2023     8:06 AM 6/10/2024     2:39 PM   PEG Score   PEG Total Score 8 8.33       Chronic Pain Follow Up:    Location of pain: lower back, ankle, right hip  Analgesia/pain control:    - Recent changes:  worsening    - Overall control: Inadequate pain control    - Current treatments: lyrica,  methocarmabol  Adherence:     - Do you ever take more pain medicine than prescribed? No    - When did you take your last dose of pain medicine?     Adverse effects:yes - one episode of lightheadness after fatigue and not eating      - started lyrica - helps some with pain.   - referral placed to [facility] neurosx - no appt   - steriods - as tolerated in the past   - on methocarbamol - does not help       - can't sleep at night d/t pain   - issues walking home d/t pain   - pain is shooting down her leg  - pain is burning    - weight gain d/t not being active   - gummies resulted in weigh gain     - having right hip    - h/o MAURI positive and sister with scleroderma      PDMP Review         Value Time User    State PDMP site checked  Yes 5/23/2025  3:49 PM Olivia Ramirez MD          Last CSA  "Agreement:   CSA -- Patient Level:    CSA: None found at the patient level.       Last UDS:   :[id number]}        Review of Systems  Constitutional, HEENT, cardiovascular, pulmonary, gi and gu systems are negative, except as otherwise noted.      Objective    /86 (BP Location: Left arm, Patient Position: Sitting, Cuff Size: Adult Large)   Pulse 63   Temp 97.3  F (36.3  C) (Tympanic)   Resp 16   Ht 1.575 m (5' 2\")   Wt 87.5 kg (193 lb)   SpO2 98%   BMI 35.30 kg/m    Body mass index is 35.3 kg/m .  Physical Exam  Constitutional:       General: She is in acute distress.      Appearance: She is not ill-appearing.   Musculoskeletal:      Comments: Stiff getting up from chair. Ambulates w/o use of assistive device   Neurological:      Mental Status: She is alert.   Psychiatric:         Mood and Affect: Mood is depressed. Affect is tearful.      Comments: Mood appropriate for situation             Xray ordered of right hip ,but not completed        Signed Electronically by: Olivia Ramirez MD    "

## 2025-07-02 ENCOUNTER — OFFICE VISIT (OUTPATIENT)
Dept: FAMILY MEDICINE | Facility: OTHER | Age: 50
End: 2025-07-02
Attending: FAMILY MEDICINE
Payer: COMMERCIAL

## 2025-07-02 VITALS
HEART RATE: 63 BPM | RESPIRATION RATE: 16 BRPM | HEIGHT: 62 IN | SYSTOLIC BLOOD PRESSURE: 112 MMHG | DIASTOLIC BLOOD PRESSURE: 86 MMHG | WEIGHT: 193 LBS | TEMPERATURE: 97.3 F | BODY MASS INDEX: 35.51 KG/M2 | OXYGEN SATURATION: 98 %

## 2025-07-02 DIAGNOSIS — R76.8 ANA POSITIVE: ICD-10-CM

## 2025-07-02 DIAGNOSIS — M54.42 CHRONIC BILATERAL LOW BACK PAIN WITH BILATERAL SCIATICA: Primary | ICD-10-CM

## 2025-07-02 DIAGNOSIS — G89.29 CHRONIC BILATERAL LOW BACK PAIN WITH BILATERAL SCIATICA: Primary | ICD-10-CM

## 2025-07-02 DIAGNOSIS — Z82.69 FAMILY HISTORY OF SCLERODERMA: ICD-10-CM

## 2025-07-02 DIAGNOSIS — M25.551 HIP PAIN, RIGHT: ICD-10-CM

## 2025-07-02 DIAGNOSIS — M54.41 CHRONIC BILATERAL LOW BACK PAIN WITH BILATERAL SCIATICA: Primary | ICD-10-CM

## 2025-07-02 PROCEDURE — G0463 HOSPITAL OUTPT CLINIC VISIT: HCPCS | Mod: 25

## 2025-07-02 PROCEDURE — G0463 HOSPITAL OUTPT CLINIC VISIT: HCPCS

## 2025-07-02 RX ORDER — PREGABALIN 75 MG/1
75 CAPSULE ORAL 3 TIMES DAILY
Qty: 90 CAPSULE | Refills: 2 | Status: SHIPPED | OUTPATIENT
Start: 2025-07-02

## 2025-07-02 RX ORDER — PREDNISONE 20 MG/1
40 TABLET ORAL DAILY
Qty: 10 TABLET | Refills: 0 | Status: SHIPPED | OUTPATIENT
Start: 2025-07-02 | End: 2025-07-07

## 2025-07-02 RX ORDER — TRAMADOL HYDROCHLORIDE 50 MG/1
50 TABLET ORAL EVERY 6 HOURS PRN
Qty: 30 TABLET | Refills: 0 | Status: SHIPPED | OUTPATIENT
Start: 2025-07-02

## 2025-07-02 ASSESSMENT — PATIENT HEALTH QUESTIONNAIRE - PHQ9
10. IF YOU CHECKED OFF ANY PROBLEMS, HOW DIFFICULT HAVE THESE PROBLEMS MADE IT FOR YOU TO DO YOUR WORK, TAKE CARE OF THINGS AT HOME, OR GET ALONG WITH OTHER PEOPLE: SOMEWHAT DIFFICULT
SUM OF ALL RESPONSES TO PHQ QUESTIONS 1-9: 17
SUM OF ALL RESPONSES TO PHQ QUESTIONS 1-9: 17

## 2025-07-02 ASSESSMENT — ANXIETY QUESTIONNAIRES: GAD7 TOTAL SCORE: INCOMPLETE

## 2025-07-02 ASSESSMENT — PAIN SCALES - PAIN ENJOYMENT GENERAL ACTIVITY SCALE (PEG)
INTERFERED_ENJOYMENT_LIFE: 10 - COMPLETELY INTERFERES
INTERFERED_ENJOYMENT_LIFE: 10
AVG_PAIN_PASTWEEK: 7
AVG_PAIN_PASTWEEK: 7
INTERFERED_GENERAL_ACTIVITY: 10
INTERFERED_GENERAL_ACTIVITY: 10 - COMPLETELY INTERFERES
PEG_TOTALSCORE: 9
PEG_TOTALSCORE: 9

## 2025-07-02 ASSESSMENT — PAIN SCALES - GENERAL: PAINLEVEL_OUTOF10: MODERATE PAIN (6)

## 2025-07-02 NOTE — PATIENT INSTRUCTIONS
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     Start prednisone 40mg every day for 5 days to help with pain/ inflammation

## 2025-07-03 NOTE — TELEPHONE ENCOUNTER
SPINE PATIENTS - NEW PROTOCOL PREVISIT     RECORDS RECEVEIVED FROM: Referred by Olivia Ramirez MD   REASON FOR VISIT: Chronic bilateral low back pain with bilateral sciatica   PROVIDER: Marques   DATE OF APPT: 07/18/2025     NOTES (FOR ALL VISITS) STATUS DETAILS   OFFICE NOTE from referring provider  Referral and notes in chart   OFFICE NOTE from other specialist     DISCHARGE SUMMARY from hospital     DISCHARGE REPORT from ER     OPERATIVE REPORT     EMG REPORT     Injection     Physical therapy       IMAGING  (FOR ALL VISITS) STATUS DETAILS   MRI (HEAD, NECK, SPINE) internal MR Lumbar 06/13/2025   XRAY (SPINE) *NEUROSURGERY* internal XR Lumbar 05/23/2025   CT (HEAD, NECK, SPINE)          Does patient have C2C?  Year last updated Action     YES   [x]   2014   Please update at appointment if outdated more than 5 years       NO     []   N/A   Please complete C2C at appointment

## 2025-07-18 ENCOUNTER — OFFICE VISIT (OUTPATIENT)
Dept: NEUROSURGERY | Facility: CLINIC | Age: 50
End: 2025-07-18
Attending: FAMILY MEDICINE
Payer: COMMERCIAL

## 2025-07-18 ENCOUNTER — PRE VISIT (OUTPATIENT)
Dept: NEUROSURGERY | Facility: CLINIC | Age: 50
End: 2025-07-18

## 2025-07-18 VITALS — SYSTOLIC BLOOD PRESSURE: 105 MMHG | HEART RATE: 80 BPM | DIASTOLIC BLOOD PRESSURE: 71 MMHG | OXYGEN SATURATION: 96 %

## 2025-07-18 DIAGNOSIS — G89.29 CHRONIC BILATERAL LOW BACK PAIN WITHOUT SCIATICA: ICD-10-CM

## 2025-07-18 DIAGNOSIS — M54.50 CHRONIC BILATERAL LOW BACK PAIN WITHOUT SCIATICA: ICD-10-CM

## 2025-07-18 DIAGNOSIS — M51.16 LUMBAR DISC HERNIATION WITH RADICULOPATHY: Primary | ICD-10-CM

## 2025-07-18 PROCEDURE — 99204 OFFICE O/P NEW MOD 45 MIN: CPT | Performed by: NEUROLOGICAL SURGERY

## 2025-07-18 ASSESSMENT — PAIN SCALES - GENERAL: PAINLEVEL_OUTOF10: MODERATE PAIN (5)

## 2025-07-18 NOTE — PATIENT INSTRUCTIONS
Patient Instructions    Surgery scheduled at [facility] for Microdiscectomy with Dr. Mohan     Pre-Operative  Surgical risks: blood clots in the leg or lung, problems urinating, nerve damage, drainage from the incision, infection,stiffness  Pre-operative physical with primary care physician within 30 days of surgical date.   Likely same day procedure with discharge home day of surgery, may stay for 23 hour observation hospitalization for monitoring.     Shower procedure  Please shower with antimicrobial soap the night before surgery and morning of surgery. Please refer to showering instruction sheet in folder.  Eating/Drinking  Stop all solid foods 8 hours before surgery.  Keep drinking clear liquids until 4 hours before surgery  Clear liquids include water, clear juice, black coffee, or clear tea without milk, Gatorade, clear soda.   Medications  Hold Aspirin, NSAIDs (Advil/Ibuprofen, Indocin, Naproxen,Nuprin,Relafen/Nabumetone, Diclofenac,Meloxicam, Aleve, Celebrex) x 7 days prior to surgical date  You can take Tylenol (Acetaminophen) for pain, 1000 mg  Do not exceed 3,000 mg per day   If you are on chronic pain medication (oxycodone, Percocet, hydrocodone, Vicodin, Norco, Dilaudid, morphine, MS Contin, naltrexone, Suboxone, etc) or have a pain contract we will reach out to your pain clinic to gather your most recent records and recommendations for pain management post-op.  Please ask your provider who manages your chronic pain if they require you to schedule an office visit prior to surgery. Continue obtaining your pain medications from your current provider until surgery. Our team will manage your acute post-op pain in the hospital and during the recovery period. Your pain team will continue to manage your chronic pain.   Any other medications prescribed, please discuss with your primary care provider at your pre-operative physical   You may NOT receive the COVID-19 vaccine 72 hours before or  after surgery.    Pain Management  You will have post-operative incisional pain which may require pain medications and muscle relaxants. You will receive medication upon discharge.  You may resume taking NSAIDs (ex. Ibuprofen, aleve, naproxen) immediately post-op for Same day surgery procedures including arthroplasties   Do NOT drive while taking narcotic pain medication  Do NOT drink alcohol while using any pain medication  You can utilize ice as needed (no longer than 20 minutes at one time)  Avoid putting heat on the incision    Incision Care  No submerging incision in water such as pools, hot tubs, baths for at least 8 weeks or until incision is healed  It is okay to shower, just pat the incision dry   Remove dressing as instructed upon discharge  Watch for signs of infection  Redness, swelling, warmth, drainage, and fever of 101 degrees or higher  Notify clinic [phone].    Activity Restrictions  For the first 6-8 weeks, no lifting > 10 pounds, limited bending, twisting, or overhead reaching.  Take stairs in moderation   Ok to walk as tolerated, take short frequent walks. You may gradually increase the distance as tolerated.   Avoid bed rest and prolonged sitting for longer than 30 minutes (change positions frequently while awake)  No contact sports until after follow up visit  No high impact activities such as; running/jogging, snowmobile or 4 chew riding or any other recreational vehicles  Please call the clinic if you develop any of the following symptoms:  Swelling and/or warmth in one or both legs  Pain or tenderness in your leg, ankle, foot, or arm   Red or discolored skin     Post-Op Follow Up Appointments  2 week incision check with RN  6 week post op follow up visit with Physician Assistant   3 months post op with Dr. Mohan   Please call to schedule follow up appointment at [phone]    Resources  If you are currently employed, you will need to be off work for 2-4 weeks for post op recovery  and healing.  Please fax any FMLA/short term disability paperwork to [phone]  You may call our clinic when you'd like to return to work and we can provide a work letter  To allow staff adequate time to complete paperwork, please send as soon as possible       [facility]  Phone: [phone]  Fax: [phone]
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
## 2025-07-18 NOTE — NURSING NOTE
"Ritesh Soliman is a 50 year old female who presents for:  Chief Complaint   Patient presents with    Consult     2nd opinion  Chronic bilateral low back pain with bilateral sciatica           Initial Vitals:  /71   Pulse 80   SpO2 96%  Estimated body mass index is 35.3 kg/m  as calculated from the following:    Height as of 7/2/25: 5' 2\" (1.575 m).    Weight as of 7/2/25: 193 lb (87.5 kg).. There is no height or weight on file to calculate BSA. BP completed using cuff size: regular  Moderate Pain (5)      Honey Blevins   "

## 2025-07-18 NOTE — NURSING NOTE
PRE-SURGERY EDUCATION  Reviewed pre- and post-operative instructions as outlined in the After Visit Summary/Patient Instructions with patient.   Surgery folder was given to patient    Patient Education Topic: Procedure with Dr. Mohan   Learner(s): Patient  Knowledge Level: Basic  Readiness to Learn: Ready  Method:  Verbal explanation  Outcome: Able to verbalize instructions  Barriers to Learning: No barrier  STD/FMLA: Yes  Job Description: Physical Job  Time Off: 4 weeks     Patient had the opportunity for questions to be answered. Advised Patient to call clinic with any questions/concerns. Gave patient antibacterial soap for pre-surgery skin preparation.     Non-fusion SPINE PRE-SURGICAL CHECKLIST   Intervention/Diagnostic Meets Criteria Details   NDI/TYSON  Completed within the last 6 months Yes Confirmation of completion within last 6 months   Nicotine/Smoking Status  N/A No Non-fusion: no action needed     Physical Therapy   Completed within 6 months   Completed on the corresponding body part  Completed at least 4 weeks (unless provider documented that patient unable to tolerate PT) Yes  Records verified and located In process       Injection  Completed within last 1 years  Completed on the corresponding body part   No  Records verified and located N/A       Imaging  MRI or CT completed within 6 months Yes Records verified and located Internal   Chronic Pain or Pain contract  No No
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
## 2025-07-22 ENCOUNTER — TELEPHONE (OUTPATIENT)
Dept: NEUROSURGERY | Facility: CLINIC | Age: 50
End: 2025-07-22
Payer: COMMERCIAL

## 2025-07-23 NOTE — TELEPHONE ENCOUNTER
[facility] Call Center    Phone Message    May a detailed message be left on voicemail: yes     Reason for Call: Pt was returning a call from a care team to schedule surgery. Writer unable to connect with anyone via Epic chat. Pt states she will be available tomorrow via phone before 10am.    Please contact Pt at [phone]    Action Taken: Message routed to:  Other: [facility]    Travel Screening: Not Applicable     Date of Service:

## 2025-07-24 ENCOUNTER — TELEPHONE (OUTPATIENT)
Dept: NEUROSURGERY | Facility: CLINIC | Age: 50
End: 2025-07-24
Payer: COMMERCIAL

## 2025-07-25 NOTE — PROGRESS NOTES
Assessment & Plan     MAURI positive Family history of scleroderma   Working on getting appt with rheumatology   - Scleroderma Antibody Scl70 BRIA IgG  - Comprehensive metabolic panel  - CBC with platelets  - CK total    Chronic bilateral low back pain with bilateral sciatica  Tolerating lyrica, will increase.   [place] PDMP reviewed and appropriate    - pregabalin (LYRICA) 100 MG capsule; Take 1 capsule (100 mg) by mouth 3 times daily.  - c/w tramadol  - c/w methocarbamol   - surgery scheduled for 9/8/2025    Lipid screening  LDL today of 83  - Lipid Profile  - no lipid medications     Edema, unspecified type  Will do a trial of lasix. K of 4.2 today   - furosemide (LASIX) 20 MG tablet; Take 1 tablet (20 mg) by mouth daily as needed (edema).    The longitudinal plan of care for the diagnosis(es)/condition(s) as documented were addressed during this visit. Due to the added complexity in care, I will continue to support Ritesh in the subsequent management and with ongoing continuity of care.    Follow-up  Preop 8/29/2025     Ivone Awad is a 50 year old, presenting for the following health issues:  Back Pain        7/28/2025     9:36 AM   Additional Questions   Roomed by Lisandra   Accompanied by none         7/28/2025   Forms   Any forms needing to be completed Yes     History of Present Illness       Reason for visit:  Follow up   She is taking medications regularly.          Pain History:  When did you first notice your pain? 30 years ago   Have you seen this provider for your pain in the past? Yes   Where in your body do you have pain? Lower back bilaterally, hips bilaterally, buttocks bilaterally, down to legs bilaterally, starts to creep up mid back.   Are you seeing anyone else for your pain? No          Chronic Pain Follow Up:    Location of pain:  Lower back bilaterally, hips bilaterally, buttocks bilaterally, down to legs bilaterally, starts to creep up mid back.   Analgesia/pain control:    - Recent changes:   Getting worse     - Overall control: Inadequate pain control    - Current treatments: stretching and pain meds    Adherence:     - Do you ever take more pain medicine than prescribed? No    - When did you take your last dose of pain medicine?  2 days ago   Adverse effects: No     Established care with [facility] neuro[facility], Dr Mohan on 7/18/2025. Note reviewed. Recommendation for sx  - Left L5 to S1 redo, minimally invasive microdisectomy scheduled for 9/8/2025    - lyrica 75mg tid - helps some. Better than the gabapentin. Issues with weight gain   - tramadol - does help at night   - methocarbamol - helps some       # FHx scleroderma   - no appt     # edema   Whole body swells, worse in ankles and legs.   - intermittent issues  - worse after work     PDMP Review         Value Time User    State PDMP site checked  Yes 7/2/2025  9:15 AM Olivia Ramirez MD          Last CSA Agreement:   CSA -- Patient Level:    CSA: None found at the patient level.       Last UDS:   56}  Wt Readings from Last 4 Encounters:   07/28/25 88.5 kg (195 lb)   07/02/25 87.5 kg (193 lb)   05/23/25 88 kg (193 lb 14.4 oz)   10/31/24 83.6 kg (184 lb 4.8 oz)             Review of Systems  Constitutional, HEENT, cardiovascular, pulmonary, gi and gu systems are negative, except as otherwise noted.      Objective    /78 (BP Location: Right arm, Patient Position: Sitting, Cuff Size: Adult Regular)   Pulse 61   Temp 98.2  F (36.8  C) (Tympanic)   Wt 88.5 kg (195 lb)   SpO2 98%   BMI 35.67 kg/m    Body mass index is 35.67 kg/m .  Physical Exam  Constitutional:       General: She is not in acute distress.     Appearance: She is not ill-appearing.   Cardiovascular:      Rate and Rhythm: Normal rate and regular rhythm.      Heart sounds: No murmur heard.     Comments: Trace edema b/l  Pulmonary:      Effort: Pulmonary effort is normal. No respiratory distress.      Breath sounds: No wheezing or rales.   Neurological:      Mental Status: She is alert.    Psychiatric:         Mood and Affect: Mood normal.            Recent Results (from the past 24 hours)   Lipid Profile   Result Value Ref Range    Cholesterol 175 <200 mg/dL    Triglycerides 199 (H) <150 mg/dL    Direct Measure HDL 52 >=50 mg/dL    LDL Cholesterol Calculated 83 <100 mg/dL    Non HDL Cholesterol 123 <130 mg/dL    Patient Fasting > 8hrs? Yes     Narrative    Cholesterol  Desirable: < 200 mg/dL  Borderline High: 200 - 239 mg/dL  High: >= 240 mg/dL    Triglycerides  Normal: < 150 mg/dL  Borderline High: 150 - 199 mg/dL  High: 200-499 mg/dL  Very High: >= 500 mg/dL    Direct Measure HDL  Female: >= 50 mg/dL   Male: >= 40 mg/dL    LDL Cholesterol  Desirable: < 100 mg/dL  Above Desirable: 100 - 129 mg/dL   Borderline High: 130 - 159 mg/dL   High:  160 - 189 mg/dL   Very High: >= 190 mg/dL    Non HDL Cholesterol  Desirable: < 130 mg/dL  Above Desirable: 130 - 159 mg/dL  Borderline High: 160 - 189 mg/dL  High: 190 - 219 mg/dL  Very High: >= 220 mg/dL   Comprehensive metabolic panel   Result Value Ref Range    Sodium 141 135 - 145 mmol/L    Potassium 4.2 3.4 - 5.3 mmol/L    Carbon Dioxide (CO2) 20 (L) 22 - 29 mmol/L    Anion Gap 12 7 - 15 mmol/L    Urea Nitrogen 16.2 6.0 - 20.0 mg/dL    Creatinine 0.80 0.51 - 0.95 mg/dL    GFR Estimate 88 >60 mL/min/1.73m2    Calcium 9.1 8.8 - 10.4 mg/dL    Chloride 109 (H) 98 - 107 mmol/L    Glucose 104 (H) 70 - 99 mg/dL    Alkaline Phosphatase 88 40 - 150 U/L    AST 17 0 - 45 U/L    ALT 15 0 - 50 U/L    Protein Total 6.9 6.4 - 8.3 g/dL    Albumin 4.2 3.5 - 5.2 g/dL    Bilirubin Total 0.4 <=1.2 mg/dL    Patient Fasting > 8hrs? Yes    CBC with platelets   Result Value Ref Range    WBC Count 5.7 4.0 - 11.0 10e3/uL    RBC Count 4.60 3.80 - 5.20 10e6/uL    Hemoglobin 15.0 11.7 - 15.7 g/dL    Hematocrit 42.3 35.0 - 47.0 %    MCV 92 78 - 100 fL    MCH 32.6 26.5 - 33.0 pg    MCHC 35.5 31.5 - 36.5 g/dL    RDW 12.1 10.0 - 15.0 %    Platelet Count 298 150 - 450 10e3/uL   CK total    Result Value Ref Range    CK 53 26 - 192 U/L       The 10-year ASCVD risk score (Bryson LAMBERT, et al., 2019) is: 2.6%    Values used to calculate the score:      Age: 50 years      Sex: Female      Is Non- : No      Diabetic: No      Tobacco smoker: Yes      Systolic Blood Pressure: 108 mmHg      Is BP treated: No      HDL Cholesterol: 52 mg/dL      Total Cholesterol: 175 mg/dL      Signed Electronically by: Olivia Ramirez MD

## 2025-07-28 ENCOUNTER — OFFICE VISIT (OUTPATIENT)
Dept: FAMILY MEDICINE | Facility: OTHER | Age: 50
End: 2025-07-28
Attending: FAMILY MEDICINE
Payer: COMMERCIAL

## 2025-07-28 VITALS
TEMPERATURE: 98.2 F | WEIGHT: 195 LBS | HEART RATE: 61 BPM | SYSTOLIC BLOOD PRESSURE: 108 MMHG | DIASTOLIC BLOOD PRESSURE: 78 MMHG | OXYGEN SATURATION: 98 % | BODY MASS INDEX: 35.67 KG/M2

## 2025-07-28 DIAGNOSIS — M54.42 CHRONIC BILATERAL LOW BACK PAIN WITH BILATERAL SCIATICA: ICD-10-CM

## 2025-07-28 DIAGNOSIS — M54.41 CHRONIC BILATERAL LOW BACK PAIN WITH BILATERAL SCIATICA: ICD-10-CM

## 2025-07-28 DIAGNOSIS — G89.29 CHRONIC BILATERAL LOW BACK PAIN WITH BILATERAL SCIATICA: ICD-10-CM

## 2025-07-28 DIAGNOSIS — R60.9 EDEMA, UNSPECIFIED TYPE: ICD-10-CM

## 2025-07-28 DIAGNOSIS — R76.8 ANA POSITIVE: Primary | ICD-10-CM

## 2025-07-28 DIAGNOSIS — Z13.220 LIPID SCREENING: ICD-10-CM

## 2025-07-28 DIAGNOSIS — Z82.69 FAMILY HISTORY OF SCLERODERMA: ICD-10-CM

## 2025-07-28 LAB
ALBUMIN SERPL BCG-MCNC: 4.2 G/DL (ref 3.5–5.2)
ALP SERPL-CCNC: 88 U/L (ref 40–150)
ALT SERPL W P-5'-P-CCNC: 15 U/L (ref 0–50)
ANION GAP SERPL CALCULATED.3IONS-SCNC: 12 MMOL/L (ref 7–15)
AST SERPL W P-5'-P-CCNC: 17 U/L (ref 0–45)
BILIRUB SERPL-MCNC: 0.4 MG/DL
BUN SERPL-MCNC: 16.2 MG/DL (ref 6–20)
CALCIUM SERPL-MCNC: 9.1 MG/DL (ref 8.8–10.4)
CHLORIDE SERPL-SCNC: 109 MMOL/L (ref 98–107)
CHOLEST SERPL-MCNC: 175 MG/DL
CK SERPL-CCNC: 53 U/L (ref 26–192)
CREAT SERPL-MCNC: 0.8 MG/DL (ref 0.51–0.95)
EGFRCR SERPLBLD CKD-EPI 2021: 88 ML/MIN/1.73M2
ERYTHROCYTE [DISTWIDTH] IN BLOOD BY AUTOMATED COUNT: 12.1 % (ref 10–15)
FASTING STATUS PATIENT QL REPORTED: YES
FASTING STATUS PATIENT QL REPORTED: YES
GLUCOSE SERPL-MCNC: 104 MG/DL (ref 70–99)
HCO3 SERPL-SCNC: 20 MMOL/L (ref 22–29)
HCT VFR BLD AUTO: 42.3 % (ref 35–47)
HDLC SERPL-MCNC: 52 MG/DL
HGB BLD-MCNC: 15 G/DL (ref 11.7–15.7)
LDLC SERPL CALC-MCNC: 83 MG/DL
MCH RBC QN AUTO: 32.6 PG (ref 26.5–33)
MCHC RBC AUTO-ENTMCNC: 35.5 G/DL (ref 31.5–36.5)
MCV RBC AUTO: 92 FL (ref 78–100)
NONHDLC SERPL-MCNC: 123 MG/DL
PLATELET # BLD AUTO: 298 10E3/UL (ref 150–450)
POTASSIUM SERPL-SCNC: 4.2 MMOL/L (ref 3.4–5.3)
PROT SERPL-MCNC: 6.9 G/DL (ref 6.4–8.3)
RBC # BLD AUTO: 4.6 10E6/UL (ref 3.8–5.2)
SODIUM SERPL-SCNC: 141 MMOL/L (ref 135–145)
TRIGL SERPL-MCNC: 199 MG/DL
WBC # BLD AUTO: 5.7 10E3/UL (ref 4–11)

## 2025-07-28 PROCEDURE — 85018 HEMOGLOBIN: CPT | Mod: ZL | Performed by: FAMILY MEDICINE

## 2025-07-28 PROCEDURE — 82040 ASSAY OF SERUM ALBUMIN: CPT | Mod: ZL | Performed by: FAMILY MEDICINE

## 2025-07-28 PROCEDURE — G0463 HOSPITAL OUTPT CLINIC VISIT: HCPCS

## 2025-07-28 PROCEDURE — 82550 ASSAY OF CK (CPK): CPT | Mod: ZL | Performed by: FAMILY MEDICINE

## 2025-07-28 PROCEDURE — 86235 NUCLEAR ANTIGEN ANTIBODY: CPT | Mod: ZL | Performed by: FAMILY MEDICINE

## 2025-07-28 PROCEDURE — 80061 LIPID PANEL: CPT | Mod: ZL | Performed by: FAMILY MEDICINE

## 2025-07-28 PROCEDURE — 36415 COLL VENOUS BLD VENIPUNCTURE: CPT | Mod: ZL | Performed by: FAMILY MEDICINE

## 2025-07-28 RX ORDER — FUROSEMIDE 20 MG/1
20 TABLET ORAL DAILY PRN
Qty: 30 TABLET | Refills: 1 | Status: SHIPPED | OUTPATIENT
Start: 2025-07-28

## 2025-07-28 RX ORDER — PREGABALIN 100 MG/1
100 CAPSULE ORAL 3 TIMES DAILY
Qty: 90 CAPSULE | Refills: 1 | Status: SHIPPED | OUTPATIENT
Start: 2025-07-28

## 2025-07-28 ASSESSMENT — PAIN SCALES - GENERAL: PAINLEVEL_OUTOF10: MODERATE PAIN (5)

## 2025-07-28 NOTE — PATIENT INSTRUCTIONS
Increase your lyrica to 100mg three times per day. New prescription sent     We will call or MyChart you with your lab results.

## 2025-07-29 ENCOUNTER — TELEPHONE (OUTPATIENT)
Dept: FAMILY MEDICINE | Facility: OTHER | Age: 50
End: 2025-07-29

## 2025-07-29 LAB
ENA SCL70 IGG SER IA-ACNC: <0.6 U/ML
ENA SCL70 IGG SER IA-ACNC: NEGATIVE

## 2025-07-29 NOTE — TELEPHONE ENCOUNTER
LVM letting Star know Dr. Ramirez said it was fine to have her pap smear the same day as her pre-op appt

## 2025-08-29 ENCOUNTER — APPOINTMENT (OUTPATIENT)
Dept: LAB | Facility: OTHER | Age: 50
End: 2025-08-29
Attending: FAMILY MEDICINE
Payer: COMMERCIAL

## 2025-08-29 PROBLEM — R60.9 EDEMA, UNSPECIFIED TYPE: Status: ACTIVE | Noted: 2025-08-29

## (undated) DEVICE — TUBING-SUCTION 20FT

## (undated) DEVICE — CATH-ESOPHAGEAL C.R.E. 18-19-20

## (undated) DEVICE — CONNECTOR ERBEFLO 2 PORT 20325-215

## (undated) DEVICE — ENDOMETRIAL ABLATION DEVICE-NOVASURE

## (undated) DEVICE — IRRIGATION-H2O 1000ML

## (undated) DEVICE — TUBING-OUTFLOW HYSTEROPUMP

## (undated) DEVICE — MOUTHPIECE W/GUARD FOR ENDOSCOPY

## (undated) DEVICE — GOWN-SURG XL LVL 3 REINFORCED

## (undated) DEVICE — SOL-NACL 0.9% 1000ML

## (undated) DEVICE — CANISTER SUCTION MEDI-VAC GUARDIAN 2000ML 90D 65651-220

## (undated) DEVICE — BLADE-SCALPEL #11

## (undated) DEVICE — GLV-7.5 PROTEXIS PI CLASSIC LF/PF

## (undated) DEVICE — DRSG-TEGADERM MEDIUM #1626

## (undated) DEVICE — SPONGE-LAPAROTOMY PADS 18 X 18

## (undated) DEVICE — CAUTERY PAD-POLYHESIVE II ADULT

## (undated) DEVICE — CONNECTOR-ERBEFLO 2 PORT

## (undated) DEVICE — SUTURE-PDS 0 CTB-1 ZB346

## (undated) DEVICE — CATH-URETHRAL 14FR

## (undated) DEVICE — SENSOR-OXISENSOR II ADULT

## (undated) DEVICE — PACK-LAPAROTOMY-CUSTOM

## (undated) DEVICE — CANISTER-SUCTION 2000CC

## (undated) DEVICE — SUTURE-VICRYL 3-0 SH J416H

## (undated) DEVICE — SYRINGE-30CC SLIP TIP

## (undated) DEVICE — TRAY-SKIN PREP POVIDONE/IODINE

## (undated) DEVICE — LABEL-STERILE PREPRINTED FOR OR

## (undated) DEVICE — STERI-STRIP-1/2" X 4"

## (undated) DEVICE — SUTURE-MONOCRYL 4-0 PS-2 Y496G

## (undated) DEVICE — DRSG-NON ADHERING 3 X 8 TELFA

## (undated) DEVICE — TUBING-INFLOW HYSTEROPUMP

## (undated) DEVICE — LUBRICANT JELLY 2OZ. TUBE

## (undated) DEVICE — DRSG-SPONGE X-RAY 4 X 4

## (undated) DEVICE — TOPICAL SKIN ADHESIVE EXOFIN

## (undated) DEVICE — SWABSTICK-BENZOIN

## (undated) DEVICE — FORCEPS BIOPSY RADIAL JAW 4 LARGE W/NEEDLE 240CM M00513332

## (undated) DEVICE — SUTURE-PROLENE 2-0 CT-2 8411H

## (undated) DEVICE — DRSG-SPONGE STERILE 4 X 4

## (undated) DEVICE — SANITARY NAPKINS-SINGLE

## (undated) DEVICE — GLV-8.5 BIOGEL LATEX

## (undated) DEVICE — IRRIGATION-NACL 1000ML

## (undated) DEVICE — PACK-BASIN SET-UP

## (undated) DEVICE — IRRIGATION-NACL 3000ML (BAG)

## (undated) DEVICE — DRAPE-STERI 45X60CM #1010

## (undated) DEVICE — LIGHT HANDLE COVER

## (undated) DEVICE — DRAPE-IOBAN 2 35CM X 35CM

## (undated) DEVICE — NDL-BLUNT FILL 18G 1.5"

## (undated) DEVICE — PRESSURE INFUSOR DISP. 3000CC

## (undated) DEVICE — CATH-ESOPHAGEAL C.R.E. 15-16.5-18MM

## (undated) DEVICE — SOL WATER IRRIG 1000ML BOTTLE 2F7114

## (undated) DEVICE — SYRINGE-ASEPTO IRRIGATION

## (undated) DEVICE — PACK-GYN CYSTO-CUSTOM

## (undated) DEVICE — SYR INFLATOR AND GAUGE 60ML M00550601

## (undated) DEVICE — TUBING SUCTION 20FT N620A

## (undated) DEVICE — SYRINGE-10CC LUER LOCK

## (undated) DEVICE — SCD SLEEVE-KNEE REG.

## (undated) DEVICE — APPLICATOR-CHLORAPREP 26ML TINTED CHG 2%+ 70% IPA-SURGICAL

## (undated) DEVICE — NDL-25G 1-1/2" NON-SAFETY

## (undated) RX ORDER — LIDOCAINE HYDROCHLORIDE 20 MG/ML
INJECTION, SOLUTION EPIDURAL; INFILTRATION; INTRACAUDAL; PERINEURAL
Status: DISPENSED
Start: 2018-10-01

## (undated) RX ORDER — PROPOFOL 10 MG/ML
INJECTION, EMULSION INTRAVENOUS
Status: DISPENSED
Start: 2017-10-19

## (undated) RX ORDER — PROPOFOL 10 MG/ML
INJECTION, EMULSION INTRAVENOUS
Status: DISPENSED
Start: 2020-07-01

## (undated) RX ORDER — LIDOCAINE HYDROCHLORIDE 20 MG/ML
INJECTION, SOLUTION EPIDURAL; INFILTRATION; INTRACAUDAL; PERINEURAL
Status: DISPENSED
Start: 2020-07-01

## (undated) RX ORDER — FENTANYL CITRATE 50 UG/ML
INJECTION, SOLUTION INTRAMUSCULAR; INTRAVENOUS
Status: DISPENSED
Start: 2018-10-01

## (undated) RX ORDER — GLYCOPYRROLATE 0.2 MG/ML
INJECTION, SOLUTION INTRAMUSCULAR; INTRAVENOUS
Status: DISPENSED
Start: 2018-10-01

## (undated) RX ORDER — FENTANYL CITRATE 50 UG/ML
INJECTION, SOLUTION INTRAMUSCULAR; INTRAVENOUS
Status: DISPENSED
Start: 2017-10-19

## (undated) RX ORDER — LIDOCAINE HYDROCHLORIDE 20 MG/ML
INJECTION, SOLUTION EPIDURAL; INFILTRATION; INTRACAUDAL; PERINEURAL
Status: DISPENSED
Start: 2017-10-19

## (undated) RX ORDER — FENTANYL CITRATE 50 UG/ML
INJECTION, SOLUTION INTRAMUSCULAR; INTRAVENOUS
Status: DISPENSED
Start: 2020-07-01

## (undated) RX ORDER — PROPOFOL 10 MG/ML
INJECTION, EMULSION INTRAVENOUS
Status: DISPENSED
Start: 2018-10-01